# Patient Record
Sex: MALE | Race: WHITE | NOT HISPANIC OR LATINO
[De-identification: names, ages, dates, MRNs, and addresses within clinical notes are randomized per-mention and may not be internally consistent; named-entity substitution may affect disease eponyms.]

---

## 2017-05-04 ENCOUNTER — TRANSCRIPTION ENCOUNTER (OUTPATIENT)
Age: 47
End: 2017-05-04

## 2017-11-23 ENCOUNTER — TRANSCRIPTION ENCOUNTER (OUTPATIENT)
Age: 47
End: 2017-11-23

## 2018-08-14 ENCOUNTER — TRANSCRIPTION ENCOUNTER (OUTPATIENT)
Age: 48
End: 2018-08-14

## 2018-11-03 ENCOUNTER — INPATIENT (INPATIENT)
Facility: HOSPITAL | Age: 48
LOS: 1 days | Discharge: HOME | DRG: 208 | End: 2018-11-05
Attending: INTERNAL MEDICINE | Admitting: INTERNAL MEDICINE
Payer: COMMERCIAL

## 2018-11-03 VITALS
OXYGEN SATURATION: 99 % | HEART RATE: 114 BPM | SYSTOLIC BLOOD PRESSURE: 202 MMHG | RESPIRATION RATE: 18 BRPM | DIASTOLIC BLOOD PRESSURE: 114 MMHG

## 2018-11-03 DIAGNOSIS — Z98.52 VASECTOMY STATUS: Chronic | ICD-10-CM

## 2018-11-03 DIAGNOSIS — Z98.89 OTHER SPECIFIED POSTPROCEDURAL STATES: Chronic | ICD-10-CM

## 2018-11-04 DIAGNOSIS — R41.82 ALTERED MENTAL STATUS, UNSPECIFIED: ICD-10-CM

## 2018-11-04 DIAGNOSIS — R25.1 TREMOR, UNSPECIFIED: ICD-10-CM

## 2018-11-04 DIAGNOSIS — G92 TOXIC ENCEPHALOPATHY: ICD-10-CM

## 2018-11-04 DIAGNOSIS — I10 ESSENTIAL (PRIMARY) HYPERTENSION: ICD-10-CM

## 2018-11-04 DIAGNOSIS — F10.10 ALCOHOL ABUSE, UNCOMPLICATED: ICD-10-CM

## 2018-11-04 DIAGNOSIS — Z98.890 OTHER SPECIFIED POSTPROCEDURAL STATES: Chronic | ICD-10-CM

## 2018-11-04 DIAGNOSIS — J96.01 ACUTE RESPIRATORY FAILURE WITH HYPOXIA: ICD-10-CM

## 2018-11-04 DIAGNOSIS — E78.00 PURE HYPERCHOLESTEROLEMIA, UNSPECIFIED: ICD-10-CM

## 2018-11-04 DIAGNOSIS — F14.10 COCAINE ABUSE, UNCOMPLICATED: ICD-10-CM

## 2018-11-04 DIAGNOSIS — Z29.9 ENCOUNTER FOR PROPHYLACTIC MEASURES, UNSPECIFIED: ICD-10-CM

## 2018-11-04 LAB
ALBUMIN SERPL ELPH-MCNC: 3.2 G/DL — LOW (ref 3.3–5)
ALBUMIN SERPL ELPH-MCNC: 4.1 G/DL — SIGNIFICANT CHANGE UP (ref 3.3–5)
ALP SERPL-CCNC: 55 U/L — SIGNIFICANT CHANGE UP (ref 40–120)
ALP SERPL-CCNC: 74 U/L — SIGNIFICANT CHANGE UP (ref 40–120)
ALT FLD-CCNC: 34 U/L — SIGNIFICANT CHANGE UP (ref 12–78)
ALT FLD-CCNC: 42 U/L — SIGNIFICANT CHANGE UP (ref 12–78)
AMMONIA BLD-MCNC: 40 UMOL/L — HIGH (ref 11–32)
ANION GAP SERPL CALC-SCNC: 11 MMOL/L — SIGNIFICANT CHANGE UP (ref 5–17)
ANION GAP SERPL CALC-SCNC: 6 MMOL/L — SIGNIFICANT CHANGE UP (ref 5–17)
APAP SERPL-MCNC: <2 UG/ML — SIGNIFICANT CHANGE UP (ref 10–30)
APPEARANCE UR: CLEAR — SIGNIFICANT CHANGE UP
APTT BLD: 31.3 SEC — SIGNIFICANT CHANGE UP (ref 28.5–37)
AST SERPL-CCNC: 21 U/L — SIGNIFICANT CHANGE UP (ref 15–37)
AST SERPL-CCNC: 27 U/L — SIGNIFICANT CHANGE UP (ref 15–37)
BASE EXCESS BLDA CALC-SCNC: -2.8 MMOL/L — LOW (ref -2–2)
BASOPHILS # BLD AUTO: 0.03 K/UL — SIGNIFICANT CHANGE UP (ref 0–0.2)
BASOPHILS # BLD AUTO: 0.06 K/UL — SIGNIFICANT CHANGE UP (ref 0–0.2)
BASOPHILS NFR BLD AUTO: 0.3 % — SIGNIFICANT CHANGE UP (ref 0–2)
BASOPHILS NFR BLD AUTO: 0.8 % — SIGNIFICANT CHANGE UP (ref 0–2)
BILIRUB DIRECT SERPL-MCNC: <.1 MG/DL — SIGNIFICANT CHANGE UP (ref 0.05–0.2)
BILIRUB INDIRECT FLD-MCNC: >0.2 MG/DL — SIGNIFICANT CHANGE UP (ref 0.2–1)
BILIRUB SERPL-MCNC: 0.3 MG/DL — SIGNIFICANT CHANGE UP (ref 0.2–1.2)
BILIRUB SERPL-MCNC: 0.3 MG/DL — SIGNIFICANT CHANGE UP (ref 0.2–1.2)
BILIRUB UR-MCNC: NEGATIVE — SIGNIFICANT CHANGE UP
BLOOD GAS COMMENTS ARTERIAL: SIGNIFICANT CHANGE UP
BUN SERPL-MCNC: 9 MG/DL — SIGNIFICANT CHANGE UP (ref 7–23)
BUN SERPL-MCNC: 9 MG/DL — SIGNIFICANT CHANGE UP (ref 7–23)
CALCIUM SERPL-MCNC: 7.5 MG/DL — LOW (ref 8.5–10.1)
CALCIUM SERPL-MCNC: 8.4 MG/DL — LOW (ref 8.5–10.1)
CHLORIDE SERPL-SCNC: 108 MMOL/L — SIGNIFICANT CHANGE UP (ref 96–108)
CHLORIDE SERPL-SCNC: 110 MMOL/L — HIGH (ref 96–108)
CK MB BLD-MCNC: 1 % — SIGNIFICANT CHANGE UP (ref 0–3.5)
CK MB BLD-MCNC: <0.6 % — SIGNIFICANT CHANGE UP (ref 0–3.5)
CK MB CFR SERPL CALC: 1.2 NG/ML — SIGNIFICANT CHANGE UP (ref 0–3.6)
CK MB CFR SERPL CALC: <1 NG/ML — SIGNIFICANT CHANGE UP (ref 0–3.6)
CK SERPL-CCNC: 124 U/L — SIGNIFICANT CHANGE UP (ref 26–308)
CK SERPL-CCNC: 161 U/L — SIGNIFICANT CHANGE UP (ref 26–308)
CO2 SERPL-SCNC: 23 MMOL/L — SIGNIFICANT CHANGE UP (ref 22–31)
CO2 SERPL-SCNC: 28 MMOL/L — SIGNIFICANT CHANGE UP (ref 22–31)
COLOR SPEC: YELLOW — SIGNIFICANT CHANGE UP
CREAT SERPL-MCNC: 0.96 MG/DL — SIGNIFICANT CHANGE UP (ref 0.5–1.3)
CREAT SERPL-MCNC: 1 MG/DL — SIGNIFICANT CHANGE UP (ref 0.5–1.3)
DIFF PNL FLD: NEGATIVE — SIGNIFICANT CHANGE UP
EOSINOPHIL # BLD AUTO: 0.09 K/UL — SIGNIFICANT CHANGE UP (ref 0–0.5)
EOSINOPHIL # BLD AUTO: 0.13 K/UL — SIGNIFICANT CHANGE UP (ref 0–0.5)
EOSINOPHIL NFR BLD AUTO: 1 % — SIGNIFICANT CHANGE UP (ref 0–6)
EOSINOPHIL NFR BLD AUTO: 1.7 % — SIGNIFICANT CHANGE UP (ref 0–6)
ETHANOL SERPL-MCNC: 256 MG/DL — HIGH (ref 0–10)
GLUCOSE SERPL-MCNC: 88 MG/DL — SIGNIFICANT CHANGE UP (ref 70–99)
GLUCOSE SERPL-MCNC: 96 MG/DL — SIGNIFICANT CHANGE UP (ref 70–99)
GLUCOSE UR QL: NEGATIVE — SIGNIFICANT CHANGE UP
GRAM STN FLD: SIGNIFICANT CHANGE UP
HAV IGM SER-ACNC: SIGNIFICANT CHANGE UP
HBV CORE IGM SER-ACNC: SIGNIFICANT CHANGE UP
HBV SURFACE AG SER-ACNC: SIGNIFICANT CHANGE UP
HCO3 BLDA-SCNC: 21 MMOL/L — LOW (ref 23–27)
HCT VFR BLD CALC: 49.1 % — SIGNIFICANT CHANGE UP (ref 39–50)
HCT VFR BLD CALC: 55.4 % — HIGH (ref 39–50)
HCV AB S/CO SERPL IA: 0.06 S/CO — SIGNIFICANT CHANGE UP
HCV AB SERPL-IMP: SIGNIFICANT CHANGE UP
HGB BLD-MCNC: 16.2 G/DL — SIGNIFICANT CHANGE UP (ref 13–17)
HGB BLD-MCNC: 18.9 G/DL — HIGH (ref 13–17)
HIV 1+2 AB+HIV1 P24 AG SERPL QL IA: SIGNIFICANT CHANGE UP
HOROWITZ INDEX BLDA+IHG-RTO: 60 — SIGNIFICANT CHANGE UP
IMM GRANULOCYTES NFR BLD AUTO: 0.3 % — SIGNIFICANT CHANGE UP (ref 0–1.5)
IMM GRANULOCYTES NFR BLD AUTO: 0.4 % — SIGNIFICANT CHANGE UP (ref 0–1.5)
INR BLD: 0.96 RATIO — SIGNIFICANT CHANGE UP (ref 0.88–1.16)
KETONES UR-MCNC: ABNORMAL
LACTATE SERPL-SCNC: 2.1 MMOL/L — HIGH (ref 0.7–2)
LACTATE SERPL-SCNC: 3 MMOL/L — HIGH (ref 0.7–2)
LEGIONELLA AG UR QL: NEGATIVE — SIGNIFICANT CHANGE UP
LEUKOCYTE ESTERASE UR-ACNC: NEGATIVE — SIGNIFICANT CHANGE UP
LYMPHOCYTES # BLD AUTO: 1.37 K/UL — SIGNIFICANT CHANGE UP (ref 1–3.3)
LYMPHOCYTES # BLD AUTO: 15.3 % — SIGNIFICANT CHANGE UP (ref 13–44)
LYMPHOCYTES # BLD AUTO: 3.67 K/UL — HIGH (ref 1–3.3)
LYMPHOCYTES # BLD AUTO: 47.3 % — HIGH (ref 13–44)
MAGNESIUM SERPL-MCNC: 2 MG/DL — SIGNIFICANT CHANGE UP (ref 1.6–2.6)
MAGNESIUM SERPL-MCNC: 2.2 MG/DL — SIGNIFICANT CHANGE UP (ref 1.6–2.6)
MCHC RBC-ENTMCNC: 29.7 PG — SIGNIFICANT CHANGE UP (ref 27–34)
MCHC RBC-ENTMCNC: 30 PG — SIGNIFICANT CHANGE UP (ref 27–34)
MCHC RBC-ENTMCNC: 33 GM/DL — SIGNIFICANT CHANGE UP (ref 32–36)
MCHC RBC-ENTMCNC: 34.1 GM/DL — SIGNIFICANT CHANGE UP (ref 32–36)
MCV RBC AUTO: 88.1 FL — SIGNIFICANT CHANGE UP (ref 80–100)
MCV RBC AUTO: 90.1 FL — SIGNIFICANT CHANGE UP (ref 80–100)
MONOCYTES # BLD AUTO: 0.82 K/UL — SIGNIFICANT CHANGE UP (ref 0–0.9)
MONOCYTES # BLD AUTO: 1.01 K/UL — HIGH (ref 0–0.9)
MONOCYTES NFR BLD AUTO: 10.6 % — SIGNIFICANT CHANGE UP (ref 2–14)
MONOCYTES NFR BLD AUTO: 11.3 % — SIGNIFICANT CHANGE UP (ref 2–14)
NEUTROPHILS # BLD AUTO: 3.05 K/UL — SIGNIFICANT CHANGE UP (ref 1.8–7.4)
NEUTROPHILS # BLD AUTO: 6.43 K/UL — SIGNIFICANT CHANGE UP (ref 1.8–7.4)
NEUTROPHILS NFR BLD AUTO: 39.2 % — LOW (ref 43–77)
NEUTROPHILS NFR BLD AUTO: 71.8 % — SIGNIFICANT CHANGE UP (ref 43–77)
NITRITE UR-MCNC: NEGATIVE — SIGNIFICANT CHANGE UP
NRBC # BLD: 0 /100 WBCS — SIGNIFICANT CHANGE UP (ref 0–0)
PCO2 BLDA: 47 MMHG — HIGH (ref 32–46)
PH BLDA: 7.3 — LOW (ref 7.35–7.45)
PH UR: 5 — SIGNIFICANT CHANGE UP (ref 5–8)
PHOSPHATE SERPL-MCNC: 1.6 MG/DL — LOW (ref 2.5–4.5)
PLATELET # BLD AUTO: 270 K/UL — SIGNIFICANT CHANGE UP (ref 150–400)
PLATELET # BLD AUTO: 331 K/UL — SIGNIFICANT CHANGE UP (ref 150–400)
PO2 BLDA: 133 MMHG — HIGH (ref 74–108)
POTASSIUM SERPL-MCNC: 4 MMOL/L — SIGNIFICANT CHANGE UP (ref 3.5–5.3)
POTASSIUM SERPL-MCNC: 4.1 MMOL/L — SIGNIFICANT CHANGE UP (ref 3.5–5.3)
POTASSIUM SERPL-SCNC: 4 MMOL/L — SIGNIFICANT CHANGE UP (ref 3.5–5.3)
POTASSIUM SERPL-SCNC: 4.1 MMOL/L — SIGNIFICANT CHANGE UP (ref 3.5–5.3)
PROCALCITONIN SERPL-MCNC: <0.05 — SIGNIFICANT CHANGE UP (ref 0–0.04)
PROT SERPL-MCNC: 6.6 G/DL — SIGNIFICANT CHANGE UP (ref 6–8.3)
PROT SERPL-MCNC: 8.5 G/DL — HIGH (ref 6–8.3)
PROT UR-MCNC: 25 MG/DL
PROTHROM AB SERPL-ACNC: 10.9 SEC — SIGNIFICANT CHANGE UP (ref 10–12.9)
RBC # BLD: 5.45 M/UL — SIGNIFICANT CHANGE UP (ref 4.2–5.8)
RBC # BLD: 6.29 M/UL — HIGH (ref 4.2–5.8)
RBC # FLD: 12.6 % — SIGNIFICANT CHANGE UP (ref 10.3–14.5)
RBC # FLD: 12.6 % — SIGNIFICANT CHANGE UP (ref 10.3–14.5)
SALICYLATES SERPL-MCNC: <1.7 MG/DL — LOW (ref 2.8–20)
SAO2 % BLDA: 98 % — HIGH (ref 92–96)
SODIUM SERPL-SCNC: 142 MMOL/L — SIGNIFICANT CHANGE UP (ref 135–145)
SODIUM SERPL-SCNC: 144 MMOL/L — SIGNIFICANT CHANGE UP (ref 135–145)
SP GR SPEC: 1.01 — SIGNIFICANT CHANGE UP (ref 1.01–1.02)
SPECIMEN SOURCE: SIGNIFICANT CHANGE UP
T4 AB SER-ACNC: 6.6 UG/DL — SIGNIFICANT CHANGE UP (ref 4.6–12)
TROPONIN I SERPL-MCNC: <.015 NG/ML — SIGNIFICANT CHANGE UP (ref 0.01–0.04)
TROPONIN I SERPL-MCNC: <.015 NG/ML — SIGNIFICANT CHANGE UP (ref 0.01–0.04)
TSH SERPL-MCNC: 1.41 UIU/ML — SIGNIFICANT CHANGE UP (ref 0.36–3.74)
UROBILINOGEN FLD QL: NEGATIVE — SIGNIFICANT CHANGE UP
WBC # BLD: 7.76 K/UL — SIGNIFICANT CHANGE UP (ref 3.8–10.5)
WBC # BLD: 8.96 K/UL — SIGNIFICANT CHANGE UP (ref 3.8–10.5)
WBC # FLD AUTO: 7.76 K/UL — SIGNIFICANT CHANGE UP (ref 3.8–10.5)
WBC # FLD AUTO: 8.96 K/UL — SIGNIFICANT CHANGE UP (ref 3.8–10.5)

## 2018-11-04 PROCEDURE — 99291 CRITICAL CARE FIRST HOUR: CPT

## 2018-11-04 PROCEDURE — 71275 CT ANGIOGRAPHY CHEST: CPT | Mod: 26

## 2018-11-04 PROCEDURE — 99222 1ST HOSP IP/OBS MODERATE 55: CPT | Mod: AI,GC

## 2018-11-04 PROCEDURE — 71045 X-RAY EXAM CHEST 1 VIEW: CPT | Mod: 26

## 2018-11-04 PROCEDURE — 70450 CT HEAD/BRAIN W/O DYE: CPT | Mod: 26

## 2018-11-04 PROCEDURE — 74177 CT ABD & PELVIS W/CONTRAST: CPT | Mod: 26

## 2018-11-04 PROCEDURE — 93010 ELECTROCARDIOGRAM REPORT: CPT

## 2018-11-04 RX ORDER — SODIUM CHLORIDE 9 MG/ML
1000 INJECTION, SOLUTION INTRAVENOUS
Qty: 0 | Refills: 0 | Status: DISCONTINUED | OUTPATIENT
Start: 2018-11-04 | End: 2018-11-04

## 2018-11-04 RX ORDER — ETOMIDATE 2 MG/ML
20 INJECTION INTRAVENOUS ONCE
Qty: 0 | Refills: 0 | Status: COMPLETED | OUTPATIENT
Start: 2018-11-04 | End: 2018-11-04

## 2018-11-04 RX ORDER — FOLIC ACID 0.8 MG
1 TABLET ORAL DAILY
Qty: 0 | Refills: 0 | Status: DISCONTINUED | OUTPATIENT
Start: 2018-11-04 | End: 2018-11-05

## 2018-11-04 RX ORDER — AZITHROMYCIN 500 MG/1
500 TABLET, FILM COATED ORAL EVERY 24 HOURS
Qty: 0 | Refills: 0 | Status: DISCONTINUED | OUTPATIENT
Start: 2018-11-05 | End: 2018-11-04

## 2018-11-04 RX ORDER — PIPERACILLIN AND TAZOBACTAM 4; .5 G/20ML; G/20ML
3.38 INJECTION, POWDER, LYOPHILIZED, FOR SOLUTION INTRAVENOUS ONCE
Qty: 0 | Refills: 0 | Status: COMPLETED | OUTPATIENT
Start: 2018-11-04 | End: 2018-11-04

## 2018-11-04 RX ORDER — HEPARIN SODIUM 5000 [USP'U]/ML
5000 INJECTION INTRAVENOUS; SUBCUTANEOUS EVERY 8 HOURS
Qty: 0 | Refills: 0 | Status: DISCONTINUED | OUTPATIENT
Start: 2018-11-04 | End: 2018-11-05

## 2018-11-04 RX ORDER — ATORVASTATIN CALCIUM 80 MG/1
20 TABLET, FILM COATED ORAL AT BEDTIME
Qty: 0 | Refills: 0 | Status: DISCONTINUED | OUTPATIENT
Start: 2018-11-04 | End: 2018-11-05

## 2018-11-04 RX ORDER — MIDAZOLAM HYDROCHLORIDE 1 MG/ML
2 INJECTION, SOLUTION INTRAMUSCULAR; INTRAVENOUS
Qty: 0 | Refills: 0 | Status: DISCONTINUED | OUTPATIENT
Start: 2018-11-04 | End: 2018-11-05

## 2018-11-04 RX ORDER — SODIUM CHLORIDE 9 MG/ML
1000 INJECTION INTRAMUSCULAR; INTRAVENOUS; SUBCUTANEOUS ONCE
Qty: 0 | Refills: 0 | Status: COMPLETED | OUTPATIENT
Start: 2018-11-04 | End: 2018-11-04

## 2018-11-04 RX ORDER — SODIUM CHLORIDE 9 MG/ML
1000 INJECTION, SOLUTION INTRAVENOUS ONCE
Qty: 0 | Refills: 0 | Status: COMPLETED | OUTPATIENT
Start: 2018-11-04 | End: 2018-11-04

## 2018-11-04 RX ORDER — THIAMINE MONONITRATE (VIT B1) 100 MG
100 TABLET ORAL DAILY
Qty: 0 | Refills: 0 | Status: DISCONTINUED | OUTPATIENT
Start: 2018-11-04 | End: 2018-11-05

## 2018-11-04 RX ORDER — PIPERACILLIN AND TAZOBACTAM 4; .5 G/20ML; G/20ML
3.38 INJECTION, POWDER, LYOPHILIZED, FOR SOLUTION INTRAVENOUS EVERY 8 HOURS
Qty: 0 | Refills: 0 | Status: DISCONTINUED | OUTPATIENT
Start: 2018-11-04 | End: 2018-11-04

## 2018-11-04 RX ORDER — CIPROFLOXACIN AND DEXAMETHASONE 3; 1 MG/ML; MG/ML
4 SUSPENSION/ DROPS AURICULAR (OTIC)
Qty: 0 | Refills: 0 | COMMUNITY

## 2018-11-04 RX ORDER — AZITHROMYCIN 500 MG/1
500 TABLET, FILM COATED ORAL ONCE
Qty: 0 | Refills: 0 | Status: COMPLETED | OUTPATIENT
Start: 2018-11-04 | End: 2018-11-04

## 2018-11-04 RX ORDER — LANOLIN ALCOHOL/MO/W.PET/CERES
3 CREAM (GRAM) TOPICAL AT BEDTIME
Qty: 0 | Refills: 0 | Status: DISCONTINUED | OUTPATIENT
Start: 2018-11-04 | End: 2018-11-05

## 2018-11-04 RX ORDER — CYCLOBENZAPRINE HYDROCHLORIDE 10 MG/1
10 TABLET, FILM COATED ORAL
Qty: 0 | Refills: 0 | COMMUNITY

## 2018-11-04 RX ORDER — PANTOPRAZOLE SODIUM 20 MG/1
40 TABLET, DELAYED RELEASE ORAL DAILY
Qty: 0 | Refills: 0 | Status: DISCONTINUED | OUTPATIENT
Start: 2018-11-04 | End: 2018-11-04

## 2018-11-04 RX ORDER — LISINOPRIL 2.5 MG/1
10 TABLET ORAL DAILY
Qty: 0 | Refills: 0 | Status: DISCONTINUED | OUTPATIENT
Start: 2018-11-04 | End: 2018-11-05

## 2018-11-04 RX ORDER — ROCURONIUM BROMIDE 10 MG/ML
100 VIAL (ML) INTRAVENOUS ONCE
Qty: 0 | Refills: 0 | Status: COMPLETED | OUTPATIENT
Start: 2018-11-04 | End: 2018-11-04

## 2018-11-04 RX ORDER — MIDAZOLAM HYDROCHLORIDE 1 MG/ML
2 INJECTION, SOLUTION INTRAMUSCULAR; INTRAVENOUS ONCE
Qty: 0 | Refills: 0 | Status: DISCONTINUED | OUTPATIENT
Start: 2018-11-04 | End: 2018-11-04

## 2018-11-04 RX ORDER — PROPOFOL 10 MG/ML
5 INJECTION, EMULSION INTRAVENOUS
Qty: 1000 | Refills: 0 | Status: DISCONTINUED | OUTPATIENT
Start: 2018-11-04 | End: 2018-11-04

## 2018-11-04 RX ORDER — POTASSIUM CHLORIDE 20 MEQ
1 PACKET (EA) ORAL
Qty: 0 | Refills: 0 | COMMUNITY

## 2018-11-04 RX ADMIN — MIDAZOLAM HYDROCHLORIDE 2 MILLIGRAM(S): 1 INJECTION, SOLUTION INTRAMUSCULAR; INTRAVENOUS at 06:30

## 2018-11-04 RX ADMIN — HEPARIN SODIUM 5000 UNIT(S): 5000 INJECTION INTRAVENOUS; SUBCUTANEOUS at 13:31

## 2018-11-04 RX ADMIN — LISINOPRIL 10 MILLIGRAM(S): 2.5 TABLET ORAL at 10:21

## 2018-11-04 RX ADMIN — PIPERACILLIN AND TAZOBACTAM 25 GRAM(S): 4; .5 INJECTION, POWDER, LYOPHILIZED, FOR SOLUTION INTRAVENOUS at 08:32

## 2018-11-04 RX ADMIN — PROPOFOL 5 MICROGRAM(S)/KG/MIN: 10 INJECTION, EMULSION INTRAVENOUS at 01:23

## 2018-11-04 RX ADMIN — SODIUM CHLORIDE 2000 MILLILITER(S): 9 INJECTION INTRAMUSCULAR; INTRAVENOUS; SUBCUTANEOUS at 00:29

## 2018-11-04 RX ADMIN — SODIUM CHLORIDE 125 MILLILITER(S): 9 INJECTION, SOLUTION INTRAVENOUS at 03:50

## 2018-11-04 RX ADMIN — HEPARIN SODIUM 5000 UNIT(S): 5000 INJECTION INTRAVENOUS; SUBCUTANEOUS at 22:03

## 2018-11-04 RX ADMIN — HEPARIN SODIUM 5000 UNIT(S): 5000 INJECTION INTRAVENOUS; SUBCUTANEOUS at 05:19

## 2018-11-04 RX ADMIN — ETOMIDATE 20 MILLIGRAM(S): 2 INJECTION INTRAVENOUS at 01:53

## 2018-11-04 RX ADMIN — SODIUM CHLORIDE 1000 MILLILITER(S): 9 INJECTION INTRAMUSCULAR; INTRAVENOUS; SUBCUTANEOUS at 01:23

## 2018-11-04 RX ADMIN — PIPERACILLIN AND TAZOBACTAM 200 GRAM(S): 4; .5 INJECTION, POWDER, LYOPHILIZED, FOR SOLUTION INTRAVENOUS at 01:10

## 2018-11-04 RX ADMIN — Medication 85 MILLIMOLE(S): at 10:52

## 2018-11-04 RX ADMIN — Medication 100 MILLIGRAM(S): at 10:16

## 2018-11-04 RX ADMIN — Medication 1 TABLET(S): at 10:16

## 2018-11-04 RX ADMIN — AZITHROMYCIN 255 MILLIGRAM(S): 500 TABLET, FILM COATED ORAL at 04:23

## 2018-11-04 RX ADMIN — PROPOFOL 3.39 MICROGRAM(S)/KG/MIN: 10 INJECTION, EMULSION INTRAVENOUS at 00:29

## 2018-11-04 RX ADMIN — ATORVASTATIN CALCIUM 20 MILLIGRAM(S): 80 TABLET, FILM COATED ORAL at 22:02

## 2018-11-04 RX ADMIN — Medication 1 MILLIGRAM(S): at 10:16

## 2018-11-04 RX ADMIN — PROPOFOL 3.39 MICROGRAM(S)/KG/MIN: 10 INJECTION, EMULSION INTRAVENOUS at 05:23

## 2018-11-04 RX ADMIN — Medication 20 MILLIGRAM(S): at 13:31

## 2018-11-04 RX ADMIN — PIPERACILLIN AND TAZOBACTAM 3.38 GRAM(S): 4; .5 INJECTION, POWDER, LYOPHILIZED, FOR SOLUTION INTRAVENOUS at 01:40

## 2018-11-04 RX ADMIN — MIDAZOLAM HYDROCHLORIDE 2 MILLIGRAM(S): 1 INJECTION, SOLUTION INTRAMUSCULAR; INTRAVENOUS at 01:53

## 2018-11-04 RX ADMIN — SODIUM CHLORIDE 2000 MILLILITER(S): 9 INJECTION INTRAMUSCULAR; INTRAVENOUS; SUBCUTANEOUS at 00:30

## 2018-11-04 RX ADMIN — Medication 100 MILLIGRAM(S): at 01:53

## 2018-11-04 RX ADMIN — SODIUM CHLORIDE 2000 MILLILITER(S): 9 INJECTION, SOLUTION INTRAVENOUS at 16:38

## 2018-11-04 NOTE — PROVIDER CONTACT NOTE (EICU) - BACKGROUND
48 year old male with PMH of HTN and hyperlipidemia with episodes of body shaking and loosing consciousness secondary to alcohol and cocaine intoxication.

## 2018-11-04 NOTE — H&P ADULT - ATTENDING COMMENTS
47 yo male w/ hx of alcohol abuse and recent drinking binge (unknown quantity), p/w episodes of shaking w/ waxing and waning consciousness, found to have blood alcohol level 256, ammonia 40, lactate 3, cocaine + on drug screen,  admitted with alcoholic intoxication a/w coma/unconsciousness; intubated in ER    Plan  Outlines:    - Admit to ICU  - Details of management plan as per ICU team   - I have personally seen and examined the patient.  - Please see detailed History & Physical document above for additional / complete details regarding history, examination, assessment, plan - these were discussed and formulated together with the resident and I am in agreement.  - Prognosis: guarded  - Plan of care / treatment  and benefits / risks / alternatives  were explained in detail to the  FAMILY; understanding verbalized and they are in agreement. All questions were answered to their satisfaction.

## 2018-11-04 NOTE — PROCEDURE NOTE - ADDITIONAL PROCEDURE DETAILS
48M with AMS, tremors, intubated for airway protection/apneic episodes. NGT placed for medications/TF's

## 2018-11-04 NOTE — ED ADULT NURSE NOTE - NSIMPLEMENTINTERV_GEN_ALL_ED
Implemented All Fall Risk Interventions:  [place] to call system. Call bell, personal items and telephone within reach. Instruct patient to call for assistance. Room bathroom lighting operational. Non-slip footwear when patient is off stretcher. Physically safe environment: no spills, clutter or unnecessary equipment. Stretcher in lowest position, wheels locked, appropriate side rails in place. Provide visual cue, wrist band, yellow gown, etc. Monitor gait and stability. Monitor for mental status changes and reorient to person, place, and time. Review medications for side effects contributing to fall risk. Reinforce activity limits and safety measures with patient and family.

## 2018-11-04 NOTE — DIETITIAN INITIAL EVALUATION ADULT. - NUTRITION INTERVENTION
Nutrition Education/Meals and Snack/Enteral Nutrition Vitamin/Meals and Snack/Enteral Nutrition/Nutrition Education

## 2018-11-04 NOTE — ED PROVIDER NOTE - OBJECTIVE STATEMENT
Pt is a 47 yo male hx etoh abuse, occasional marijuana.  pt has been traveling lately and came home today . pt had been drinking and then went out with friends and drank more. per father, pt began with right arm shaking, then had both arms shaking but awake. pt then began to be a bit confused and they got him in car and on way to er became unconsious. on arrival lethargic and with seizure like activity but awake and answeriing questions, but confused.

## 2018-11-04 NOTE — ED ADULT NURSE NOTE - OBJECTIVE STATEMENT
As per pt father, he began with right arm shaking, then had both arms shaking but awake with confusion. Father states they got him in a  car and drove him here and on the way to the  ER Pt became unconscious. On arrival pt was unresponsive, and a few minutes after pt responded but lethargic with seizure like activity but awake. Iv placed, lab sent. Pt was intubated at 00:03 am, lip 24cm. normal sinus on the monitor. Vss, afebrile. will continue to monitor

## 2018-11-04 NOTE — PROGRESS NOTE ADULT - SUBJECTIVE AND OBJECTIVE BOX
HISTORY  48y Male    24 HOUR EVENTS:    SUBJECTIVE/ROS:  [ ] A ten-point review of systems was otherwise negative except as noted.  [ ] Due to altered mental status/intubation, subjective information were not able to be obtained from the patient. History was obtained, to the extent possible, from review of the chart and collateral sources of information.      NEURO  RASS:     GCS:     CAM ICU:  Exam:   Meds: midazolam Injectable 2 milliGRAM(s) IV Push every 2 hours PRN agitation  propofol Infusion 5 MICROgram(s)/kG/Min IV Continuous <Continuous>    [x] Adequacy of sedation and pain control has been assessed and adjusted      RESPIRATORY  RR: 11 (11-04-18 @ 08:00) (11 - 24)  SpO2: 96% (11-04-18 @ 08:24) (94% - 100%)  Wt(kg): --  Exam: unlabored, clear to auscultation bilaterally  Mechanical Ventilation: Mode: Spontaneous/ CPAP (Continuous Positive Airway Pressure), RR (patient): 17, FiO2: 40, PEEP: 5, PS: 10, MAP: 9, PIP: 16  ABG - ( 04 Nov 2018 02:39 )  pH: x     /  pCO2: x     /  pO2: x     / HCO3: x     / Base Excess: x     /  SaO2: x       Lactate: 2.1              [ ] Extubation Readiness Assessed  Meds:       CARDIOVASCULAR  HR: 87 (11-04-18 @ 08:24) (80 - 114)  BP: 106/64 (11-04-18 @ 08:00) (104/58 - 202/114)  BP(mean): 78 (11-04-18 @ 08:00) (75 - 106)  ABP: --  ABP(mean): --  Wt(kg): --  CVP(cm H2O): --    Lactate, Blood: 2.1 mmol/L (11-04 @ 02:39)    Exam:  Cardiac Rhythm:  Perfusion     [ ]Adequate   [ ]Inadequate  Mentation   [ ]Normal       [ ]Reduced  Extremities  [ ]Warm         [ ]Cool  Volume Status [ ]Hypervolemic [ ]Euvolemic [ ]Hypovolemic  Meds:       GI/NUTRITION  Exam:  Diet:  Meds: pantoprazole   Suspension 40 milliGRAM(s) Oral daily      GENITOURINARY  I&O's Detail    11-03 @ 08:01  -  11-04 @ 07:00  --------------------------------------------------------  IN:    IV PiggyBack: 250 mL    lactated ringers.: 500 mL    propofol Infusion: 131.6 mL  Total IN: 881.6 mL    OUT:    Indwelling Catheter - Urethral: 1075 mL  Total OUT: 1075 mL    Total NET: -193.4 mL      11-04 @ 07:01  -  11-04 @ 09:39  --------------------------------------------------------  IN:  Total IN: 0 mL    OUT:    Indwelling Catheter - Urethral: 75 mL  Total OUT: 75 mL    Total NET: -75 mL        Weight (kg): 116.7 (11-04 @ 03:23)  11-04    144  |  110<H>  |  9   ----------------------------<  88  4.0   |  28  |  0.96    Ca    7.5<L>      04 Nov 2018 08:59  Phos  1.6     11-04  Mg     2.0     11-04    TPro  6.6  /  Alb  3.2<L>  /  TBili  0.3  /  DBili  <.10  /  AST  21  /  ALT  34  /  AlkPhos  55  11-04    [ ] Mota catheter, indication: N/A  Meds: folic acid 1 milliGRAM(s) Oral daily  multivitamin 1 Tablet(s) Oral daily  sodium phosphate IVPB 30 milliMole(s) IV Intermittent once  thiamine 100 milliGRAM(s) Oral daily        HEMATOLOGIC  Meds: heparin  Injectable 5000 Unit(s) SubCutaneous every 8 hours    [x] VTE Prophylaxis                        16.2   8.96  )-----------( 270      ( 04 Nov 2018 08:59 )             49.1     PT/INR - ( 04 Nov 2018 00:00 )   PT: 10.9 sec;   INR: 0.96 ratio         PTT - ( 04 Nov 2018 00:00 )  PTT:31.3 sec  Transfusion     [ ] PRBC   [ ] Platelets   [ ] FFP   [ ] Cryoprecipitate      INFECTIOUS DISEASES  T(C): 36.3 (11-04-18 @ 08:11), Max: 36.7 (11-04-18 @ 02:18)  Wt(kg): --  WBC Count: 8.96 K/uL (11-04 @ 08:59)  WBC Count: 7.76 K/uL (11-04 @ 00:00)    Recent Cultures:    Meds: azithromycin  IVPB 500 milliGRAM(s) IV Intermittent every 24 hours  piperacillin/tazobactam IVPB. 3.375 Gram(s) IV Intermittent every 8 hours        ENDOCRINE  Capillary Blood Glucose    Meds:       ACCESS DEVICES:  [ ] Peripheral IV  [ ] Central Venous Line	[ ] R	[ ] L	[ ] IJ	[ ] Fem	[ ] SC	Placed:   [ ] Arterial Line		[ ] R	[ ] L	[ ] Fem	[ ] Rad	[ ] Ax	Placed:   [ ] PICC:					[ ] Mediport  [ ] Urinary Catheter, Date Placed:   [ ] Necessity of urinary, arterial, and venous catheters discussed    OTHER MEDICATIONS:      CODE STATUS:     IMAGING: HISTORY  HPI:  Patient intubated and sedated, however wife at bedside and provided history.     49 yo male w/ hx of alcohol abuse, p/w episodes of shaking w/ waxing and waning consciousness.     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 On his return, he met friends and continued to drink with them, mostly beer and vodka, though his wife is unsure of how much/if he ingested other drugs. Per wife, after returning home on the day of admission, patient watched TV alone (wife was upstairs sleeping), and noticed that both his arms started to shake. He remained conscious during this time. He then went upstairs to sleep, and his wife noticed continued shaking movements in the upper extremities. He then quickly became intermittently unresponsive and the decision was made to present to the ED.     In the ED, patient also exhibited these same symptoms of upper extremity shaking and intermittent consciousness throughout the episode. Last drink was 10 PM 11/3. Vitals , /102, RR 24, Sp02 100% (intubated, , Fi02 60%, PEEP 5, RR 14). Labs notable for Hgb 18.9, Hct 55.4, ammonia 40, lactate 3, ABG 7.3, pC02 47, p02 133 (while intubated), blood alcohol level 256. CT abdomen pelvis, CT chest and CT head negative for acute processes.     Of note, patient's wife knows he sees cardiologist (Dr. Blackburn prescribed patient amlodipine so possibly him), though doesn't know his past medical conditions. Also of note, patient's wife noted he recently saw a doctor for swelling in his neck and legs, which she attributes to his recent and frequent travel, and was prescribed a "water pill". (04 Nov 2018 01:32)    24 HOUR EVENTS:  Patient transitioned to PSV this AM. Sedation held. He is able to make his needs known. Wife at the bedside and corroborates hx as described above. He is awaiting extubation at this time     PAST MEDICAL & SURGICAL HISTORY:  High cholesterol  H/O hernia repair    Home Medications:  amLODIPine 5 mg oral tablet: 1 tab(s) orally once a day (04 Nov 2018 02:12)  cefadroxil: 500 milligram(s) orally once a day (04 Nov 2018 02:12)  Cialis 20 mg oral tablet: 1 tab(s) orally once a day (04 Nov 2018 02:12)  Ciprodex 0.3%-0.1% otic suspension: 4 drop(s) to each affected ear 2 times a day (04 Nov 2018 02:12)  cyclobenzaprine: 10 milligram(s) orally once a day (04 Nov 2018 02:12)  furosemide 20 mg oral tablet: 1 tab(s) orally once a day (04 Nov 2018 02:12)  Klor-Con 10 oral tablet, extended release: 1 tab(s) orally once a day (04 Nov 2018 02:12)  PARoxetine: 20 milligram(s) orally once a day (04 Nov 2018 02:12)  rosuvastatin 10 mg oral tablet: 1 tab(s) orally once a day (at bedtime) (04 Nov 2018 02:12)  testosterone: 200 milligram(s) intramuscularly every 2 weeks (04 Nov 2018 02:12)    Allergies    No Known Allergies    Intolerances      SUBJECTIVE/ROS:  [ ] A ten-point review of systems was otherwise negative except as noted.  [x ] Due to altered mental status/intubation, subjective information were not able to be obtained from the patient. History was obtained, to the extent possible, from review of the chart and collateral sources of information.      NEURO  RASS:  0   GCS:11T     CAM ICU: negative  Exam: Alert and following commands. no focal deficits  Meds: midazolam Injectable 2 milliGRAM(s) IV Push every 2 hours PRN agitation  propofol Infusion 5 MICROgram(s)/kG/Min IV Continuous <Continuous>    [x] Adequacy of sedation and pain control has been assessed and adjusted      RESPIRATORY  RR: 11 (11-04-18 @ 08:00) (11 - 24)  SpO2: 96% (11-04-18 @ 08:24) (94% - 100%)  Wt(kg): --  Exam: unlabored, clear to auscultation bilaterally  Mechanical Ventilation: Mode: Spontaneous/ CPAP (Continuous Positive Airway Pressure), RR (patient): 17, FiO2: 40, PEEP: 5, PS: 10, MAP: 9, PIP: 16  ABG - ( 04 Nov 2018 02:39 )  pH: x     /  pCO2: x     /  pO2: x     / HCO3: x     / Base Excess: x     /  SaO2: x       Lactate: 2.1      [NA ] Extubation Readiness Assessed  Meds:     CARDIOVASCULAR  HR: 87 (11-04-18 @ 08:24) (80 - 114)  BP: 106/64 (11-04-18 @ 08:00) (104/58 - 202/114)  BP(mean): 78 (11-04-18 @ 08:00) (75 - 106)  ABP: --  ABP(mean): --  Wt(kg): --  CVP(cm H2O): --    Lactate, Blood: 2.1 mmol/L (11-04 @ 02:39)    Exam: S1S2  Cardiac Rhythm: NSR  Perfusion     [x ]Adequate   [ ]Inadequate  Mentation   [ x]Normal       [ ]Reduced  Extremities  [ x]Warm         [ ]Cool  Volume Status [ ]Hypervolemic [ x]Euvolemic [ ]Hypovolemic  Meds:       GI/NUTRITION  Exam: soft, nt nd, no guarding or rebound  Diet: NPO  Meds: pantoprazole   Suspension 40 milliGRAM(s) Oral daily      GENITOURINARY  I&O's Detail    11-03 @ 08:01  -  11-04 @ 07:00  --------------------------------------------------------  IN:    IV PiggyBack: 250 mL    lactated ringers.: 500 mL    propofol Infusion: 131.6 mL  Total IN: 881.6 mL    OUT:    Indwelling Catheter - Urethral: 1075 mL  Total OUT: 1075 mL    Total NET: -193.4 mL      11-04 @ 07:01  -  11-04 @ 09:39  --------------------------------------------------------  IN:  Total IN: 0 mL    OUT:    Indwelling Catheter - Urethral: 75 mL  Total OUT: 75 mL    Total NET: -75 mL        Weight (kg): 116.7 (11-04 @ 03:23)  11-04    144  |  110<H>  |  9   ----------------------------<  88  4.0   |  28  |  0.96    Ca    7.5<L>      04 Nov 2018 08:59  Phos  1.6     11-04  Mg     2.0     11-04    TPro  6.6  /  Alb  3.2<L>  /  TBili  0.3  /  DBili  <.10  /  AST  21  /  ALT  34  /  AlkPhos  55  11-04    [ ] Mota catheter, indication: N/A  Meds: folic acid 1 milliGRAM(s) Oral daily  multivitamin 1 Tablet(s) Oral daily  sodium phosphate IVPB 30 milliMole(s) IV Intermittent once  thiamine 100 milliGRAM(s) Oral daily        HEMATOLOGIC  Meds: heparin  Injectable 5000 Unit(s) SubCutaneous every 8 hours    [x] VTE Prophylaxis                        16.2   8.96  )-----------( 270      ( 04 Nov 2018 08:59 )             49.1     PT/INR - ( 04 Nov 2018 00:00 )   PT: 10.9 sec;   INR: 0.96 ratio         PTT - ( 04 Nov 2018 00:00 )  PTT:31.3 sec  Transfusion     [ ] PRBC   [ ] Platelets   [ ] FFP   [ ] Cryoprecipitate      INFECTIOUS DISEASES  T(C): 36.3 (11-04-18 @ 08:11), Max: 36.7 (11-04-18 @ 02:18)  Wt(kg): --  WBC Count: 8.96 K/uL (11-04 @ 08:59)  WBC Count: 7.76 K/uL (11-04 @ 00:00)    Recent Cultures:    Meds: azithromycin  IVPB 500 milliGRAM(s) IV Intermittent every 24 hours  piperacillin/tazobactam IVPB. 3.375 Gram(s) IV Intermittent every 8 hours        ENDOCRINE  Capillary Blood Glucose    Meds:       ACCESS DEVICES:  [ ] Peripheral IV  [ ] Central Venous Line	[ ] R	[ ] L	[ ] IJ	[ ] Fem	[ ] SC	Placed:   [ ] Arterial Line		[ ] R	[ ] L	[ ] Fem	[ ] Rad	[ ] Ax	Placed:   [ ] PICC:					[ ] Mediport  [ ] Urinary Catheter, Date Placed:   [ ] Necessity of urinary, arterial, and venous catheters discussed    OTHER MEDICATIONS:      CODE STATUS:     IMAGING: HISTORY  HPI:  Patient intubated and sedated, however wife at bedside and provided history.     49 yo male w/ hx of alcohol abuse, p/w episodes of shaking w/ waxing and waning consciousness.     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 On his return, he met friends and continued to drink with them, mostly beer and vodka, though his wife is unsure of how much/if he ingested other drugs. Per wife, after returning home on the day of admission, patient watched TV alone (wife was upstairs sleeping), and noticed that both his arms started to shake. He remained conscious during this time. He then went upstairs to sleep, and his wife noticed continued shaking movements in the upper extremities. He then quickly became intermittently unresponsive and the decision was made to present to the ED.     In the ED, patient also exhibited these same symptoms of upper extremity shaking and intermittent consciousness throughout the episode. Last drink was 10 PM 11/3. Vitals , /102, RR 24, Sp02 100% (intubated, , Fi02 60%, PEEP 5, RR 14). Labs notable for Hgb 18.9, Hct 55.4, ammonia 40, lactate 3, ABG 7.3, pC02 47, p02 133 (while intubated), blood alcohol level 256. CT abdomen pelvis, CT chest and CT head negative for acute processes.     Of note, patient's wife knows he sees cardiologist (Dr. Blackburn prescribed patient amlodipine so possibly him), though doesn't know his past medical conditions. Also of note, patient's wife noted he recently saw a doctor for swelling in his neck and legs, which she attributes to his recent and frequent travel, and was prescribed a "water pill". (04 Nov 2018 01:32)    24 HOUR EVENTS:  Patient transitioned to PSV this AM. Sedation held. He is able to make his needs known. Wife at the bedside and corroborates hx as described above. He is awaiting extubation at this time     PAST MEDICAL & SURGICAL HISTORY:  High cholesterol  H/O hernia repair    Home Medications:  amLODIPine 5 mg oral tablet: 1 tab(s) orally once a day (04 Nov 2018 02:12)  cefadroxil: 500 milligram(s) orally once a day (04 Nov 2018 02:12)  Cialis 20 mg oral tablet: 1 tab(s) orally once a day (04 Nov 2018 02:12)  Ciprodex 0.3%-0.1% otic suspension: 4 drop(s) to each affected ear 2 times a day (04 Nov 2018 02:12)  cyclobenzaprine: 10 milligram(s) orally once a day (04 Nov 2018 02:12)  furosemide 20 mg oral tablet: 1 tab(s) orally once a day (04 Nov 2018 02:12)  Klor-Con 10 oral tablet, extended release: 1 tab(s) orally once a day (04 Nov 2018 02:12)  PARoxetine: 20 milligram(s) orally once a day (04 Nov 2018 02:12)  rosuvastatin 10 mg oral tablet: 1 tab(s) orally once a day (at bedtime) (04 Nov 2018 02:12)  testosterone: 200 milligram(s) intramuscularly every 2 weeks (04 Nov 2018 02:12)    Allergies    No Known Allergies    Intolerances      SUBJECTIVE/ROS:  [ ] A ten-point review of systems was otherwise negative except as noted.  [x ] Due to altered mental status/intubation, subjective information were not able to be obtained from the patient. History was obtained, to the extent possible, from review of the chart and collateral sources of information.      NEURO  RASS:  0   GCS:11T     CAM ICU: negative  Exam: Alert and following commands. no focal deficits  Meds: midazolam Injectable 2 milliGRAM(s) IV Push every 2 hours PRN agitation  propofol Infusion 5 MICROgram(s)/kG/Min IV Continuous <Continuous>    [x] Adequacy of sedation and pain control has been assessed and adjusted      RESPIRATORY  RR: 11 (11-04-18 @ 08:00) (11 - 24)  SpO2: 96% (11-04-18 @ 08:24) (94% - 100%)  Wt(kg): --  Exam: unlabored, clear to auscultation bilaterally  Mechanical Ventilation: Mode: Spontaneous/ CPAP (Continuous Positive Airway Pressure), RR (patient): 17, FiO2: 40, PEEP: 5, PS: 10, MAP: 9, PIP: 16  ABG - ( 04 Nov 2018 02:39 )  pH: x     /  pCO2: x     /  pO2: x     / HCO3: x     / Base Excess: x     /  SaO2: x       Lactate: 2.1      [NA ] Extubation Readiness Assessed  Meds:     CARDIOVASCULAR  HR: 87 (11-04-18 @ 08:24) (80 - 114)  BP: 106/64 (11-04-18 @ 08:00) (104/58 - 202/114)  BP(mean): 78 (11-04-18 @ 08:00) (75 - 106)  ABP: --  ABP(mean): --  Wt(kg): --  CVP(cm H2O): --    Lactate, Blood: 2.1 mmol/L (11-04 @ 02:39)    Exam: S1S2  Cardiac Rhythm: NSR  Perfusion     [x ]Adequate   [ ]Inadequate  Mentation   [ x]Normal       [ ]Reduced  Extremities  [ x]Warm         [ ]Cool  Volume Status [ ]Hypervolemic [ x]Euvolemic [ ]Hypovolemic  Meds:       GI/NUTRITION  Exam: soft, nt nd, no guarding or rebound  Diet: NPO  Meds: pantoprazole   Suspension 40 milliGRAM(s) Oral daily      GENITOURINARY  I&O's Detail    11-03 @ 08:01  -  11-04 @ 07:00  --------------------------------------------------------  IN:    IV PiggyBack: 250 mL    lactated ringers.: 500 mL    propofol Infusion: 131.6 mL  Total IN: 881.6 mL    OUT:    Indwelling Catheter - Urethral: 1075 mL  Total OUT: 1075 mL    Total NET: -193.4 mL      11-04 @ 07:01  -  11-04 @ 09:39  --------------------------------------------------------  IN:  Total IN: 0 mL    OUT:    Indwelling Catheter - Urethral: 75 mL  Total OUT: 75 mL    Total NET: -75 mL        Weight (kg): 116.7 (11-04 @ 03:23)  11-04    144  |  110<H>  |  9   ----------------------------<  88  4.0   |  28  |  0.96    Ca    7.5<L>      04 Nov 2018 08:59  Phos  1.6     11-04  Mg     2.0     11-04    TPro  6.6  /  Alb  3.2<L>  /  TBili  0.3  /  DBili  <.10  /  AST  21  /  ALT  34  /  AlkPhos  55  11-04    [x ] Mota catheter, indication: critical illness  Meds: folic acid 1 milliGRAM(s) Oral daily  multivitamin 1 Tablet(s) Oral daily  sodium phosphate IVPB 30 milliMole(s) IV Intermittent once  thiamine 100 milliGRAM(s) Oral daily        HEMATOLOGIC  Meds: heparin  Injectable 5000 Unit(s) SubCutaneous every 8 hours    [x] VTE Prophylaxis                        16.2   8.96  )-----------( 270      ( 04 Nov 2018 08:59 )             49.1     PT/INR - ( 04 Nov 2018 00:00 )   PT: 10.9 sec;   INR: 0.96 ratio         PTT - ( 04 Nov 2018 00:00 )  PTT:31.3 sec  Transfusion     [ ] PRBC   [ ] Platelets   [ ] FFP   [ ] Cryoprecipitate      INFECTIOUS DISEASES  T(C): 36.3 (11-04-18 @ 08:11), Max: 36.7 (11-04-18 @ 02:18)  Wt(kg): --  WBC Count: 8.96 K/uL (11-04 @ 08:59)  WBC Count: 7.76 K/uL (11-04 @ 00:00)    Recent Cultures:    Meds: azithromycin  IVPB 500 milliGRAM(s) IV Intermittent every 24 hours  piperacillin/tazobactam IVPB. 3.375 Gram(s) IV Intermittent every 8 hours        ENDOCRINE  Capillary Blood Glucose    Meds:       ACCESS DEVICES:  [x ] Peripheral IV  [ ] Central Venous Line	[ ] R	[ ] L	[ ] IJ	[ ] Fem	[ ] SC	Placed:   [ ] Arterial Line		[ ] R	[ ] L	[ ] Fem	[ ] Rad	[ ] Ax	Placed:   [ ] PICC:					[ ] Mediport  [ x] Urinary Catheter, Date Placed:   [ x] Necessity of urinary, arterial, and venous catheters discussed    OTHER MEDICATIONS:      CODE STATUS: Full    IMAGING:

## 2018-11-04 NOTE — H&P ADULT - PROBLEM SELECTOR PLAN 3
- patient on amlodipine 5 mg, no hx of HTN given by wife but presumed dx based on medication  - plan per ICU

## 2018-11-04 NOTE — H&P ADULT - NSHPPHYSICALEXAM_GEN_ALL_CORE
T(C): --  HR: 102 (11-04-18 @ 01:35) (102 - 114)  BP: 149/102 (11-04-18 @ 01:35) (149/81 - 202/114)  RR: 24 (11-04-18 @ 01:35) (18 - 24)  SpO2: 100% (11-04-18 @ 01:35) (98% - 100%)    GENERAL: sedated eyes closed, though would intermittently open during exam  EYES: sclera clear  ENMT: moist mucous membranes, ET tube in place  NECK: supple, soft, no thyromegaly noted  LUNGS: good air entry bilaterally, clear to auscultation, vent settings: , Fi02 60%, PEEP 5, RR 14  HEART: regular rate and rhythm, no murmurs appreciated   GASTROINTESTINAL: abdomen soft, nontender, + bowel sounds   MUSCULOSKELETAL: no clubbing or cyanosis  NEUROLOGIC: intubated and sedated   HEME/LYMPH: no palpable supraclavicular nodules

## 2018-11-04 NOTE — ED PROVIDER NOTE - MEDICAL DECISION MAKING DETAILS
pt with aob, ams with ? complex partial sz activity, ? other drug ingestion, intermittent unresponsive with apnea, intubated for airway protection and further tests, ct brain, check labs, trop, pain culture, check tox, icu consult

## 2018-11-04 NOTE — PROGRESS NOTE ADULT - SUBJECTIVE AND OBJECTIVE BOX
Interval events: admitted overnight for altered mental status and tremors at home, found to have acute alcohol intoxication and urine tox positive for cocaine. Intubated for airway protection but more awake today. Hemodynamically stable. Being treated for pneumonia    Review of Systems:  Constitutional: no fever, chills, fatigue  Neuro: no headache, numbness, weakness  Resp: no cough, wheezing, shortness of breath  CVS: no chest pain, palpitations, leg swelling  GI: no abdominal pain, nausea, vomiting, diarrhea   : no dysuria, frequency, incontinence  Skin: no itching, burning, rashes, or lesions   Msk: no joint pain or swelling  Psych: no depression, anxiety    T(F): 97.4 (18 @ 08:11), Max: 98 (18 @ 02:18)  HR: 87 (18 @ 08:24) (80 - 114)  BP: 106/64 (18 @ 08:00) (104/58 - 202/114)  RR: 11 (18 @ 08:00) (11 - 24)  SpO2: 96% (18 @ 08:24) (94% - 100%)  Wt(kg): --    Mode: Spontaneous/ CPAP (Continuous Positive Airway Pressure), FiO2: 40, PEEP: 5, PS: 10    CAPILLARY BLOOD GLUCOSE        I&O's Summary    2018 08:01  -  2018 07:00  --------------------------------------------------------  IN: 881.6 mL / OUT: 1075 mL / NET: -193.4 mL    2018 07:01  -  2018 09:37  --------------------------------------------------------  IN: 0 mL / OUT: 75 mL / NET: -75 mL        Physical Exam:     Gen:  Neuro:  HEENT:  CV:  Pulm:  GI:  Ext:  Skin:    Meds:  heparin  Injectable 5000 Unit(s) SubCutaneous every 8 hours    azithromycin  IVPB 500 milliGRAM(s) IV Intermittent every 24 hours  piperacillin/tazobactam IVPB. 3.375 Gram(s) IV Intermittent every 8 hours          midazolam Injectable 2 milliGRAM(s) IV Push every 2 hours PRN  propofol Infusion 5 MICROgram(s)/kG/Min IV Continuous <Continuous>    pantoprazole   Suspension 40 milliGRAM(s) Oral daily        folic acid 1 milliGRAM(s) Oral daily  multivitamin 1 Tablet(s) Oral daily  thiamine 100 milliGRAM(s) Oral daily                                      16.2   8.96  )-----------( 270      ( 2018 08:59 )             49.1       11-04    144  |  110<H>  |  9   ----------------------------<  88  4.0   |  28  |  0.96    Ca    7.5<L>      2018 08:59  Phos  1.6     11-04  Mg     2.0     11-04    TPro  6.6  /  Alb  3.2<L>  /  TBili  0.3  /  DBili  <.10  /  AST  21  /  ALT  34  /  AlkPhos  55  11-04    Lactate 2.1           11-04 @ 02:39    Lactate 3.0           11-04 @ 00:00      CARDIAC MARKERS ( 2018 08:59 )  <.015 ng/mL / x     / 124 U/L / x     / 1.2 ng/mL  CARDIAC MARKERS ( 2018 01:14 )  <.015 ng/mL / x     / 161 U/L / x     / <1.0 ng/mL      PT/INR - ( 2018 00:00 )   PT: 10.9 sec;   INR: 0.96 ratio         PTT - ( 2018 00:00 )  PTT:31.3 sec  Urinalysis Basic - ( 2018 01:12 )    Color: Yellow / Appearance: Clear / S.010 / pH: x  Gluc: x / Ketone: Trace  / Bili: Negative / Urobili: Negative   Blood: x / Protein: 25 mg/dL / Nitrite: Negative   Leuk Esterase: Negative / RBC: x / WBC x   Sq Epi: x / Non Sq Epi: x / Bacteria: x            ABG - ( 2018 01:10 )  pH, Arterial: 7.30  pH, Blood: x     /  pCO2: 47    /  pO2: 133   / HCO3: 21    / Base Excess: -2.8  /  SaO2: 98                  Radiology: ***    Bedside Lung U/S: ***    Bedside Cardiac U/S: ***    CENTRAL LINE: Y/N   DATE INSERTED:   REMOVE: Y/N    BEATTY: Y/N      DATE INSERTED:        REMOVE: Y/N    A-LINE: Y/N     DATE INSERTED:              REMOVE: Y/N    GLOBAL ISSUE/BEST PRACTICE:  Analgesia:  Sedation:  CAM-ICU:  HOB elevation: yes  Stress ulcer prophylaxis:  VTE prophylaxis:  Glycemic control:  Nutrition:    CODE STATUS: *** Interval events: admitted overnight for altered mental status and tremors at home, found to have acute alcohol intoxication and urine tox positive for cocaine. Intubated for airway protection but more awake today. Hemodynamically stable. Being treated for pneumonia    Review of Systems:  Constitutional: no fever, chills, fatigue  Neuro: no headache, numbness, weakness  Resp: no cough, wheezing, shortness of breath  CVS: no chest pain, palpitations, leg swelling  GI: no abdominal pain, nausea, vomiting, diarrhea   : no dysuria, frequency, incontinence  Skin: no itching, burning, rashes, or lesions   Msk: no joint pain or swelling  Psych: no depression, anxiety    T(F): 97.4 (18 @ 08:11), Max: 98 (18 @ 02:18)  HR: 87 (18 @ 08:24) (80 - 114)  BP: 106/64 (18 @ 08:00) (104/58 - 202/114)  RR: 11 (18 @ 08:00) (11 - 24)  SpO2: 96% (18 @ 08:24) (94% - 100%)    Mode: Spontaneous/ CPAP (Continuous Positive Airway Pressure), FiO2: 40, PEEP: 5, PS: 10    I&O's Summary    2018 08:01  -  2018 07:00  --------------------------------------------------------  IN: 881.6 mL / OUT: 1075 mL / NET: -193.4 mL    Physical Exam:     Gen: NAD; AAOx3  Neuro: CN II-XII grossly intact; moving all extremities   HEENT: NC/AT; EOMI; MMM  CV: normal S1 & S2; regular rate and rhythm   Pulm: faint rales at the bases improved with deep inhalation  GI: soft; NT/ND  Ext: trace edema; pulses intact  Skin: warm, well perfused    Meds:  heparin  Injectable 5000 Unit(s) SubCutaneous every 8 hours  azithromycin  IVPB 500 milliGRAM(s) IV Intermittent every 24 hours  piperacillin/tazobactam IVPB. 3.375 Gram(s) IV Intermittent every 8 hours  midazolam Injectable 2 milliGRAM(s) IV Push every 2 hours PRN  propofol Infusion 5 MICROgram(s)/kG/Min IV Continuous <Continuous>  pantoprazole   Suspension 40 milliGRAM(s) Oral daily  folic acid 1 milliGRAM(s) Oral daily  multivitamin 1 Tablet(s) Oral daily  thiamine 100 milliGRAM(s) Oral daily                          16.2   8.96  )-----------( 270      ( 2018 08:59 )             49.1       144  |  110  |  9   ---------------------<  88  4.0   |  28  |  0.96    Ca    7.5<L>      2018 08:59  Phos  1.6     11-04  Mg     2.0     11-04    TPro  6.6  /  Alb  3.2<L>  /  TBili  0.3  /  DBili  <.10  /  AST  21  /  ALT  34  /  AlkPhos  55  11-04    Lactate 2.1           11-04 @ 02:39    Lactate 3.0           11-04 @ 00:00      CARDIAC MARKERS ( 2018 08:59 )  <.015 ng/mL / x     / 124 U/L / x     / 1.2 ng/mL  CARDIAC MARKERS ( 2018 01:14 )  <.015 ng/mL / x     / 161 U/L / x     / <1.0 ng/mL      PT/INR - ( 2018 00:00 )   PT: 10.9 sec;   INR: 0.96 ratio         PTT - ( 2018 00:00 )  PTT:31.3 sec  Urinalysis Basic - ( 2018 01:12 )    Color: Yellow / Appearance: Clear / S.010 / pH: x  Gluc: x / Ketone: Trace  / Bili: Negative / Urobili: Negative   Blood: x / Protein: 25 mg/dL / Nitrite: Negative   Leuk Esterase: Negative / RBC: x / WBC x   Sq Epi: x / Non Sq Epi: x / Bacteria: x            ABG - ( 2018 01:10 )  pH, Arterial: 7.30  pH, Blood: x     /  pCO2: 47    /  pO2: 133   / HCO3: 21    / Base Excess: -2.8  /  SaO2: 98        Radiology: ***    Bedside Lung U/S: ***    Bedside Cardiac U/S: ***    CENTRAL LINE: N    BEATTY: Y (remove)    A-LINE: N    GLOBAL ISSUE/BEST PRACTICE:  Analgesia: n/a  Sedation: n/a  CAM-ICU: N  HOB elevation: yes  Stress ulcer prophylaxis: n/a  VTE prophylaxis: hsq  Glycemic control: yes  Nutrition: post-extubation    CODE STATUS: full Interval events: admitted overnight for altered mental status and tremors at home, found to have acute alcohol intoxication and urine tox positive for cocaine. Intubated for airway protection but more awake today. Hemodynamically stable. Being treated for pneumonia    Review of Systems:  Constitutional: no fever, chills, fatigue  Neuro: no headache, numbness, weakness  Resp: no cough, wheezing, shortness of breath  CVS: no chest pain, palpitations, leg swelling  GI: no abdominal pain, nausea, vomiting, diarrhea   : no dysuria, frequency, incontinence  Skin: no itching, burning, rashes, or lesions   Msk: no joint pain or swelling  Psych: no depression, anxiety    T(F): 97.4 (18 @ 08:11), Max: 98 (18 @ 02:18)  HR: 87 (18 @ 08:24) (80 - 114)  BP: 106/64 (18 @ 08:00) (104/58 - 202/114)  RR: 11 (18 @ 08:00) (11 - 24)  SpO2: 96% (18 @ 08:24) (94% - 100%)    Mode: Spontaneous/ CPAP (Continuous Positive Airway Pressure), FiO2: 40, PEEP: 5, PS: 10    I&O's Summary    2018 08:01  -  2018 07:00  --------------------------------------------------------  IN: 881.6 mL / OUT: 1075 mL / NET: -193.4 mL    Physical Exam:     Gen: NAD; AAOx3  Neuro: CN II-XII grossly intact; moving all extremities   HEENT: NC/AT; EOMI; MMM  CV: normal S1 & S2; regular rate and rhythm   Pulm: faint rales at the bases improved with deep inhalation  GI: soft; NT/ND  Ext: trace edema; pulses intact  Skin: warm, well perfused    Meds:  heparin  Injectable 5000 Unit(s) SubCutaneous every 8 hours  azithromycin  IVPB 500 milliGRAM(s) IV Intermittent every 24 hours  piperacillin/tazobactam IVPB. 3.375 Gram(s) IV Intermittent every 8 hours  midazolam Injectable 2 milliGRAM(s) IV Push every 2 hours PRN  propofol Infusion 5 MICROgram(s)/kG/Min IV Continuous <Continuous>  pantoprazole   Suspension 40 milliGRAM(s) Oral daily  folic acid 1 milliGRAM(s) Oral daily  multivitamin 1 Tablet(s) Oral daily  thiamine 100 milliGRAM(s) Oral daily                          16.2   8.96  )-----------( 270      ( 2018 08:59 )             49.1       144  |  110  |  9   ---------------------<  88  4.0   |  28  |  0.96    Ca    7.5<L>      2018 08:59  Phos  1.6     11-04  Mg     2.0     11-04    TPro  6.6  /  Alb  3.2<L>  /  TBili  0.3  /  DBili  <.10  /  AST  21  /  ALT  34  /  AlkPhos  55  11-04    Lactate 2.1           11-04 @ 02:39    Lactate 3.0           11-04 @ 00:00      CARDIAC MARKERS ( 2018 08:59 )  <.015 ng/mL / x     / 124 U/L / x     / 1.2 ng/mL  CARDIAC MARKERS ( 2018 01:14 )  <.015 ng/mL / x     / 161 U/L / x     / <1.0 ng/mL      PT/INR - ( 2018 00:00 )   PT: 10.9 sec;   INR: 0.96 ratio         PTT - ( 2018 00:00 )  PTT:31.3 sec  Urinalysis Basic - ( 2018 01:12 )    Color: Yellow / Appearance: Clear / S.010 / pH: x  Gluc: x / Ketone: Trace  / Bili: Negative / Urobili: Negative   Blood: x / Protein: 25 mg/dL / Nitrite: Negative   Leuk Esterase: Negative / RBC: x / WBC x   Sq Epi: x / Non Sq Epi: x / Bacteria: x            ABG - ( 2018 01:10 )  pH, Arterial: 7.30  pH, Blood: x     /  pCO2: 47    /  pO2: 133   / HCO3: 21    / Base Excess: -2.8  /  SaO2: 98        Head CT: No acute intracranial hemorrhage, mass effect, or CT evidence of an acute vascular territorial infarct. No evidence of global anoxic injury; gray-white matter differentiation is preserved.    CT C/A/P: bibasilar atelectasis. No pulmonary emboli. No lymphadenopathy. Enlarged heart. Diffuse hepatic steatosis    CENTRAL LINE: N    BEATTY: Y (remove)    A-LINE: N    GLOBAL ISSUE/BEST PRACTICE:  Analgesia: n/a  Sedation: n/a  CAM-ICU: N  HOB elevation: yes  Stress ulcer prophylaxis: n/a  VTE prophylaxis: hsq  Glycemic control: yes  Nutrition: post-extubation    CODE STATUS: full

## 2018-11-04 NOTE — DIETITIAN INITIAL EVALUATION ADULT. - NS AS NUTRI INTERV ED CONTENT
Unable to provide nutrition education at this time as pt is intubated, RD to follow up with education prn. RD to remain available.

## 2018-11-04 NOTE — H&P ADULT - PROBLEM SELECTOR PLAN 1
- p/w shaking movements and periodic unresponsiveness likely due to alcohol ingestion/overdose, patient intubated and sedated now for airway protection   - blood alcohol 256 and ammonia 40  - monitor mental status, wean from vent as tolerated

## 2018-11-04 NOTE — CONSULT NOTE ADULT - SUBJECTIVE AND OBJECTIVE BOX
48M with PMHx of hld, hernia repair, polysubstance abuse (marijuana/cocaine), ETOH Abuse, who was brought into the ER by his father and wife due to acute AMS, intermittent LOC, shaking/tremors of bilateral UE's/LE's. Per wife, Pt frequently travels due to his line of work, had just returned home from week long business trip today.     24 hour events: ***  PAST MEDICAL & SURGICAL HISTORY:  High cholesterol  H/O hernia repair      Review of Systems:  Constitutional: No fever, chills, fatigue  Neuro: No headache, numbness, weakness  Resp: No cough, wheezing, shortness of breath  CVS: No chest pain, palpitations, leg swelling  GI: No abdominal pain, nausea, vomiting, diarrhea   : No dysuria, frequency, incontinence  Skin: No itching, burning, rashes, or lesions   Msk: No joint pain or swelling  Psych: No depression, anxiety, mood swings    T(F): 97.9 (18 @ 03:00), Max: 98 (18 @ 02:18)  HR: 80 (18 @ 04:00) (80 - 114)  BP: 116/70 (18 @ 04:00) (106/70 - 202/114)  RR: 14 (18 @ 04:00) (14 - 24)  SpO2: 98% (18 @ 04:00) (96% - 100%)  Wt(kg): --    Mode: AC/ CMV (Assist Control/ Continuous Mandatory Ventilation), RR (machine): 14, TV (machine): 600, FiO2: 60, PEEP: 5    CAPILLARY BLOOD GLUCOSE          I&O's Summary    2018 08:01  -  2018 05:04  --------------------------------------------------------  IN: 558.8 mL / OUT: 875 mL / NET: -316.2 mL        Physical Exam:     Gen:   Neuro: A&Ox3, non-focal  HEENT: NC/AT  Resp: CTA b/l  CVS: nl S1/S2, RRR  Abd: soft, nt, nd, +bs  Ext: no edema, +pulses  Skin: well perfused, warm    Meds:          midazolam Injectable IV Push PRN  propofol Infusion IV Continuous      heparin  Injectable SubCutaneous    pantoprazole   Suspension Oral      lactated ringers. IV Continuous                                  18.9   7.76  )-----------( 331      ( 2018 00:00 )             55.4       11-    142  |  108  |  9   ----------------------------<  96  4.1   |  23  |  1.00    Ca    8.4<L>      2018 00:00  Mg     2.2         TPro  8.5<H>  /  Alb  4.1  /  TBili  0.3  /  DBili  x   /  AST  27  /  ALT  42  /  AlkPhos  74      Lactate 2.1            @ 02:39    Lactate 3.0           11-04 @ 00:00      CARDIAC MARKERS ( 2018 01:14 )  <.015 ng/mL / x     / 161 U/L / x     / <1.0 ng/mL      PT/INR - ( 2018 00:00 )   PT: 10.9 sec;   INR: 0.96 ratio         PTT - ( 2018 00:00 )  PTT:31.3 sec  Urinalysis Basic - ( 2018 01:12 )    Color: Yellow / Appearance: Clear / S.010 / pH: x  Gluc: x / Ketone: Trace  / Bili: Negative / Urobili: Negative   Blood: x / Protein: 25 mg/dL / Nitrite: Negative   Leuk Esterase: Negative / RBC: x / WBC x   Sq Epi: x / Non Sq Epi: x / Bacteria: x              Radiology: ***    Bedside lung ultrasound: ***    Bedside ECHO: ***    CENTRAL LINE: Y/N          DATE INSERTED:              REMOVE: Y/N    BEATTY: Y/N                        DATE INSERTED:              REMOVE: Y/N    A-LINE: Y/N                       DATE INSERTED:              REMOVE: Y/N    GLOBAL ISSUE/BEST PRACTICE:  Analgesia:  Sedation:  HOB elevation: yes  Stress ulcer prophylaxis:  VTE prophylaxis:  Glycemic control:  Nutrition:      CODE STATUS: ***  GOC discussion: Y  Critical care time spent (mins): ***  (Reviewing data, imaging, discussing with multidisciplinary team, non inclusive of procedures, discussing goals of care with patient/family) 48M with PMHx of hld, hernia repair, polysubstance abuse (marijuana/cocaine), ETOH Abuse, who was brought into the ER by his father and wife due to acute AMS, intermittent LOC, shaking/tremors of bilateral UE's/LE's. Per wife, Pt frequently travels due to his line of work, had just returned home from week long business trip today. Per wife, Pt drinks ~10 beers a day on the weekends as well as vodka (did not specify amount). Pt also uses "occasional" marijuana/cocaine. Wife states,  may have gone drinking with friends after he returned home from business trip but does not know how much he may have consumed. Wife states she saw bottle of vodka on the table tonight. Pt's daughter first saw him with some shaking in his hands while watching a movie. Later, Pt went to sleep and wife noticed he was shaking and became altered and decided to bring him to the ER.    In ER, Pt with diffuse shaking (not rhythmic), but awake and answering questions at times alternating with periods of apnea and what appears to be LOC. Pt never lost his pulse. Decision made to intubate Pt for airway protection. Pan scan obtained, did not show any acute processes. Labs significant for hemocenctrated H/H, lactic acidosis, metabolic acidosis on abg. UA negative. Urine Tox + cocaine, etoh 256. Pt given 2 liters crystalloids, empiric abx. Pt admitted to ICU for further management.      PAST MEDICAL & SURGICAL HISTORY:  High cholesterol  H/O hernia repair      Review of Systems:  unable to obtain    T(F): 97.9   HR: 80   BP: 116/70   RR: 14   SpO2: 98%   Wt(kg): --    Mode: AC/ CMV (Assist Control/ Continuous Mandatory Ventilation), RR (machine): 14, TV (machine): 600, FiO2: 60, PEEP: 5        I&O's Summary    2018 08:01  -  2018 05:04  --------------------------------------------------------  IN: 558.8 mL / OUT: 875 mL / NET: -316.2 mL        Physical Exam:     Gen: Distress  Neuro: intermittently awake, episodes of apnea with tremors  HEENT: NC/AT  Resp: CTA b/l  CVS: nl S1/S2, RRR  Abd: obese, soft, nt, nd, +bs  Ext: no edema, +pulses  Skin: well perfused, warm      Meds:    midazolam Injectable IV Push PRN  propofol Infusion IV Continuous  heparin  Injectable SubCutaneous  pantoprazole   Suspension Oral  lactated ringers. IV Continuous                              18.9   7.76  )-----------( 331      ( 2018 00:00 )             55.4       11-    142  |  108  |  9   ----------------------------<  96  4.1   |  23  |  1.00    Ca    8.4<L>      2018 00:00  Mg     2.2         TPro  8.5<H>  /  Alb  4.1  /  TBili  0.3  /  DBili  x   /  AST  27  /  ALT  42  /  AlkPhos  74      Lactate 2.1            @ 02:39    Lactate 3.0           11-04 @ 00:00      CARDIAC MARKERS ( 2018 01:14 )  <.015 ng/mL / x     / 161 U/L / x     / <1.0 ng/mL      PT/INR - ( 2018 00:00 )   PT: 10.9 sec;   INR: 0.96 ratio         PTT - ( 2018 00:00 )  PTT:31.3 sec  Urinalysis Basic - ( 2018 01:12 )    Color: Yellow / Appearance: Clear / S.010 / pH: x  Gluc: x / Ketone: Trace  / Bili: Negative / Urobili: Negative   Blood: x / Protein: 25 mg/dL / Nitrite: Negative   Leuk Esterase: Negative / RBC: x / WBC x   Sq Epi: x / Non Sq Epi: x / Bacteria: x        CTH:    FINDINGS:     There is no acute intra-axial or extra-axial hemorrhage. There is no mass   effect or shift of the midline. The basal cisterns are not effaced. There is   mild cerebral volume loss with prominence of the ventricles and sulci.   Gray-white matter differentiation is preserved. There is no CT evidence of   an acute vascular territorial infarct.     There is an endotracheal tube within the oral cavity and a nasogastric tube   entering the left nasal passage. There are secretions layering in the nasal   passages and nasopharynx. The osseous structures are unremarkable. The   visualized paranasal sinuses and tympanic/mastoid cavities are clear apart   from mild bilateral ethmoid and maxillary sinus mucosal thickening.       IMPRESSION:     No acute intracranial hemorrhage, mass effect, or CT evidence of an acute   vascular territorial infarct. No evidence of global anoxic injury;   gray-white matter differentiation is preserved.         CTA Chest/Abd/P:  FINDINGS:     There is adequate pulmonary arterial opacification. Evaluation is mildly   degraded by motion. There is no pulmonary embolism within the confines of   this examination.     There is bilateral dependent atelectasis/consolidation. There is no pleural   effusion. Endotracheal tube tip terminates above the dusty.     The heart is enlarged. There is no pericardial effusion. The thoracic aorta   and main pulmonary artery are normal in caliber.     There is no significant supraclavicular, axillary, mediastinal, or hilar   lymphadenopathy.     The visualized thyroid gland is unremarkable in appearance.     Nasogastric tube is seen within the esophagus extending into the stomach.     There is diffuse hepatic steatosis. A few tiny subcentimeter hypodense   lesions in the liver too small to characterize. The gallbladder is normally   distended. There are no radiodense gallstones. There is no biliary ductal   dilatation. The pancreas, spleen, adrenal glands, and kidneys are   unremarkable. The urinary bladder is distended with smooth and not thickened   walls. The prostate gland and seminal vesicles are unremarkable.     There is no bowel obstruction, overt bowel wall thickening, or mesenteric   inflammatory change. The appendix is normal. There is no pneumoperitoneum,   ascites, or loculated collection.     There is no significant abdominal or pelvic lymphadenopathy. The abdominal   aorta is normal in caliber.     There is evidence of right inguinal hernia repair. There is a small left   sided fat-containing inguinal hernia.     The osseous structures are unremarkable apart from degenerative changes of   the spine.     IMPRESSION:     CTA CHEST:     No pulmonary embolism within the confines of this exam.     Bilateral dependent atelectasis/consolidation which may be due to recent   intubation. Pneumonia could be present and should be excluded on clinical   grounds.     CT ABDOMEN/PELVIS:     No acute intra-abdominal or pelvic findings.     CXR:  Pending official report. ETT ~6 cm above dusty. NGT in stomach. ? bilateral pulmonary vascular congestion      CENTRAL LINE: no    BEATTY: yes    A-LINE: no    GLOBAL ISSUE/BEST PRACTICE:  Analgesia: no  Sedation: yes  HOB elevation: yes  Stress ulcer prophylaxis: yes  VTE prophylaxis: yes  Glycemic control: yes  Nutrition: npo      CODE STATUS: Full  GOC discussion: Y  Critical care time spent (mins): 97  (Reviewing data, imaging, discussing with multidisciplinary team, non inclusive of procedures, discussing goals of care with patient/family)

## 2018-11-04 NOTE — DIETITIAN INITIAL EVALUATION ADULT. - NS AS NUTRI INTERV ENTERAL NUTRITION
If TF is warranted recommend Jevity 1.2 starting rate at 30ml and increase by 10ml until goal rate of 80ml x 20hrs to provided a total of 1,600ml; provides a total of 1,920 kcal/day, 89 g protein/day (meets 28kcal/kg; 1.3 g protein/kg based on IBW of 67.1kg)

## 2018-11-04 NOTE — H&P ADULT - NSHPSOCIALHISTORY_GEN_ALL_CORE
As above, all hx obtained from wife at bedside as patient unresponsive    Alcohol: drinks beer and vodka, usually binges on weekends and work trips, which are frequent, but does not drink during the week  Smoking: Denies cigarette use  Denies use of illicit drugs     Works as salesman in hotel industry

## 2018-11-04 NOTE — CONSULT NOTE ADULT - ASSESSMENT
48M with PMHx as above, admitted with AMS, tremors, periods of apnea, intubated for respiratory failure, urine tox + for etoh/cocaine. Possibel etoh poisoning, possible cocaine intoxication/OD    -Admit to ICU  -Titrate propofol to light sedation for vent synchrony and symptom relief  -Neurology consult  -EEG   -Versed prn  -Will need CIWA Protocol given etoh abuse  -Monitor HD's. HTN urgency improved with airway management and sedation  -Continue Lung protective ventilation. Wean FiO2 as tolerated by O2 saturation > 92%. Unstable for SBT at this time  -NPO. PPI  -? Activated Charcoal  -Continue fluid resuscitation  -Monitor UOP/Lytes  -Empiric abx. Follow up cultures   -possible left basilar infiltrate on CTA chest, No PE seen  -DVT pps  -Supportive care

## 2018-11-04 NOTE — PROGRESS NOTE ADULT - ASSESSMENT
48M PMH HTN, HLD, obesity, JEFFREY on CPAP qhs, alcohol abuse, polysubstance abuse (marijuana, cocaine), and s/p hernia repair who presents with tremor and altered mental status requiring intubation for airway protection. Found to have bibasilar consolidation, diffuse hepatic steatosis, dehydration with hemoconcentration, lactic acidosis. Extubated to NC, doing well. 48M PMH HTN, HLD, obesity, JEFFREY on CPAP qhs, alcohol abuse, polysubstance abuse (marijuana, cocaine), and s/p hernia repair who presents with tremor and altered mental status requiring intubation for airway protection. Found to have bibasilar consolidation, diffuse hepatic steatosis, dehydration with hemoconcentration, lactic acidosis. Extubated to NC, doing well.    1. Neuro: stable, appears anxious at times. CIWA monitoring for withdrawal. Start thiamine, mvi, folic acid  2. CV: HD stable, hold amlodipine given leg edema. Start lisinopril and restart statin. Hold diuresis for now, resolved dehydration/hemoconcentration  3. Pulm: passed SBT, extubated to NC, doing well now on room air. CPAP qhs for JEFFREY  4. GI: start DASH/TLC diet. +Hepatic steatosis on CT consistent with alcoholic fatty liver disease. No evidence of cirrhosis or acute alcoholic hepatitis. Patient counseled regarding alcohol cessation and I recommended AA as outpatient for abstinence  5. Renal: stable kidney function and lytes, replete hypophosphatemia  6. ID: doubt pneumonia given no fevers, chills, cough, or leukocytosis. D/c abx. F/up cultures  7. Heme: HSQ for DVT ppx  8. Endo: no active issues. Receives testosterone shots  9. Dispo: full code, discussed with patient, wife, sister, and parents at bedside  CC time spent: 35 min

## 2018-11-04 NOTE — PROVIDER CONTACT NOTE (EICU) - ASSESSMENT
Acute encephalopathy (toxic)  Acute respiratory failure on mechanical ventilation  Alcohol intoxication  Cocaine usage

## 2018-11-04 NOTE — PROVIDER CONTACT NOTE (EICU) - RECOMMENDATIONS
- Agree with keeping patient intubated for now.   - Continue propofol  - Prn ativan for intermittent agitation  - PPI to continue  - Agree with zosyn for possible aspiration pneumonia  - D/c azithromycin  - I would d/c maintenance IVF  - Discussed with night time provider in ICU - Agree with keeping patient intubated for now.   - Continue propofol  - Prn ativan for intermittent agitation  - PPI to continue  - Agree with zosyn for possible aspiration pneumonia  - D/c azithromycin  - I would d/c maintenance IVF  - ET tube already adjusted  - Discussed with night time provider in ICU

## 2018-11-04 NOTE — H&P ADULT - NSHPOUTPATIENTPROVIDERS_GEN_ALL_CORE
Patient unresponsive, intubated though info of doctors who've prescribed him medications through [facility]:   Mathew Blackburn (cardiologist)  Italo Valenzuela and Mack Mak (ENT)  Jarad Burroughs and Carissa Chung and Ej Alvarenga([facility])  Singh Adhikari (Urologist)

## 2018-11-04 NOTE — DIETITIAN INITIAL EVALUATION ADULT. - OTHER INFO
Pt seen in ICU. As per chart pt is a 48 year old male with a PMH of HLD, HTN, alcohol abuse and recent drinking binge (unknown quantity), p/w episodes of shaking w/ waxing and waning consciousness,. Pt admitted with alcoholic intoxication a/w coma/unconsciousness. Pt's current weight per chart (11/4) 257.2lbs, (admission wt) 257.4lbs. Pt's wife unsure of pt's current or UBW, however reports that she believes the pt was intentionally trying to lose a few pounds over the past few weeks, but does not believe he lost any significant amount of weight. Pt with no BM since admission. Pt with + 1 left foot edema, no pressure injuries noted at this time.

## 2018-11-04 NOTE — H&P ADULT - ASSESSMENT
49 yo male w/ hx of alcohol abuse and recent drinking binge (unknown quantity), p/w episodes of shaking w/ waxing and waning consciousness, found to have blood alcohol level 256, ammonia 40, lactate 3, admitted with alcoholic intoxication a/w coma/unconsciousness.

## 2018-11-04 NOTE — H&P ADULT - HISTORY OF PRESENT ILLNESS
Patient intubated and sedated, however wife at bedside and provided history.     49 yo male w/ hx of alcohol abuse, p/w episodes of shaking w/ waxing and waning consciousness.     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 On his return, he met friends and continued to drink with them, mostly beer and vodka, though his wife is unsure of how much/if he ingested other drugs. Per wife, after returning home on the day of admission, patient watched TV alone (wife was upstairs sleeping), and noticed that both his arms started to shake. He remained conscious during this time. He then went upstairs to sleep, and his wife noticed continued shaking movements in the upper extremities. He then quickly became intermittently unresponsive and the decision was made to present to the ED.     In the ED, patient also exhibited these same symptoms of upper extremity shaking and intermittent consciousness throughout the episode. Last drink was 10 PM 11/3. Vitals , /102, RR 24, Sp02 100% (intubated, , Fi02 60%, PEEP 5, RR 14). Labs notable for Hgb 18.9, Hct 55.4, ammonia 40, lactate 3, ABG 7.3, pC02 47, p02 133 (while intubated), blood alcohol level 256. CT abdomen pelvis, CT chest and CT head negative for acute processes.     Of note, patient's wife knows he sees cardiologist (Dr. lBackburn prescribed patient amlodipine so possibly him), though doesn't know his past medical conditions. Also of note, patient's wife noted he recently saw a doctor for swelling in his neck and legs, which she attributes to his recent and frequent travel, and was prescribed a "water pill".

## 2018-11-05 ENCOUNTER — TRANSCRIPTION ENCOUNTER (OUTPATIENT)
Age: 48
End: 2018-11-05

## 2018-11-05 VITALS
OXYGEN SATURATION: 95 % | DIASTOLIC BLOOD PRESSURE: 75 MMHG | RESPIRATION RATE: 14 BRPM | HEART RATE: 68 BPM | SYSTOLIC BLOOD PRESSURE: 149 MMHG

## 2018-11-05 LAB
ALBUMIN SERPL ELPH-MCNC: 3.1 G/DL — LOW (ref 3.3–5)
ALP SERPL-CCNC: 57 U/L — SIGNIFICANT CHANGE UP (ref 40–120)
ALT FLD-CCNC: 27 U/L — SIGNIFICANT CHANGE UP (ref 12–78)
ANION GAP SERPL CALC-SCNC: 7 MMOL/L — SIGNIFICANT CHANGE UP (ref 5–17)
AST SERPL-CCNC: 14 U/L — LOW (ref 15–37)
BASOPHILS # BLD AUTO: 0.05 K/UL — SIGNIFICANT CHANGE UP (ref 0–0.2)
BASOPHILS NFR BLD AUTO: 0.6 % — SIGNIFICANT CHANGE UP (ref 0–2)
BILIRUB SERPL-MCNC: 0.9 MG/DL — SIGNIFICANT CHANGE UP (ref 0.2–1.2)
BUN SERPL-MCNC: 8 MG/DL — SIGNIFICANT CHANGE UP (ref 7–23)
CALCIUM SERPL-MCNC: 8 MG/DL — LOW (ref 8.5–10.1)
CHLORIDE SERPL-SCNC: 105 MMOL/L — SIGNIFICANT CHANGE UP (ref 96–108)
CK SERPL-CCNC: 117 U/L — SIGNIFICANT CHANGE UP (ref 26–308)
CO2 SERPL-SCNC: 29 MMOL/L — SIGNIFICANT CHANGE UP (ref 22–31)
CREAT SERPL-MCNC: 0.87 MG/DL — SIGNIFICANT CHANGE UP (ref 0.5–1.3)
CULTURE RESULTS: NO GROWTH — SIGNIFICANT CHANGE UP
EOSINOPHIL # BLD AUTO: 0.19 K/UL — SIGNIFICANT CHANGE UP (ref 0–0.5)
EOSINOPHIL NFR BLD AUTO: 2.3 % — SIGNIFICANT CHANGE UP (ref 0–6)
GLUCOSE SERPL-MCNC: 77 MG/DL — SIGNIFICANT CHANGE UP (ref 70–99)
HCT VFR BLD CALC: 48.1 % — SIGNIFICANT CHANGE UP (ref 39–50)
HGB BLD-MCNC: 16.1 G/DL — SIGNIFICANT CHANGE UP (ref 13–17)
IMM GRANULOCYTES NFR BLD AUTO: 0.4 % — SIGNIFICANT CHANGE UP (ref 0–1.5)
LACTATE SERPL-SCNC: 1.5 MMOL/L — SIGNIFICANT CHANGE UP (ref 0.7–2)
LYMPHOCYTES # BLD AUTO: 1.5 K/UL — SIGNIFICANT CHANGE UP (ref 1–3.3)
LYMPHOCYTES # BLD AUTO: 18.4 % — SIGNIFICANT CHANGE UP (ref 13–44)
MAGNESIUM SERPL-MCNC: 1.9 MG/DL — SIGNIFICANT CHANGE UP (ref 1.6–2.6)
MCHC RBC-ENTMCNC: 29.8 PG — SIGNIFICANT CHANGE UP (ref 27–34)
MCHC RBC-ENTMCNC: 33.5 GM/DL — SIGNIFICANT CHANGE UP (ref 32–36)
MCV RBC AUTO: 89.1 FL — SIGNIFICANT CHANGE UP (ref 80–100)
MONOCYTES # BLD AUTO: 0.93 K/UL — HIGH (ref 0–0.9)
MONOCYTES NFR BLD AUTO: 11.4 % — SIGNIFICANT CHANGE UP (ref 2–14)
NEUTROPHILS # BLD AUTO: 5.46 K/UL — SIGNIFICANT CHANGE UP (ref 1.8–7.4)
NEUTROPHILS NFR BLD AUTO: 66.9 % — SIGNIFICANT CHANGE UP (ref 43–77)
PHOSPHATE SERPL-MCNC: 2.3 MG/DL — LOW (ref 2.5–4.5)
PLATELET # BLD AUTO: 245 K/UL — SIGNIFICANT CHANGE UP (ref 150–400)
POTASSIUM SERPL-MCNC: 3.8 MMOL/L — SIGNIFICANT CHANGE UP (ref 3.5–5.3)
POTASSIUM SERPL-SCNC: 3.8 MMOL/L — SIGNIFICANT CHANGE UP (ref 3.5–5.3)
PROT SERPL-MCNC: 6.7 G/DL — SIGNIFICANT CHANGE UP (ref 6–8.3)
RBC # BLD: 5.4 M/UL — SIGNIFICANT CHANGE UP (ref 4.2–5.8)
RBC # FLD: 12.6 % — SIGNIFICANT CHANGE UP (ref 10.3–14.5)
SODIUM SERPL-SCNC: 141 MMOL/L — SIGNIFICANT CHANGE UP (ref 135–145)
SPECIMEN SOURCE: SIGNIFICANT CHANGE UP
WBC # BLD: 8.16 K/UL — SIGNIFICANT CHANGE UP (ref 3.8–10.5)
WBC # FLD AUTO: 8.16 K/UL — SIGNIFICANT CHANGE UP (ref 3.8–10.5)

## 2018-11-05 PROCEDURE — 99233 SBSQ HOSP IP/OBS HIGH 50: CPT | Mod: GC

## 2018-11-05 PROCEDURE — 85610 PROTHROMBIN TIME: CPT

## 2018-11-05 PROCEDURE — 80048 BASIC METABOLIC PNL TOTAL CA: CPT

## 2018-11-05 PROCEDURE — 82553 CREATINE MB FRACTION: CPT

## 2018-11-05 PROCEDURE — 93306 TTE W/DOPPLER COMPLETE: CPT

## 2018-11-05 PROCEDURE — 82140 ASSAY OF AMMONIA: CPT

## 2018-11-05 PROCEDURE — 87040 BLOOD CULTURE FOR BACTERIA: CPT

## 2018-11-05 PROCEDURE — 80053 COMPREHEN METABOLIC PANEL: CPT

## 2018-11-05 PROCEDURE — 71275 CT ANGIOGRAPHY CHEST: CPT

## 2018-11-05 PROCEDURE — 84484 ASSAY OF TROPONIN QUANT: CPT

## 2018-11-05 PROCEDURE — 99239 HOSP IP/OBS DSCHRG MGMT >30: CPT

## 2018-11-05 PROCEDURE — 80076 HEPATIC FUNCTION PANEL: CPT

## 2018-11-05 PROCEDURE — 85027 COMPLETE CBC AUTOMATED: CPT

## 2018-11-05 PROCEDURE — 99291 CRITICAL CARE FIRST HOUR: CPT | Mod: 25

## 2018-11-05 PROCEDURE — 83735 ASSAY OF MAGNESIUM: CPT

## 2018-11-05 PROCEDURE — 81001 URINALYSIS AUTO W/SCOPE: CPT

## 2018-11-05 PROCEDURE — 82803 BLOOD GASES ANY COMBINATION: CPT

## 2018-11-05 PROCEDURE — 87389 HIV-1 AG W/HIV-1&-2 AB AG IA: CPT

## 2018-11-05 PROCEDURE — 96374 THER/PROPH/DIAG INJ IV PUSH: CPT

## 2018-11-05 PROCEDURE — 80307 DRUG TEST PRSMV CHEM ANLYZR: CPT

## 2018-11-05 PROCEDURE — 87449 NOS EACH ORGANISM AG IA: CPT

## 2018-11-05 PROCEDURE — 74177 CT ABD & PELVIS W/CONTRAST: CPT

## 2018-11-05 PROCEDURE — 93005 ELECTROCARDIOGRAM TRACING: CPT

## 2018-11-05 PROCEDURE — 94002 VENT MGMT INPAT INIT DAY: CPT

## 2018-11-05 PROCEDURE — 85730 THROMBOPLASTIN TIME PARTIAL: CPT

## 2018-11-05 PROCEDURE — 83605 ASSAY OF LACTIC ACID: CPT

## 2018-11-05 PROCEDURE — 84443 ASSAY THYROID STIM HORMONE: CPT

## 2018-11-05 PROCEDURE — 80074 ACUTE HEPATITIS PANEL: CPT

## 2018-11-05 PROCEDURE — C8929: CPT

## 2018-11-05 PROCEDURE — 36415 COLL VENOUS BLD VENIPUNCTURE: CPT

## 2018-11-05 PROCEDURE — 82550 ASSAY OF CK (CPK): CPT

## 2018-11-05 PROCEDURE — 87086 URINE CULTURE/COLONY COUNT: CPT

## 2018-11-05 PROCEDURE — 71045 X-RAY EXAM CHEST 1 VIEW: CPT

## 2018-11-05 PROCEDURE — 84436 ASSAY OF TOTAL THYROXINE: CPT

## 2018-11-05 PROCEDURE — 87070 CULTURE OTHR SPECIMN AEROBIC: CPT

## 2018-11-05 PROCEDURE — 84145 PROCALCITONIN (PCT): CPT

## 2018-11-05 PROCEDURE — 84100 ASSAY OF PHOSPHORUS: CPT

## 2018-11-05 PROCEDURE — 70450 CT HEAD/BRAIN W/O DYE: CPT

## 2018-11-05 RX ORDER — CHLORHEXIDINE GLUCONATE 213 G/1000ML
1 SOLUTION TOPICAL DAILY
Qty: 0 | Refills: 0 | Status: DISCONTINUED | OUTPATIENT
Start: 2018-11-05 | End: 2018-11-05

## 2018-11-05 RX ORDER — SODIUM,POTASSIUM PHOSPHATES 278-250MG
1 POWDER IN PACKET (EA) ORAL
Qty: 0 | Refills: 0 | Status: DISCONTINUED | OUTPATIENT
Start: 2018-11-05 | End: 2018-11-05

## 2018-11-05 RX ADMIN — LISINOPRIL 10 MILLIGRAM(S): 2.5 TABLET ORAL at 05:10

## 2018-11-05 RX ADMIN — Medication 1 TABLET(S): at 12:20

## 2018-11-05 RX ADMIN — CHLORHEXIDINE GLUCONATE 1 APPLICATION(S): 213 SOLUTION TOPICAL at 12:20

## 2018-11-05 RX ADMIN — HEPARIN SODIUM 5000 UNIT(S): 5000 INJECTION INTRAVENOUS; SUBCUTANEOUS at 05:10

## 2018-11-05 RX ADMIN — Medication 20 MILLIGRAM(S): at 12:20

## 2018-11-05 RX ADMIN — Medication 100 MILLIGRAM(S): at 12:20

## 2018-11-05 RX ADMIN — Medication 1 MILLIGRAM(S): at 12:20

## 2018-11-05 NOTE — PROGRESS NOTE ADULT - ASSESSMENT
47 yo M with PMH of HTN, HLD, obesity, JEFFREY on CPAP, alcohol abuse, polysubstance abuse (marijuana, cocaine), and s/p hernia repair who presented to the ED with tremors and altered mental status requiring intubation for airway protection, found to have bibasilar consolidation, diffuse hepatic steatosis, dehydration with hemoconcentration, lactic acidosis, admitted to the ICU for Acute encephalopathy due to secondary to alcohol and cocaine intoxication. Extubated to NC on 11/4, doing well.    Neuro: stable, appears anxious at times. CIWA monitoring for withdrawal. Continue with Thiamine, Multivitamin, Folic acid, Midazolam PRN agitation.  Cardio: HD stable, hold Amlodipine given leg edema. Continue with ACE-I, statin.  Pulm: s/p extubated to NC on 11/4, saturating well on room air. Continue with CPAP for JEFFREY.  GI: Continue with DASH/TLC diet. Hepatic steatosis on CT consistent with Alcoholic Fatty Liver disease. No evidence of cirrhosis or acute alcoholic hepatitis. Patient counseled regarding alcohol cessation and AA as outpatient for abstinence  Renal: Kidney function remain stable, continue to monitor lytes and replete as needed.  ID: Doubt Pneumonia lack of fevers, clinical sign/symptoms, or leukocytosis. Legionella negative. Follow up blood cultures, sputum culture preliminarily negative. Monitor off antibiotics.  Heme: H/H, platelets stable. Continue HSQ for DVT ppx.  Endo: No active issues. Receives testosterone shots outpatient. 47 yo M with PMH of HTN, HLD, obesity, JEFFREY on CPAP, alcohol abuse, polysubstance abuse (marijuana, cocaine), and s/p hernia repair who presented to the ED with tremors and altered mental status requiring intubation for airway protection, found to have bibasilar consolidation, diffuse hepatic steatosis, dehydration with hemoconcentration, lactic acidosis, admitted to the ICU for Acute encephalopathy due to secondary to alcohol and cocaine intoxication. Extubated to NC on 11/4, doing well.    Neuro: Stable. CIWA monitoring for alcohol withdrawal, approximately 36 hrs since last alcohol use. Continue with Thiamine, Multivitamin, Folic acid, Midazolam PRN agitation.  Cardio: HD stable, hold Amlodipine given leg edema. Continue with ACE-I, Statin.  Pulm: s/p extubated to NC on 11/4, saturating well on room air. Continue with CPAP for JEFFREY.  GI: Continue with DASH/TLC diet. Hepatic steatosis on CT consistent with Alcoholic Fatty Liver disease. No evidence of cirrhosis or acute alcoholic hepatitis. Patient counseled regarding alcohol cessation and AA as outpatient for abstinence.  Renal: Kidney function remain stable, continue to monitor lytes and replete as needed.  ID: Doubt Pneumonia due to lack of fevers, clinical sign/symptoms, or leukocytosis. Legionella negative. Blood cultures, sputum culture preliminarily negative. Monitor off antibiotics.  Heme: H/H, platelets stable. Continue HSQ for DVT ppx.  Endo: No active issues. Receives testosterone injections outpatient.  Dispo: Transfer to [facility].

## 2018-11-05 NOTE — DISCHARGE NOTE ADULT - CARE PLAN
Principal Discharge DX:	Acute respiratory failure with hypoxia  Goal:	allevation of symptoms  Assessment and plan of treatment:	Please follow up with your primary care physician regarding your hospitalization. Please abstain from alcohol and ilicit drugs. May return to work. Return to ED if symptoms recur or if sudden onset of shortness of breath, chest pain or abdominal pain.  Secondary Diagnosis:	Alcohol abuse  Assessment and plan of treatment:	abstain from alcohol intake  Secondary Diagnosis:	Altered mental status, unspecified  Assessment and plan of treatment:	Resolved  Secondary Diagnosis:	HTN (hypertension)  Assessment and plan of treatment:	Resume home medications  Secondary Diagnosis:	High cholesterol  Assessment and plan of treatment:	resume home medications  Secondary Diagnosis:	Toxic encephalopathy

## 2018-11-05 NOTE — DISCHARGE NOTE ADULT - PATIENT PORTAL LINK FT
You can access the "[employer] Patient Portal, offered by [facility], by registering with the following website: [email or web address]

## 2018-11-05 NOTE — PROGRESS NOTE ADULT - PROBLEM SELECTOR PLAN 1
- p/w shaking movements and periodic unresponsiveness likely due to alcohol ingestion/overdose, patient intubated and sedated now for airway protection   - blood alcohol 256 and ammonia 40  extubated, tolerating well, wants to go home, a03  dc planning per ICU
Plan:  Neuro: Monitoring for recurrent tremors. Earlier this AM patient with rhythmic right upper extremity movement. On exam when given a different task (i..e wiggle toes) his movement stopped. He is following commands and I do not believe is having any seizure activity.     Pulm: Will extubate at preset time. Abx for presumed PNA are to be stopped as unlikely infectious    CV: stable. Will resume BP meds once extubated. Will start ACE rather than resume CCB due to lower extremity edema. TTE ordered to assess patient for pulmonary HTN.     GI/Nutrition: Diet as tolerated once extubated    /Renal: Remove jimenes. avoid nephrotoxic agents    ID: d/c abx therapy    Heme; Stable    Skin: intact    Dispo: Social work consult for polysubstance abuse. Place on CIWA protocol.   Will possibly be a floor candidate later this day if extubated well.

## 2018-11-05 NOTE — DISCHARGE NOTE ADULT - SECONDARY DIAGNOSIS.
Altered mental status, unspecified HTN (hypertension) High cholesterol Toxic encephalopathy Alcohol abuse

## 2018-11-05 NOTE — PROGRESS NOTE ADULT - SUBJECTIVE AND OBJECTIVE BOX
Patient is a 48y old  Male who presents with a chief complaint of Altered mental status (2018 09:38)    PAST MEDICAL & SURGICAL HISTORY:  High cholesterol  Pericardial cyst  Dyslipidemia: reports past history of dyslipidemia, denies current  H/O hernia repair  H/O vasectomy  H/O hernia repair  Status post hip surgery: R hip, repair of labrum,     LUIS GARAY   48y    Male    BRIEF HOSPITAL COURSE:    Admit  with  altered mental status and tremors, found to have acute alcohol intoxication and urine tox positive for cocaine. Intubated for airway protection extubated .         Review of Systems:  insomnia                      All other ROS are negative.    Allergies    No Known Allergies    Intolerances      Weight (kg): 116.7 (18 @ 03:23)  BMI (kg/m2): 40.3 (18 @ 03:23)    ICU Vital Signs Last 24 Hrs  T(C): 36.9 (2018 23:00), Max: 37.7 (2018 12:23)  T(F): 98.4 (2018 23:00), Max: 99.9 (2018 12:23)  HR: 79 (2018 00:00) (69 - 112)  BP: 122/67 (2018 00:00) (104/58 - 179/96)  BP(mean): 88 (2018 00:00) (75 - 128)  ABP: --  ABP(mean): --  RR: 17 (2018 00:00) (10 - 33)  SpO2: 92% (2018 00:00) (92% - 100%)    Physical Examination:    General:  NAD    HEENT: no JVD    PULM: bilateral BS    CVS:  s1 s2 reg    ABD: soft NT    EXT: no edema     SKIN: warm    Neuro: alert oriented    ABG - ( 2018 01:10 )  pH, Arterial: 7.30  pH, Blood: x     /  pCO2: 47    /  pO2: 133   / HCO3: 21    / Base Excess: -2.8  /  SaO2: 98                Mode: Spontaneous/ CPAP (Continuous Positive Airway Pressure)  FiO2: 40  PEEP: 5  PS: 10  MAP: 9  PIP: 16    Mode: Spontaneous/ CPAP (Continuous Positive Airway Pressure), FiO2: 40, PEEP: 5, PS: 10, MAP: 9, PIP: 16  LABS:                        16.2   8.96  )-----------( 270      ( 2018 08:59 )             49.1     11-    144  |  110<H>  |  9   ----------------------------<  88  4.0   |  28  |  0.96    Ca    7.5<L>      2018 08:59  Phos  1.6     11-  Mg     2.0         TPro  6.6  /  Alb  3.2<L>  /  TBili  0.3  /  DBili  <.10  /  AST  21  /  ALT  34  /  AlkPhos  55  1104      CARDIAC MARKERS ( 2018 08:59 )  <.015 ng/mL / x     / 124 U/L / x     / 1.2 ng/mL  CARDIAC MARKERS ( 2018 01:14 )  <.015 ng/mL / x     / 161 U/L / x     / <1.0 ng/mL      CAPILLARY BLOOD GLUCOSE        PT/INR - ( 2018 00:00 )   PT: 10.9 sec;   INR: 0.96 ratio         PTT - ( 2018 00:00 )  PTT:31.3 sec  Urinalysis Basic - ( 2018 01:12 )    Color: Yellow / Appearance: Clear / S.010 / pH: x  Gluc: x / Ketone: Trace  / Bili: Negative / Urobili: Negative   Blood: x / Protein: 25 mg/dL / Nitrite: Negative   Leuk Esterase: Negative / RBC: x / WBC x   Sq Epi: x / Non Sq Epi: x / Bacteria: x      CULTURES:      Medications:  MEDICATIONS  (STANDING):  atorvastatin 20 milliGRAM(s) Oral at bedtime  folic acid 1 milliGRAM(s) Oral daily  heparin  Injectable 5000 Unit(s) SubCutaneous every 8 hours  lisinopril 10 milliGRAM(s) Oral daily  melatonin 3 milliGRAM(s) Oral at bedtime  multivitamin 1 Tablet(s) Oral daily  PARoxetine 20 milliGRAM(s) Oral daily  thiamine 100 milliGRAM(s) Oral daily    MEDICATIONS  (PRN):  midazolam Injectable 2 milliGRAM(s) IV Push every 2 hours PRN agitation         @ 08:01  -   @ 07:00  --------------------------------------------------------  IN: 881.6 mL / OUT: 1075 mL / NET: -193.4 mL     @ 07:01  -   @ 00:15  --------------------------------------------------------  IN: 1930 mL / OUT: 950 mL / NET: 980 mL        RADIOLOGY/IMAGING/ECHO  < from: CT Angio Chest w/ IV Cont (18 @ 01:23) >  IMPRESSION:    CTA CHEST:     No pulmonary embolism within the confines of this exam.    Bilateral dependent atelectasis/consolidation which may be due to recent   intubation. Pneumonia could be present and should be excluded on clinical   grounds.    CT ABDOMEN/PELVIS:     No acute intra-abdominal or pelvic findings.    < end of copied text >        Assessment/Plan:      48m hx  HTN, HLD, obesity, JEFFREY on CPAP admit with lactic acidosis, AMS ? focal sz, ETOH level high + tox cocaine,  requiring intubation for airway protection.     Extubated .  No distress    [facility] protocol  Thiamine   Nocturnal CPAP    Transfer to pulse ox bed .

## 2018-11-05 NOTE — DISCHARGE NOTE ADULT - MEDICATION SUMMARY - MEDICATIONS TO TAKE
I will START or STAY ON the medications listed below when I get home from the hospital:    Cialis 20 mg oral tablet  -- 1 tab(s) by mouth once a day, As Needed  -- Indication: For erectile dysfunction    aspirin 81 mg oral delayed release tablet  -- 1 tab(s) by mouth once a day  -- Indication: For Home med    PARoxetine  -- 20 milligram(s) by mouth once a day  -- Indication: For depression    rosuvastatin 10 mg oral tablet  -- 1 tab(s) by mouth once a day (at bedtime)  -- Indication: For Hyperlipidemia    amLODIPine 5 mg oral tablet  -- 1 tab(s) by mouth once a day  -- Indication: For Hypertension    furosemide 20 mg oral tablet  -- 1 tab(s) by mouth once a day, As Needed  -- Indication: For Hypertension    testosterone  -- 200 milligram(s) intramuscularly every week  -- Indication: For low testosterone

## 2018-11-05 NOTE — PROGRESS NOTE ADULT - SUBJECTIVE AND OBJECTIVE BOX
Patient is a 48y old  Male who presents with a chief complaint of Altered mental status (2018 13:29)        HPI:  Patient intubated and sedated, however wife at bedside and provided history.     49 yo male w/ hx of alcohol abuse, p/w episodes of shaking w/ waxing and waning consciousness.     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 On his return, he met friends and continued to drink with them, mostly beer and vodka, though his wife is unsure of how much/if he ingested other drugs. Per wife, after returning home on the day of admission, patient watched TV alone (wife was upstairs sleeping), and noticed that both his arms started to shake. He remained conscious during this time. He then went upstairs to sleep, and his wife noticed continued shaking movements in the upper extremities. He then quickly became intermittently unresponsive and the decision was made to present to the ED.     In the ED, patient also exhibited these same symptoms of upper extremity shaking and intermittent consciousness throughout the episode. Last drink was 10 PM 11/3. Vitals , /102, RR 24, Sp02 100% (intubated, , Fi02 60%, PEEP 5, RR 14). Labs notable for Hgb 18.9, Hct 55.4, ammonia 40, lactate 3, ABG 7.3, pC02 47, p02 133 (while intubated), blood alcohol level 256. CT abdomen pelvis, CT chest and CT head negative for acute processes.     Of note, patient's wife knows he sees cardiologist (Dr. Blackburn prescribed patient amlodipine so possibly him), though doesn't know his past medical conditions. Also of note, patient's wife noted he recently saw a doctor for swelling in his neck and legs, which she attributes to his recent and frequent travel, and was prescribed a "water pill". (2018 01:32)      SUBJECTIVE & OBJECTIVE: Pt seen and examined at bedside, wants to go home     PHYSICAL EXAM:  T(C): 37.3 (18 @ 12:17), Max: 37.4 (18 @ 15:54)  HR: 68 (18 @ 14:00) (62 - 87)  BP: 149/75 (18 @ 14:00) (119/59 - 188/96)  RR: 14 (18 @ 14:00) (0 - 23)  SpO2: 95% (18 @ 14:00) (91% - 97%)  Wt(kg): --   GENERAL: NAD, well-groomed, well-developed  ENMT: Moist mucous membranes  NECK: Supple, No JVD  NERVOUS SYSTEM:  Alert & Oriented X3,  CHEST/LUNG: decrease air entry at the bases   HEART: Regular rate and rhythm; No murmurs, rubs, or gallops  ABDOMEN: Soft, Nontender, Nondistended; Bowel sounds present  EXTREMITIES:  2+ Peripheral Pulses, No clubbing, cyanosis, or edema        MEDICATIONS  (STANDING):  atorvastatin 20 milliGRAM(s) Oral at bedtime  chlorhexidine 2% Cloths 1 Application(s) Topical daily  folic acid 1 milliGRAM(s) Oral daily  heparin  Injectable 5000 Unit(s) SubCutaneous every 8 hours  lisinopril 10 milliGRAM(s) Oral daily  melatonin 3 milliGRAM(s) Oral at bedtime  multivitamin 1 Tablet(s) Oral daily  PARoxetine 20 milliGRAM(s) Oral daily  potassium acid phosphate/sodium acid phosphate tablet (K-PHOS No. 2) 1 Tablet(s) Oral four times a day with meals  thiamine 100 milliGRAM(s) Oral daily    MEDICATIONS  (PRN):  midazolam Injectable 2 milliGRAM(s) IV Push every 2 hours PRN agitation      LABS:                        16.1   8.16  )-----------( 245      ( 2018 04:53 )             48.1         141  |  105  |  8   ----------------------------<  77  3.8   |  29  |  0.87    Ca    8.0<L>      2018 04:53  Phos  2.3       Mg     1.9         TPro  6.7  /  Alb  3.1<L>  /  TBili  0.9  /  DBili  x   /  AST  14<L>  /  ALT  27  /  AlkPhos  57      PT/INR - ( 2018 00:00 )   PT: 10.9 sec;   INR: 0.96 ratio         PTT - ( 2018 00:00 )  PTT:31.3 sec  Urinalysis Basic - ( 2018 01:12 )    Color: Yellow / Appearance: Clear / S.010 / pH: x  Gluc: x / Ketone: Trace  / Bili: Negative / Urobili: Negative   Blood: x / Protein: 25 mg/dL / Nitrite: Negative   Leuk Esterase: Negative / RBC: x / WBC x   Sq Epi: x / Non Sq Epi: x / Bacteria: x      Magnesium, Serum: 1.9 mg/dL ( @ 04:53)    CAPILLARY BLOOD GLUCOSE          CAPILLARY BLOOD GLUCOSE        CAPILLARY BLOOD GLUCOSE        ABG - ( 2018 01:10 )  pH, Arterial: 7.30  pH, Blood: x     /  pCO2: 47    /  pO2: 133   / HCO3: 21    / Base Excess: -2.8  /  SaO2: 98                CARDIAC MARKERS ( 2018 04:53 )  x     / x     / 117 U/L / x     / x      CARDIAC MARKERS ( 2018 08:59 )  <.015 ng/mL / x     / 124 U/L / x     / 1.2 ng/mL  CARDIAC MARKERS ( 2018 01:14 )  <.015 ng/mL / x     / 161 U/L / x     / <1.0 ng/mL        RECENT CULTURES:      RADIOLOGY & ADDITIONAL TESTS:                        DVT/GI ppx  Discussed with pt @ bedside

## 2018-11-05 NOTE — PROGRESS NOTE ADULT - ATTENDING COMMENTS
49 yo male admitted with acute EtOH intoxication, + Cocaine, intubated for airway protection in ED, extubated yesterday and stable from CV and pulmonary standpoint.  No h/o EtOH withdrawal or Sz in past as per pt and wife.  No sign of acute infection, acute EtOH withdrawal.  Pt stable for d/c home

## 2018-11-05 NOTE — DISCHARGE NOTE ADULT - PLAN OF CARE
Resolved Resume home medications resume home medications allevation of symptoms Please follow up with your primary care physician regarding your hospitalization. Please abstain from alcohol and ilicit drugs. May return to work. Return to ED if symptoms recur or if sudden onset of shortness of breath, chest pain or abdominal pain. abstain from alcohol intake

## 2018-11-05 NOTE — PROGRESS NOTE ADULT - SUBJECTIVE AND OBJECTIVE BOX
Patient is a 48y old  Male who presents with a chief complaint of Altered mental status (2018 00:15)    Interval History: Patient extubated , doing well. No significant events overnight. Patient seen and examined at bedside.    REVIEW OF SYSTEMS  Constitutional: No fever, chills, fatigue  Neuro: No headache, numbness, weakness  Resp: No cough, wheezing, shortness of breath  CVS: No chest pain, palpitations, leg swelling  GI: No abdominal pain, nausea, vomiting, diarrhea   : No dysuria, frequency, incontinence  Skin: No itching, burning, rashes, or lesions   Msk: No joint pain or swelling  Psych: No depression, anxiety, mood swings  Heme: No bleeding    T(F): 98.3 (18 @ 08:09), Max: 99.9 (18 @ 12:23)  HR: 68 (18 @ 07:00) (62 - 105)  BP: 137/71 (18 @ 07:00) (119/59 - 179/96)  RR: 15 (18 @ 07:00) (0 - 33)  SpO2: 91% (18 @ 07:00) (91% - 100%)  Wt(kg): --    Mode: Spontaneous/ CPAP (Continuous Positive Airway Pressure), FiO2: 40, PEEP: 5, PS: 10    CAPILLARY BLOOD GLUCOSE  N/A  I&O's Summary     @ 07:01  -   @ 07:00  --------------------------------------------------------  IN: 2030 mL / OUT: 1350 mL / NET: 680 mL    PHYSICAL EXAM  General:   CNS:   HEENT:   Resp:   CVS:   Abd:   Ext:   Skin:     MEDICATIONS  lisinopril Oral  atorvastatin Oral  melatonin Oral  midazolam Injectable IV Push PRN  PARoxetine Oral  heparin  Injectable SubCutaneous  folic acid Oral  multivitamin Oral  thiamine Oral                        16.1   8.16  )-----------( 245      ( 2018 04:53 )             48.1         141  |  105  |  8   ----------------------------<  77  3.8   |  29  |  0.87    Ca    8.0<L>      2018 04:53  Phos  2.3       Mg     1.9         TPro  6.7  /  Alb  3.1<L>  /  TBili  0.9  /  DBili  x   /  AST  14<L>  /  ALT  27  /  AlkPhos  57      Lactate 1.5            @ 04:55    CARDIAC MARKERS ( 2018 04:53 )  x     / x     / 117 U/L / x     / x      CARDIAC MARKERS ( 2018 08:59 )  <.015 ng/mL / x     / 124 U/L / x     / 1.2 ng/mL  CARDIAC MARKERS ( 2018 01:14 )  <.015 ng/mL / x     / 161 U/L / x     / <1.0 ng/mL    PT/INR - ( 2018 00:00 )   PT: 10.9 sec;   INR: 0.96 ratio    PTT - ( 2018 00:00 )  PTT:31.3 sec    Acute Hepatitis Panel (18 @ 10:06)    Hepatitis C Virus Interpretation: Nonreact: Hepatitis C AB    Hepatitis C Virus S/CO Ratio: 0.06 S/CO    Hepatitis B Core IgM Antibody: Nonreact    Hepatitis B Surface Antigen: Nonreact    Hepatitis A IgM Antibody: Nonreact    HIV-1/2 Antigen/Antibody Screen by CMIA (18 @ 10:07)    HIV-1/2 Combo Result: Nonreact.    Legionella pneumophila Antigen, Urine (18 @ 12:44)    Legionella Antigen, Urine: Negative    Urinalysis Basic - ( 2018 01:12 )  Color: Yellow / Appearance: Clear / S.010 / pH: x  Gluc: x / Ketone: Trace  / Bili: Negative / Urobili: Negative   Blood: x / Protein: 25 mg/dL / Nitrite: Negative   Leuk Esterase: Negative / RBC: x / WBC x   Sq Epi: x / Non Sq Epi: x / Bacteria: x    .Sputum Endotracheal trap recived   Normal Respiratory Nathalia present   Few polymorphonuclear leukocytes per low power field  No Squamous epithelial cells per low power field  Moderate Gram positive cocci in pairs per oil power field  @ 12:38    Radiology: No new imaging studies  Bedside lung ultrasound: N/A  Bedside ECHO: N/A    CENTRAL LINE: N            BEATTY: N                     A-LINE: N                          GLOBAL ISSUE/BEST PRACTICE  Analgesia: N  Sedation: Y (Midazolam PRN)  HOB elevation: Yes  Stress ulcer prophylaxis: N  VTE prophylaxis: Y (HSQ)  Glycemic control: N   Nutrition: Y    CODE STATUS: Full Patient is a 48y old  Male who presents with a chief complaint of Altered mental status (2018 00:15)    Interval History: Patient extubated , doing well. No significant events overnight. Patient seen and examined at bedside. Complaining of some fatigue at this time.     REVIEW OF SYSTEMS  Constitutional: Reports fatigue, no fever, chills, fatigue  Neuro: No headache, numbness, weakness  Resp: No cough, wheezing, shortness of breath  CVS: No chest pain, palpitations, leg swelling  GI: No abdominal pain, nausea, vomiting, diarrhea   : No dysuria, frequency, incontinence  Skin: No itching, burning, rashes, or lesions   Heme: No bleeding    T(F): 98.3 (18 @ 08:09), Max: 99.9 (18 @ 12:23)  HR: 68 (18 @ 07:00) (62 - 105)  BP: 137/71 (18 @ 07:00) (119/59 - 179/96)  RR: 15 (18 @ 07:00) (0 - 33)  SpO2: 91% (18 @ :00) (91% - 100%)  Wt(kg): --    Mode: Spontaneous/ CPAP (Continuous Positive Airway Pressure), FiO2: 40, PEEP: 5, PS: 10    CAPILLARY BLOOD GLUCOSE  N/A  I&O's Summary     @ 07:01  -   @ 07:00  --------------------------------------------------------  IN: 2030 mL / OUT: 1350 mL / NET: 680 mL    Physical Exam:  General: Well developed, NAD  HEENT: NCAT, moist mucous membranes   Neurology: A&Ox3, nonfocal, sensation intact   Respiratory: CTA B/L, No W/R/R  CV: RRR, +S1/S2, no murmurs  Abdominal: Soft, NT, ND +BSx4  Extremities: trace LE edema bilaterally, + peripheral pulses    MEDICATIONS  lisinopril Oral  atorvastatin Oral  melatonin Oral  midazolam Injectable IV Push PRN  PARoxetine Oral  heparin  Injectable SubCutaneous  folic acid Oral  multivitamin Oral  thiamine Oral                        16.1   8.16  )-----------( 245      ( 2018 04:53 )             48.1         141  |  105  |  8   ----------------------------<  77  3.8   |  29  |  0.87    Ca    8.0<L>      2018 04:53  Phos  2.3       Mg     1.9         TPro  6.7  /  Alb  3.1<L>  /  TBili  0.9  /  DBili  x   /  AST  14<L>  /  ALT  27  /  AlkPhos  57      Lactate 1.5           1105 @ 04:55    CARDIAC MARKERS ( 2018 04:53 )  x     / x     / 117 U/L / x     / x      CARDIAC MARKERS ( 2018 08:59 )  <.015 ng/mL / x     / 124 U/L / x     / 1.2 ng/mL  CARDIAC MARKERS ( 2018 01:14 )  <.015 ng/mL / x     / 161 U/L / x     / <1.0 ng/mL    PT/INR - ( 2018 00:00 )   PT: 10.9 sec;   INR: 0.96 ratio    PTT - ( 2018 00:00 )  PTT:31.3 sec    Acute Hepatitis Panel (18 @ 10:06)    Hepatitis C Virus Interpretation: Nonreact: Hepatitis C AB    Hepatitis C Virus S/CO Ratio: 0.06 S/CO    Hepatitis B Core IgM Antibody: Nonreact    Hepatitis B Surface Antigen: Nonreact    Hepatitis A IgM Antibody: Nonreact    HIV-1/2 Antigen/Antibody Screen by CMIA (18 @ 10:07)    HIV-1/2 Combo Result: Nonreact.    Legionella pneumophila Antigen, Urine (18 @ 12:44)    Legionella Antigen, Urine: Negative    Urinalysis Basic - ( 2018 01:12 )  Color: Yellow / Appearance: Clear / S.010 / pH: x  Gluc: x / Ketone: Trace  / Bili: Negative / Urobili: Negative   Blood: x / Protein: 25 mg/dL / Nitrite: Negative   Leuk Esterase: Negative / RBC: x / WBC x   Sq Epi: x / Non Sq Epi: x / Bacteria: x    .Sputum Endotracheal trap recived   Normal Respiratory Nathalia present   Few polymorphonuclear leukocytes per low power field  No Squamous epithelial cells per low power field  Moderate Gram positive cocci in pairs per oil power field 11 @ 12:38    Radiology: No new imaging studies  Bedside lung ultrasound: N/A  Bedside ECHO: N/A    CENTRAL LINE: N            BEATTY: N                     A-LINE: N                          GLOBAL ISSUE/BEST PRACTICE  Analgesia: N  Sedation: Y (Midazolam PRN)  HOB elevation: Yes  Stress ulcer prophylaxis: N  VTE prophylaxis: Y (HSQ)  Glycemic control: N   Nutrition: Y    CODE STATUS: Full Patient is a 48y old  Male who presents with a chief complaint of Altered mental status (2018 00:15)    Interval History: Patient extubated , doing well. No significant events overnight. Patient seen and examined at bedside. Complaining of some fatigue and throat pain only at this time.     REVIEW OF SYSTEMS  Constitutional: Reports fatigue, no fever, chills, fatigue  Neuro: No headache, numbness, weakness  Resp: No cough, wheezing, shortness of breath  CVS: No chest pain, palpitations, leg swelling  GI: No abdominal pain, nausea, vomiting, diarrhea   : No dysuria, frequency, incontinence  Skin: No itching, burning, rashes, or lesions   Heme: No bleeding    T(F): 98.3 (18 @ 08:09), Max: 99.9 (18 @ 12:23)  HR: 68 (18 @ 07:00) (62 - 105)  BP: 137/71 (18 @ 07:00) (119/59 - 179/96)  RR: 15 (18 07:00) (0 - 33)  SpO2: 91% (18 @ :00) (91% - 100%)  Wt(kg): --    Mode: Spontaneous/ CPAP (Continuous Positive Airway Pressure), FiO2: 40, PEEP: 5, PS: 10    CAPILLARY BLOOD GLUCOSE  N/A  I&O's Summary     @ 07:01  -   @ 07:00  --------------------------------------------------------  IN: 2030 mL / OUT: 1350 mL / NET: 680 mL    Physical Exam:  General: Well developed, NAD  HEENT: NCAT, moist mucous membranes   Neurology: A&Ox3, nonfocal, sensation intact   Respiratory: CTA B/L, No W/R/R  CV: RRR, +S1/S2, no murmurs  Abdominal: Soft, NT, ND +BSx4  Extremities: trace LE edema bilaterally, + peripheral pulses    MEDICATIONS  lisinopril Oral  atorvastatin Oral  melatonin Oral  midazolam Injectable IV Push PRN  PARoxetine Oral  heparin  Injectable SubCutaneous  folic acid Oral  multivitamin Oral  thiamine Oral                        16.1   8.16  )-----------( 245      ( 2018 04:53 )             48.1         141  |  105  |  8   ----------------------------<  77  3.8   |  29  |  0.87    Ca    8.0<L>      2018 04:53  Phos  2.3       Mg     1.9         TPro  6.7  /  Alb  3.1<L>  /  TBili  0.9  /  DBili  x   /  AST  14<L>  /  ALT  27  /  AlkPhos  57      Lactate 1.5            @ 04:55    CARDIAC MARKERS ( 2018 04:53 )  x     / x     / 117 U/L / x     / x      CARDIAC MARKERS ( 2018 08:59 )  <.015 ng/mL / x     / 124 U/L / x     / 1.2 ng/mL  CARDIAC MARKERS ( 2018 01:14 )  <.015 ng/mL / x     / 161 U/L / x     / <1.0 ng/mL    PT/INR - ( 2018 00:00 )   PT: 10.9 sec;   INR: 0.96 ratio    PTT - ( 2018 00:00 )  PTT:31.3 sec    Acute Hepatitis Panel (18 @ 10:06)    Hepatitis C Virus Interpretation: Nonreact: Hepatitis C AB    Hepatitis C Virus S/CO Ratio: 0.06 S/CO    Hepatitis B Core IgM Antibody: Nonreact    Hepatitis B Surface Antigen: Nonreact    Hepatitis A IgM Antibody: Nonreact    HIV-1/2 Antigen/Antibody Screen by [facility] (18 @ 10:07)    HIV-1/2 Combo Result: Nonreact.    Legionella pneumophila Antigen, Urine (18 @ 12:44)    Legionella Antigen, Urine: Negative    Urinalysis Basic - ( 2018 01:12 )  Color: Yellow / Appearance: Clear / S.010 / pH: x  Gluc: x / Ketone: Trace  / Bili: Negative / Urobili: Negative   Blood: x / Protein: 25 mg/dL / Nitrite: Negative   Leuk Esterase: Negative / RBC: x / WBC x   Sq Epi: x / Non Sq Epi: x / Bacteria: x    .Sputum Endotracheal trap recived   Normal Respiratory Nathalia present   Few polymorphonuclear leukocytes per low power field  No Squamous epithelial cells per low power field  Moderate Gram positive cocci in pairs per oil power field 11- @ 12:38    Radiology: No new imaging studies  Bedside lung ultrasound: N/A  Bedside ECHO: N/A    CENTRAL LINE: N            BEATTY: N                     A-LINE: N                          GLOBAL ISSUE/BEST PRACTICE  Analgesia: N  Sedation: Y (Midazolam PRN)  HOB elevation: Yes  Stress ulcer prophylaxis: N  VTE prophylaxis: Y (HSQ)  Glycemic control: N   Nutrition: Y    CODE STATUS: Full Patient is a 48y old  Male who presents with a chief complaint of Altered mental status (2018 00:15)    Interval History: Patient extubated , doing well. No significant events overnight. Patient seen and examined at bedside. Complaining of some fatigue and throat pain only at this time.     REVIEW OF SYSTEMS  Constitutional: Reports fatigue, no fever, chills  Neuro: No headache, numbness, weakness  Resp: No cough, wheezing, shortness of breath  CVS: No chest pain, palpitations, leg swelling  GI: No abdominal pain, nausea, vomiting, diarrhea   : No dysuria, frequency, incontinence  Skin: No itching, burning, rashes, or lesions   Heme: No bleeding    T(F): 98.3 (18 @ 08:09), Max: 99.9 (18 @ 12:23)  HR: 68 (18 @ :) (62 - 105)  BP: 137/71 (18:00) (119/59 - 179/96)  RR: 15 (18:) (0 - 33)  SpO2: 91% (18:00) (91% - 100%)  Wt(kg): --    Mode: Spontaneous/ CPAP (Continuous Positive Airway Pressure), FiO2: 40, PEEP: 5, PS: 10    CAPILLARY BLOOD GLUCOSE  N/A    I&O's Summary     @ 07:01  -   @ 07:00  --------------------------------------------------------  IN: 2030 mL / OUT: 1350 mL / NET: 680 mL    Physical Exam:  General: Well developed, NAD  HEENT: NCAT, moist mucous membranes   Neurology: A&Ox3, nonfocal, sensation intact   Respiratory: CTA B/L, No W/R/R  CV: RRR, +S1/S2, no murmurs  Abdominal: Soft, NT, ND +BSx4  Extremities: trace pitting LE edema bilaterally, + peripheral pulses    MEDICATIONS  lisinopril Oral  atorvastatin Oral  melatonin Oral  midazolam Injectable IV Push PRN  PARoxetine Oral  heparin  Injectable SubCutaneous  folic acid Oral  multivitamin Oral  thiamine Oral                        16.1   8.16  )-----------( 245      ( 2018 04:53 )             48.1         141  |  105  |  8   ----------------------------<  77  3.8   |  29  |  0.87    Ca    8.0<L>      2018 04:53  Phos  2.3       Mg     1.9         TPro  6.7  /  Alb  3.1<L>  /  TBili  0.9  /  DBili  x   /  AST  14<L>  /  ALT  27  /  AlkPhos  57      Lactate 1.5            @ 04:55    CARDIAC MARKERS ( 2018 04:53 )  x     / x     / 117 U/L / x     / x      CARDIAC MARKERS ( 2018 08:59 )  <.015 ng/mL / x     / 124 U/L / x     / 1.2 ng/mL  CARDIAC MARKERS ( 2018 01:14 )  <.015 ng/mL / x     / 161 U/L / x     / <1.0 ng/mL    PT/INR - ( 2018 00:00 )   PT: 10.9 sec;   INR: 0.96 ratio    PTT - ( 2018 00:00 )  PTT:31.3 sec    Acute Hepatitis Panel (18 @ 10:06)    Hepatitis C Virus Interpretation: Nonreact: Hepatitis C AB    Hepatitis C Virus S/CO Ratio: 0.06 S/CO    Hepatitis B Core IgM Antibody: Nonreact    Hepatitis B Surface Antigen: Nonreact    Hepatitis A IgM Antibody: Nonreact    HIV-1/2 Antigen/Antibody Screen by CMIA (18 @ 10:07)    HIV-1/2 Combo Result: Nonreact.    Legionella pneumophila Antigen, Urine (18 @ 12:44)    Legionella Antigen, Urine: Negative    Urinalysis Basic - ( 2018 01:12 )  Color: Yellow / Appearance: Clear / S.010 / pH: x  Gluc: x / Ketone: Trace  / Bili: Negative / Urobili: Negative   Blood: x / Protein: 25 mg/dL / Nitrite: Negative   Leuk Esterase: Negative / RBC: x / WBC x   Sq Epi: x / Non Sq Epi: x / Bacteria: x    .Sputum Endotracheal trap received   Normal Respiratory Nathalia present   Few polymorphonuclear leukocytes per low power field  No Squamous epithelial cells per low power field  Moderate Gram positive cocci in pairs per oil power field 11-04 @ 12:38    Culture - Blood (18 @ 10:49)    Specimen Source: .Blood Blood-Venous    Culture Results:   No growth to date.    Culture - Blood (18 @ 10:49)    Specimen Source: .Blood Blood-Peripheral    Culture Results:   No growth to date.    Radiology: No new imaging studies  Bedside lung ultrasound: N/A  Bedside ECHO: N/A    CENTRAL LINE: N            BEATTY: N                     A-LINE: N                          GLOBAL ISSUE/BEST PRACTICE  Analgesia: N  Sedation: Y (Midazolam PRN)  HOB elevation: Yes  Stress ulcer prophylaxis: N  VTE prophylaxis: Y (HSQ)  Glycemic control: N   Nutrition: Y    CODE STATUS: Full

## 2018-11-05 NOTE — DISCHARGE NOTE ADULT - CARE PROVIDER_API CALL
Sully Tee), Internal Medicine  [address] [place] [address]  Phone: [phone]  Fax: [phone]

## 2018-11-05 NOTE — DISCHARGE NOTE ADULT - HOSPITAL COURSE
HPI:  Patient intubated and sedated, however wife at bedside and provided history.     49 yo male w/ hx of alcohol abuse, p/w episodes of shaking w/ waxing and waning consciousness.     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 On his return, he met friends and continued to drink with them, mostly beer and vodka, though his wife is unsure of how much/if he ingested other drugs. Per wife, after returning home on the day of admission, patient watched TV alone (wife was upstairs sleeping), and noticed that both his arms started to shake. He remained conscious during this time. He then went upstairs to sleep, and his wife noticed continued shaking movements in the upper extremities. He then quickly became intermittently unresponsive and the decision was made to present to the ED.     In the ED, patient also exhibited these same symptoms of upper extremity shaking and intermittent consciousness throughout the episode. Last drink was 10 PM 11/3. Vitals , /102, RR 24, Sp02 100% (intubated, , Fi02 60%, PEEP 5, RR 14). Labs notable for Hgb 18.9, Hct 55.4, ammonia 40, lactate 3, ABG 7.3, pC02 47, p02 133 (while intubated), blood alcohol level 256. CT abdomen pelvis, CT chest and CT head negative for acute processes.     Of note, patient's wife knows he sees cardiologist (Dr. Blackburn prescribed patient amlodipine so possibly him), though doesn't know his past medical conditions. Also of note, patient's wife noted he recently saw a doctor for swelling in his neck and legs, which she attributes to his recent and frequent travel, and was prescribed a "water pill". (04 Nov 2018 01:32)    Hospital Course:  Patient was admitted to the ICU and intubated for airway protection. His alcohol level and toxicology screen were positive. He was sedated on full ventilatory support and started on broad spectrum antibiotics for possible infectious etiology. Family reported some tremors pre hospital which were again seen in the ICU. This did not resemble seizure like activity and were able to be controlled with distraction. The following morning patient was extubated, jimenes and NG tube removed and a diet was started. CIWA precautions were put in place and social work consulted. No identifiable etiology for symptoms aside from polysubstance abuse appreciated. Patient at time of discharge is tolerating diet, free of pain and able to carry out activities of daily living.       TIME OF DISCHARGE  : 45 minutes

## 2018-11-06 LAB
CULTURE RESULTS: SIGNIFICANT CHANGE UP
SPECIMEN SOURCE: SIGNIFICANT CHANGE UP

## 2018-11-09 LAB
CULTURE RESULTS: SIGNIFICANT CHANGE UP
CULTURE RESULTS: SIGNIFICANT CHANGE UP
SPECIMEN SOURCE: SIGNIFICANT CHANGE UP
SPECIMEN SOURCE: SIGNIFICANT CHANGE UP

## 2018-11-14 DIAGNOSIS — F10.129 ALCOHOL ABUSE WITH INTOXICATION, UNSPECIFIED: ICD-10-CM

## 2018-11-14 DIAGNOSIS — K70.0 ALCOHOLIC FATTY LIVER: ICD-10-CM

## 2018-11-14 DIAGNOSIS — E86.0 DEHYDRATION: ICD-10-CM

## 2018-11-14 DIAGNOSIS — I10 ESSENTIAL (PRIMARY) HYPERTENSION: ICD-10-CM

## 2018-11-14 DIAGNOSIS — F19.19 OTHER PSYCHOACTIVE SUBSTANCE ABUSE WITH UNSPECIFIED PSYCHOACTIVE SUBSTANCE-INDUCED DISORDER: ICD-10-CM

## 2018-11-14 DIAGNOSIS — Z28.21 IMMUNIZATION NOT CARRIED OUT BECAUSE OF PATIENT REFUSAL: ICD-10-CM

## 2018-11-14 DIAGNOSIS — G92 TOXIC ENCEPHALOPATHY: ICD-10-CM

## 2018-11-14 DIAGNOSIS — E83.39 OTHER DISORDERS OF PHOSPHORUS METABOLISM: ICD-10-CM

## 2018-11-14 DIAGNOSIS — J96.01 ACUTE RESPIRATORY FAILURE WITH HYPOXIA: ICD-10-CM

## 2018-11-14 DIAGNOSIS — E87.2 ACIDOSIS: ICD-10-CM

## 2018-11-14 DIAGNOSIS — I16.0 HYPERTENSIVE URGENCY: ICD-10-CM
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
## 2018-11-14 DIAGNOSIS — E78.00 PURE HYPERCHOLESTEROLEMIA, UNSPECIFIED: ICD-10-CM
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
## 2019-04-26 NOTE — ED PROVIDER NOTE - NS_EDPROVIDERDISPOUSERTYPE_ED_A_ED
Attending Attestation (For Attendings USE Only)...

## 2020-02-03 ENCOUNTER — TRANSCRIPTION ENCOUNTER (OUTPATIENT)
Age: 50
End: 2020-02-03

## 2021-04-12 ENCOUNTER — APPOINTMENT (OUTPATIENT)
Dept: SURGERY | Facility: CLINIC | Age: 51
End: 2021-04-12
Payer: COMMERCIAL

## 2021-04-12 VITALS — BODY MASS INDEX: 41.78 KG/M2 | WEIGHT: 260 LBS | HEIGHT: 66 IN

## 2021-04-12 DIAGNOSIS — Z82.49 FAMILY HISTORY OF ISCHEMIC HEART DISEASE AND OTHER DISEASES OF THE CIRCULATORY SYSTEM: ICD-10-CM

## 2021-04-12 DIAGNOSIS — Z78.9 OTHER SPECIFIED HEALTH STATUS: ICD-10-CM

## 2021-04-12 DIAGNOSIS — M54.9 DORSALGIA, UNSPECIFIED: ICD-10-CM

## 2021-04-12 DIAGNOSIS — Z01.818 ENCOUNTER FOR OTHER PREPROCEDURAL EXAMINATION: ICD-10-CM

## 2021-04-12 DIAGNOSIS — K21.9 GASTRO-ESOPHAGEAL REFLUX DISEASE W/OUT ESOPHAGITIS: ICD-10-CM

## 2021-04-12 DIAGNOSIS — M19.90 UNSPECIFIED OSTEOARTHRITIS, UNSPECIFIED SITE: ICD-10-CM

## 2021-04-12 DIAGNOSIS — E78.00 PURE HYPERCHOLESTEROLEMIA, UNSPECIFIED: ICD-10-CM

## 2021-04-12 PROCEDURE — 99204 OFFICE O/P NEW MOD 45 MIN: CPT | Mod: 95

## 2021-04-12 RX ORDER — ROSUVASTATIN CALCIUM 5 MG/1
TABLET, FILM COATED ORAL
Refills: 0 | Status: ACTIVE | COMMUNITY

## 2021-04-12 RX ORDER — LOSARTAN POTASSIUM 100 MG/1
TABLET, FILM COATED ORAL
Refills: 0 | Status: ACTIVE | COMMUNITY

## 2021-04-12 NOTE — REASON FOR VISIT
[Home] : at home, [unfilled] , at the time of the visit. [Medical Office: ([facility])___] : at the medical office located in  [Verbal consent obtained from patient] : the patient, [unfilled]

## 2021-04-12 NOTE — ASSESSMENT
[FreeTextEntry1] : 50-year-old male with longstanding history of morbid obesity (height 5 feet 6 inches, weight 260 pounds, BMI 42) who is interested in bariatric surgery.  He has comorbidities of hypertension and hypercholesterolemia.\par \par The patient has failed multiple prior attempts at weight loss and is now dealing with the side effects, risk factors, and poor quality of life associated with morbid obesity.  They would benefit from surgical weight loss as outlined in the NIH and  ASMBS consensus statements on bariatric surgery.  Surgical intervention is medically indicated.\par \par My impression is that the patient is a reasonable candidate for laparoscopic sleeve gastrectomy.\par \par \par

## 2021-04-12 NOTE — PLAN
[FreeTextEntry1] : I have discussed my impression and treatment plan with the patient.  All surgical options were discussed including non-surgical treatments.  The patient would like to proceed with laparoscopic sleeve gastrectomy.  \par \par Benefits and risks of the planned surgery were discussed in depth, particularly leak, bleeding, sepsis, fistula, GERD, blood clots, dysphagia, malnutrition, weight regain, prolonged hospital stay and the remote possibility of death.   Also discussed was the importance of adhering to the recommended nutritional guidelines and supplements and attending regular follow-up.  I reviewed the critical importance of behavioral modification and follow-up in order to optimize outcomes and avoid complications. The patient was given the opportunity to ask pertinent questions and expressed good understanding of the aforementioned issues.\par \par Plan will be for laparoscopic sleeve gastrectomy after completion of:\par - medical weight management program\par - upper endoscopy\par - nutritional evaluation\par - medical, pulmonary and cardiac clearance\par - psychological evaluation\par - I have discussed with him the recommendation that he abstain from drinking alcohol immediately before and after surgery for some time.  I have discussed with him the risks of gastritis and peptic ulcer disease from drinking.  He expressed good understanding and agrees to comply.

## 2021-04-12 NOTE — HISTORY OF PRESENT ILLNESS
[de-identified] : Mr. Barraza is a 50-year-old gentleman who presents today for evaluation for bariatric surgery.\par \par The patient has been struggling with obesity for many years.  He has attempted several diet and exercise programs without success.  When he was younger, he could easily lose weight and exercise frequently, however has been less able to do so as he has gotten older.   The excess weight is starting to significantly affect his health and lifestyle.\par \par Medical history is significant for hypertension and high cholesterol.\par Surgical history is significant for bilateral inguinal hernia repair and umbilical hernia repair with mesh, vasectomy.\par The patient denies prior history of blood clot or abnormal bleeding.\par The patient denies prior history of dysphagia or GERD.\par He is a non-smoker.  He states he drinks socially.\par

## 2021-04-12 NOTE — HISTORY OF PRESENT ILLNESS
[de-identified] : Mr. Barraza is a 50-year-old gentleman who presents today for evaluation for bariatric surgery.\par \par The patient has been struggling with obesity for many years.  He has attempted several diet and exercise programs without success.  When he was younger, he could easily lose weight and exercise frequently, however has been less able to do so as he has gotten older.   The excess weight is starting to significantly affect his health and lifestyle.\par \par Medical history is significant for hypertension and high cholesterol.\par Surgical history is significant for bilateral inguinal hernia repair and umbilical hernia repair with mesh, vasectomy.\par The patient denies prior history of blood clot or abnormal bleeding.\par The patient denies prior history of dysphagia or GERD.\par He is a non-smoker.  He states he drinks socially.\par

## 2021-04-12 NOTE — REASON FOR VISIT
[Home] : at home, [unfilled] , at the time of the visit. [Medical Office: ([facility])___] : at the medical office located in  [Verbal consent obtained from patient] : the patient, [unfilled]

## 2021-04-14 ENCOUNTER — RESULT REVIEW (OUTPATIENT)
Age: 51
End: 2021-04-14

## 2021-05-20 DIAGNOSIS — Z01.818 ENCOUNTER FOR OTHER PREPROCEDURAL EXAMINATION: ICD-10-CM

## 2021-05-21 ENCOUNTER — APPOINTMENT (OUTPATIENT)
Dept: DISASTER EMERGENCY | Facility: CLINIC | Age: 51
End: 2021-05-21

## 2021-05-22 LAB — SARS-COV-2 N GENE NPH QL NAA+PROBE: NOT DETECTED

## 2021-05-23 ENCOUNTER — TRANSCRIPTION ENCOUNTER (OUTPATIENT)
Age: 51
End: 2021-05-23

## 2021-05-24 ENCOUNTER — RESULT REVIEW (OUTPATIENT)
Age: 51
End: 2021-05-24

## 2021-05-24 ENCOUNTER — OUTPATIENT (OUTPATIENT)
Dept: OUTPATIENT SERVICES | Facility: HOSPITAL | Age: 51
LOS: 1 days | End: 2021-05-24
Payer: COMMERCIAL

## 2021-05-24 DIAGNOSIS — Z98.89 OTHER SPECIFIED POSTPROCEDURAL STATES: Chronic | ICD-10-CM

## 2021-05-24 DIAGNOSIS — Z98.890 OTHER SPECIFIED POSTPROCEDURAL STATES: Chronic | ICD-10-CM

## 2021-05-24 DIAGNOSIS — R10.13 EPIGASTRIC PAIN: ICD-10-CM

## 2021-05-24 DIAGNOSIS — Z98.52 VASECTOMY STATUS: Chronic | ICD-10-CM

## 2021-05-24 PROCEDURE — 88312 SPECIAL STAINS GROUP 1: CPT

## 2021-05-24 PROCEDURE — 88305 TISSUE EXAM BY PATHOLOGIST: CPT

## 2021-05-24 PROCEDURE — 43239 EGD BIOPSY SINGLE/MULTIPLE: CPT

## 2021-05-24 PROCEDURE — 88312 SPECIAL STAINS GROUP 1: CPT | Mod: 26

## 2021-05-24 PROCEDURE — 88305 TISSUE EXAM BY PATHOLOGIST: CPT | Mod: 26

## 2021-05-25 LAB — SURGICAL PATHOLOGY STUDY: SIGNIFICANT CHANGE UP

## 2021-06-12 ENCOUNTER — TRANSCRIPTION ENCOUNTER (OUTPATIENT)
Age: 51
End: 2021-06-12

## 2021-06-15 ENCOUNTER — APPOINTMENT (OUTPATIENT)
Dept: ULTRASOUND IMAGING | Facility: HOSPITAL | Age: 51
End: 2021-06-15
Payer: COMMERCIAL

## 2021-06-15 ENCOUNTER — OUTPATIENT (OUTPATIENT)
Dept: OUTPATIENT SERVICES | Facility: HOSPITAL | Age: 51
LOS: 1 days | End: 2021-06-15
Payer: COMMERCIAL

## 2021-06-15 ENCOUNTER — APPOINTMENT (OUTPATIENT)
Dept: RADIOLOGY | Facility: HOSPITAL | Age: 51
End: 2021-06-15
Payer: COMMERCIAL

## 2021-06-15 DIAGNOSIS — Z98.890 OTHER SPECIFIED POSTPROCEDURAL STATES: Chronic | ICD-10-CM

## 2021-06-15 DIAGNOSIS — Z98.89 OTHER SPECIFIED POSTPROCEDURAL STATES: Chronic | ICD-10-CM

## 2021-06-15 DIAGNOSIS — E66.01 MORBID (SEVERE) OBESITY DUE TO EXCESS CALORIES: ICD-10-CM

## 2021-06-15 DIAGNOSIS — Z98.52 VASECTOMY STATUS: Chronic | ICD-10-CM

## 2021-06-15 DIAGNOSIS — Z00.00 ENCOUNTER FOR GENERAL ADULT MEDICAL EXAMINATION WITHOUT ABNORMAL FINDINGS: ICD-10-CM

## 2021-06-15 PROCEDURE — 76700 US EXAM ABDOM COMPLETE: CPT | Mod: 26

## 2021-06-15 PROCEDURE — 74246 X-RAY XM UPR GI TRC 2CNTRST: CPT

## 2021-06-15 PROCEDURE — 76700 US EXAM ABDOM COMPLETE: CPT

## 2021-06-15 PROCEDURE — 74240 X-RAY XM UPR GI TRC 1CNTRST: CPT

## 2021-06-15 PROCEDURE — 74246 X-RAY XM UPR GI TRC 2CNTRST: CPT | Mod: 26

## 2021-07-18 ENCOUNTER — TRANSCRIPTION ENCOUNTER (OUTPATIENT)
Age: 51
End: 2021-07-18

## 2021-07-19 ENCOUNTER — RESULT REVIEW (OUTPATIENT)
Age: 51
End: 2021-07-19

## 2021-07-19 ENCOUNTER — OUTPATIENT (OUTPATIENT)
Dept: OUTPATIENT SERVICES | Facility: HOSPITAL | Age: 51
LOS: 1 days | End: 2021-07-19
Payer: COMMERCIAL

## 2021-07-19 DIAGNOSIS — Z98.890 OTHER SPECIFIED POSTPROCEDURAL STATES: Chronic | ICD-10-CM

## 2021-07-19 DIAGNOSIS — K25.3 ACUTE GASTRIC ULCER WITHOUT HEMORRHAGE OR PERFORATION: ICD-10-CM

## 2021-07-19 DIAGNOSIS — Z98.89 OTHER SPECIFIED POSTPROCEDURAL STATES: Chronic | ICD-10-CM

## 2021-07-19 DIAGNOSIS — Z98.52 VASECTOMY STATUS: Chronic | ICD-10-CM

## 2021-07-19 PROCEDURE — 88313 SPECIAL STAINS GROUP 2: CPT

## 2021-07-19 PROCEDURE — 43239 EGD BIOPSY SINGLE/MULTIPLE: CPT

## 2021-07-19 PROCEDURE — 88312 SPECIAL STAINS GROUP 1: CPT | Mod: 26

## 2021-07-19 PROCEDURE — 88305 TISSUE EXAM BY PATHOLOGIST: CPT | Mod: 26

## 2021-07-19 PROCEDURE — 88313 SPECIAL STAINS GROUP 2: CPT | Mod: 26

## 2021-07-19 PROCEDURE — 88305 TISSUE EXAM BY PATHOLOGIST: CPT

## 2021-07-19 PROCEDURE — 88312 SPECIAL STAINS GROUP 1: CPT

## 2021-07-20 LAB — SURGICAL PATHOLOGY STUDY: SIGNIFICANT CHANGE UP

## 2021-10-18 ENCOUNTER — APPOINTMENT (OUTPATIENT)
Dept: SURGERY | Facility: CLINIC | Age: 51
End: 2021-10-18
Payer: COMMERCIAL

## 2021-10-18 VITALS
SYSTOLIC BLOOD PRESSURE: 163 MMHG | BODY MASS INDEX: 42.06 KG/M2 | WEIGHT: 268 LBS | DIASTOLIC BLOOD PRESSURE: 92 MMHG | RESPIRATION RATE: 17 BRPM | HEART RATE: 89 BPM | HEIGHT: 67 IN | OXYGEN SATURATION: 97 % | TEMPERATURE: 97.2 F

## 2021-10-18 PROCEDURE — 99212 OFFICE O/P EST SF 10 MIN: CPT

## 2021-10-21 LAB
25(OH)D3 SERPL-MCNC: 16.5 NG/ML
ALBUMIN SERPL ELPH-MCNC: 4.2 G/DL
ALP BLD-CCNC: 91 U/L
ALT SERPL-CCNC: 17 U/L
ANION GAP SERPL CALC-SCNC: 16 MMOL/L
APTT BLD: 30.5 SEC
AST SERPL-CCNC: 17 U/L
BASOPHILS # BLD AUTO: 0.07 K/UL
BASOPHILS NFR BLD AUTO: 1.1 %
BILIRUB SERPL-MCNC: 0.3 MG/DL
BUN SERPL-MCNC: 17 MG/DL
CALCIUM SERPL-MCNC: 9.1 MG/DL
CHLORIDE SERPL-SCNC: 104 MMOL/L
CO2 SERPL-SCNC: 21 MMOL/L
CREAT SERPL-MCNC: 0.95 MG/DL
EOSINOPHIL # BLD AUTO: 0.14 K/UL
EOSINOPHIL NFR BLD AUTO: 2.3 %
ESTIMATED AVERAGE GLUCOSE: 117 MG/DL
FOLATE SERPL-MCNC: 6.7 NG/ML
GLUCOSE SERPL-MCNC: 96 MG/DL
HBA1C MFR BLD HPLC: 5.7 %
HCT VFR BLD CALC: 45.6 %
HGB BLD-MCNC: 15.4 G/DL
IMM GRANULOCYTES NFR BLD AUTO: 0.6 %
INR PPP: 1 RATIO
IRON SERPL-MCNC: 58 UG/DL
LYMPHOCYTES # BLD AUTO: 2.53 K/UL
LYMPHOCYTES NFR BLD AUTO: 41 %
MAN DIFF?: NORMAL
MCHC RBC-ENTMCNC: 30.5 PG
MCHC RBC-ENTMCNC: 33.8 GM/DL
MCV RBC AUTO: 90.3 FL
MONOCYTES # BLD AUTO: 0.71 K/UL
MONOCYTES NFR BLD AUTO: 11.5 %
NEUTROPHILS # BLD AUTO: 2.68 K/UL
NEUTROPHILS NFR BLD AUTO: 43.5 %
PLATELET # BLD AUTO: 204 K/UL
POTASSIUM SERPL-SCNC: 4.4 MMOL/L
PROT SERPL-MCNC: 6.7 G/DL
PSA SERPL-MCNC: 0.96 NG/ML
PT BLD: 11.8 SEC
RBC # BLD: 5.05 M/UL
RBC # FLD: 13.2 %
SODIUM SERPL-SCNC: 141 MMOL/L
TSH SERPL-ACNC: 2.1 UIU/ML
VIT B12 SERPL-MCNC: 433 PG/ML
WBC # FLD AUTO: 6.17 K/UL

## 2021-10-25 LAB — VIT B1 SERPL-MCNC: 149 NMOL/L

## 2021-11-04 ENCOUNTER — OUTPATIENT (OUTPATIENT)
Dept: OUTPATIENT SERVICES | Facility: HOSPITAL | Age: 51
LOS: 1 days | End: 2021-11-04
Payer: COMMERCIAL

## 2021-11-04 VITALS
TEMPERATURE: 98 F | WEIGHT: 268.08 LBS | SYSTOLIC BLOOD PRESSURE: 142 MMHG | RESPIRATION RATE: 14 BRPM | HEIGHT: 66.5 IN | OXYGEN SATURATION: 95 % | HEART RATE: 78 BPM | DIASTOLIC BLOOD PRESSURE: 88 MMHG

## 2021-11-04 DIAGNOSIS — E66.01 MORBID (SEVERE) OBESITY DUE TO EXCESS CALORIES: ICD-10-CM

## 2021-11-04 DIAGNOSIS — Z98.89 OTHER SPECIFIED POSTPROCEDURAL STATES: Chronic | ICD-10-CM

## 2021-11-04 DIAGNOSIS — Z98.890 OTHER SPECIFIED POSTPROCEDURAL STATES: Chronic | ICD-10-CM

## 2021-11-04 DIAGNOSIS — Z29.9 ENCOUNTER FOR PROPHYLACTIC MEASURES, UNSPECIFIED: ICD-10-CM

## 2021-11-04 DIAGNOSIS — Z98.52 VASECTOMY STATUS: Chronic | ICD-10-CM

## 2021-11-04 LAB
ALBUMIN SERPL ELPH-MCNC: 4.2 G/DL — SIGNIFICANT CHANGE UP (ref 3.3–5)
ALP SERPL-CCNC: 85 U/L — SIGNIFICANT CHANGE UP (ref 40–120)
ALT FLD-CCNC: 22 U/L — SIGNIFICANT CHANGE UP (ref 10–45)
ANION GAP SERPL CALC-SCNC: 15 MMOL/L — SIGNIFICANT CHANGE UP (ref 5–17)
AST SERPL-CCNC: 24 U/L — SIGNIFICANT CHANGE UP (ref 10–40)
BILIRUB SERPL-MCNC: 0.4 MG/DL — SIGNIFICANT CHANGE UP (ref 0.2–1.2)
BLD GP AB SCN SERPL QL: NEGATIVE — SIGNIFICANT CHANGE UP
BUN SERPL-MCNC: 14 MG/DL — SIGNIFICANT CHANGE UP (ref 7–23)
CALCIUM SERPL-MCNC: 9.3 MG/DL — SIGNIFICANT CHANGE UP (ref 8.4–10.5)
CHLORIDE SERPL-SCNC: 102 MMOL/L — SIGNIFICANT CHANGE UP (ref 96–108)
CO2 SERPL-SCNC: 20 MMOL/L — LOW (ref 22–31)
CREAT SERPL-MCNC: 0.82 MG/DL — SIGNIFICANT CHANGE UP (ref 0.5–1.3)
GLUCOSE SERPL-MCNC: 78 MG/DL — SIGNIFICANT CHANGE UP (ref 70–99)
HCT VFR BLD CALC: 47.6 % — SIGNIFICANT CHANGE UP (ref 39–50)
HGB BLD-MCNC: 16 G/DL — SIGNIFICANT CHANGE UP (ref 13–17)
MCHC RBC-ENTMCNC: 29.6 PG — SIGNIFICANT CHANGE UP (ref 27–34)
MCHC RBC-ENTMCNC: 33.6 GM/DL — SIGNIFICANT CHANGE UP (ref 32–36)
MCV RBC AUTO: 88.1 FL — SIGNIFICANT CHANGE UP (ref 80–100)
NRBC # BLD: 0 /100 WBCS — SIGNIFICANT CHANGE UP (ref 0–0)
PLATELET # BLD AUTO: 242 K/UL — SIGNIFICANT CHANGE UP (ref 150–400)
POTASSIUM SERPL-MCNC: 4.2 MMOL/L — SIGNIFICANT CHANGE UP (ref 3.5–5.3)
POTASSIUM SERPL-SCNC: 4.2 MMOL/L — SIGNIFICANT CHANGE UP (ref 3.5–5.3)
PROT SERPL-MCNC: 7.2 G/DL — SIGNIFICANT CHANGE UP (ref 6–8.3)
RBC # BLD: 5.4 M/UL — SIGNIFICANT CHANGE UP (ref 4.2–5.8)
RBC # FLD: 12.8 % — SIGNIFICANT CHANGE UP (ref 10.3–14.5)
RH IG SCN BLD-IMP: POSITIVE — SIGNIFICANT CHANGE UP
SODIUM SERPL-SCNC: 137 MMOL/L — SIGNIFICANT CHANGE UP (ref 135–145)
WBC # BLD: 6.95 K/UL — SIGNIFICANT CHANGE UP (ref 3.8–10.5)
WBC # FLD AUTO: 6.95 K/UL — SIGNIFICANT CHANGE UP (ref 3.8–10.5)

## 2021-11-04 PROCEDURE — 86901 BLOOD TYPING SEROLOGIC RH(D): CPT

## 2021-11-04 PROCEDURE — 86900 BLOOD TYPING SEROLOGIC ABO: CPT

## 2021-11-04 PROCEDURE — 86850 RBC ANTIBODY SCREEN: CPT

## 2021-11-04 PROCEDURE — 80053 COMPREHEN METABOLIC PANEL: CPT

## 2021-11-04 PROCEDURE — G0463: CPT

## 2021-11-04 PROCEDURE — 85027 COMPLETE CBC AUTOMATED: CPT

## 2021-11-04 RX ORDER — CEFAZOLIN SODIUM 1 G
2000 VIAL (EA) INJECTION ONCE
Refills: 0 | Status: COMPLETED | OUTPATIENT
Start: 2021-11-17 | End: 2021-11-17

## 2021-11-04 RX ORDER — FUROSEMIDE 40 MG
1 TABLET ORAL
Qty: 0 | Refills: 0 | DISCHARGE

## 2021-11-04 RX ORDER — AMLODIPINE BESYLATE 2.5 MG/1
1 TABLET ORAL
Qty: 0 | Refills: 0 | DISCHARGE

## 2021-11-04 NOTE — H&P PST ADULT - NSICDXPASTSURGICALHX_GEN_ALL_CORE_FT
PAST SURGICAL HISTORY:  H/O hernia repair 2010 x2    H/O vasectomy 2007    S/P colonoscopy 2020    Status post hip surgery R hip, repair of labrum, 2013

## 2021-11-04 NOTE — H&P PST ADULT - HISTORY OF PRESENT ILLNESS
52 y/o with PMHx of HTN, HLD, obesity, sleep hypoxia (autopap machine), Hx of seizure 11/2018 (hospitalized, intubated, followed up with neurologist- negative work up), presents for PST evaluation for Laparoscopic Sleeve Gastrectomy on 11/17/21. Hx of weight variation but the past 3 years gained more weight.     Hx of Covid 12/2020 (asymptomatic, no hospitalization, no need for O2).   [employer] 7/2021  Denies Flu vaccine  Covid swab 11/14/21

## 2021-11-04 NOTE — H&P PST ADULT - ASSESSMENT
CORII VTE 2.0 SCORE [CLOT updated 2019]    AGE RELATED RISK FACTORS                                                       MOBILITY RELATED FACTORS  [X ] Age 41-60 years                                            (1 Point)                    [ ] Bed rest                                                        (1 Point)  [ ] Age: 61-74 years                                           (2 Points)                  [ ] Plaster cast                                                   (2 Points)  [ ] Age= 75 years                                              (3 Points)                    [ ] Bed bound for more than 72 hours                 (2 Points)    DISEASE RELATED RISK FACTORS                                               GENDER SPECIFIC FACTORS  [ ] Edema in the lower extremities                       (1 Point)              [ ] Pregnancy                                                     (1 Point)  [ ] Varicose veins                                               (1 Point)                     [ ] Post-partum < 6 weeks                                   (1 Point)             [ X] BMI > 25 Kg/m2                                            (1 Point)                     [ ] Hormonal therapy  or oral contraception          (1 Point)                 [ ] Sepsis (in the previous month)                        (1 Point)               [ ] History of pregnancy complications                 (1 point)  [ ] Pneumonia or serious lung disease                                               [ ] Unexplained or recurrent                     (1 Point)           (in the previous month)                               (1 Point)  [ ] Abnormal pulmonary function test                     (1 Point)                 SURGERY RELATED RISK FACTORS  [ ] Acute myocardial infarction                              (1 Point)               [ ]  Section                                             (1 Point)  [ ] Congestive heart failure (in the previous month)  (1 Point)      [ ] Minor surgery                                                  (1 Point)   [ ] Inflammatory bowel disease                             (1 Point)               [ ] Arthroscopic surgery                                        (2 Points)  [ ] Central venous access                                      (2 Points)                [X ] General surgery lasting more than 45 minutes (2 points)  [ ] Malignancy- Present or previous                   (2 Points)                [ ] Elective arthroplasty                                         (5 points)    [ ] Stroke (in the previous month)                          (5 Points)                                                                                                                                                           HEMATOLOGY RELATED FACTORS                                                 TRAUMA RELATED RISK FACTORS  [ ] Prior episodes of VTE                                     (3 Points)                [ ] Fracture of the hip, pelvis, or leg                       (5 Points)  [ ] Positive family history for VTE                         (3 Points)             [ ] Acute spinal cord injury (in the previous month)  (5 Points)  [ ] Prothrombin 23158 A                                     (3 Points)               [ ] Paralysis  (less than 1 month)                             (5 Points)  [ ] Factor V Leiden                                             (3 Points)                  [ ] Multiple Trauma within 1 month                        (5 Points)  [ ] Lupus anticoagulants                                     (3 Points)                                                           [ ] Anticardiolipin antibodies                               (3 Points)                                                       [ ] High homocysteine in the blood                      (3 Points)                                             [ ] Other congenital or acquired thrombophilia      (3 Points)                                                [ ] Heparin induced thrombocytopenia                  (3 Points)                                     Total Score [      4    ]

## 2021-11-04 NOTE — H&P PST ADULT - NSICDXPASTMEDICALHX_GEN_ALL_CORE_FT
PAST MEDICAL HISTORY:  Dyslipidemia     High cholesterol     HTN (hypertension)     Morbid obesity due to excess calories     Pericardial cyst 1993, resolved

## 2021-11-11 ENCOUNTER — TRANSCRIPTION ENCOUNTER (OUTPATIENT)
Age: 51
End: 2021-11-11

## 2021-11-11 PROBLEM — I10 ESSENTIAL (PRIMARY) HYPERTENSION: Chronic | Status: ACTIVE | Noted: 2021-11-04

## 2021-11-14 ENCOUNTER — OUTPATIENT (OUTPATIENT)
Dept: OUTPATIENT SERVICES | Facility: HOSPITAL | Age: 51
LOS: 1 days | End: 2021-11-14
Payer: COMMERCIAL

## 2021-11-14 DIAGNOSIS — Z98.890 OTHER SPECIFIED POSTPROCEDURAL STATES: Chronic | ICD-10-CM

## 2021-11-14 DIAGNOSIS — Z11.52 ENCOUNTER FOR SCREENING FOR COVID-19: ICD-10-CM

## 2021-11-14 DIAGNOSIS — Z98.89 OTHER SPECIFIED POSTPROCEDURAL STATES: Chronic | ICD-10-CM

## 2021-11-14 DIAGNOSIS — Z98.52 VASECTOMY STATUS: Chronic | ICD-10-CM

## 2021-11-14 LAB — SARS-COV-2 RNA SPEC QL NAA+PROBE: SIGNIFICANT CHANGE UP

## 2021-11-14 PROCEDURE — U0003: CPT

## 2021-11-14 PROCEDURE — C9803: CPT

## 2021-11-14 PROCEDURE — U0005: CPT

## 2021-11-16 ENCOUNTER — TRANSCRIPTION ENCOUNTER (OUTPATIENT)
Age: 51
End: 2021-11-16

## 2021-11-17 ENCOUNTER — INPATIENT (INPATIENT)
Facility: HOSPITAL | Age: 51
LOS: 2 days | Discharge: HOME | DRG: 621 | End: 2021-11-20
Attending: SURGERY | Admitting: SURGERY
Payer: COMMERCIAL

## 2021-11-17 ENCOUNTER — APPOINTMENT (OUTPATIENT)
Dept: SURGERY | Facility: HOSPITAL | Age: 51
End: 2021-11-17
Payer: COMMERCIAL

## 2021-11-17 ENCOUNTER — RESULT REVIEW (OUTPATIENT)
Age: 51
End: 2021-11-17

## 2021-11-17 VITALS
HEART RATE: 77 BPM | HEIGHT: 66.5 IN | DIASTOLIC BLOOD PRESSURE: 75 MMHG | OXYGEN SATURATION: 98 % | SYSTOLIC BLOOD PRESSURE: 125 MMHG | TEMPERATURE: 98 F | WEIGHT: 268.08 LBS | RESPIRATION RATE: 16 BRPM

## 2021-11-17 DIAGNOSIS — Z98.89 OTHER SPECIFIED POSTPROCEDURAL STATES: Chronic | ICD-10-CM

## 2021-11-17 DIAGNOSIS — Z98.52 VASECTOMY STATUS: Chronic | ICD-10-CM

## 2021-11-17 DIAGNOSIS — E66.01 MORBID (SEVERE) OBESITY DUE TO EXCESS CALORIES: ICD-10-CM

## 2021-11-17 DIAGNOSIS — Z98.890 OTHER SPECIFIED POSTPROCEDURAL STATES: Chronic | ICD-10-CM

## 2021-11-17 LAB
ANION GAP SERPL CALC-SCNC: 11 MMOL/L — SIGNIFICANT CHANGE UP (ref 5–17)
BASOPHILS # BLD AUTO: 0.04 K/UL — SIGNIFICANT CHANGE UP (ref 0–0.2)
BASOPHILS NFR BLD AUTO: 0.6 % — SIGNIFICANT CHANGE UP (ref 0–2)
BUN SERPL-MCNC: 8 MG/DL — SIGNIFICANT CHANGE UP (ref 7–23)
CALCIUM SERPL-MCNC: 8.1 MG/DL — LOW (ref 8.4–10.5)
CHLORIDE SERPL-SCNC: 104 MMOL/L — SIGNIFICANT CHANGE UP (ref 96–108)
CO2 SERPL-SCNC: 22 MMOL/L — SIGNIFICANT CHANGE UP (ref 22–31)
CREAT SERPL-MCNC: 0.89 MG/DL — SIGNIFICANT CHANGE UP (ref 0.5–1.3)
EOSINOPHIL # BLD AUTO: 0.08 K/UL — SIGNIFICANT CHANGE UP (ref 0–0.5)
EOSINOPHIL NFR BLD AUTO: 1.2 % — SIGNIFICANT CHANGE UP (ref 0–6)
GAS PNL BLDA: SIGNIFICANT CHANGE UP
GLUCOSE BLDC GLUCOMTR-MCNC: 83 MG/DL — SIGNIFICANT CHANGE UP (ref 70–99)
GLUCOSE SERPL-MCNC: 106 MG/DL — HIGH (ref 70–99)
HCT VFR BLD CALC: 42.4 % — SIGNIFICANT CHANGE UP (ref 39–50)
HGB BLD-MCNC: 14 G/DL — SIGNIFICANT CHANGE UP (ref 13–17)
IMM GRANULOCYTES NFR BLD AUTO: 0.3 % — SIGNIFICANT CHANGE UP (ref 0–1.5)
LYMPHOCYTES # BLD AUTO: 1.23 K/UL — SIGNIFICANT CHANGE UP (ref 1–3.3)
LYMPHOCYTES # BLD AUTO: 18.9 % — SIGNIFICANT CHANGE UP (ref 13–44)
MAGNESIUM SERPL-MCNC: 2.1 MG/DL — SIGNIFICANT CHANGE UP (ref 1.6–2.6)
MCHC RBC-ENTMCNC: 30 PG — SIGNIFICANT CHANGE UP (ref 27–34)
MCHC RBC-ENTMCNC: 33 GM/DL — SIGNIFICANT CHANGE UP (ref 32–36)
MCV RBC AUTO: 91 FL — SIGNIFICANT CHANGE UP (ref 80–100)
MONOCYTES # BLD AUTO: 0.31 K/UL — SIGNIFICANT CHANGE UP (ref 0–0.9)
MONOCYTES NFR BLD AUTO: 4.8 % — SIGNIFICANT CHANGE UP (ref 2–14)
NEUTROPHILS # BLD AUTO: 4.82 K/UL — SIGNIFICANT CHANGE UP (ref 1.8–7.4)
NEUTROPHILS NFR BLD AUTO: 74.2 % — SIGNIFICANT CHANGE UP (ref 43–77)
NRBC # BLD: 0 /100 WBCS — SIGNIFICANT CHANGE UP (ref 0–0)
PHOSPHATE SERPL-MCNC: 2.6 MG/DL — SIGNIFICANT CHANGE UP (ref 2.5–4.5)
PLATELET # BLD AUTO: 200 K/UL — SIGNIFICANT CHANGE UP (ref 150–400)
POTASSIUM SERPL-MCNC: 4.4 MMOL/L — SIGNIFICANT CHANGE UP (ref 3.5–5.3)
POTASSIUM SERPL-SCNC: 4.4 MMOL/L — SIGNIFICANT CHANGE UP (ref 3.5–5.3)
RBC # BLD: 4.66 M/UL — SIGNIFICANT CHANGE UP (ref 4.2–5.8)
RBC # FLD: 12.4 % — SIGNIFICANT CHANGE UP (ref 10.3–14.5)
SODIUM SERPL-SCNC: 137 MMOL/L — SIGNIFICANT CHANGE UP (ref 135–145)
WBC # BLD: 6.5 K/UL — SIGNIFICANT CHANGE UP (ref 3.8–10.5)
WBC # FLD AUTO: 6.5 K/UL — SIGNIFICANT CHANGE UP (ref 3.8–10.5)

## 2021-11-17 PROCEDURE — 43775 LAP SLEEVE GASTRECTOMY: CPT

## 2021-11-17 PROCEDURE — 71045 X-RAY EXAM CHEST 1 VIEW: CPT | Mod: 26

## 2021-11-17 PROCEDURE — 88305 TISSUE EXAM BY PATHOLOGIST: CPT | Mod: 26

## 2021-11-17 RX ORDER — ACETAMINOPHEN 500 MG
1000 TABLET ORAL ONCE
Refills: 0 | Status: COMPLETED | OUTPATIENT
Start: 2021-11-18 | End: 2021-11-18

## 2021-11-17 RX ORDER — HYDROMORPHONE HYDROCHLORIDE 2 MG/ML
0.25 INJECTION INTRAMUSCULAR; INTRAVENOUS; SUBCUTANEOUS ONCE
Refills: 0 | Status: DISCONTINUED | OUTPATIENT
Start: 2021-11-17 | End: 2021-11-17

## 2021-11-17 RX ORDER — ONDANSETRON 8 MG/1
4 TABLET, FILM COATED ORAL EVERY 6 HOURS
Refills: 0 | Status: DISCONTINUED | OUTPATIENT
Start: 2021-11-17 | End: 2021-11-20

## 2021-11-17 RX ORDER — ACETAMINOPHEN 500 MG
1000 TABLET ORAL ONCE
Refills: 0 | Status: COMPLETED | OUTPATIENT
Start: 2021-11-17 | End: 2021-11-17

## 2021-11-17 RX ORDER — FENTANYL CITRATE 50 UG/ML
25 INJECTION INTRAVENOUS
Refills: 0 | Status: DISCONTINUED | OUTPATIENT
Start: 2021-11-17 | End: 2021-11-20

## 2021-11-17 RX ORDER — DIPHENHYDRAMINE HCL 50 MG
12.5 CAPSULE ORAL ONCE
Refills: 0 | Status: DISCONTINUED | OUTPATIENT
Start: 2021-11-17 | End: 2021-11-20

## 2021-11-17 RX ORDER — THIAMINE MONONITRATE (VIT B1) 100 MG
100 TABLET ORAL ONCE
Refills: 0 | Status: COMPLETED | OUTPATIENT
Start: 2021-11-17 | End: 2021-11-17

## 2021-11-17 RX ORDER — HEPARIN SODIUM 5000 [USP'U]/ML
5000 INJECTION INTRAVENOUS; SUBCUTANEOUS ONCE
Refills: 0 | Status: COMPLETED | OUTPATIENT
Start: 2021-11-17 | End: 2021-11-17

## 2021-11-17 RX ORDER — HEPARIN SODIUM 5000 [USP'U]/ML
5000 INJECTION INTRAVENOUS; SUBCUTANEOUS EVERY 8 HOURS
Refills: 0 | Status: DISCONTINUED | OUTPATIENT
Start: 2021-11-17 | End: 2021-11-20

## 2021-11-17 RX ORDER — FENTANYL CITRATE 50 UG/ML
50 INJECTION INTRAVENOUS
Refills: 0 | Status: DISCONTINUED | OUTPATIENT
Start: 2021-11-17 | End: 2021-11-18

## 2021-11-17 RX ORDER — HYDROMORPHONE HYDROCHLORIDE 2 MG/ML
0.5 INJECTION INTRAMUSCULAR; INTRAVENOUS; SUBCUTANEOUS ONCE
Refills: 0 | Status: DISCONTINUED | OUTPATIENT
Start: 2021-11-17 | End: 2021-11-17

## 2021-11-17 RX ORDER — HALOPERIDOL DECANOATE 100 MG/ML
0.5 INJECTION INTRAMUSCULAR EVERY 4 HOURS
Refills: 0 | Status: DISCONTINUED | OUTPATIENT
Start: 2021-11-17 | End: 2021-11-20

## 2021-11-17 RX ORDER — FAMOTIDINE 10 MG/ML
20 INJECTION INTRAVENOUS ONCE
Refills: 0 | Status: COMPLETED | OUTPATIENT
Start: 2021-11-17 | End: 2021-11-17

## 2021-11-17 RX ORDER — CHLORHEXIDINE GLUCONATE 213 G/1000ML
1 SOLUTION TOPICAL ONCE
Refills: 0 | Status: DISCONTINUED | OUTPATIENT
Start: 2021-11-17 | End: 2021-11-17

## 2021-11-17 RX ORDER — SODIUM CHLORIDE 9 MG/ML
1000 INJECTION, SOLUTION INTRAVENOUS
Refills: 0 | Status: DISCONTINUED | OUTPATIENT
Start: 2021-11-17 | End: 2021-11-19

## 2021-11-17 RX ORDER — LIDOCAINE HCL 20 MG/ML
0.2 VIAL (ML) INJECTION ONCE
Refills: 0 | Status: DISCONTINUED | OUTPATIENT
Start: 2021-11-17 | End: 2021-11-17

## 2021-11-17 RX ORDER — SODIUM CHLORIDE 9 MG/ML
1000 INJECTION, SOLUTION INTRAVENOUS
Refills: 0 | Status: DISCONTINUED | OUTPATIENT
Start: 2021-11-17 | End: 2021-11-20

## 2021-11-17 RX ORDER — HALOPERIDOL DECANOATE 100 MG/ML
0.5 INJECTION INTRAMUSCULAR ONCE
Refills: 0 | Status: DISCONTINUED | OUTPATIENT
Start: 2021-11-17 | End: 2021-11-20

## 2021-11-17 RX ORDER — CEFAZOLIN SODIUM 1 G
2000 VIAL (EA) INJECTION EVERY 8 HOURS
Refills: 0 | Status: COMPLETED | OUTPATIENT
Start: 2021-11-17 | End: 2021-11-18

## 2021-11-17 RX ORDER — SODIUM CHLORIDE 9 MG/ML
3 INJECTION INTRAMUSCULAR; INTRAVENOUS; SUBCUTANEOUS EVERY 8 HOURS
Refills: 0 | Status: DISCONTINUED | OUTPATIENT
Start: 2021-11-17 | End: 2021-11-17

## 2021-11-17 RX ORDER — HYDROMORPHONE HYDROCHLORIDE 2 MG/ML
0.5 INJECTION INTRAMUSCULAR; INTRAVENOUS; SUBCUTANEOUS
Refills: 0 | Status: DISCONTINUED | OUTPATIENT
Start: 2021-11-17 | End: 2021-11-18

## 2021-11-17 RX ORDER — KETOROLAC TROMETHAMINE 30 MG/ML
30 SYRINGE (ML) INJECTION EVERY 6 HOURS
Refills: 0 | Status: DISCONTINUED | OUTPATIENT
Start: 2021-11-17 | End: 2021-11-18

## 2021-11-17 RX ORDER — HYOSCYAMINE SULFATE 0.13 MG
0.12 TABLET ORAL EVERY 6 HOURS
Refills: 0 | Status: DISCONTINUED | OUTPATIENT
Start: 2021-11-17 | End: 2021-11-20

## 2021-11-17 RX ORDER — PANTOPRAZOLE SODIUM 20 MG/1
40 TABLET, DELAYED RELEASE ORAL EVERY 24 HOURS
Refills: 0 | Status: DISCONTINUED | OUTPATIENT
Start: 2021-11-17 | End: 2021-11-20

## 2021-11-17 RX ORDER — FOLIC ACID 0.8 MG
1 TABLET ORAL ONCE
Refills: 0 | Status: DISCONTINUED | OUTPATIENT
Start: 2021-11-17 | End: 2021-11-20

## 2021-11-17 RX ORDER — DIAZEPAM 5 MG
5 TABLET ORAL ONCE
Refills: 0 | Status: DISCONTINUED | OUTPATIENT
Start: 2021-11-17 | End: 2021-11-17

## 2021-11-17 RX ORDER — HYDROMORPHONE HYDROCHLORIDE 2 MG/ML
1 INJECTION INTRAMUSCULAR; INTRAVENOUS; SUBCUTANEOUS ONCE
Refills: 0 | Status: DISCONTINUED | OUTPATIENT
Start: 2021-11-17 | End: 2021-11-17

## 2021-11-17 RX ADMIN — Medication 5 MILLIGRAM(S): at 16:35

## 2021-11-17 RX ADMIN — FENTANYL CITRATE 50 MICROGRAM(S): 50 INJECTION INTRAVENOUS at 13:54

## 2021-11-17 RX ADMIN — PANTOPRAZOLE SODIUM 40 MILLIGRAM(S): 20 TABLET, DELAYED RELEASE ORAL at 14:32

## 2021-11-17 RX ADMIN — Medication 30 MILLIGRAM(S): at 19:00

## 2021-11-17 RX ADMIN — Medication 100 MILLIGRAM(S): at 19:19

## 2021-11-17 RX ADMIN — Medication 1000 MILLIGRAM(S): at 23:00

## 2021-11-17 RX ADMIN — HYDROMORPHONE HYDROCHLORIDE 0.5 MILLIGRAM(S): 2 INJECTION INTRAMUSCULAR; INTRAVENOUS; SUBCUTANEOUS at 15:20

## 2021-11-17 RX ADMIN — Medication 1000 MILLIGRAM(S): at 17:00

## 2021-11-17 RX ADMIN — ONDANSETRON 4 MILLIGRAM(S): 8 TABLET, FILM COATED ORAL at 23:49

## 2021-11-17 RX ADMIN — Medication 30 MILLIGRAM(S): at 18:29

## 2021-11-17 RX ADMIN — HYDROMORPHONE HYDROCHLORIDE 1 MILLIGRAM(S): 2 INJECTION INTRAMUSCULAR; INTRAVENOUS; SUBCUTANEOUS at 22:56

## 2021-11-17 RX ADMIN — FENTANYL CITRATE 25 MICROGRAM(S): 50 INJECTION INTRAVENOUS at 14:35

## 2021-11-17 RX ADMIN — Medication 400 MILLIGRAM(S): at 22:47

## 2021-11-17 RX ADMIN — HYDROMORPHONE HYDROCHLORIDE 0.25 MILLIGRAM(S): 2 INJECTION INTRAMUSCULAR; INTRAVENOUS; SUBCUTANEOUS at 14:50

## 2021-11-17 RX ADMIN — HYDROMORPHONE HYDROCHLORIDE 0.5 MILLIGRAM(S): 2 INJECTION INTRAMUSCULAR; INTRAVENOUS; SUBCUTANEOUS at 21:15

## 2021-11-17 RX ADMIN — Medication 0.12 MILLIGRAM(S): at 19:22

## 2021-11-17 RX ADMIN — HYDROMORPHONE HYDROCHLORIDE 0.5 MILLIGRAM(S): 2 INJECTION INTRAMUSCULAR; INTRAVENOUS; SUBCUTANEOUS at 15:45

## 2021-11-17 RX ADMIN — Medication 100 MILLIGRAM(S): at 18:28

## 2021-11-17 RX ADMIN — HYDROMORPHONE HYDROCHLORIDE 0.25 MILLIGRAM(S): 2 INJECTION INTRAMUSCULAR; INTRAVENOUS; SUBCUTANEOUS at 15:24

## 2021-11-17 RX ADMIN — HYDROMORPHONE HYDROCHLORIDE 0.5 MILLIGRAM(S): 2 INJECTION INTRAMUSCULAR; INTRAVENOUS; SUBCUTANEOUS at 20:57

## 2021-11-17 RX ADMIN — HYDROMORPHONE HYDROCHLORIDE 1 MILLIGRAM(S): 2 INJECTION INTRAMUSCULAR; INTRAVENOUS; SUBCUTANEOUS at 22:41

## 2021-11-17 RX ADMIN — Medication 30 MILLIGRAM(S): at 23:49

## 2021-11-17 RX ADMIN — HEPARIN SODIUM 5000 UNIT(S): 5000 INJECTION INTRAVENOUS; SUBCUTANEOUS at 10:19

## 2021-11-17 RX ADMIN — Medication 0.12 MILLIGRAM(S): at 14:52

## 2021-11-17 RX ADMIN — FENTANYL CITRATE 50 MICROGRAM(S): 50 INJECTION INTRAVENOUS at 14:15

## 2021-11-17 RX ADMIN — SODIUM CHLORIDE 150 MILLILITER(S): 9 INJECTION, SOLUTION INTRAVENOUS at 14:06

## 2021-11-17 RX ADMIN — FENTANYL CITRATE 25 MICROGRAM(S): 50 INJECTION INTRAVENOUS at 13:40

## 2021-11-17 RX ADMIN — FENTANYL CITRATE 25 MICROGRAM(S): 50 INJECTION INTRAVENOUS at 14:20

## 2021-11-17 RX ADMIN — Medication 400 MILLIGRAM(S): at 16:44

## 2021-11-17 RX ADMIN — FENTANYL CITRATE 25 MICROGRAM(S): 50 INJECTION INTRAVENOUS at 13:50

## 2021-11-17 RX ADMIN — SODIUM CHLORIDE 250 MILLILITER(S): 9 INJECTION, SOLUTION INTRAVENOUS at 14:26

## 2021-11-17 RX ADMIN — HEPARIN SODIUM 5000 UNIT(S): 5000 INJECTION INTRAVENOUS; SUBCUTANEOUS at 18:29

## 2021-11-17 RX ADMIN — FAMOTIDINE 20 MILLIGRAM(S): 10 INJECTION INTRAVENOUS at 14:50

## 2021-11-17 RX ADMIN — ONDANSETRON 4 MILLIGRAM(S): 8 TABLET, FILM COATED ORAL at 18:29

## 2021-11-17 NOTE — PATIENT PROFILE ADULT - BILL PAYMENT
CERTIFICATE OF SCHOOL    October 4, 2017      Re: Isidoro Michelle  [address] [facility] [address]      This is to certify that Isidoro Michelle has been under my care. Please excuse him from school today, 10/4/17 due to illness.                       SIGNATURE:___________________________________________,   10/4/2017  Dr. Elaine Pimentel          Pediatrics  [address]   [place],  [place] [id number] [phone]          
no

## 2021-11-17 NOTE — CHART NOTE - NSCHARTNOTEFT_GEN_A_CORE
POST-OPERATIVE NOTE    Subjective:  Patient is s/p laparoscopic sleeve gastrectomy. Complaining for severe colicky abdominal pain. Dr. Posada is made aware and he personally assessed the patient. The patient reports that Dilaudid and Valium have slightly helped with his pain. Otherwise, denies n/v, chest pain or palpitations. Vitals are stable.     Vital Signs Last 24 Hrs  T(C): 36.6 (17 Nov 2021 16:45), Max: 36.7 (17 Nov 2021 08:15)  T(F): 97.9 (17 Nov 2021 16:45), Max: 98.1 (17 Nov 2021 08:15)  HR: 62 (17 Nov 2021 17:00) (53 - 80)  BP: 122/73 (17 Nov 2021 17:00) (111/68 - 136/76)  BP(mean): 93 (17 Nov 2021 17:00) (84 - 101)  RR: 16 (17 Nov 2021 17:00) (14 - 20)  SpO2: 100% (17 Nov 2021 17:00) (95% - 100%)  I&O's Detail    17 Nov 2021 07:01  -  17 Nov 2021 17:46  --------------------------------------------------------  IN:    IV PiggyBack: 100 mL    Lactated Ringers: 150 mL    sodium chloride 0.9% w/ Additives: 1000 mL  Total IN: 1250 mL    OUT:    Voided (mL): 450 mL  Total OUT: 450 mL    Total NET: 800 mL        ceFAZolin   IVPB 2000  ceFAZolin   IVPB 2000  ceFAZolin   IVPB 2000  ceFAZolin   IVPB 2000  heparin   Injectable 5000    PAST MEDICAL & SURGICAL HISTORY:  Dyslipidemia    Pericardial cyst  1993, resolved    High cholesterol    HTN (hypertension)    Morbid obesity due to excess calories    Status post hip surgery  R hip, repair of labrum, 2013    H/O hernia repair  2010 x2    H/O vasectomy  2007    S/P colonoscopy  2020          Physical Exam:  General: NAD, resting comfortably in bed  Pulmonary: Nonlabored breathing, no respiratory distress  Cardiovascular: NSR  Abdominal: soft, NT/ND  Extremities: WWP      LABS:                        14.0   6.50  )-----------( 200      ( 17 Nov 2021 13:15 )             42.4     11-17    137  |  104  |  8   ----------------------------<  106<H>  4.4   |  22  |  0.89    Ca    8.1<L>      17 Nov 2021 13:15  Phos  2.6     11-17  Mg     2.1     11-17        CAPILLARY BLOOD GLUCOSE      POCT Blood Glucose.: 83 mg/dL (17 Nov 2021 08:23)      Radiology and Additional Studies:    Assessment:  The patient is a 51y Male who is now several hours post-op from a laparoscopic sleeve gastrectomy:     Plan:  - Pain control as needed  - DVT ppx  - OOB and ambulating as tolerated  - F/u AM labs    GREEN [phone]

## 2021-11-17 NOTE — PRE-OP CHECKLIST - LATEX ALLERGY
no Ashley:  I have independently evaluated the patient and have documented in the appropriate sections above.  I agree with the exam and plan as noted above.

## 2021-11-18 ENCOUNTER — TRANSCRIPTION ENCOUNTER (OUTPATIENT)
Age: 51
End: 2021-11-18

## 2021-11-18 LAB
ANION GAP SERPL CALC-SCNC: 16 MMOL/L — SIGNIFICANT CHANGE UP (ref 5–17)
BASOPHILS # BLD AUTO: 0.01 K/UL — SIGNIFICANT CHANGE UP (ref 0–0.2)
BASOPHILS NFR BLD AUTO: 0.1 % — SIGNIFICANT CHANGE UP (ref 0–2)
BUN SERPL-MCNC: 10 MG/DL — SIGNIFICANT CHANGE UP (ref 7–23)
CALCIUM SERPL-MCNC: 8.3 MG/DL — LOW (ref 8.4–10.5)
CHLORIDE SERPL-SCNC: 103 MMOL/L — SIGNIFICANT CHANGE UP (ref 96–108)
CO2 SERPL-SCNC: 19 MMOL/L — LOW (ref 22–31)
COVID-19 NUCLEOCAPSID GAM AB INTERP: POSITIVE
COVID-19 NUCLEOCAPSID TOTAL GAM ANTIBODY RESULT: 46.8 INDEX — HIGH
COVID-19 SPIKE DOMAIN AB INTERP: POSITIVE
COVID-19 SPIKE DOMAIN ANTIBODY RESULT: >250 U/ML — HIGH
CREAT SERPL-MCNC: 0.84 MG/DL — SIGNIFICANT CHANGE UP (ref 0.5–1.3)
EOSINOPHIL # BLD AUTO: 0 K/UL — SIGNIFICANT CHANGE UP (ref 0–0.5)
EOSINOPHIL NFR BLD AUTO: 0 % — SIGNIFICANT CHANGE UP (ref 0–6)
GLUCOSE SERPL-MCNC: 95 MG/DL — SIGNIFICANT CHANGE UP (ref 70–99)
HCT VFR BLD CALC: 38.1 % — LOW (ref 39–50)
HGB BLD-MCNC: 12.9 G/DL — LOW (ref 13–17)
IMM GRANULOCYTES NFR BLD AUTO: 0.4 % — SIGNIFICANT CHANGE UP (ref 0–1.5)
LYMPHOCYTES # BLD AUTO: 1.5 K/UL — SIGNIFICANT CHANGE UP (ref 1–3.3)
LYMPHOCYTES # BLD AUTO: 19.7 % — SIGNIFICANT CHANGE UP (ref 13–44)
MCHC RBC-ENTMCNC: 30 PG — SIGNIFICANT CHANGE UP (ref 27–34)
MCHC RBC-ENTMCNC: 33.9 GM/DL — SIGNIFICANT CHANGE UP (ref 32–36)
MCV RBC AUTO: 88.6 FL — SIGNIFICANT CHANGE UP (ref 80–100)
MONOCYTES # BLD AUTO: 0.8 K/UL — SIGNIFICANT CHANGE UP (ref 0–0.9)
MONOCYTES NFR BLD AUTO: 10.5 % — SIGNIFICANT CHANGE UP (ref 2–14)
NEUTROPHILS # BLD AUTO: 5.26 K/UL — SIGNIFICANT CHANGE UP (ref 1.8–7.4)
NEUTROPHILS NFR BLD AUTO: 69.3 % — SIGNIFICANT CHANGE UP (ref 43–77)
NRBC # BLD: 0 /100 WBCS — SIGNIFICANT CHANGE UP (ref 0–0)
PLATELET # BLD AUTO: 230 K/UL — SIGNIFICANT CHANGE UP (ref 150–400)
POTASSIUM SERPL-MCNC: 4.1 MMOL/L — SIGNIFICANT CHANGE UP (ref 3.5–5.3)
POTASSIUM SERPL-SCNC: 4.1 MMOL/L — SIGNIFICANT CHANGE UP (ref 3.5–5.3)
RBC # BLD: 4.3 M/UL — SIGNIFICANT CHANGE UP (ref 4.2–5.8)
RBC # FLD: 12.2 % — SIGNIFICANT CHANGE UP (ref 10.3–14.5)
SARS-COV-2 IGG+IGM SERPL QL IA: 46.8 INDEX — HIGH
SARS-COV-2 IGG+IGM SERPL QL IA: >250 U/ML — HIGH
SARS-COV-2 IGG+IGM SERPL QL IA: POSITIVE
SARS-COV-2 IGG+IGM SERPL QL IA: POSITIVE
SODIUM SERPL-SCNC: 138 MMOL/L — SIGNIFICANT CHANGE UP (ref 135–145)
WBC # BLD: 7.6 K/UL — SIGNIFICANT CHANGE UP (ref 3.8–10.5)
WBC # FLD AUTO: 7.6 K/UL — SIGNIFICANT CHANGE UP (ref 3.8–10.5)

## 2021-11-18 RX ORDER — HYOSCYAMINE SULFATE 0.13 MG
2 TABLET ORAL
Qty: 56 | Refills: 0
Start: 2021-11-18 | End: 2021-11-24

## 2021-11-18 RX ORDER — HYOSCYAMINE SULFATE 0.13 MG
1 TABLET ORAL
Qty: 28 | Refills: 0
Start: 2021-11-18 | End: 2021-11-24

## 2021-11-18 RX ORDER — HYDROMORPHONE HYDROCHLORIDE 2 MG/ML
0.5 INJECTION INTRAMUSCULAR; INTRAVENOUS; SUBCUTANEOUS EVERY 4 HOURS
Refills: 0 | Status: DISCONTINUED | OUTPATIENT
Start: 2021-11-18 | End: 2021-11-20

## 2021-11-18 RX ORDER — OMEPRAZOLE 10 MG/1
1 CAPSULE, DELAYED RELEASE ORAL
Qty: 0 | Refills: 0 | DISCHARGE

## 2021-11-18 RX ORDER — ONDANSETRON 8 MG/1
1 TABLET, FILM COATED ORAL
Qty: 28 | Refills: 0
Start: 2021-11-18 | End: 2021-11-24

## 2021-11-18 RX ORDER — OMEPRAZOLE 10 MG/1
1 CAPSULE, DELAYED RELEASE ORAL
Qty: 30 | Refills: 0
Start: 2021-11-18 | End: 2021-12-17

## 2021-11-18 RX ORDER — OXYCODONE HYDROCHLORIDE 5 MG/1
5 TABLET ORAL
Qty: 120 | Refills: 0
Start: 2021-11-18 | End: 2021-11-21

## 2021-11-18 RX ADMIN — HEPARIN SODIUM 5000 UNIT(S): 5000 INJECTION INTRAVENOUS; SUBCUTANEOUS at 17:42

## 2021-11-18 RX ADMIN — HYDROMORPHONE HYDROCHLORIDE 0.5 MILLIGRAM(S): 2 INJECTION INTRAMUSCULAR; INTRAVENOUS; SUBCUTANEOUS at 17:42

## 2021-11-18 RX ADMIN — ONDANSETRON 4 MILLIGRAM(S): 8 TABLET, FILM COATED ORAL at 11:23

## 2021-11-18 RX ADMIN — Medication 1000 MILLIGRAM(S): at 05:27

## 2021-11-18 RX ADMIN — Medication 100 MILLIGRAM(S): at 02:13

## 2021-11-18 RX ADMIN — Medication 30 MILLIGRAM(S): at 05:27

## 2021-11-18 RX ADMIN — HYDROMORPHONE HYDROCHLORIDE 0.5 MILLIGRAM(S): 2 INJECTION INTRAMUSCULAR; INTRAVENOUS; SUBCUTANEOUS at 01:00

## 2021-11-18 RX ADMIN — Medication 1000 MILLIGRAM(S): at 11:52

## 2021-11-18 RX ADMIN — Medication 30 MILLIGRAM(S): at 05:12

## 2021-11-18 RX ADMIN — Medication 0.12 MILLIGRAM(S): at 08:14

## 2021-11-18 RX ADMIN — ONDANSETRON 4 MILLIGRAM(S): 8 TABLET, FILM COATED ORAL at 17:42

## 2021-11-18 RX ADMIN — ONDANSETRON 4 MILLIGRAM(S): 8 TABLET, FILM COATED ORAL at 23:34

## 2021-11-18 RX ADMIN — HEPARIN SODIUM 5000 UNIT(S): 5000 INJECTION INTRAVENOUS; SUBCUTANEOUS at 11:22

## 2021-11-18 RX ADMIN — Medication 0.12 MILLIGRAM(S): at 02:12

## 2021-11-18 RX ADMIN — PANTOPRAZOLE SODIUM 40 MILLIGRAM(S): 20 TABLET, DELAYED RELEASE ORAL at 13:30

## 2021-11-18 RX ADMIN — FENTANYL CITRATE 50 MICROGRAM(S): 50 INJECTION INTRAVENOUS at 21:05

## 2021-11-18 RX ADMIN — HYDROMORPHONE HYDROCHLORIDE 0.5 MILLIGRAM(S): 2 INJECTION INTRAMUSCULAR; INTRAVENOUS; SUBCUTANEOUS at 17:57

## 2021-11-18 RX ADMIN — HYDROMORPHONE HYDROCHLORIDE 0.5 MILLIGRAM(S): 2 INJECTION INTRAMUSCULAR; INTRAVENOUS; SUBCUTANEOUS at 00:44

## 2021-11-18 RX ADMIN — HYDROMORPHONE HYDROCHLORIDE 0.5 MILLIGRAM(S): 2 INJECTION INTRAMUSCULAR; INTRAVENOUS; SUBCUTANEOUS at 03:11

## 2021-11-18 RX ADMIN — HYDROMORPHONE HYDROCHLORIDE 0.5 MILLIGRAM(S): 2 INJECTION INTRAMUSCULAR; INTRAVENOUS; SUBCUTANEOUS at 08:16

## 2021-11-18 RX ADMIN — Medication 0.12 MILLIGRAM(S): at 20:36

## 2021-11-18 RX ADMIN — FENTANYL CITRATE 50 MICROGRAM(S): 50 INJECTION INTRAVENOUS at 20:36

## 2021-11-18 RX ADMIN — Medication 0.12 MILLIGRAM(S): at 13:30

## 2021-11-18 RX ADMIN — Medication 400 MILLIGRAM(S): at 11:22

## 2021-11-18 RX ADMIN — HYDROMORPHONE HYDROCHLORIDE 0.5 MILLIGRAM(S): 2 INJECTION INTRAMUSCULAR; INTRAVENOUS; SUBCUTANEOUS at 13:02

## 2021-11-18 RX ADMIN — HYDROMORPHONE HYDROCHLORIDE 0.5 MILLIGRAM(S): 2 INJECTION INTRAMUSCULAR; INTRAVENOUS; SUBCUTANEOUS at 08:31

## 2021-11-18 RX ADMIN — Medication 30 MILLIGRAM(S): at 00:05

## 2021-11-18 RX ADMIN — ONDANSETRON 4 MILLIGRAM(S): 8 TABLET, FILM COATED ORAL at 05:12

## 2021-11-18 RX ADMIN — Medication 400 MILLIGRAM(S): at 05:12

## 2021-11-18 RX ADMIN — HYDROMORPHONE HYDROCHLORIDE 0.5 MILLIGRAM(S): 2 INJECTION INTRAMUSCULAR; INTRAVENOUS; SUBCUTANEOUS at 12:47

## 2021-11-18 RX ADMIN — HEPARIN SODIUM 5000 UNIT(S): 5000 INJECTION INTRAVENOUS; SUBCUTANEOUS at 02:13

## 2021-11-18 RX ADMIN — HYDROMORPHONE HYDROCHLORIDE 0.5 MILLIGRAM(S): 2 INJECTION INTRAMUSCULAR; INTRAVENOUS; SUBCUTANEOUS at 02:56

## 2021-11-18 RX ADMIN — SODIUM CHLORIDE 150 MILLILITER(S): 9 INJECTION, SOLUTION INTRAVENOUS at 17:41

## 2021-11-18 NOTE — DISCHARGE NOTE PROVIDER - CARE PROVIDER_API CALL
Chintan Rodriguez  General Surgery  [address] [place] [address], [place] [address]  Phone: [phone]  Fax: [phone]  Follow Up Time:

## 2021-11-18 NOTE — DIETITIAN INITIAL EVALUATION ADULT. - CHIEF COMPLAINT
This is a " 51M with PMHx of HTN, HLD, obesity, sleep hypoxia, seizure, now POD1 s/p lap sleeve gastrectomy"

## 2021-11-18 NOTE — DIETITIAN INITIAL EVALUATION ADULT. - OTHER INFO
Pt with multiple previous wt loss attempts per chart and was unable to lose and maintain significant wt loss. Per chart, pt met with outpatient RD and weighed  260pounds (5/18/21). Pre-surgical wt (H&P) noted as 268 pounds (11/4/21).  Pt now S/P laparoscopic vertical sleeve gastrectomy. Pt denies N+V, does endorse some stomach spasms, sipping on bariatric clear liquids during RD visit. Pt with knowledge of bariatric full liquid diet and receptive to in depth review/reinforcement. Pt reports home stock of protein shakes with plans to purchase more. Pt reports plan to purchase the necessary vitamins/minerals in chewable/liquid/crushable form including a multivitamin with added elemental iron, calcium citrate with vitamin D, and sublingual B12. Pt was advised to take the multivitamin with elemental iron at least 2 hours apart from the calcium citrate with vitamin D for optimal absorption. Pt plans to schedule a follow up appointment with outpatient RD. Pt able to teach back all points discussed during interview.     Diet Education  1. Laparoscopic  sleeve gastrectomy recommendations reviewed/reinforced (discharge instruction handout references). Bariatric full liquid diet reviewed- sugar free, caffeine free, no carbonated beverages, low fat, no straws, no chewing gum, 6 small meals/day gradually increasing volume, and sipping beverages slowly, no water during meals- drink water 1 hour before or after meals, meeting hydration and protein needs. Discussed vitamin/mineral supplement compliance as discussed above. 2. RD to remain available to reinforce nutrition education as requested by patient/family/caregiver.

## 2021-11-18 NOTE — DISCHARGE NOTE PROVIDER - HOSPITAL COURSE
52 y/o with PMHx of HTN, HLD, obesity, sleep hypoxia (autopap machine), Hx of seizure 11/2018 (hospitalized, intubated, followed up with neurologist- negative work up).    On 11/18/2021 Mr Barraza underwent Laparoscopic Sleeve Gastrectomy. Bariatric ERAS protocol followed to include preoperative and perioperative use of DVT and SSI prophylaxis as well as multi-modal non-opioid analgesics. Patient tolerated the procedure well extubated in the operating room then transferred to the PACU in stable condition. Upon arrival in the PACU patient had brief episode of unresponsiveness, etiology unknown. Abdominal pain and spasm for which he received antispasmodic and analgesias with relief. Once hemodynamically stable and effective pain control he ambulated with the  assistance. He was also able to void within 4 hours following the procedure without difficulty. The patient was later transferred to a bariatric unit and placed on bedside continuous pulse oximetry. Pain management included IV opioid and non-opioid analgesia. Patient remained NPO overnight.     On POD #1 he remained stable with no cardio-pulmonary adverse events overnight, has effective  pain control. Denies nausea and vomiting. He walked the unit independently. The rest of the hospital course was uneventful and there were no post-operative complications identified. Patient met 8-Point Bariatric Surgery D/C criteria and subsequently cleared for discharge home on POD#1. No post d/c extended VTE prophylaxis ([facility] VTE Risk Stratification low; <1% ). The patient will follow a protocol-derived staged meal progression supervised by the dietitian in the outpatient setting. Patient will follow-up with Dr. Rodriguez in 7-10 days, medical doctor and dietitian in 30 days. All appropriate [facility] from [facility] prior to discharge. Written discharge instruction explained and given to include VTE prevention. 50 y/o with PMHx of HTN, HLD, obesity, sleep hypoxia (autopap machine), Hx of seizure 11/2018 (hospitalized, intubated, followed up with neurologist- negative work up).    On 11/18/2021 Mr Barraza underwent Laparoscopic Sleeve Gastrectomy. Bariatric ERAS protocol followed to include preoperative and perioperative use of DVT and SSI prophylaxis as well as multi-modal non-opioid analgesics. Patient tolerated the procedure well extubated in the operating room then transferred to the PACU in stable condition. Upon arrival in the PACU patient had brief episode of unresponsiveness, etiology unknown. Abdominal pain and spasm for which he received antispasmodic and analgesias with relief. Once hemodynamically stable and effective pain control he ambulated with the  assistance. He was also able to void within 4 hours following the procedure without difficulty. The patient was later transferred to a bariatric unit and placed on bedside continuous pulse oximetry. Pain management included IV opioid and non-opioid analgesia. Patient remained NPO overnight.     On POD #1 he remained stable with no cardio-pulmonary adverse events overnight, has ineffective  pain control. Denies nausea and vomiting. He walked the unit independently. On POD # 2 continues to c/o crampy and spasm-like abdominal pain and so CT Abdomen order which was negative for leak, bleed, and obstruction.  He resume bariatric diet and antispasmodic given.  The rest of the hospital course was uneventful and there were no post-operative complications identified. Patient met 8-Point Bariatric Surgery D/C criteria and subsequently cleared for discharge home on POD#3. No post d/c extended VTE prophylaxis ([facility] VTE Risk Stratification low; <1% ). The patient will follow a protocol-derived staged meal progression supervised by the dietitian in the outpatient setting. Patient will follow-up with Dr. Rodriguez in 7-10 days, medical doctor and dietitian in 30 days. All appropriate [facility] obtained from [facility] prior to discharge. Written discharge instruction explained and given to include VTE prevention.

## 2021-11-18 NOTE — DISCHARGE NOTE PROVIDER - NSDCCPTREATMENT_GEN_ALL_CORE_FT
PRINCIPAL PROCEDURE  Procedure: Gastrectomy, sleeve  Findings and Treatment: analgesia for pain control, bariatric didt, increase ambulation, dietary supplement, cpap for alexx. Follow up care

## 2021-11-18 NOTE — DISCHARGE NOTE PROVIDER - NSDCFUADDINST_GEN_ALL_CORE_FT
Avoid carbonated beverage, no smoking, no alcohol and avoid Motrin, aleve, naprosyn. Additional instructions as explained and outlined on the gastric sleeve instructions

## 2021-11-18 NOTE — DIETITIAN INITIAL EVALUATION ADULT. - ORAL INTAKE PTA/DIET HISTORY
Pt reports following a full liquid diet for 2 weeks PTA as instructed by outpatient RD. Pt reports having Premier Protein shakes, blended soup, broth. Confirms NKFA. Pt denies chewing/swallowing difficulty, nausea, vomiting, diarrhea, constipation. Was taking multivitamin and Balance of Nature supplement.

## 2021-11-18 NOTE — PROGRESS NOTE ADULT - SUBJECTIVE AND OBJECTIVE BOX
Green Team Surgery Daily Progress Note    Subjective:   Patient seen at bedside this AM. Reports feeling well, without complaints. Denies chest pain, SOB. Tolerating diet without N/V.     24h Events:   - Overnight, no acute events    Objective:  Vital Signs  T(C): 37.2 (11-17 @ 23:30), Max: 37.2 (11-17 @ 23:30)  HR: 85 (11-17 @ 23:30) (53 - 91)  BP: 103/65 (11-17 @ 23:30) (103/65 - 154/94)  RR: 18 (11-17 @ 23:30) (14 - 20)  SpO2: 93% (11-17 @ 23:30) (93% - 100%)  11-17-21 @ 07:01  -  11-18-21 @ 02:12  --------------------------------------------------------  IN:  Total IN: 0 mL    OUT:    Voided (mL): 1700 mL  Total OUT: 1700 mL    Total NET: -1700 mL          Physical Exam:  GEN: resting in bed comfortably in NAD  RESP: no increased WOB  ABD: soft, non-distended, non-tender without rebound tenderness or guarding, port incision site dressings c/d/i  EXTR: warm, well-perfused without gross deformities; spontaneous movement in b/l U/L extrem  NEURO: AAOx4    Labs:                        14.0   6.50  )-----------( 200      ( 17 Nov 2021 13:15 )             42.4   11-17    137  |  104  |  8   ----------------------------<  106<H>  4.4   |  22  |  0.89    Ca    8.1<L>      17 Nov 2021 13:15  Phos  2.6     11-17  Mg     2.1     11-17      CAPILLARY BLOOD GLUCOSE      POCT Blood Glucose.: 83 mg/dL (17 Nov 2021 08:23)      Medications:   MEDICATIONS  (STANDING):  acetaminophen   IVPB .. 1000 milliGRAM(s) IV Intermittent once  acetaminophen   IVPB .. 1000 milliGRAM(s) IV Intermittent once  ceFAZolin   IVPB 2000 milliGRAM(s) IV Intermittent every 8 hours  ceFAZolin   IVPB 2000 milliGRAM(s) IV Intermittent once  diphenhydrAMINE Injectable 12.5 milliGRAM(s) IV Push once  folic acid Injectable 1 milliGRAM(s) IV Push once  haloperidol    Injectable 0.5 milliGRAM(s) IV Push once  heparin   Injectable 5000 Unit(s) SubCutaneous every 8 hours  hyoscyamine SL 0.125 milliGRAM(s) SubLingual every 6 hours  ketorolac   Injectable 30 milliGRAM(s) IV Push every 6 hours  lactated ringers. 1000 milliLiter(s) (150 mL/Hr) IV Continuous <Continuous>  LORazepam   Injectable 0.5 milliGRAM(s) IV Push once  ondansetron Injectable 4 milliGRAM(s) IV Push every 6 hours  pantoprazole  Injectable 40 milliGRAM(s) IV Push every 24 hours  sodium chloride 0.9% 1000 milliLiter(s) (250 mL/Hr) IV Continuous <Continuous>    MEDICATIONS  (PRN):  fentaNYL    Injectable 25 MICROGram(s) IV Push every 5 minutes PRN Moderate Pain (4 - 6)  fentaNYL    Injectable 50 MICROGram(s) IV Push every 5 minutes PRN Severe Pain (7 - 10)  haloperidol IVPB 0.5 milliGRAM(s) IV Intermittent every 4 hours PRN nausea and/or vomiting  HYDROmorphone  Injectable 0.5 milliGRAM(s) IV Push every 2 hours PRN Severe Pain (7 - 10)      Imaging:

## 2021-11-18 NOTE — DISCHARGE NOTE PROVIDER - NSDCMRMEDTOKEN_GEN_ALL_CORE_FT
Cialis 20 mg oral tablet: 1 tab(s) orally once a day, As Needed  hyoscyamine 0.125 mg sublingual tablet: 1 tab(s) sublingual every 6 hours, As Needed MDD:4   losartan 25 mg oral tablet: 1 tab(s) orally once a day  omeprazole 40 mg oral delayed release capsule: 1 cap(s) orally once a day MDD:1  ondansetron 4 mg oral tablet, disintegratin tab(s) orally every 6 hours, As Needed   oxyCODONE 5 mg/5 mL oral solution: 5 milliliter(s) orally every 4 hours MDD:30  rosuvastatin 10 mg oral tablet: 1 tab(s) orally once a day (at bedtime)  Vitamin D3 25 mcg (1000 intl units) oral tablet: 1 tab(s) orally once a day   Cialis 20 mg oral tablet: 1 tab(s) orally once a day, As Needed  hyoscyamine 0.125 mg sublingual tablet: 2 tab(s) sublingual every 6 hours, As Needed MDD:4   losartan 25 mg oral tablet: 1 tab(s) orally once a day  omeprazole 40 mg oral delayed release capsule: 1 cap(s) orally once a day MDD:1  ondansetron 4 mg oral tablet, disintegratin tab(s) orally every 6 hours, As Needed   oxyCODONE 5 mg/5 mL oral solution: 5 milliliter(s) orally every 4 hours MDD:30  rosuvastatin 10 mg oral tablet: 1 tab(s) orally once a day (at bedtime)  Vitamin D3 25 mcg (1000 intl units) oral tablet: 1 tab(s) orally once a day

## 2021-11-18 NOTE — PROGRESS NOTE ADULT - SUBJECTIVE AND OBJECTIVE BOX
Post Op Day#: 1    Subjective: " Feeling better, spasm still on and off but not as bad"    Objective: No cardio-pulmonary adverse events overnight, improved abdominal pain, Walked the unit, denies dizziness, lightheadedness, no N/V. Tolerating bariatric clear liquid. Continuous pulse oximetry at bedside functioning, v/s stable, afebrile. AM Labs pending.  Ambulated independently.  Voiding as expected.                                               Vital Signs Last 24 Hrs  T(C): 36.9 (18 Nov 2021 06:04), Max: 37.2 (17 Nov 2021 23:30)  T(F): 98.5 (18 Nov 2021 06:04), Max: 98.9 (17 Nov 2021 23:30)  HR: 70 (18 Nov 2021 06:04) (53 - 91)  BP: 111/63 (18 Nov 2021 06:04) (103/65 - 154/94)  BP(mean): 96 (17 Nov 2021 20:15) (84 - 109)  RR: 18 (18 Nov 2021 06:04) (14 - 20)  SpO2: 97% (18 Nov 2021 06:04) (93% - 100%)                                               I&O's Summary    17 Nov 2021 07:01  -  18 Nov 2021 07:00  --------------------------------------------------------  IN: 3350 mL / OUT: 1700 mL / NET: 1650 mL                                                                          14.0   6.50  )-----------( 200      ( 17 Nov 2021 13:15 )             42.4                                                 11-17    137  |  104  |  8   ----------------------------<  106<H>  4.4   |  22  |  0.89    Ca    8.1<L>      17 Nov 2021 13:15  Phos  2.6     11-17  Mg     2.1     11-17      acetaminophen   IVPB .. 1000 milliGRAM(s) IV Intermittent once  ceFAZolin   IVPB 2000 milliGRAM(s) IV Intermittent once  diphenhydrAMINE Injectable 12.5 milliGRAM(s) IV Push once  fentaNYL    Injectable 25 MICROGram(s) IV Push every 5 minutes PRN  fentaNYL    Injectable 50 MICROGram(s) IV Push every 5 minutes PRN  folic acid Injectable 1 milliGRAM(s) IV Push once  haloperidol    Injectable 0.5 milliGRAM(s) IV Push once  haloperidol IVPB 0.5 milliGRAM(s) IV Intermittent every 4 hours PRN  heparin   Injectable 5000 Unit(s) SubCutaneous every 8 hours  HYDROmorphone  Injectable 0.5 milliGRAM(s) IV Push every 2 hours PRN  hyoscyamine SL 0.125 milliGRAM(s) SubLingual every 6 hours  lactated ringers. 1000 milliLiter(s) IV Continuous <Continuous>  LORazepam   Injectable 0.5 milliGRAM(s) IV Push once  ondansetron Injectable 4 milliGRAM(s) IV Push every 6 hours  pantoprazole  Injectable 40 milliGRAM(s) IV Push every 24 hours  sodium chloride 0.9% 1000 milliLiter(s) IV Continuous <Continuous>      Physical Exam:         Lungs:  clear breath sounds b/l       Heart:  Regular rate & rhythm       Abdomen:  Soft, non-distended.  Scopes sites clean, dry and intact. + bs, - flatus, no rebound or guarding       Skin:  intact, pannus w/o rash       Extremities: + pulses, no edema, no calf tenderness, negative arnie's     VTE Risk Assessment Scores               [facility]  VTE Predicted RISK Low:   ( <1% ), No post D/C extended  VTE prophylaxis      Assessment and Plan: Lap Sleeve Gastrectomy POD #1 Stable    - Analgesia  - Bariatric Clear diet today then protocol derived staged meal progression supervised by RD in outpatient setting  - DVT/GI prophylaxis, Incentive spirometry  - Ambulate as tolerated  -  Procedure specific education including postoperative complications, diet, vitamins and VTE prevention. Written materials given  - Medication reviewed and reconciled, Bariatric meds obtained from [facility] prior to d/c  -  D/C home this afternoon once Bariatric 8-Point d/c criteria met  -  Follow up with Dr Rodriguez  in 7-10 days, Dietitian and PMD in 30 days.      Irene Tomas, JET, ANP  [phone]

## 2021-11-18 NOTE — DISCHARGE NOTE PROVIDER - NSDCCPCAREPLAN_GEN_ALL_CORE_FT
PRINCIPAL DISCHARGE DIAGNOSIS  Diagnosis: Morbid obesity due to excess calories  Assessment and Plan of Treatment:

## 2021-11-18 NOTE — PROGRESS NOTE ADULT - ASSESSMENT
51M with PMHx of HTN, HLD, obesity, sleep hypoxia, seizure, now POD1 s/p lap sleeve gastrectomy     Plan:   bariatric ERP  -OOB/ambulation  -pain control  -dietician and pharmacy consult  -d/c once meets bariatric d/c criteria and passes bariatric clears tolerance test      Green Team Surgery   Pager [phone]

## 2021-11-18 NOTE — DISCHARGE NOTE PROVIDER - NSDCADMDATE_GEN_ALL_CORE_FT
Please call pt.  Needs appt- not evisit for this.  Last appt addressing mood/anxiety in 3/19, well over a year ago at her last physical.  Would prefer an office appt with physical/anxiety/mood f/u, but would be okay with video visit if she is resistaqnt to coming in.  Thanks- CW   17-Nov-2021 07:38

## 2021-11-18 NOTE — PHARMACOTHERAPY INTERVENTION NOTE - COMMENTS
S/p sleeve gastrectomy on 11/17/2021.  Patient medication reconciliation completed. Patient currently taking:     Home Medications:  Cialis 20 mg oral tablet: 1 tab(s) orally once a day, As Needed   losartan 25 mg oral tablet: 1 tab(s) orally once a day   rosuvastatin 10 mg oral tablet: 1 tab(s) orally once a day (at bedtime)   Vitamin D3 25 mcg (1000 intl units) oral tablet: 1 tab(s) orally once a day   Omeprazole 40mg DR cap; 1 tab orally once a day    Patient was instructed to use crushed, dissolvable, chewable, or liquid formulations of medications for 1 month. Patient was informed to take daily multivitamins post surgically. Patient reeducated on NSAID avoidance (ibuprofen, ASA, naproxen, aleve) as they increased risk of GI bleeding; may use APAP for mild pain otherwise contact prescriber for consult. Patient was informed on indications and directions for administration for hyoscyamine SL, acetaminophen liquid, ondansetron ODT, and omeprazole DR. Patient was instructed to take the medications as follows:     Stop cialis after surgery  Crush losartan and rosuvastatin  Continue vitamins/supplements in chewable/dissolvable forms (vit D, MVI)  Open omeprazole cap and mix with applesauce    No pre-op opioids  Post-op opioids:    PACU: 45 MME (fentanyl IV, hydromorphone IV)    2 Tk: 70 MME (hydromorphone IV) as of 11/18, 13:00  Home: pending     Recommendation:  Trial oxycodone PO oxycodone while inpatient to determine outpt dosing  Increase hyoscyamine 0.125mg to 0.25mg PRN as current dose is ineffective for spasms per pt    Jamison Sapp, AlyssaD, BCPS  [phone]  Available on [employer] Teams S/p sleeve gastrectomy on 11/17/2021.  Patient medication reconciliation completed. Patient currently taking:     Home Medications:  Cialis 20 mg oral tablet: 1 tab(s) orally once a day, As Needed   losartan 25 mg oral tablet: 1 tab(s) orally once a day   rosuvastatin 10 mg oral tablet: 1 tab(s) orally once a day (at bedtime)   Vitamin D3 25 mcg (1000 intl units) oral tablet: 1 tab(s) orally once a day   Omeprazole 40mg DR cap; 1 tab orally once a day    Patient was instructed to use crushed, dissolvable, chewable, or liquid formulations of medications for 1 month. Patient was informed to take daily multivitamins post surgically. Patient reeducated on NSAID avoidance (ibuprofen, ASA, naproxen, aleve) as they increased risk of GI bleeding; may use APAP for mild pain otherwise contact prescriber for consult. Patient was informed on indications and directions for administration for hyoscyamine SL, acetaminophen liquid, ondansetron ODT, and omeprazole DR. Patient was instructed to take the medications as follows:     Stop cialis after surgery  Crush losartan and rosuvastatin  Continue vitamins/supplements in chewable/dissolvable forms (vit D, MVI)  Open omeprazole cap and mix with applesauce    No pre-op opioids  Post-op opioids:    PACU: 45 MME (fentanyl IV, hydromorphone IV)    2 Tk: 70 MME (hydromorphone IV) as of 11/18, 13:00  Home: pending     Recommendation:  Trial PO oxycodone while inpatient to determine outpt dosing  Increase hyoscyamine 0.125mg to 0.25mg PRN as current dose is ineffective for spasms per pt    Jamison Sapp, PharmD, BCPS  [phone]  Available on [employer] Teams S/p sleeve gastrectomy on 11/17/2021.  Patient medication reconciliation completed. Patient currently taking:     Home Medications:  Cialis 20 mg oral tablet: 1 tab(s) orally once a day, As Needed   losartan 25 mg oral tablet: 1 tab(s) orally once a day   rosuvastatin 10 mg oral tablet: 1 tab(s) orally once a day (at bedtime)   Vitamin D3 25 mcg (1000 intl units) oral tablet: 1 tab(s) orally once a day   Omeprazole 40mg DR cap; 1 tab orally once a day    Patient was instructed to use crushed, dissolvable, chewable, or liquid formulations of medications for 1 month. Patient was informed to take daily multivitamins post surgically. Patient reeducated on NSAID avoidance (ibuprofen, ASA, naproxen, aleve) as they increased risk of GI bleeding; may use APAP for mild pain otherwise contact prescriber for consult. Patient was informed on indications and directions for administration for hyoscyamine SL, acetaminophen liquid, ondansetron ODT, and omeprazole DR. Patient was instructed to take the medications as follows:     Stop cialis after surgery  Crush losartan and rosuvastatin  Continue vitamins/supplements in chewable/dissolvable forms (vit D, MVI)  Open omeprazole cap and mix with applesauce    No pre-op opioids  Post-op opioids:    PACU: 45 MME (fentanyl IV, hydromorphone IV)    2 Tk: 110 MME (hydromorphone IV) as of 11/19, 11:00  Home: pending     Recommendation:  Trial PO oxycodone while inpatient to determine outpt dosing  Increase hyoscyamine 0.125mg to 0.25mg PRN as current dose is ineffective for spasms per pt    Jamison Sapp, PharmD, BCPS  [phone]  Available on [employer] Teams S/p sleeve gastrectomy on 11/17/2021.  Patient medication reconciliation completed. Patient currently taking:     Home Medications:  Cialis 20 mg oral tablet: 1 tab(s) orally once a day, As Needed   losartan 25 mg oral tablet: 1 tab(s) orally once a day   rosuvastatin 10 mg oral tablet: 1 tab(s) orally once a day (at bedtime)   Vitamin D3 25 mcg (1000 intl units) oral tablet: 1 tab(s) orally once a day   Omeprazole 40mg DR cap; 1 tab orally once a day    Patient was instructed to use crushed, dissolvable, chewable, or liquid formulations of medications for 1 month. Patient was informed to take daily multivitamins post surgically. Patient reeducated on NSAID avoidance (ibuprofen, ASA, naproxen, aleve) as they increased risk of GI bleeding; may use APAP for mild pain otherwise contact prescriber for consult. Patient was informed on indications and directions for administration for hyoscyamine SL, acetaminophen liquid, ondansetron ODT, and omeprazole DR. Patient was instructed to take the medications as follows:     Stop cialis after surgery  Crush losartan and rosuvastatin  Continue vitamins/supplements in chewable/dissolvable forms (vit D, MVI)  Open omeprazole cap and mix with applesauce    No pre-op opioids  Post-op opioids:    PACU: 45 MME (fentanyl IV, hydromorphone IV)    2 Tk: 110 MME (hydromorphone IV) as of 11/19, 11:00  Home: 180 MME (oxycodone 5mg/5mL PO Q4H PRN #120)    Recommendation:  Trial PO oxycodone while inpatient to determine outpt dosing  Increase hyoscyamine 0.125mg to 0.25mg PRN as current dose is ineffective for spasms per pt    Jamison Sapp, PharmD, BCPS  [phone]  Available on [employer] Teams

## 2021-11-18 NOTE — DIETITIAN INITIAL EVALUATION ADULT. - PHYSCIAL ASSESSMENT
skin: free of pressure injuries per nursing flow sheets, noted with surgical incision to abdomen obese

## 2021-11-19 LAB
ANION GAP SERPL CALC-SCNC: 10 MMOL/L — SIGNIFICANT CHANGE UP (ref 5–17)
BUN SERPL-MCNC: 11 MG/DL — SIGNIFICANT CHANGE UP (ref 7–23)
CALCIUM SERPL-MCNC: 8.6 MG/DL — SIGNIFICANT CHANGE UP (ref 8.4–10.5)
CHLORIDE SERPL-SCNC: 105 MMOL/L — SIGNIFICANT CHANGE UP (ref 96–108)
CO2 SERPL-SCNC: 25 MMOL/L — SIGNIFICANT CHANGE UP (ref 22–31)
CREAT SERPL-MCNC: 0.98 MG/DL — SIGNIFICANT CHANGE UP (ref 0.5–1.3)
GLUCOSE SERPL-MCNC: 69 MG/DL — LOW (ref 70–99)
HCT VFR BLD CALC: 38.5 % — LOW (ref 39–50)
HGB BLD-MCNC: 12.9 G/DL — LOW (ref 13–17)
MAGNESIUM SERPL-MCNC: 1.9 MG/DL — SIGNIFICANT CHANGE UP (ref 1.6–2.6)
MCHC RBC-ENTMCNC: 30.3 PG — SIGNIFICANT CHANGE UP (ref 27–34)
MCHC RBC-ENTMCNC: 33.5 GM/DL — SIGNIFICANT CHANGE UP (ref 32–36)
MCV RBC AUTO: 90.4 FL — SIGNIFICANT CHANGE UP (ref 80–100)
NRBC # BLD: 0 /100 WBCS — SIGNIFICANT CHANGE UP (ref 0–0)
PHOSPHATE SERPL-MCNC: 2.5 MG/DL — SIGNIFICANT CHANGE UP (ref 2.5–4.5)
PLATELET # BLD AUTO: 184 K/UL — SIGNIFICANT CHANGE UP (ref 150–400)
POTASSIUM SERPL-MCNC: 4.2 MMOL/L — SIGNIFICANT CHANGE UP (ref 3.5–5.3)
POTASSIUM SERPL-SCNC: 4.2 MMOL/L — SIGNIFICANT CHANGE UP (ref 3.5–5.3)
RBC # BLD: 4.26 M/UL — SIGNIFICANT CHANGE UP (ref 4.2–5.8)
RBC # FLD: 12.7 % — SIGNIFICANT CHANGE UP (ref 10.3–14.5)
SODIUM SERPL-SCNC: 140 MMOL/L — SIGNIFICANT CHANGE UP (ref 135–145)
WBC # BLD: 5.87 K/UL — SIGNIFICANT CHANGE UP (ref 3.8–10.5)
WBC # FLD AUTO: 5.87 K/UL — SIGNIFICANT CHANGE UP (ref 3.8–10.5)

## 2021-11-19 PROCEDURE — 74177 CT ABD & PELVIS W/CONTRAST: CPT | Mod: 26

## 2021-11-19 RX ORDER — KETOROLAC TROMETHAMINE 30 MG/ML
30 SYRINGE (ML) INJECTION ONCE
Refills: 0 | Status: DISCONTINUED | OUTPATIENT
Start: 2021-11-19 | End: 2021-11-19

## 2021-11-19 RX ORDER — CYCLOBENZAPRINE HYDROCHLORIDE 10 MG/1
10 TABLET, FILM COATED ORAL THREE TIMES A DAY
Refills: 0 | Status: DISCONTINUED | OUTPATIENT
Start: 2021-11-19 | End: 2021-11-20

## 2021-11-19 RX ADMIN — HEPARIN SODIUM 5000 UNIT(S): 5000 INJECTION INTRAVENOUS; SUBCUTANEOUS at 01:05

## 2021-11-19 RX ADMIN — HYDROMORPHONE HYDROCHLORIDE 0.5 MILLIGRAM(S): 2 INJECTION INTRAMUSCULAR; INTRAVENOUS; SUBCUTANEOUS at 06:30

## 2021-11-19 RX ADMIN — HYDROMORPHONE HYDROCHLORIDE 0.5 MILLIGRAM(S): 2 INJECTION INTRAMUSCULAR; INTRAVENOUS; SUBCUTANEOUS at 15:19

## 2021-11-19 RX ADMIN — Medication 0.12 MILLIGRAM(S): at 09:28

## 2021-11-19 RX ADMIN — HYDROMORPHONE HYDROCHLORIDE 0.5 MILLIGRAM(S): 2 INJECTION INTRAMUSCULAR; INTRAVENOUS; SUBCUTANEOUS at 20:44

## 2021-11-19 RX ADMIN — HYDROMORPHONE HYDROCHLORIDE 0.5 MILLIGRAM(S): 2 INJECTION INTRAMUSCULAR; INTRAVENOUS; SUBCUTANEOUS at 00:40

## 2021-11-19 RX ADMIN — HYDROMORPHONE HYDROCHLORIDE 0.5 MILLIGRAM(S): 2 INJECTION INTRAMUSCULAR; INTRAVENOUS; SUBCUTANEOUS at 05:59

## 2021-11-19 RX ADMIN — ONDANSETRON 4 MILLIGRAM(S): 8 TABLET, FILM COATED ORAL at 13:41

## 2021-11-19 RX ADMIN — HYDROMORPHONE HYDROCHLORIDE 0.5 MILLIGRAM(S): 2 INJECTION INTRAMUSCULAR; INTRAVENOUS; SUBCUTANEOUS at 15:50

## 2021-11-19 RX ADMIN — ONDANSETRON 4 MILLIGRAM(S): 8 TABLET, FILM COATED ORAL at 17:40

## 2021-11-19 RX ADMIN — HEPARIN SODIUM 5000 UNIT(S): 5000 INJECTION INTRAVENOUS; SUBCUTANEOUS at 17:40

## 2021-11-19 RX ADMIN — Medication 0.12 MILLIGRAM(S): at 20:44

## 2021-11-19 RX ADMIN — Medication 30 MILLIGRAM(S): at 17:40

## 2021-11-19 RX ADMIN — PANTOPRAZOLE SODIUM 40 MILLIGRAM(S): 20 TABLET, DELAYED RELEASE ORAL at 13:41

## 2021-11-19 RX ADMIN — HYDROMORPHONE HYDROCHLORIDE 0.5 MILLIGRAM(S): 2 INJECTION INTRAMUSCULAR; INTRAVENOUS; SUBCUTANEOUS at 00:10

## 2021-11-19 RX ADMIN — Medication 0.12 MILLIGRAM(S): at 13:41

## 2021-11-19 RX ADMIN — Medication 0.12 MILLIGRAM(S): at 01:05

## 2021-11-19 RX ADMIN — ONDANSETRON 4 MILLIGRAM(S): 8 TABLET, FILM COATED ORAL at 06:00

## 2021-11-19 RX ADMIN — HYDROMORPHONE HYDROCHLORIDE 0.5 MILLIGRAM(S): 2 INJECTION INTRAMUSCULAR; INTRAVENOUS; SUBCUTANEOUS at 10:48

## 2021-11-19 RX ADMIN — HEPARIN SODIUM 5000 UNIT(S): 5000 INJECTION INTRAVENOUS; SUBCUTANEOUS at 09:28

## 2021-11-19 RX ADMIN — HYDROMORPHONE HYDROCHLORIDE 0.5 MILLIGRAM(S): 2 INJECTION INTRAMUSCULAR; INTRAVENOUS; SUBCUTANEOUS at 21:15

## 2021-11-19 RX ADMIN — Medication 30 MILLIGRAM(S): at 18:29

## 2021-11-19 RX ADMIN — CYCLOBENZAPRINE HYDROCHLORIDE 10 MILLIGRAM(S): 10 TABLET, FILM COATED ORAL at 08:24

## 2021-11-19 RX ADMIN — HYDROMORPHONE HYDROCHLORIDE 0.5 MILLIGRAM(S): 2 INJECTION INTRAMUSCULAR; INTRAVENOUS; SUBCUTANEOUS at 11:20

## 2021-11-19 NOTE — PROGRESS NOTE ADULT - ASSESSMENT
51M with PMHx of HTN, HLD, obesity, sleep hypoxia, seizure, now POD2 s/p lap sleeve gastrectomy     Plan:   bariatric ERP  -OOB/ambulation  -pain control  -dietician and pharmacy consult  -d/c once meets bariatric d/c criteria and passes bariatric clears tolerance test      [facility]   Pager [phone]

## 2021-11-19 NOTE — PROGRESS NOTE ADULT - SUBJECTIVE AND OBJECTIVE BOX
[facility] Daily Progress Note    Subjective:   Patient seen at bedside this AM. Reports feeling well, without complaints. Denies chest pain, SOB. Tolerating diet without N/V.     24h Events:   - Overnight, no acute events    Objective:  Vital Signs  T(C): 36.8 (11-19 @ 00:43), Max: 37.2 (11-18 @ 20:57)  HR: 62 (11-19 @ 00:43) (53 - 81)  BP: 120/73 (11-19 @ 00:43) (111/63 - 122/69)  RR: 18 (11-19 @ 00:43) (18 - 18)  SpO2: 98% (11-19 @ 00:43) (96% - 99%)  11-17-21 @ 07:01  -  11-18-21 @ 07:00  --------------------------------------------------------  IN:  Total IN: 0 mL    OUT:    Voided (mL): 1700 mL  Total OUT: 1700 mL    Total NET: -1700 mL      11-18-21 @ 07:01  -  11-19-21 @ 01:10  --------------------------------------------------------  IN:  Total IN: 0 mL    OUT:    Voided (mL): 1750 mL  Total OUT: 1750 mL    Total NET: -1750 mL          Physical Exam:  GEN: resting in bed comfortably in NAD  RESP: no increased WOB  ABD: soft, non-distended, non-tender without rebound tenderness or guarding, port incision site dressing c/d/i  EXTR: warm, well-perfused without gross deformities; spontaneous movement in b/l U/L extrem  NEURO: AAOx4    Labs:                        12.9   7.60  )-----------( 230      ( 18 Nov 2021 07:27 )             38.1   11-18    138  |  103  |  10  ----------------------------<  95  4.1   |  19<L>  |  0.84    Ca    8.3<L>      18 Nov 2021 07:27  Phos  2.6     11-17  Mg     2.1     11-17      CAPILLARY BLOOD GLUCOSE          Medications:   MEDICATIONS  (STANDING):  diphenhydrAMINE Injectable 12.5 milliGRAM(s) IV Push once  folic acid Injectable 1 milliGRAM(s) IV Push once  haloperidol    Injectable 0.5 milliGRAM(s) IV Push once  heparin   Injectable 5000 Unit(s) SubCutaneous every 8 hours  hyoscyamine SL 0.125 milliGRAM(s) SubLingual every 6 hours  lactated ringers. 1000 milliLiter(s) (150 mL/Hr) IV Continuous <Continuous>  LORazepam   Injectable 0.5 milliGRAM(s) IV Push once  ondansetron Injectable 4 milliGRAM(s) IV Push every 6 hours  pantoprazole  Injectable 40 milliGRAM(s) IV Push every 24 hours  sodium chloride 0.9% 1000 milliLiter(s) (250 mL/Hr) IV Continuous <Continuous>    MEDICATIONS  (PRN):  fentaNYL    Injectable 25 MICROGram(s) IV Push every 5 minutes PRN Moderate Pain (4 - 6)  haloperidol IVPB 0.5 milliGRAM(s) IV Intermittent every 4 hours PRN nausea and/or vomiting  HYDROmorphone  Injectable 0.5 milliGRAM(s) IV Push every 4 hours PRN Severe Pain (7 - 10)      Imaging:       [facility] Daily Progress Note    Subjective:   Patient seen at bedside this AM. Reports incisional pain. Denies chest pain, SOB. Tolerating diet without N/V.     24h Events:   - Overnight, no acute events    Objective:  Vital Signs  T(C): 36.8 (11-19 @ 00:43), Max: 37.2 (11-18 @ 20:57)  HR: 62 (11-19 @ 00:43) (53 - 81)  BP: 120/73 (11-19 @ 00:43) (111/63 - 122/69)  RR: 18 (11-19 @ 00:43) (18 - 18)  SpO2: 98% (11-19 @ 00:43) (96% - 99%)  11-17-21 @ 07:01  -  11-18-21 @ 07:00  --------------------------------------------------------  IN:  Total IN: 0 mL    OUT:    Voided (mL): 1700 mL  Total OUT: 1700 mL    Total NET: -1700 mL      11-18-21 @ 07:01  -  11-19-21 @ 01:10  --------------------------------------------------------  IN:  Total IN: 0 mL    OUT:    Voided (mL): 1750 mL  Total OUT: 1750 mL    Total NET: -1750 mL          Physical Exam:  GEN: resting in bed comfortably in NAD  RESP: no increased WOB  ABD: soft, non-distended, non-tender without rebound tenderness or guarding, port incision site dressing c/d/i  EXTR: warm, well-perfused without gross deformities; spontaneous movement in b/l U/L extrem  NEURO: AAOx4    Labs:                        12.9   7.60  )-----------( 230      ( 18 Nov 2021 07:27 )             38.1   11-18    138  |  103  |  10  ----------------------------<  95  4.1   |  19<L>  |  0.84    Ca    8.3<L>      18 Nov 2021 07:27  Phos  2.6     11-17  Mg     2.1     11-17      CAPILLARY BLOOD GLUCOSE          Medications:   MEDICATIONS  (STANDING):  diphenhydrAMINE Injectable 12.5 milliGRAM(s) IV Push once  folic acid Injectable 1 milliGRAM(s) IV Push once  haloperidol    Injectable 0.5 milliGRAM(s) IV Push once  heparin   Injectable 5000 Unit(s) SubCutaneous every 8 hours  hyoscyamine SL 0.125 milliGRAM(s) SubLingual every 6 hours  lactated ringers. 1000 milliLiter(s) (150 mL/Hr) IV Continuous <Continuous>  LORazepam   Injectable 0.5 milliGRAM(s) IV Push once  ondansetron Injectable 4 milliGRAM(s) IV Push every 6 hours  pantoprazole  Injectable 40 milliGRAM(s) IV Push every 24 hours  sodium chloride 0.9% 1000 milliLiter(s) (250 mL/Hr) IV Continuous <Continuous>    MEDICATIONS  (PRN):  fentaNYL    Injectable 25 MICROGram(s) IV Push every 5 minutes PRN Moderate Pain (4 - 6)  haloperidol IVPB 0.5 milliGRAM(s) IV Intermittent every 4 hours PRN nausea and/or vomiting  HYDROmorphone  Injectable 0.5 milliGRAM(s) IV Push every 4 hours PRN Severe Pain (7 - 10)      Imaging:

## 2021-11-19 NOTE — PROGRESS NOTE ADULT - SUBJECTIVE AND OBJECTIVE BOX
Post Op Day#: 1    Subjective: " Pain in the stomach"    Objective: Continues to c/o intermittent spasm-like crampy abdominal pain.  Had abdominal CT which is  negative for PVT, no obstruction no  leak.  V/S stable, afebrile. Labs within acceptable range. Continuous pulse oximetry at bedside functioning, Ambulating independently.  Resume Bariatric clear liquid.  Voiding as expected.                                                Vital Signs Last 24 Hrs  T(C): 36.4 (19 Nov 2021 13:12), Max: 37.2 (18 Nov 2021 20:57)  T(F): 97.5 (19 Nov 2021 13:12), Max: 98.9 (18 Nov 2021 20:57)  HR: 80 (19 Nov 2021 13:12) (56 - 81)  BP: 130/79 (19 Nov 2021 13:12) (113/68 - 133/81)  BP(mean): --  RR: 19 (19 Nov 2021 13:12) (18 - 19)  SpO2: 94% (19 Nov 2021 13:12) (94% - 99%)                                               I&O's Summary    18 Nov 2021 07:01  -  19 Nov 2021 07:00  --------------------------------------------------------  IN: 2300 mL / OUT: 2550 mL / NET: -250 mL    19 Nov 2021 07:01  -  19 Nov 2021 15:32  --------------------------------------------------------  IN: 420 mL / OUT: 2000 mL / NET: -1580 mL                                                                          12.9   5.87  )-----------( 184      ( 19 Nov 2021 07:12 )             38.5                                                 11-19    140  |  105  |  11  ----------------------------<  69<L>  4.2   |  25  |  0.98    Ca    8.6      19 Nov 2021 07:15  Phos  2.5     11-19  Mg     1.9     11-19      bisacodyl Suppository 10 milliGRAM(s) Rectal daily PRN  cyclobenzaprine 10 milliGRAM(s) Oral three times a day PRN  diphenhydrAMINE Injectable 12.5 milliGRAM(s) IV Push once  fentaNYL    Injectable 25 MICROGram(s) IV Push every 5 minutes PRN  folic acid Injectable 1 milliGRAM(s) IV Push once  haloperidol    Injectable 0.5 milliGRAM(s) IV Push once  haloperidol IVPB 0.5 milliGRAM(s) IV Intermittent every 4 hours PRN  heparin   Injectable 5000 Unit(s) SubCutaneous every 8 hours  HYDROmorphone  Injectable 0.5 milliGRAM(s) IV Push every 4 hours PRN  hyoscyamine SL 0.125 milliGRAM(s) SubLingual every 6 hours  lactated ringers. 1000 milliLiter(s) IV Continuous <Continuous>  LORazepam   Injectable 0.5 milliGRAM(s) IV Push once  ondansetron Injectable 4 milliGRAM(s) IV Push every 6 hours  pantoprazole  Injectable 40 milliGRAM(s) IV Push every 24 hours      Physical Exam:         Lungs:  clear breath sounds b/l       Heart:  Regular rate & rhythm       Abdomen:  Soft, non-distended.  Scopes sites clean, dry and intact. + bs, - flatus, no rebound or guarding       Skin:  intact, pannus w/o rash       Extremities: + pulses, no edema, no calf tenderness, negative arnie's     VTE Risk Assessment Scores               [facility]  VTE Predicted RISK LOw:   (<1% ), No extended postoperative VTE prophylaxis      Assessment and Plan: Lap Sleeve Gastrectomy POD # 1    - Pain management, Flexeril for spasm  - Bariatric Clear diet today then protocol derived staged meal progression supervised by RD in outpatient setting  - DVT/GI prophylaxis, Incentive spirometry  - Ambulate as tolerated   -  Procedure specific education including postoperative complications, medications and side effect, diet, vitamins and VTE prevention. Written materials given  - Medication reviewed and reconciled, Bariatric meds obtained from [facility] prior to d/c  -  D/C home tomorrow  Bariatric 8-Point d/c criteria met  -  Follow up with Dr Rodriguez  in 7-10 days, Dietitian and PMD in 30 days.      Irene Tomas, JET, ANP  [phone]

## 2021-11-20 ENCOUNTER — TRANSCRIPTION ENCOUNTER (OUTPATIENT)
Age: 51
End: 2021-11-20

## 2021-11-20 VITALS — OXYGEN SATURATION: 97 % | HEART RATE: 75 BPM

## 2021-11-20 LAB
ANION GAP SERPL CALC-SCNC: 14 MMOL/L — SIGNIFICANT CHANGE UP (ref 5–17)
BUN SERPL-MCNC: 10 MG/DL — SIGNIFICANT CHANGE UP (ref 7–23)
CALCIUM SERPL-MCNC: 8.7 MG/DL — SIGNIFICANT CHANGE UP (ref 8.4–10.5)
CHLORIDE SERPL-SCNC: 102 MMOL/L — SIGNIFICANT CHANGE UP (ref 96–108)
CO2 SERPL-SCNC: 21 MMOL/L — LOW (ref 22–31)
CREAT SERPL-MCNC: 0.89 MG/DL — SIGNIFICANT CHANGE UP (ref 0.5–1.3)
GLUCOSE SERPL-MCNC: 78 MG/DL — SIGNIFICANT CHANGE UP (ref 70–99)
HCT VFR BLD CALC: 40 % — SIGNIFICANT CHANGE UP (ref 39–50)
HGB BLD-MCNC: 13.3 G/DL — SIGNIFICANT CHANGE UP (ref 13–17)
MAGNESIUM SERPL-MCNC: 1.8 MG/DL — SIGNIFICANT CHANGE UP (ref 1.6–2.6)
MCHC RBC-ENTMCNC: 29.8 PG — SIGNIFICANT CHANGE UP (ref 27–34)
MCHC RBC-ENTMCNC: 33.3 GM/DL — SIGNIFICANT CHANGE UP (ref 32–36)
MCV RBC AUTO: 89.7 FL — SIGNIFICANT CHANGE UP (ref 80–100)
NRBC # BLD: 0 /100 WBCS — SIGNIFICANT CHANGE UP (ref 0–0)
PHOSPHATE SERPL-MCNC: 2.8 MG/DL — SIGNIFICANT CHANGE UP (ref 2.5–4.5)
PLATELET # BLD AUTO: 183 K/UL — SIGNIFICANT CHANGE UP (ref 150–400)
POTASSIUM SERPL-MCNC: 3.8 MMOL/L — SIGNIFICANT CHANGE UP (ref 3.5–5.3)
POTASSIUM SERPL-SCNC: 3.8 MMOL/L — SIGNIFICANT CHANGE UP (ref 3.5–5.3)
RBC # BLD: 4.46 M/UL — SIGNIFICANT CHANGE UP (ref 4.2–5.8)
RBC # FLD: 12.2 % — SIGNIFICANT CHANGE UP (ref 10.3–14.5)
SODIUM SERPL-SCNC: 137 MMOL/L — SIGNIFICANT CHANGE UP (ref 135–145)
WBC # BLD: 5.62 K/UL — SIGNIFICANT CHANGE UP (ref 3.8–10.5)
WBC # FLD AUTO: 5.62 K/UL — SIGNIFICANT CHANGE UP (ref 3.8–10.5)

## 2021-11-20 PROCEDURE — 74177 CT ABD & PELVIS W/CONTRAST: CPT

## 2021-11-20 PROCEDURE — 82803 BLOOD GASES ANY COMBINATION: CPT

## 2021-11-20 PROCEDURE — 71045 X-RAY EXAM CHEST 1 VIEW: CPT

## 2021-11-20 PROCEDURE — 84295 ASSAY OF SERUM SODIUM: CPT

## 2021-11-20 PROCEDURE — 94660 CPAP INITIATION&MGMT: CPT

## 2021-11-20 PROCEDURE — 82565 ASSAY OF CREATININE: CPT

## 2021-11-20 PROCEDURE — 82330 ASSAY OF CALCIUM: CPT

## 2021-11-20 PROCEDURE — 82947 ASSAY GLUCOSE BLOOD QUANT: CPT

## 2021-11-20 PROCEDURE — C1889: CPT

## 2021-11-20 PROCEDURE — 80048 BASIC METABOLIC PNL TOTAL CA: CPT

## 2021-11-20 PROCEDURE — 84132 ASSAY OF SERUM POTASSIUM: CPT

## 2021-11-20 PROCEDURE — 85027 COMPLETE CBC AUTOMATED: CPT

## 2021-11-20 PROCEDURE — 83605 ASSAY OF LACTIC ACID: CPT

## 2021-11-20 PROCEDURE — 84100 ASSAY OF PHOSPHORUS: CPT

## 2021-11-20 PROCEDURE — 83735 ASSAY OF MAGNESIUM: CPT

## 2021-11-20 PROCEDURE — 85018 HEMOGLOBIN: CPT

## 2021-11-20 PROCEDURE — 85014 HEMATOCRIT: CPT

## 2021-11-20 PROCEDURE — C9399: CPT

## 2021-11-20 PROCEDURE — 85025 COMPLETE CBC W/AUTO DIFF WBC: CPT

## 2021-11-20 PROCEDURE — 36415 COLL VENOUS BLD VENIPUNCTURE: CPT

## 2021-11-20 PROCEDURE — 82962 GLUCOSE BLOOD TEST: CPT

## 2021-11-20 PROCEDURE — 88305 TISSUE EXAM BY PATHOLOGIST: CPT

## 2021-11-20 PROCEDURE — 86769 SARS-COV-2 COVID-19 ANTIBODY: CPT

## 2021-11-20 PROCEDURE — 82435 ASSAY OF BLOOD CHLORIDE: CPT

## 2021-11-20 RX ADMIN — Medication 0.12 MILLIGRAM(S): at 09:55

## 2021-11-20 RX ADMIN — Medication 0.12 MILLIGRAM(S): at 01:00

## 2021-11-20 RX ADMIN — ONDANSETRON 4 MILLIGRAM(S): 8 TABLET, FILM COATED ORAL at 01:00

## 2021-11-20 RX ADMIN — ONDANSETRON 4 MILLIGRAM(S): 8 TABLET, FILM COATED ORAL at 05:29

## 2021-11-20 RX ADMIN — HEPARIN SODIUM 5000 UNIT(S): 5000 INJECTION INTRAVENOUS; SUBCUTANEOUS at 01:01

## 2021-11-20 NOTE — DISCHARGE NOTE NURSING/CASE MANAGEMENT/SOCIAL WORK - PATIENT PORTAL LINK FT
You can access the FollowMyHealth Patient Portal offered by [facility] by registering at the following website: [email or web address]. By joining [facility]’s FollowMyHealth portal, you will also be able to view your health information using other applications (apps) compatible with our system.

## 2021-11-20 NOTE — PROGRESS NOTE ADULT - ATTENDING COMMENTS
POD1 s/p lap sleeve gastrectomy  Had significant post-operative pain in PACU with muscle spasm, improved with dilaudid and valium  Better today but still with crampy abdominal wall pain  No nausea    VSS  Abd soft, mildly distended, minimally tender at incisions  Incisions clean and dry, no erythema    Yohannes clears  Pain control  Encourage OOB  Continue to monitor    Chintan Rodriguez MD
I saw and examined the patient, and reviewed  the history with the patient and   Agree with note which was also reviewed and edited where appropriate.  D/W patient, RN, residents and Fellow
POD2 s/p sleeve gastrectomy  Incisional pain slightly improved  Tolerating bariatric clears  Passed flatus once    Vitals normal  Abdomen soft, mildly distended  Inc c/d/i      Cont bariatric protocol  Suppository  Encouraged ambulation  Discharge when criteria met    Chintan Rodriguez MD

## 2021-11-20 NOTE — PROGRESS NOTE ADULT - ASSESSMENT
Called patient  And his cardiologist put him on dofetilide and it interacted with the metformin. He reports that his BS are all good.he is wondering if there are any other options for him to replace the metformin         51M with PMHx of HTN, HLD, obesity, sleep hypoxia, seizure, now POD3 s/p lap sleeve gastrectomy     Plan:   bariatric ERP  -OOB/ambulation  -pain control  -d/c home today      [facility]   Pager [phone] 51M with PMHx of HTN, HLD, obesity, sleep hypoxia, seizure, now POD3 s/p lap sleeve gastrectomy     Plan:   - bariatric ERP  -OOB/ambulation  -pain control  -d/c home today      [facility]   Pager [phone]

## 2021-11-20 NOTE — PROGRESS NOTE ADULT - SUBJECTIVE AND OBJECTIVE BOX
Green Team Surgery Daily Progress Note    Subjective:   Patient seen at bedside this AM. Reports feeling well, without complaints. Denies chest pain, SOB. Tolerating diet without N/V.     24h Events:   - Overnight, no acute events    Objective:  Vital Signs  T(C): 36.7 (11-20 @ 01:05), Max: 37.7 (11-19 @ 16:43)  HR: 60 (11-20 @ 01:05) (60 - 82)  BP: 111/69 (11-20 @ 01:05) (111/69 - 133/81)  RR: 18 (11-20 @ 01:05) (18 - 19)  SpO2: 98% (11-20 @ 01:05) (91% - 98%)  11-18-21 @ 07:01  -  11-19-21 @ 07:00  --------------------------------------------------------  IN:  Total IN: 0 mL    OUT:    Voided (mL): 2550 mL  Total OUT: 2550 mL    Total NET: -2550 mL      11-19-21 @ 07:01  -  11-20-21 @ 03:07  --------------------------------------------------------  IN:  Total IN: 0 mL    OUT:    Voided (mL): 3400 mL  Total OUT: 3400 mL    Total NET: -3400 mL          Physical Exam:  GEN: resting in bed comfortably in NAD  RESP: no increased WOB  ABD: soft, non-distended, non-tender without rebound tenderness or guarding, port incision site dressings c/d/i  EXTR: warm, well-perfused without gross deformities; spontaneous movement in b/l U/L extrem  NEURO: AAOx4    Labs:                        12.9   5.87  )-----------( 184      ( 19 Nov 2021 07:12 )             38.5   11-19    140  |  105  |  11  ----------------------------<  69<L>  4.2   |  25  |  0.98    Ca    8.6      19 Nov 2021 07:15  Phos  2.5     11-19  Mg     1.9     11-19      CAPILLARY BLOOD GLUCOSE          Medications:   MEDICATIONS  (STANDING):  diphenhydrAMINE Injectable 12.5 milliGRAM(s) IV Push once  folic acid Injectable 1 milliGRAM(s) IV Push once  haloperidol    Injectable 0.5 milliGRAM(s) IV Push once  heparin   Injectable 5000 Unit(s) SubCutaneous every 8 hours  hyoscyamine SL 0.125 milliGRAM(s) SubLingual every 6 hours  lactated ringers. 1000 milliLiter(s) (150 mL/Hr) IV Continuous <Continuous>  LORazepam   Injectable 0.5 milliGRAM(s) IV Push once  ondansetron Injectable 4 milliGRAM(s) IV Push every 6 hours  pantoprazole  Injectable 40 milliGRAM(s) IV Push every 24 hours    MEDICATIONS  (PRN):  bisacodyl Suppository 10 milliGRAM(s) Rectal daily PRN Constipation  cyclobenzaprine 10 milliGRAM(s) Oral three times a day PRN Muscle Spasm  fentaNYL    Injectable 25 MICROGram(s) IV Push every 5 minutes PRN Moderate Pain (4 - 6)  haloperidol IVPB 0.5 milliGRAM(s) IV Intermittent every 4 hours PRN nausea and/or vomiting  HYDROmorphone  Injectable 0.5 milliGRAM(s) IV Push every 4 hours PRN Severe Pain (7 - 10)      Imaging:

## 2021-11-22 ENCOUNTER — INPATIENT (INPATIENT)
Facility: HOSPITAL | Age: 51
LOS: 3 days | Discharge: HOME | DRG: 176 | End: 2021-11-26
Attending: SURGERY | Admitting: SURGERY
Payer: COMMERCIAL

## 2021-11-22 VITALS
RESPIRATION RATE: 18 BRPM | DIASTOLIC BLOOD PRESSURE: 85 MMHG | HEART RATE: 82 BPM | SYSTOLIC BLOOD PRESSURE: 128 MMHG | TEMPERATURE: 98 F | OXYGEN SATURATION: 98 % | HEIGHT: 66.5 IN | WEIGHT: 259.93 LBS

## 2021-11-22 DIAGNOSIS — I26.99 OTHER PULMONARY EMBOLISM WITHOUT ACUTE COR PULMONALE: ICD-10-CM

## 2021-11-22 DIAGNOSIS — Z98.89 OTHER SPECIFIED POSTPROCEDURAL STATES: Chronic | ICD-10-CM

## 2021-11-22 DIAGNOSIS — Z98.52 VASECTOMY STATUS: Chronic | ICD-10-CM

## 2021-11-22 DIAGNOSIS — Z98.890 OTHER SPECIFIED POSTPROCEDURAL STATES: Chronic | ICD-10-CM

## 2021-11-22 DIAGNOSIS — Z90.3 ACQUIRED ABSENCE OF STOMACH [PART OF]: Chronic | ICD-10-CM

## 2021-11-22 LAB
ALBUMIN SERPL ELPH-MCNC: 3.8 G/DL — SIGNIFICANT CHANGE UP (ref 3.3–5)
ALP SERPL-CCNC: 85 U/L — SIGNIFICANT CHANGE UP (ref 40–120)
ALT FLD-CCNC: 26 U/L — SIGNIFICANT CHANGE UP (ref 10–45)
ANION GAP SERPL CALC-SCNC: 15 MMOL/L — SIGNIFICANT CHANGE UP (ref 5–17)
APPEARANCE UR: CLEAR — SIGNIFICANT CHANGE UP
APTT BLD: 28.5 SEC — SIGNIFICANT CHANGE UP (ref 27.5–35.5)
AST SERPL-CCNC: 17 U/L — SIGNIFICANT CHANGE UP (ref 10–40)
BACTERIA # UR AUTO: NEGATIVE — SIGNIFICANT CHANGE UP
BASE EXCESS BLDV CALC-SCNC: -1.8 MMOL/L — SIGNIFICANT CHANGE UP (ref -2–2)
BASOPHILS # BLD AUTO: 0.05 K/UL — SIGNIFICANT CHANGE UP (ref 0–0.2)
BASOPHILS NFR BLD AUTO: 0.5 % — SIGNIFICANT CHANGE UP (ref 0–2)
BILIRUB SERPL-MCNC: 0.5 MG/DL — SIGNIFICANT CHANGE UP (ref 0.2–1.2)
BILIRUB UR-MCNC: NEGATIVE — SIGNIFICANT CHANGE UP
BUN SERPL-MCNC: 11 MG/DL — SIGNIFICANT CHANGE UP (ref 7–23)
CA-I SERPL-SCNC: 1.19 MMOL/L — SIGNIFICANT CHANGE UP (ref 1.15–1.33)
CALCIUM SERPL-MCNC: 9.3 MG/DL — SIGNIFICANT CHANGE UP (ref 8.4–10.5)
CHLORIDE BLDV-SCNC: 103 MMOL/L — SIGNIFICANT CHANGE UP (ref 96–108)
CHLORIDE SERPL-SCNC: 103 MMOL/L — SIGNIFICANT CHANGE UP (ref 96–108)
CO2 BLDV-SCNC: 25 MMOL/L — SIGNIFICANT CHANGE UP (ref 22–26)
CO2 SERPL-SCNC: 19 MMOL/L — LOW (ref 22–31)
COLOR SPEC: SIGNIFICANT CHANGE UP
CREAT SERPL-MCNC: 0.84 MG/DL — SIGNIFICANT CHANGE UP (ref 0.5–1.3)
DIFF PNL FLD: NEGATIVE — SIGNIFICANT CHANGE UP
EOSINOPHIL # BLD AUTO: 0.24 K/UL — SIGNIFICANT CHANGE UP (ref 0–0.5)
EOSINOPHIL NFR BLD AUTO: 2.5 % — SIGNIFICANT CHANGE UP (ref 0–6)
GAS PNL BLDV: 135 MMOL/L — LOW (ref 136–145)
GAS PNL BLDV: SIGNIFICANT CHANGE UP
GAS PNL BLDV: SIGNIFICANT CHANGE UP
GLUCOSE BLDV-MCNC: 89 MG/DL — SIGNIFICANT CHANGE UP (ref 70–99)
GLUCOSE SERPL-MCNC: 90 MG/DL — SIGNIFICANT CHANGE UP (ref 70–99)
GLUCOSE UR QL: NEGATIVE — SIGNIFICANT CHANGE UP
HCO3 BLDV-SCNC: 24 MMOL/L — SIGNIFICANT CHANGE UP (ref 22–29)
HCT VFR BLD CALC: 44.9 % — SIGNIFICANT CHANGE UP (ref 39–50)
HCT VFR BLDA CALC: 45 % — SIGNIFICANT CHANGE UP (ref 39–51)
HGB BLD CALC-MCNC: 15.1 G/DL — SIGNIFICANT CHANGE UP (ref 12.6–17.4)
HGB BLD-MCNC: 14.6 G/DL — SIGNIFICANT CHANGE UP (ref 13–17)
IMM GRANULOCYTES NFR BLD AUTO: 0.3 % — SIGNIFICANT CHANGE UP (ref 0–1.5)
INR BLD: 1.35 RATIO — HIGH (ref 0.88–1.16)
KETONES UR-MCNC: ABNORMAL
LACTATE BLDV-MCNC: 1.2 MMOL/L — SIGNIFICANT CHANGE UP (ref 0.7–2)
LEUKOCYTE ESTERASE UR-ACNC: NEGATIVE — SIGNIFICANT CHANGE UP
LIDOCAIN IGE QN: 33 U/L — SIGNIFICANT CHANGE UP (ref 7–60)
LYMPHOCYTES # BLD AUTO: 1.65 K/UL — SIGNIFICANT CHANGE UP (ref 1–3.3)
LYMPHOCYTES # BLD AUTO: 17.2 % — SIGNIFICANT CHANGE UP (ref 13–44)
MCHC RBC-ENTMCNC: 29 PG — SIGNIFICANT CHANGE UP (ref 27–34)
MCHC RBC-ENTMCNC: 32.5 GM/DL — SIGNIFICANT CHANGE UP (ref 32–36)
MCV RBC AUTO: 89.3 FL — SIGNIFICANT CHANGE UP (ref 80–100)
MONOCYTES # BLD AUTO: 1.03 K/UL — HIGH (ref 0–0.9)
MONOCYTES NFR BLD AUTO: 10.7 % — SIGNIFICANT CHANGE UP (ref 2–14)
NEUTROPHILS # BLD AUTO: 6.59 K/UL — SIGNIFICANT CHANGE UP (ref 1.8–7.4)
NEUTROPHILS NFR BLD AUTO: 68.8 % — SIGNIFICANT CHANGE UP (ref 43–77)
NITRITE UR-MCNC: NEGATIVE — SIGNIFICANT CHANGE UP
NRBC # BLD: 0 /100 WBCS — SIGNIFICANT CHANGE UP (ref 0–0)
PCO2 BLDV: 42 MMHG — SIGNIFICANT CHANGE UP (ref 42–55)
PH BLDV: 7.36 — SIGNIFICANT CHANGE UP (ref 7.32–7.43)
PH UR: 6 — SIGNIFICANT CHANGE UP (ref 5–8)
PLATELET # BLD AUTO: 209 K/UL — SIGNIFICANT CHANGE UP (ref 150–400)
PO2 BLDV: 22 MMHG — LOW (ref 25–45)
POTASSIUM BLDV-SCNC: 3.8 MMOL/L — SIGNIFICANT CHANGE UP (ref 3.5–5.1)
POTASSIUM SERPL-MCNC: 4.1 MMOL/L — SIGNIFICANT CHANGE UP (ref 3.5–5.3)
POTASSIUM SERPL-SCNC: 4.1 MMOL/L — SIGNIFICANT CHANGE UP (ref 3.5–5.3)
PROT SERPL-MCNC: 7.1 G/DL — SIGNIFICANT CHANGE UP (ref 6–8.3)
PROT UR-MCNC: NEGATIVE — SIGNIFICANT CHANGE UP
PROTHROM AB SERPL-ACNC: 16 SEC — HIGH (ref 10.6–13.6)
RBC # BLD: 5.03 M/UL — SIGNIFICANT CHANGE UP (ref 4.2–5.8)
RBC # FLD: 12.3 % — SIGNIFICANT CHANGE UP (ref 10.3–14.5)
RBC CASTS # UR COMP ASSIST: 0 /HPF — SIGNIFICANT CHANGE UP (ref 0–4)
SAO2 % BLDV: 36.8 % — LOW (ref 67–88)
SODIUM SERPL-SCNC: 137 MMOL/L — SIGNIFICANT CHANGE UP (ref 135–145)
SP GR SPEC: 1.02 — SIGNIFICANT CHANGE UP (ref 1.01–1.02)
TROPONIN T, HIGH SENSITIVITY RESULT: 8 NG/L — SIGNIFICANT CHANGE UP (ref 0–51)
UROBILINOGEN FLD QL: NEGATIVE — SIGNIFICANT CHANGE UP
WBC # BLD: 9.59 K/UL — SIGNIFICANT CHANGE UP (ref 3.8–10.5)
WBC # FLD AUTO: 9.59 K/UL — SIGNIFICANT CHANGE UP (ref 3.8–10.5)
WBC UR QL: 1 /HPF — SIGNIFICANT CHANGE UP (ref 0–5)

## 2021-11-22 PROCEDURE — G1004: CPT

## 2021-11-22 PROCEDURE — 70450 CT HEAD/BRAIN W/O DYE: CPT | Mod: 26,ME

## 2021-11-22 PROCEDURE — 99285 EMERGENCY DEPT VISIT HI MDM: CPT

## 2021-11-22 PROCEDURE — 71275 CT ANGIOGRAPHY CHEST: CPT | Mod: 26,ME

## 2021-11-22 PROCEDURE — 74177 CT ABD & PELVIS W/CONTRAST: CPT | Mod: 26,ME

## 2021-11-22 PROCEDURE — 71045 X-RAY EXAM CHEST 1 VIEW: CPT | Mod: 26

## 2021-11-22 PROCEDURE — 93010 ELECTROCARDIOGRAM REPORT: CPT

## 2021-11-22 PROCEDURE — 99291 CRITICAL CARE FIRST HOUR: CPT | Mod: 24

## 2021-11-22 PROCEDURE — 93306 TTE W/DOPPLER COMPLETE: CPT | Mod: 26

## 2021-11-22 RX ORDER — ATORVASTATIN CALCIUM 80 MG/1
40 TABLET, FILM COATED ORAL AT BEDTIME
Refills: 0 | Status: DISCONTINUED | OUTPATIENT
Start: 2021-11-22 | End: 2021-11-26

## 2021-11-22 RX ORDER — SODIUM CHLORIDE 9 MG/ML
1000 INJECTION INTRAMUSCULAR; INTRAVENOUS; SUBCUTANEOUS ONCE
Refills: 0 | Status: DISCONTINUED | OUTPATIENT
Start: 2021-11-22 | End: 2021-11-22

## 2021-11-22 RX ORDER — SODIUM CHLORIDE 9 MG/ML
2000 INJECTION, SOLUTION INTRAVENOUS ONCE
Refills: 0 | Status: COMPLETED | OUTPATIENT
Start: 2021-11-22 | End: 2021-11-22

## 2021-11-22 RX ORDER — THIAMINE MONONITRATE (VIT B1) 100 MG
100 TABLET ORAL ONCE
Refills: 0 | Status: DISCONTINUED | OUTPATIENT
Start: 2021-11-22 | End: 2021-11-22

## 2021-11-22 RX ORDER — HYOSCYAMINE SULFATE 0.13 MG
0.12 TABLET ORAL EVERY 6 HOURS
Refills: 0 | Status: DISCONTINUED | OUTPATIENT
Start: 2021-11-22 | End: 2021-11-26

## 2021-11-22 RX ORDER — HEPARIN SODIUM 5000 [USP'U]/ML
1500 INJECTION INTRAVENOUS; SUBCUTANEOUS
Qty: 25000 | Refills: 0 | Status: DISCONTINUED | OUTPATIENT
Start: 2021-11-22 | End: 2021-11-22

## 2021-11-22 RX ORDER — THIAMINE MONONITRATE (VIT B1) 100 MG
100 TABLET ORAL ONCE
Refills: 0 | Status: COMPLETED | OUTPATIENT
Start: 2021-11-22 | End: 2021-11-22

## 2021-11-22 RX ORDER — PANTOPRAZOLE SODIUM 20 MG/1
40 TABLET, DELAYED RELEASE ORAL EVERY 24 HOURS
Refills: 0 | Status: DISCONTINUED | OUTPATIENT
Start: 2021-11-22 | End: 2021-11-23

## 2021-11-22 RX ORDER — HEPARIN SODIUM 5000 [USP'U]/ML
9500 INJECTION INTRAVENOUS; SUBCUTANEOUS EVERY 6 HOURS
Refills: 0 | Status: DISCONTINUED | OUTPATIENT
Start: 2021-11-22 | End: 2021-11-22

## 2021-11-22 RX ORDER — SODIUM CHLORIDE 9 MG/ML
1000 INJECTION, SOLUTION INTRAVENOUS
Refills: 0 | Status: DISCONTINUED | OUTPATIENT
Start: 2021-11-22 | End: 2021-11-23

## 2021-11-22 RX ORDER — PIPERACILLIN AND TAZOBACTAM 4; .5 G/20ML; G/20ML
3.38 INJECTION, POWDER, LYOPHILIZED, FOR SOLUTION INTRAVENOUS ONCE
Refills: 0 | Status: COMPLETED | OUTPATIENT
Start: 2021-11-22 | End: 2021-11-22

## 2021-11-22 RX ORDER — HEPARIN SODIUM 5000 [USP'U]/ML
9500 INJECTION INTRAVENOUS; SUBCUTANEOUS ONCE
Refills: 0 | Status: COMPLETED | OUTPATIENT
Start: 2021-11-22 | End: 2021-11-22

## 2021-11-22 RX ORDER — HEPARIN SODIUM 5000 [USP'U]/ML
4500 INJECTION INTRAVENOUS; SUBCUTANEOUS EVERY 6 HOURS
Refills: 0 | Status: DISCONTINUED | OUTPATIENT
Start: 2021-11-22 | End: 2021-11-22

## 2021-11-22 RX ORDER — VANCOMYCIN HCL 1 G
1000 VIAL (EA) INTRAVENOUS ONCE
Refills: 0 | Status: COMPLETED | OUTPATIENT
Start: 2021-11-22 | End: 2021-11-22

## 2021-11-22 RX ORDER — FOLIC ACID 0.8 MG
1 TABLET ORAL ONCE
Refills: 0 | Status: COMPLETED | OUTPATIENT
Start: 2021-11-22 | End: 2021-11-22

## 2021-11-22 RX ORDER — HEPARIN SODIUM 5000 [USP'U]/ML
INJECTION INTRAVENOUS; SUBCUTANEOUS
Qty: 25000 | Refills: 0 | Status: DISCONTINUED | OUTPATIENT
Start: 2021-11-22 | End: 2021-11-23

## 2021-11-22 RX ORDER — FOLIC ACID 0.8 MG
1 TABLET ORAL ONCE
Refills: 0 | Status: DISCONTINUED | OUTPATIENT
Start: 2021-11-22 | End: 2021-11-22

## 2021-11-22 RX ADMIN — HEPARIN SODIUM 2100 UNIT(S)/HR: 5000 INJECTION INTRAVENOUS; SUBCUTANEOUS at 19:55

## 2021-11-22 RX ADMIN — PANTOPRAZOLE SODIUM 40 MILLIGRAM(S): 20 TABLET, DELAYED RELEASE ORAL at 23:46

## 2021-11-22 RX ADMIN — Medication 2 MILLIGRAM(S): at 17:09

## 2021-11-22 RX ADMIN — Medication 1000 MILLIGRAM(S): at 18:03

## 2021-11-22 RX ADMIN — Medication 250 MILLIGRAM(S): at 14:34

## 2021-11-22 RX ADMIN — SODIUM CHLORIDE 2000 MILLILITER(S): 9 INJECTION, SOLUTION INTRAVENOUS at 18:03

## 2021-11-22 RX ADMIN — Medication 100 MILLIGRAM(S): at 18:24

## 2021-11-22 RX ADMIN — Medication 1 MILLIGRAM(S): at 16:18

## 2021-11-22 RX ADMIN — SODIUM CHLORIDE 2000 MILLILITER(S): 9 INJECTION, SOLUTION INTRAVENOUS at 14:34

## 2021-11-22 RX ADMIN — PIPERACILLIN AND TAZOBACTAM 3.38 GRAM(S): 4; .5 INJECTION, POWDER, LYOPHILIZED, FOR SOLUTION INTRAVENOUS at 18:04

## 2021-11-22 RX ADMIN — PIPERACILLIN AND TAZOBACTAM 200 GRAM(S): 4; .5 INJECTION, POWDER, LYOPHILIZED, FOR SOLUTION INTRAVENOUS at 17:09

## 2021-11-22 RX ADMIN — HEPARIN SODIUM 9500 UNIT(S): 5000 INJECTION INTRAVENOUS; SUBCUTANEOUS at 19:53

## 2021-11-22 NOTE — ED PROVIDER NOTE - OBJECTIVE STATEMENT
51M discharged yesterday after gastric sleeve done on Wednesday w/ Dr. Rodriguez, complaining of chest pains, shortness of breath, general malaise some confusion x 2 days with associated weakness. Pt endorses poor PO intake x2 days. Pt also endorses scrotal rash and pain which he says has been progressing over the next two weeks. Pt denies vomiting, denies any diarrhea, pt endorses diffuse abdominal pain but states he has had no issues with surgical wounds.

## 2021-11-22 NOTE — H&P ADULT - NSHPLABSRESULTS_GEN_ALL_CORE
----------  LABORATORY DATA:  ----------                        14.6   9.59  )-----------( 209      ( 2021 14:04 )             44.9                   137  |  103  |  11  ----------------------------<  90  4.1   |  19<L>  |  0.84    Ca    9.3      2021 14:04    TPro  7.1  /  Alb  3.8  /  TBili  0.5  /  DBili  x   /  AST  17  /  ALT  26  /  AlkPhos  85      LIVER FUNCTIONS - ( 2021 14:04 )  Alb: 3.8 g/dL / Pro: 7.1 g/dL / ALK PHOS: 85 U/L / ALT: 26 U/L / AST: 17 U/L / GGT: x           PT/INR - ( 2021 18:54 )   PT: 16.0 sec;   INR: 1.35 ratio         PTT - ( 2021 18:54 )  PTT:28.5 sec  Urinalysis Basic - ( 2021 21:00 )    Color: Light Yellow / Appearance: Clear / S.017 / pH: x  Gluc: x / Ketone: Large  / Bili: Negative / Urobili: Negative   Blood: x / Protein: Negative / Nitrite: Negative   Leuk Esterase: Negative / RBC: 0 /hpf / WBC 1 /HPF   Sq Epi: x / Non Sq Epi: x / Bacteria: Negative      < from: CT Angio Chest PE Protocol w/ IV Cont (21 @ 18:16) >    FINDINGS:  CHEST:  LUNGS AND LARGE AIRWAYS: Patent central airways. Evaluation of the lung parenchyma is limited due to respiratory motion.  PLEURA: No pleural effusion or pneumothorax.  VESSELS: Bilateral pulmonary emboli are present. On the right pulmonary embolus is seen in the right lower lobe lobar artery extending into several segmental branches. On the left there is a pulmonary embolus extending from the left main pulmonary artery into the lobar and segmental branches of the left lower lobe. Evaluation of subsegmental branches is limited due to respiratory motion.  HEART: Heart size is normal. No pericardial effusion. No evidence of right heart strain.  MEDIASTINUM AND EMMANUEL: No lymphadenopathy.  CHEST WALL AND LOWER NECK: Within normal limits.    ABDOMEN AND PELVIS:  LIVER: Within normal limits.  BILE DUCTS: Normal caliber.  GALLBLADDER: Within normal limits.  SPLEEN: Within normal limits.  PANCREAS: Within normal limits.  ADRENALS: Within normal limits.  KIDNEYS/URETERS: Within normal limits.    BLADDER: Within normal limits.  REPRODUCTIVE ORGANS: Prostatewithin normal limits.    BOWEL: Status post gastric sleeve. No collection or abscess is seen within the surgical bed. No bowel obstruction. Appendix is normal. Colonic diverticulosis without acute diverticulitis.  PERITONEUM: No ascites.  VESSELS: Within normal limits.  RETROPERITONEUM/LYMPH NODES: No lymphadenopathy.  ABDOMINAL WALL: Within normal limits.  BONES: Degenerative changes.    IMPRESSION:  Bilateral pulmonary emboli involving the right lower lobar and segmental branches in the left main, left lower lobe lobar, and subsegmental branches.    Status post gastric sleeve. No evidence of collection or abscess in the surgical bed.    < end of copied text >

## 2021-11-22 NOTE — H&P ADULT - NSHPPHYSICALEXAM_GEN_ALL_CORE
General: NAD  Pulmonary: normal resp effort, CTA-B  Cardiovascular: NSR, no murmurs  Abdominal: distended, nontender to palpation, incisions c/d/i

## 2021-11-22 NOTE — ED ADULT TRIAGE NOTE - CHIEF COMPLAINT QUOTE
C/o SOB x 1 hour with intermittent chest discomfort   Pt is 1 week s/p gastric sleeve at [facility] with Dr Rodriguez   ekg completed

## 2021-11-22 NOTE — ED PROVIDER NOTE - NSICDXFAMILYHX_GEN_ALL_CORE_FT
FAMILY HISTORY:  Father  Still living? Yes, Estimated age: Age Unknown  Family history of CABG, Age at diagnosis: Age Unknown  Family history of prostate cancer, Age at diagnosis: Age Unknown    Sibling  Still living? Yes, Estimated age: Age Unknown  Family history of stroke, Age at diagnosis: Age Unknown

## 2021-11-22 NOTE — CONSULT NOTE ADULT - ASSESSMENT
This is a 42yo Male with PMHx of HTN, HLD, s/p gastric sleeve surgery 5 days ago who presented to the ED today for chest pain and shortness of breath. EKG sinus, no ST changes. Troponin 8. CTA Chest: bilateral pulmonary emboli involving the right lower lobar and segmental branches in the left main, left lower lobe lobar, and subsegmental branches. HD stable. On RA with SpO2 high 90s. TTE with grossly normal LV systolic function. PESI risk index 53 points, Class I, Very Low Risk: 0-1.6% 30-day mortality in this group.    Plan:   - Continue Heparin gtt  - Check lower extremity venous dopplers   - Monitor on telemetry     Jim Reddy MD  Cardiology Fellow - PGY 4  Text or Call: [phone]  For all New Consults and Questions:  [email or web address]   Login: Inter[employer] This is a 42yo Male with PMHx of HTN, HLD, s/p gastric sleeve surgery 5 days ago who presented to the ED today for chest pain and shortness of breath. EKG sinus, no ST changes. Troponin 8. CTA Chest: bilateral pulmonary emboli involving the right lower lobar and segmental branches in the left main, left lower lobe lobar, and subsegmental branches. HD stable. On RA with SpO2 high 90s. TTE with grossly normal LV systolic function. PESI risk index 53 points, Class I, Very Low Risk: 0-1.6% 30-day mortality in this group. Provoked DVT in setting of recent surgery.     Plan:   - Continue Heparin gtt  - Check lower extremity venous dopplers   - Monitor on telemetry     Jim Reddy MD  Cardiology Fellow - PGY 4  Text or Call: [phone]  For all New Consults and Questions:  [email or web address]   Login: Viewpost

## 2021-11-22 NOTE — ED ADULT NURSE REASSESSMENT NOTE - NS ED NURSE REASSESS COMMENT FT1
per MD Torey Escalante, pt Is "hypoxic, confused, and About to fall out of bed." Upon immediate RN assessment, pt found to be sitting upright in stretcher, AOx4, answering questions without difficulty, speaking in clear and complete sentences, breathing even unlabored spontaneously, airway patent, following directions. pt moved to CM side of room, placed on pulse oximetry, 100% on room air. Pt repositioned to semi-marcos's position/ position of comfort. Pt denies complaints at this time.

## 2021-11-22 NOTE — ED PROVIDER NOTE - NSICDXPASTSURGICALHX_GEN_ALL_CORE_FT
PAST SURGICAL HISTORY:  H/O gastric sleeve     H/O hernia repair 2010 x2    H/O vasectomy 2007    S/P colonoscopy 2020    Status post hip surgery R hip, repair of labrum, 2013

## 2021-11-22 NOTE — ED ADULT NURSE NOTE - CHIEF COMPLAINT QUOTE
C/o SOB x 1 hour with intermittent chest discomfort   Pt is 1 week s/p gastric sleeve at [facility] with Dr Rodriguez   ekg completed

## 2021-11-22 NOTE — H&P ADULT - HISTORY OF PRESENT ILLNESS
52 YO M POD 5 s/p gastric sleeve presenting with EPIFANIO and shortness of breath for two days. Patient states that he has had trouble remembering what he needs to do, and has been feeling dizzy/ Reports feeling as though he were about to fall when entering the hospital today. States that he has had poor PO intake, both food and fluids since d/c and is not up to par with what the post operative instruction dictate that he should be having. Denies flatus and states that he had a very small BM this AM. States that he has been feeling slightly feverish but has not actually checked his temperature.

## 2021-11-22 NOTE — H&P ADULT - ASSESSMENT
55 YO M POD 5 s/p gastric sleeve presenting with SOB, dizziness, CT showing PE    Plan:  - Admit to Dr. Rodriguez  - ECHO without heart strain  - Cardiology consult, SCIU consult  - Hep gtt  - NPO, IVF  - am labs    Green Surgery  [id number]    Plan discussed with Dr. Dobbins on behalf of Dr. Rodriguez

## 2021-11-22 NOTE — ED ADULT NURSE REASSESSMENT NOTE - NS ED NURSE REASSESS COMMENT FT1
Report received from PAULINA Pagan. Pt A&Ox3, appears resting stretcher. Pt denies SOB, respirations non-labored, O2 sat 97% room air. Pt endorses mild sternal and RLQ discomfort but states "I can manage it." VSS, lung sounds clear b/l. Red, blanchable rash noted to scrotal area, no edema noted. Plan of care discussed with pt and verbalizes understanding. Safety and comfort measures maintained.

## 2021-11-22 NOTE — ED PROVIDER NOTE - PHYSICAL EXAMINATION
CONSTITUTIONAL: Uncomfortable appearing  SKIN: Chaperone MD Panosian - scrotal erythema, tenderness, no bullae  EYES: EOMI, PERRLA, no scleral icterus, conjunctiva pink  ENT: normal pharynx with no erythema or exudates  NECK: Supple; non tender. Full ROM.  CARD: RRR, no murmurs.  RESP: clear to ausculation b/l. No crackles or wheezing.  ABD: soft, non-tender, non-distended, no rebound or guarding.  MSK: Full ROM, no bony tenderness, no pedal edema, no calf tenderness  NEURO: normal motor. normal sensory. CN II-XII intact. Cerebellar testing normal. Normal gait.  PSYCH: Cooperative, appropriate.

## 2021-11-22 NOTE — ED ADULT NURSE NOTE - OBJECTIVE STATEMENT
51 y m came to the ed c/o rash in his groin and sob. patient states the rash started last week and the sob started today. patient is one week out of bariatric surgery. patient states rash is painful. no rash on his penis. patient is a/ox3. denies fevers, chills, chest pain, sob. abdomen is soft and nontender. skin is warm and dry. c/o decreased appetite since having surgery.

## 2021-11-22 NOTE — ED PROVIDER NOTE - CLINICAL SUMMARY MEDICAL DECISION MAKING FREE TEXT BOX
ap -51M hx of HTN, HLD, morbid obesity s/p grastric sleeve pod 5 here w/ SOB and abd pain w/ dec po intake w/ a worsening scortal rash and pain for the past 2 weeks. no fevers, no chills, but w/ general maliase. no dysuria, no penile discharge, no pain w/ stooling, no diarrhea. On exam, pt is awake and alert sating in the mid 90s on no oxygen, has clear lungs, soft abdomen w/ no active drainage from surgical wounds, abd mildly distended, no lower extremity edema,  area w/ erythema in the groin and scrotum, w/ no crepitus, no perineal tenderness, no erythema in the rectalarea. Pt w/ recent post op at risk for PE, intraabdominal infection vs urinary tract infection, plan for labs, imaging case discussed w/ surgical team. Pt reports "not feeling right" as a result, will obtain CT head imaging to assess for intracranial pathology as well.

## 2021-11-22 NOTE — ED ADULT NURSE REASSESSMENT NOTE - NS ED NURSE REASSESS COMMENT FT1
----- Message from Jesica Flahrety RN sent at 5/1/2018  3:25 PM CDT -----  Regarding: + Hpylori--interaction with patient's current medicaions  Patient had EGD 4/27/2018--pyloric channel ulcer and + H pylori.    Dr. Redd/Deepika Nunez NP want to treat with Prevpac.  There is an interaction that comes up between the Biaxin and the Citalopram (possible prolonged Q-T) and Lipitor.    Dr. Redd wants to know if patient can hold the Citalopram and Lipitor for 2 weeks? Or what would your recommendations be?    Thanks   Heparin nomogram protocol initiated with 2 RN's at bedside. 9,500 unit bolus administered with infusion 21ml/hr via pump. Plan of care discussed with pt and verbalizes understanding. No signs of active bleeding at this time. Safety and comfort measures maintained.

## 2021-11-23 LAB
ANION GAP SERPL CALC-SCNC: 13 MMOL/L — SIGNIFICANT CHANGE UP (ref 5–17)
ANION GAP SERPL CALC-SCNC: 15 MMOL/L — SIGNIFICANT CHANGE UP (ref 5–17)
ANION GAP SERPL CALC-SCNC: 16 MMOL/L — SIGNIFICANT CHANGE UP (ref 5–17)
APCR PPP: 3.18 RATIO — SIGNIFICANT CHANGE UP
APPEARANCE UR: CLEAR — SIGNIFICANT CHANGE UP
APTT BLD: 79.9 SEC — HIGH (ref 27.5–35.5)
APTT BLD: 89.8 SEC — HIGH (ref 27.5–35.5)
APTT BLD: 95.6 SEC — HIGH (ref 27.5–35.5)
APTT BLD: 97.6 SEC — HIGH (ref 27.5–35.5)
AT III ACT/NOR PPP CHRO: 71 % — LOW (ref 85–135)
BACTERIA # UR AUTO: NEGATIVE — SIGNIFICANT CHANGE UP
BASE EXCESS BLDV CALC-SCNC: -2 MMOL/L — SIGNIFICANT CHANGE UP (ref -2–2)
BILIRUB UR-MCNC: ABNORMAL
BLD GP AB SCN SERPL QL: NEGATIVE — SIGNIFICANT CHANGE UP
BUN SERPL-MCNC: 7 MG/DL — SIGNIFICANT CHANGE UP (ref 7–23)
BUN SERPL-MCNC: 8 MG/DL — SIGNIFICANT CHANGE UP (ref 7–23)
BUN SERPL-MCNC: 8 MG/DL — SIGNIFICANT CHANGE UP (ref 7–23)
CA-I SERPL-SCNC: 1.22 MMOL/L — SIGNIFICANT CHANGE UP (ref 1.15–1.33)
CALCIUM SERPL-MCNC: 8.6 MG/DL — SIGNIFICANT CHANGE UP (ref 8.4–10.5)
CALCIUM SERPL-MCNC: 8.8 MG/DL — SIGNIFICANT CHANGE UP (ref 8.4–10.5)
CALCIUM SERPL-MCNC: 9 MG/DL — SIGNIFICANT CHANGE UP (ref 8.4–10.5)
CHLORIDE BLDV-SCNC: 104 MMOL/L — SIGNIFICANT CHANGE UP (ref 96–108)
CHLORIDE SERPL-SCNC: 102 MMOL/L — SIGNIFICANT CHANGE UP (ref 96–108)
CHLORIDE SERPL-SCNC: 103 MMOL/L — SIGNIFICANT CHANGE UP (ref 96–108)
CHLORIDE SERPL-SCNC: 103 MMOL/L — SIGNIFICANT CHANGE UP (ref 96–108)
CK MB CFR SERPL CALC: <1 NG/ML — SIGNIFICANT CHANGE UP (ref 0–6.7)
CK SERPL-CCNC: 27 U/L — LOW (ref 30–200)
CO2 BLDV-SCNC: 24 MMOL/L — SIGNIFICANT CHANGE UP (ref 22–26)
CO2 SERPL-SCNC: 18 MMOL/L — LOW (ref 22–31)
CO2 SERPL-SCNC: 19 MMOL/L — LOW (ref 22–31)
CO2 SERPL-SCNC: 23 MMOL/L — SIGNIFICANT CHANGE UP (ref 22–31)
COLOR SPEC: COLORLESS — SIGNIFICANT CHANGE UP
CREAT SERPL-MCNC: 0.7 MG/DL — SIGNIFICANT CHANGE UP (ref 0.5–1.3)
CREAT SERPL-MCNC: 0.76 MG/DL — SIGNIFICANT CHANGE UP (ref 0.5–1.3)
CREAT SERPL-MCNC: 0.84 MG/DL — SIGNIFICANT CHANGE UP (ref 0.5–1.3)
DIFF PNL FLD: NEGATIVE — SIGNIFICANT CHANGE UP
DRVVT SCREEN TO CONFIRM RATIO: SIGNIFICANT CHANGE UP
EPI CELLS # UR: 0 — SIGNIFICANT CHANGE UP
GAS PNL BLDV: 134 MMOL/L — LOW (ref 136–145)
GAS PNL BLDV: SIGNIFICANT CHANGE UP
GAS PNL BLDV: SIGNIFICANT CHANGE UP
GLUCOSE BLDV-MCNC: 102 MG/DL — HIGH (ref 70–99)
GLUCOSE SERPL-MCNC: 85 MG/DL — SIGNIFICANT CHANGE UP (ref 70–99)
GLUCOSE SERPL-MCNC: 90 MG/DL — SIGNIFICANT CHANGE UP (ref 70–99)
GLUCOSE SERPL-MCNC: 93 MG/DL — SIGNIFICANT CHANGE UP (ref 70–99)
GLUCOSE UR QL: NEGATIVE — SIGNIFICANT CHANGE UP
HCO3 BLDV-SCNC: 23 MMOL/L — SIGNIFICANT CHANGE UP (ref 22–29)
HCT VFR BLD CALC: 40.7 % — SIGNIFICANT CHANGE UP (ref 39–50)
HCT VFR BLD CALC: 40.9 % — SIGNIFICANT CHANGE UP (ref 39–50)
HCT VFR BLD CALC: 41.9 % — SIGNIFICANT CHANGE UP (ref 39–50)
HCT VFR BLDA CALC: 44 % — SIGNIFICANT CHANGE UP (ref 39–51)
HCYS SERPL-MCNC: 9.2 UMOL/L — SIGNIFICANT CHANGE UP
HGB BLD CALC-MCNC: 14.7 G/DL — SIGNIFICANT CHANGE UP (ref 12.6–17.4)
HGB BLD-MCNC: 13.9 G/DL — SIGNIFICANT CHANGE UP (ref 13–17)
HGB BLD-MCNC: 13.9 G/DL — SIGNIFICANT CHANGE UP (ref 13–17)
HGB BLD-MCNC: 14.1 G/DL — SIGNIFICANT CHANGE UP (ref 13–17)
HOROWITZ INDEX BLDV+IHG-RTO: 21 — SIGNIFICANT CHANGE UP
HYALINE CASTS # UR AUTO: 0 /LPF — SIGNIFICANT CHANGE UP (ref 0–7)
INR BLD: 1.31 RATIO — HIGH (ref 0.88–1.16)
INR BLD: 1.33 RATIO — HIGH (ref 0.88–1.16)
INR BLD: 1.36 RATIO — HIGH (ref 0.88–1.16)
KETONES UR-MCNC: ABNORMAL
LA NT DPL PPP QL: 40.9 SEC — SIGNIFICANT CHANGE UP
LACTATE BLDV-MCNC: 1 MMOL/L — SIGNIFICANT CHANGE UP (ref 0.7–2)
LEUKOCYTE ESTERASE UR-ACNC: NEGATIVE — SIGNIFICANT CHANGE UP
MAGNESIUM SERPL-MCNC: 1.9 MG/DL — SIGNIFICANT CHANGE UP (ref 1.6–2.6)
MAGNESIUM SERPL-MCNC: 1.9 MG/DL — SIGNIFICANT CHANGE UP (ref 1.6–2.6)
MAGNESIUM SERPL-MCNC: 2.1 MG/DL — SIGNIFICANT CHANGE UP (ref 1.6–2.6)
MCHC RBC-ENTMCNC: 29.6 PG — SIGNIFICANT CHANGE UP (ref 27–34)
MCHC RBC-ENTMCNC: 29.6 PG — SIGNIFICANT CHANGE UP (ref 27–34)
MCHC RBC-ENTMCNC: 29.8 PG — SIGNIFICANT CHANGE UP (ref 27–34)
MCHC RBC-ENTMCNC: 33.7 GM/DL — SIGNIFICANT CHANGE UP (ref 32–36)
MCHC RBC-ENTMCNC: 34 GM/DL — SIGNIFICANT CHANGE UP (ref 32–36)
MCHC RBC-ENTMCNC: 34.2 GM/DL — SIGNIFICANT CHANGE UP (ref 32–36)
MCV RBC AUTO: 86.8 FL — SIGNIFICANT CHANGE UP (ref 80–100)
MCV RBC AUTO: 87.6 FL — SIGNIFICANT CHANGE UP (ref 80–100)
MCV RBC AUTO: 88 FL — SIGNIFICANT CHANGE UP (ref 80–100)
NITRITE UR-MCNC: NEGATIVE — SIGNIFICANT CHANGE UP
NRBC # BLD: 0 /100 WBCS — SIGNIFICANT CHANGE UP (ref 0–0)
NT-PROBNP SERPL-SCNC: 18 PG/ML — SIGNIFICANT CHANGE UP (ref 0–300)
PCO2 BLDV: 40 MMHG — LOW (ref 42–55)
PH BLDV: 7.37 — SIGNIFICANT CHANGE UP (ref 7.32–7.43)
PH UR: 6.5 — SIGNIFICANT CHANGE UP (ref 5–8)
PHOSPHATE SERPL-MCNC: 2.5 MG/DL — SIGNIFICANT CHANGE UP (ref 2.5–4.5)
PHOSPHATE SERPL-MCNC: 2.5 MG/DL — SIGNIFICANT CHANGE UP (ref 2.5–4.5)
PHOSPHATE SERPL-MCNC: 2.7 MG/DL — SIGNIFICANT CHANGE UP (ref 2.5–4.5)
PLATELET # BLD AUTO: 199 K/UL — SIGNIFICANT CHANGE UP (ref 150–400)
PLATELET # BLD AUTO: 200 K/UL — SIGNIFICANT CHANGE UP (ref 150–400)
PLATELET # BLD AUTO: 211 K/UL — SIGNIFICANT CHANGE UP (ref 150–400)
PO2 BLDV: 35 MMHG — SIGNIFICANT CHANGE UP (ref 25–45)
POTASSIUM BLDV-SCNC: 3.7 MMOL/L — SIGNIFICANT CHANGE UP (ref 3.5–5.1)
POTASSIUM SERPL-MCNC: 3.7 MMOL/L — SIGNIFICANT CHANGE UP (ref 3.5–5.3)
POTASSIUM SERPL-MCNC: 3.8 MMOL/L — SIGNIFICANT CHANGE UP (ref 3.5–5.3)
POTASSIUM SERPL-MCNC: 4.1 MMOL/L — SIGNIFICANT CHANGE UP (ref 3.5–5.3)
POTASSIUM SERPL-SCNC: 3.7 MMOL/L — SIGNIFICANT CHANGE UP (ref 3.5–5.3)
POTASSIUM SERPL-SCNC: 3.8 MMOL/L — SIGNIFICANT CHANGE UP (ref 3.5–5.3)
POTASSIUM SERPL-SCNC: 4.1 MMOL/L — SIGNIFICANT CHANGE UP (ref 3.5–5.3)
PROT C ACT/NOR PPP: 88 % — SIGNIFICANT CHANGE UP (ref 74–150)
PROT UR-MCNC: ABNORMAL
PROTHROM AB SERPL-ACNC: 15.5 SEC — HIGH (ref 10.6–13.6)
PROTHROM AB SERPL-ACNC: 15.7 SEC — HIGH (ref 10.6–13.6)
PROTHROM AB SERPL-ACNC: 16.1 SEC — HIGH (ref 10.6–13.6)
RBC # BLD: 4.67 M/UL — SIGNIFICANT CHANGE UP (ref 4.2–5.8)
RBC # BLD: 4.69 M/UL — SIGNIFICANT CHANGE UP (ref 4.2–5.8)
RBC # BLD: 4.76 M/UL — SIGNIFICANT CHANGE UP (ref 4.2–5.8)
RBC # FLD: 12.2 % — SIGNIFICANT CHANGE UP (ref 10.3–14.5)
RBC # FLD: 12.3 % — SIGNIFICANT CHANGE UP (ref 10.3–14.5)
RBC # FLD: 12.3 % — SIGNIFICANT CHANGE UP (ref 10.3–14.5)
RBC CASTS # UR COMP ASSIST: 1 /HPF — SIGNIFICANT CHANGE UP (ref 0–4)
RH IG SCN BLD-IMP: POSITIVE — SIGNIFICANT CHANGE UP
SAO2 % BLDV: 61.6 % — LOW (ref 67–88)
SARS-COV-2 RNA SPEC QL NAA+PROBE: SIGNIFICANT CHANGE UP
SODIUM SERPL-SCNC: 136 MMOL/L — SIGNIFICANT CHANGE UP (ref 135–145)
SODIUM SERPL-SCNC: 137 MMOL/L — SIGNIFICANT CHANGE UP (ref 135–145)
SODIUM SERPL-SCNC: 139 MMOL/L — SIGNIFICANT CHANGE UP (ref 135–145)
SP GR SPEC: 1.05 — SIGNIFICANT CHANGE UP (ref 1.01–1.02)
TROPONIN T, HIGH SENSITIVITY RESULT: 7 NG/L — SIGNIFICANT CHANGE UP (ref 0–51)
TROPONIN T, HIGH SENSITIVITY RESULT: 8 NG/L — SIGNIFICANT CHANGE UP (ref 0–51)
TROPONIN T, HIGH SENSITIVITY RESULT: 8 NG/L — SIGNIFICANT CHANGE UP (ref 0–51)
UROBILINOGEN FLD QL: NEGATIVE — SIGNIFICANT CHANGE UP
WBC # BLD: 6.76 K/UL — SIGNIFICANT CHANGE UP (ref 3.8–10.5)
WBC # BLD: 7.67 K/UL — SIGNIFICANT CHANGE UP (ref 3.8–10.5)
WBC # BLD: 8.93 K/UL — SIGNIFICANT CHANGE UP (ref 3.8–10.5)
WBC # FLD AUTO: 6.76 K/UL — SIGNIFICANT CHANGE UP (ref 3.8–10.5)
WBC # FLD AUTO: 7.67 K/UL — SIGNIFICANT CHANGE UP (ref 3.8–10.5)
WBC # FLD AUTO: 8.93 K/UL — SIGNIFICANT CHANGE UP (ref 3.8–10.5)
WBC UR QL: 1 /HPF — SIGNIFICANT CHANGE UP (ref 0–5)

## 2021-11-23 PROCEDURE — G0452: CPT | Mod: 26

## 2021-11-23 PROCEDURE — 93970 EXTREMITY STUDY: CPT | Mod: 26

## 2021-11-23 PROCEDURE — 99233 SBSQ HOSP IP/OBS HIGH 50: CPT

## 2021-11-23 PROCEDURE — 99223 1ST HOSP IP/OBS HIGH 75: CPT

## 2021-11-23 PROCEDURE — 93010 ELECTROCARDIOGRAM REPORT: CPT

## 2021-11-23 RX ORDER — HYDROMORPHONE HYDROCHLORIDE 2 MG/ML
0.5 INJECTION INTRAMUSCULAR; INTRAVENOUS; SUBCUTANEOUS ONCE
Refills: 0 | Status: DISCONTINUED | OUTPATIENT
Start: 2021-11-23 | End: 2021-11-23

## 2021-11-23 RX ORDER — POTASSIUM PHOSPHATE, MONOBASIC POTASSIUM PHOSPHATE, DIBASIC 236; 224 MG/ML; MG/ML
15 INJECTION, SOLUTION INTRAVENOUS ONCE
Refills: 0 | Status: COMPLETED | OUTPATIENT
Start: 2021-11-23 | End: 2021-11-23

## 2021-11-23 RX ORDER — MAGNESIUM SULFATE 500 MG/ML
2 VIAL (ML) INJECTION ONCE
Refills: 0 | Status: COMPLETED | OUTPATIENT
Start: 2021-11-23 | End: 2021-11-23

## 2021-11-23 RX ORDER — CHLORHEXIDINE GLUCONATE 213 G/1000ML
1 SOLUTION TOPICAL
Refills: 0 | Status: DISCONTINUED | OUTPATIENT
Start: 2021-11-23 | End: 2021-11-26

## 2021-11-23 RX ORDER — ACETAMINOPHEN 500 MG
975 TABLET ORAL EVERY 6 HOURS
Refills: 0 | Status: DISCONTINUED | OUTPATIENT
Start: 2021-11-23 | End: 2021-11-26

## 2021-11-23 RX ORDER — OXYCODONE HYDROCHLORIDE 5 MG/1
5 TABLET ORAL EVERY 6 HOURS
Refills: 0 | Status: DISCONTINUED | OUTPATIENT
Start: 2021-11-23 | End: 2021-11-26

## 2021-11-23 RX ORDER — NYSTATIN CREAM 100000 [USP'U]/G
1 CREAM TOPICAL
Refills: 0 | Status: DISCONTINUED | OUTPATIENT
Start: 2021-11-23 | End: 2021-11-26

## 2021-11-23 RX ORDER — MORPHINE SULFATE 50 MG/1
4 CAPSULE, EXTENDED RELEASE ORAL ONCE
Refills: 0 | Status: DISCONTINUED | OUTPATIENT
Start: 2021-11-23 | End: 2021-11-23

## 2021-11-23 RX ORDER — HEPARIN SODIUM 5000 [USP'U]/ML
2100 INJECTION INTRAVENOUS; SUBCUTANEOUS
Qty: 25000 | Refills: 0 | Status: DISCONTINUED | OUTPATIENT
Start: 2021-11-23 | End: 2021-11-25

## 2021-11-23 RX ORDER — KETOROLAC TROMETHAMINE 30 MG/ML
15 SYRINGE (ML) INJECTION ONCE
Refills: 0 | Status: DISCONTINUED | OUTPATIENT
Start: 2021-11-23 | End: 2021-11-24

## 2021-11-23 RX ORDER — HYDROCORTISONE 1 %
1 OINTMENT (GRAM) TOPICAL DAILY
Refills: 0 | Status: DISCONTINUED | OUTPATIENT
Start: 2021-11-23 | End: 2021-11-26

## 2021-11-23 RX ORDER — PANTOPRAZOLE SODIUM 20 MG/1
40 TABLET, DELAYED RELEASE ORAL DAILY
Refills: 0 | Status: DISCONTINUED | OUTPATIENT
Start: 2021-11-24 | End: 2021-11-26

## 2021-11-23 RX ORDER — ACETAMINOPHEN 500 MG
1000 TABLET ORAL EVERY 6 HOURS
Refills: 0 | Status: DISCONTINUED | OUTPATIENT
Start: 2021-11-23 | End: 2021-11-23

## 2021-11-23 RX ORDER — OXYCODONE HYDROCHLORIDE 5 MG/1
2.5 TABLET ORAL EVERY 6 HOURS
Refills: 0 | Status: DISCONTINUED | OUTPATIENT
Start: 2021-11-23 | End: 2021-11-26

## 2021-11-23 RX ADMIN — OXYCODONE HYDROCHLORIDE 5 MILLIGRAM(S): 5 TABLET ORAL at 02:57

## 2021-11-23 RX ADMIN — NYSTATIN CREAM 1 APPLICATION(S): 100000 CREAM TOPICAL at 17:49

## 2021-11-23 RX ADMIN — Medication 50 GRAM(S): at 13:07

## 2021-11-23 RX ADMIN — Medication 975 MILLIGRAM(S): at 01:40

## 2021-11-23 RX ADMIN — HEPARIN SODIUM 21 UNIT(S)/HR: 5000 INJECTION INTRAVENOUS; SUBCUTANEOUS at 01:49

## 2021-11-23 RX ADMIN — Medication 1000 MILLIGRAM(S): at 02:20

## 2021-11-23 RX ADMIN — MORPHINE SULFATE 4 MILLIGRAM(S): 50 CAPSULE, EXTENDED RELEASE ORAL at 11:10

## 2021-11-23 RX ADMIN — ATORVASTATIN CALCIUM 40 MILLIGRAM(S): 80 TABLET, FILM COATED ORAL at 21:49

## 2021-11-23 RX ADMIN — MORPHINE SULFATE 4 MILLIGRAM(S): 50 CAPSULE, EXTENDED RELEASE ORAL at 11:29

## 2021-11-23 RX ADMIN — Medication 400 MILLIGRAM(S): at 01:50

## 2021-11-23 RX ADMIN — OXYCODONE HYDROCHLORIDE 5 MILLIGRAM(S): 5 TABLET ORAL at 02:27

## 2021-11-23 RX ADMIN — NYSTATIN CREAM 1 APPLICATION(S): 100000 CREAM TOPICAL at 06:09

## 2021-11-23 RX ADMIN — HYDROMORPHONE HYDROCHLORIDE 0.5 MILLIGRAM(S): 2 INJECTION INTRAMUSCULAR; INTRAVENOUS; SUBCUTANEOUS at 20:50

## 2021-11-23 RX ADMIN — OXYCODONE HYDROCHLORIDE 5 MILLIGRAM(S): 5 TABLET ORAL at 19:22

## 2021-11-23 RX ADMIN — Medication 30 MILLILITER(S): at 20:27

## 2021-11-23 RX ADMIN — OXYCODONE HYDROCHLORIDE 5 MILLIGRAM(S): 5 TABLET ORAL at 20:00

## 2021-11-23 RX ADMIN — OXYCODONE HYDROCHLORIDE 5 MILLIGRAM(S): 5 TABLET ORAL at 01:40

## 2021-11-23 RX ADMIN — Medication 1 APPLICATION(S): at 02:32

## 2021-11-23 RX ADMIN — POTASSIUM PHOSPHATE, MONOBASIC POTASSIUM PHOSPHATE, DIBASIC 62.5 MILLIMOLE(S): 236; 224 INJECTION, SOLUTION INTRAVENOUS at 13:08

## 2021-11-23 RX ADMIN — Medication 975 MILLIGRAM(S): at 13:08

## 2021-11-23 RX ADMIN — CHLORHEXIDINE GLUCONATE 1 APPLICATION(S): 213 SOLUTION TOPICAL at 06:09

## 2021-11-23 RX ADMIN — OXYCODONE HYDROCHLORIDE 5 MILLIGRAM(S): 5 TABLET ORAL at 13:09

## 2021-11-23 RX ADMIN — HYDROMORPHONE HYDROCHLORIDE 0.5 MILLIGRAM(S): 2 INJECTION INTRAMUSCULAR; INTRAVENOUS; SUBCUTANEOUS at 20:28

## 2021-11-23 NOTE — CONSULT NOTE ADULT - ASSESSMENT
51y M PMH HTN, HLD, obesity, sleep hypoxia (autopap machine), Hx of seizure 11/2018 (neuro work-up negative), s/p sleeve gastrectomy 11/17 presented with EPIFANIO and shortness of breath for two days, found to have bilateral pulmonary emboli. Patient started on heparin gtt, evaluated by Vascular Cardiology. SICU consulted for hemodynamic and respiratory monitoring.     Neuro: AOx4, Post-operative pain control  - IV Tylenol and Oxycodone Solution PRN  - CTH negative for stroke  - Monitor for dizziness, mental status changes    Resp: bilateral pulmonary embolisms on CTA, likely 2/2 provoked DVT in setting recent surgery. Hx JEFFREY  - Continue heparin gtt  - Supplemental O2 as needed  - Nocturnal CPAP for JEFFREY    CV: hemodynamically stable  - EKG wnl  - 11/22 TTE: EF 60-65%, grossly normal LV and RV function   - f/u troponin/BNP  - Appreciate Vascular Cardiology recs     GI: s/p sleeve gastrectomy 11/17  - bariatric clear liquid diet  - Will follow-up with primary team regarding diet advancement  - Must crush pills or use liquid formulations of medications    /Renal:   - Trend Daily BMPs   - Monitor UOP    Heme: bilateral pulmonary embolism   - Continue hep gtt, titrate to goal aPTT 58-99  - Q6H PTT and CBC  - f/u bilateral lower extremity duplex (ordered)  - f/u hypercoagulable work-up    ID: Afebrile, WBC wnl  - s/p Vanc/Zosyn x1 in ED  - f/u BCx and UCx from ED  - Nystatin powder for suspected candida skin infection     Endo: A1c 5.7 (10/21/21)  - Trend glucose on BMPs    Dispo: SICU  Code Status: Full Code    Patient discussed with attending, Dr. Reyes.    Laly Ross MD  General Surgery, PGY2  [facility] SICU  Spectra [phone]

## 2021-11-23 NOTE — CONSULT NOTE ADULT - ATTENDING COMMENTS
Patient seen and examined  51 year old POD 5 from bariatric procedure (gastric sleeve), now presents with dizziness and SOB  CTA done in ED showing acute bilateral PE's    - Acute PE Patient seen and examined  51 year old POD 5 from bariatric procedure (gastric sleeve), now presents with dizziness and SOB  CTA done in ED showing acute bilateral PE's    - Acute provoked PE  - Hemodynamically stable, oxygenation ok, no signs of right heart failure on echo  - Will bring to SICU for close monitoring  - Start heparin infusion for now.  Likely transition to Lovenox or DOAC quickly

## 2021-11-23 NOTE — PROGRESS NOTE ADULT - SUBJECTIVE AND OBJECTIVE BOX
Vascular Cardiology  Progress note     SPECTRA [id number]              EMAIL [email or web address]   OFFICE [phone]    INTERVAL HISTORY:  -Had acute chest pain, sharp in nature, 10/10 intensity that lasted for about 15-20 mins and abated with morphine 2mg IV push.  -Non-radiating, never experienced before; EKG repeated x2 with no acute ST changes, NSR with incomplete RBBB.  -Troponin negative x2.      Allergies  No Known Allergies    MEDICATIONS:  heparin  Infusion 2100 Unit(s)/Hr IV Continuous <Continuous>  acetaminophen    Suspension .. 975 milliGRAM(s) Oral every 6 hours PRN  oxyCODONE    Solution 2.5 milliGRAM(s) Oral every 6 hours PRN  oxyCODONE    Solution 5 milliGRAM(s) Oral every 6 hours PRN  aluminum hydroxide/magnesium hydroxide/simethicone Suspension 30 milliLiter(s) Oral every 6 hours PRN  hyoscyamine SL 0.125 milliGRAM(s) SubLingual every 6 hours PRN  pantoprazole  Injectable 40 milliGRAM(s) IV Push every 24 hours  atorvastatin 40 milliGRAM(s) Oral at bedtime  chlorhexidine 2% Cloths 1 Application(s) Topical <User Schedule>  hydrocortisone 1% Cream 1 Application(s) Topical daily PRN  nystatin Powder 1 Application(s) Topical two times a day    PAST MEDICAL & SURGICAL HISTORY:  Dyslipidemia  Pericardial cyst  1993, resolved  High cholesterol  HTN (hypertension)  Morbid obesity due to excess calories  Status post hip surgery  R hip, repair of labrum, 2013  H/O hernia repair  2010 x2  H/O vasectomy  2007  S/P colonoscopy  2020  H/O gastric sleeve    FAMILY HISTORY:  Family history of CABG (Father)  Family history of prostate cancer (Father)  Family history of stroke (Sibling)    SOCIAL HISTORY:  unchanged    REVIEW OF SYSTEMS:  CONSTITUTIONAL: No fever, weight loss, or fatigue  EYES: No eye pain, visual disturbances, or discharge  ENMT:  No difficulty hearing, tinnitus, vertigo; No sinus or throat pain  NECK: No pain or stiffness  RESPIRATORY: No cough, wheezing, chills or hemoptysis; No Shortness of Breath  CARDIOVASCULAR: Chest pain  GASTROINTESTINAL: No abdominal or epigastric pain. No nausea, vomiting, or hematemesis; No diarrhea or constipation. No melena or hematochezia.  GENITOURINARY: No dysuria, frequency, hematuria, or incontinence  NEUROLOGICAL: No headaches, memory loss, loss of strength, numbness, or tremors  SKIN: No itching, burning, rashes, or lesions   LYMPH Nodes: No enlarged glands  ENDOCRINE: No heat or cold intolerance; No hair loss  MUSCULOSKELETAL: No joint pain or swelling; No muscle, back, or extremity pain  PSYCHIATRIC: No depression, anxiety, mood swings, or difficulty sleeping  HEME/LYMPH: No easy bruising, or bleeding gums  ALLERY AND IMMUNOLOGIC: No hives or eczema	    [ x] All others negative	  [ ] Unable to obtain    PHYSICAL EXAM:  T(C): 36.8 (11-23-21 @ 12:00), Max: 37.4 (11-22-21 @ 22:30)  HR: 66 (11-23-21 @ 15:07) (61 - 83)  BP: 123/74 (11-23-21 @ 14:30) (109/63 - 178/106)  RR: 18 (11-23-21 @ 14:30) (14 - 23)  SpO2: 98% (11-23-21 @ 15:07) (90% - 100%)  Wt(kg): --  I&O's Summary    22 Nov 2021 07:01  -  23 Nov 2021 07:00  --------------------------------------------------------  IN: 587 mL / OUT: 525 mL / NET: 62 mL    23 Nov 2021 07:01  -  23 Nov 2021 15:09  --------------------------------------------------------  IN: 300.5 mL / OUT: 700 mL / NET: -399.5 mL    Appearance: Morbidly obese man, in NAC  HEENT:   Normal oral mucosa, PERRL, EOMI	  Carotid:  Right: No bruit    Left:  No bruit  Lymphatic: No lymphadenopathy  Cardiovascular: Normal S1 S2, No JVD, No murmurs, No edema  Respiratory: Lungs clear to auscultation	  Psychiatry: A & O x 3, Mood & affect appropriate  Gastrointestinal:  Soft, Non-tender, + BS	  Skin: No rashes, No ecchymoses, No cyanosis	  Neurologic: Non-focal  Extremities: Normal range of motion, No clubbing, cyanosis.  Vascular:   Right DP:  Palpable             Left DP:  Palpable  Right PT:  Palpable              Left PT:  Palpable    LABS:	 	    CBC Full  -  ( 23 Nov 2021 11:10 )  WBC Count : 7.67 K/uL  Hemoglobin : 13.9 g/dL  Hematocrit : 40.7 %  Platelet Count - Automated : 200 K/uL  Mean Cell Volume : 86.8 fl  Mean Cell Hemoglobin : 29.6 pg  Mean Cell Hemoglobin Concentration : 34.2 gm/dL  Auto Neutrophil # : x  Auto Lymphocyte # : x  Auto Monocyte # : x  Auto Eosinophil # : x  Auto Basophil # : x  Auto Neutrophil % : x  Auto Lymphocyte % : x  Auto Monocyte % : x  Auto Eosinophil % : x  Auto Basophil % : x    11-23    136  |  103  |  8   ----------------------------<  93  3.8   |  18<L>  |  0.70  11-23    137  |  102  |  8   ----------------------------<  90  3.7   |  19<L>  |  0.84    Ca    8.8      23 Nov 2021 11:10  Ca    9.0      23 Nov 2021 01:12  Phos  2.5     11-23  Phos  2.5     11-23  Mg     1.9     11-23  Mg     1.9     11-23    TPro  7.1  /  Alb  3.8  /  TBili  0.5  /  DBili  x   /  AST  17  /  ALT  26  /  AlkPhos  85  11-22      Assessment:  1. Post-surgical state (gastric sleeve surgery 5 days prior to presentation), immobile, morbidly obese, male sex  2. CT chest PE protocol 11/22:Bilateral pulmonary emboli involving the right lower lobar and segmental branches in the left main, left lower lobe lobar, and subsegmental branches.   --HD stable, on RA with SpO2 high 90s  --TTE with grossly normal LV systolic function.   --PESI risk index 53 points, Class I, Very Low Risk: 0-1.6% 30-day mortality in this group.   3. VA Duplex 11/23 with R peroneal and soleal vein DVT    Plan:  1. Continue heparin gtt   2. Continue excellent SICU care  3. Continue telemetry monitoring     Thank you    Vascular Cardiology Service   [id number]  [id number]  Office [phone]  email:   [email or web address]     Vascular Cardiology  Progress note     SPECTRA [id number]              EMAIL [email or web address]   OFFICE [phone]    INTERVAL HISTORY:  -Had acute chest pain, sharp in nature, 10/10 intensity that lasted for about 15-20 mins and abated with morphine 2mg IV push.  -Non-radiating, never experienced before; EKG repeated x2 with no acute ST changes, NSR with incomplete RBBB.  -Troponin negative x2.      Allergies  No Known Allergies    MEDICATIONS:  heparin  Infusion 2100 Unit(s)/Hr IV Continuous <Continuous>  acetaminophen    Suspension .. 975 milliGRAM(s) Oral every 6 hours PRN  oxyCODONE    Solution 2.5 milliGRAM(s) Oral every 6 hours PRN  oxyCODONE    Solution 5 milliGRAM(s) Oral every 6 hours PRN  aluminum hydroxide/magnesium hydroxide/simethicone Suspension 30 milliLiter(s) Oral every 6 hours PRN  hyoscyamine SL 0.125 milliGRAM(s) SubLingual every 6 hours PRN  pantoprazole  Injectable 40 milliGRAM(s) IV Push every 24 hours  atorvastatin 40 milliGRAM(s) Oral at bedtime  chlorhexidine 2% Cloths 1 Application(s) Topical <User Schedule>  hydrocortisone 1% Cream 1 Application(s) Topical daily PRN  nystatin Powder 1 Application(s) Topical two times a day    PAST MEDICAL & SURGICAL HISTORY:  Dyslipidemia  Pericardial cyst  1993, resolved  High cholesterol  HTN (hypertension)  Morbid obesity due to excess calories  Status post hip surgery  R hip, repair of labrum, 2013  H/O hernia repair  2010 x2  H/O vasectomy  2007  S/P colonoscopy  2020  H/O gastric sleeve    FAMILY HISTORY:  Family history of CABG (Father)  Family history of prostate cancer (Father)  Family history of stroke (Sibling)    SOCIAL HISTORY:  unchanged    REVIEW OF SYSTEMS:  CONSTITUTIONAL: No fever, weight loss, or fatigue  EYES: No eye pain, visual disturbances, or discharge  ENMT:  No difficulty hearing, tinnitus, vertigo; No sinus or throat pain  NECK: No pain or stiffness  RESPIRATORY: No cough, wheezing, chills or hemoptysis; No Shortness of Breath  CARDIOVASCULAR: Chest pain  GASTROINTESTINAL: No abdominal or epigastric pain. No nausea, vomiting, or hematemesis; No diarrhea or constipation. No melena or hematochezia.  GENITOURINARY: No dysuria, frequency, hematuria, or incontinence  NEUROLOGICAL: No headaches, memory loss, loss of strength, numbness, or tremors  SKIN: No itching, burning, rashes, or lesions   LYMPH Nodes: No enlarged glands  ENDOCRINE: No heat or cold intolerance; No hair loss  MUSCULOSKELETAL: No joint pain or swelling; No muscle, back, or extremity pain  PSYCHIATRIC: No depression, anxiety, mood swings, or difficulty sleeping  HEME/LYMPH: No easy bruising, or bleeding gums  ALLERY AND IMMUNOLOGIC: No hives or eczema	    [ x] All others negative	  [ ] Unable to obtain    PHYSICAL EXAM:  T(C): 36.8 (11-23-21 @ 12:00), Max: 37.4 (11-22-21 @ 22:30)  HR: 66 (11-23-21 @ 15:07) (61 - 83)  BP: 123/74 (11-23-21 @ 14:30) (109/63 - 178/106)  RR: 18 (11-23-21 @ 14:30) (14 - 23)  SpO2: 98% (11-23-21 @ 15:07) (90% - 100%)  Wt(kg): --  I&O's Summary    22 Nov 2021 07:01  -  23 Nov 2021 07:00  --------------------------------------------------------  IN: 587 mL / OUT: 525 mL / NET: 62 mL    23 Nov 2021 07:01  -  23 Nov 2021 15:09  --------------------------------------------------------  IN: 300.5 mL / OUT: 700 mL / NET: -399.5 mL    Appearance: Morbidly obese man, in NAC  HEENT:   Normal oral mucosa, PERRL, EOMI	  Carotid:  Right: No bruit    Left:  No bruit  Lymphatic: No lymphadenopathy  Cardiovascular: Normal S1 S2, No JVD, No murmurs, No edema  Respiratory: Lungs clear to auscultation	  Psychiatry: A & O x 3, Mood & affect appropriate  Gastrointestinal:  Soft, Non-tender, + BS	  Skin: No rashes, No ecchymoses, No cyanosis	  Neurologic: Non-focal  Extremities: Normal range of motion, No clubbing, cyanosis.  Vascular:   Right DP:  Palpable             Left DP:  Palpable  Right PT:  Palpable              Left PT:  Palpable    LABS:	 	    CBC Full  -  ( 23 Nov 2021 11:10 )  WBC Count : 7.67 K/uL  Hemoglobin : 13.9 g/dL  Hematocrit : 40.7 %  Platelet Count - Automated : 200 K/uL  Mean Cell Volume : 86.8 fl  Mean Cell Hemoglobin : 29.6 pg  Mean Cell Hemoglobin Concentration : 34.2 gm/dL  Auto Neutrophil # : x  Auto Lymphocyte # : x  Auto Monocyte # : x  Auto Eosinophil # : x  Auto Basophil # : x  Auto Neutrophil % : x  Auto Lymphocyte % : x  Auto Monocyte % : x  Auto Eosinophil % : x  Auto Basophil % : x    11-23    136  |  103  |  8   ----------------------------<  93  3.8   |  18<L>  |  0.70  11-23    137  |  102  |  8   ----------------------------<  90  3.7   |  19<L>  |  0.84    Ca    8.8      23 Nov 2021 11:10  Ca    9.0      23 Nov 2021 01:12  Phos  2.5     11-23  Phos  2.5     11-23  Mg     1.9     11-23  Mg     1.9     11-23    TPro  7.1  /  Alb  3.8  /  TBili  0.5  /  DBili  x   /  AST  17  /  ALT  26  /  AlkPhos  85  11-22      Assessment:  1. Post-surgical state (gastric sleeve surgery 5 days prior to presentation), immobile, morbidly obese, male sex  2. CT chest PE protocol 11/22:Bilateral pulmonary emboli involving the right lower lobar and segmental branches in the left main, left lower lobe lobar, and subsegmental branches.   --HD stable, on RA with SpO2 high 90s  --TTE with grossly normal LV systolic function.   --PESI risk index 53 points, Class I, Very Low Risk: 0-1.6% 30-day mortality in this group.   3. VA Duplex 11/23 with R peroneal and soleal vein DVT    Plan:  1. Continue heparin gtt   2. Continue excellent SICU care  3. Continue telemetry monitoring  4. Will require hypercoagulable workup outpatient     Thank you    Vascular Cardiology Service   [id number] - [id number]  Office [phone]  email:   [email or web address]

## 2021-11-23 NOTE — PROGRESS NOTE ADULT - ASSESSMENT
55 YO M POD 5 s/p gastric sleeve presenting with SOB, dizziness, CT showing PE    Plan:  hep gtt f/u ptt levels  NPO/IVF  echo without heart strain  cardiology consult  f/u AM labs  c/w excellent SICU care      Green Team Surgery   Pager [phone] 53 YO M POD 5 s/p gastric sleeve presenting with SOB, dizziness, CT showing PE    Plan:  hep gtt f/u ptt levels  CLD bariatric   echo without heart strain  f/u cardiology consult  f/u AM labs  c/w excellent SICU care      Green Team Surgery   Pager [phone]

## 2021-11-24 LAB
ALBUMIN SERPL ELPH-MCNC: 3.7 G/DL — SIGNIFICANT CHANGE UP (ref 3.3–5)
ALP SERPL-CCNC: 80 U/L — SIGNIFICANT CHANGE UP (ref 40–120)
ALT FLD-CCNC: 20 U/L — SIGNIFICANT CHANGE UP (ref 10–45)
ANION GAP SERPL CALC-SCNC: 14 MMOL/L — SIGNIFICANT CHANGE UP (ref 5–17)
ANION GAP SERPL CALC-SCNC: 16 MMOL/L — SIGNIFICANT CHANGE UP (ref 5–17)
APTT BLD: 77.6 SEC — HIGH (ref 27.5–35.5)
APTT BLD: 85.3 SEC — HIGH (ref 27.5–35.5)
AST SERPL-CCNC: 16 U/L — SIGNIFICANT CHANGE UP (ref 10–40)
B2 GLYCOPROT1 AB SER QL: NEGATIVE — SIGNIFICANT CHANGE UP
BILIRUB SERPL-MCNC: 0.4 MG/DL — SIGNIFICANT CHANGE UP (ref 0.2–1.2)
BUN SERPL-MCNC: 7 MG/DL — SIGNIFICANT CHANGE UP (ref 7–23)
BUN SERPL-MCNC: 8 MG/DL — SIGNIFICANT CHANGE UP (ref 7–23)
CALCIUM SERPL-MCNC: 8.7 MG/DL — SIGNIFICANT CHANGE UP (ref 8.4–10.5)
CALCIUM SERPL-MCNC: 8.9 MG/DL — SIGNIFICANT CHANGE UP (ref 8.4–10.5)
CARDIOLIPIN AB SER-ACNC: NEGATIVE — SIGNIFICANT CHANGE UP
CHLORIDE SERPL-SCNC: 102 MMOL/L — SIGNIFICANT CHANGE UP (ref 96–108)
CHLORIDE SERPL-SCNC: 104 MMOL/L — SIGNIFICANT CHANGE UP (ref 96–108)
CK MB CFR SERPL CALC: 1 NG/ML — SIGNIFICANT CHANGE UP (ref 0–6.7)
CO2 SERPL-SCNC: 19 MMOL/L — LOW (ref 22–31)
CO2 SERPL-SCNC: 22 MMOL/L — SIGNIFICANT CHANGE UP (ref 22–31)
CREAT SERPL-MCNC: 0.74 MG/DL — SIGNIFICANT CHANGE UP (ref 0.5–1.3)
CREAT SERPL-MCNC: 0.85 MG/DL — SIGNIFICANT CHANGE UP (ref 0.5–1.3)
CULTURE RESULTS: NO GROWTH — SIGNIFICANT CHANGE UP
DNA PLOIDY SPEC FC-IMP: SIGNIFICANT CHANGE UP
GAS PNL BLDA: SIGNIFICANT CHANGE UP
GLUCOSE SERPL-MCNC: 83 MG/DL — SIGNIFICANT CHANGE UP (ref 70–99)
GLUCOSE SERPL-MCNC: 84 MG/DL — SIGNIFICANT CHANGE UP (ref 70–99)
HCT VFR BLD CALC: 40.8 % — SIGNIFICANT CHANGE UP (ref 39–50)
HCT VFR BLD CALC: 42.2 % — SIGNIFICANT CHANGE UP (ref 39–50)
HGB BLD-MCNC: 14 G/DL — SIGNIFICANT CHANGE UP (ref 13–17)
HGB BLD-MCNC: 14.1 G/DL — SIGNIFICANT CHANGE UP (ref 13–17)
INR BLD: 1.27 RATIO — HIGH (ref 0.88–1.16)
INR BLD: 1.28 RATIO — HIGH (ref 0.88–1.16)
MAGNESIUM SERPL-MCNC: 2 MG/DL — SIGNIFICANT CHANGE UP (ref 1.6–2.6)
MAGNESIUM SERPL-MCNC: 2.2 MG/DL — SIGNIFICANT CHANGE UP (ref 1.6–2.6)
MCHC RBC-ENTMCNC: 29.4 PG — SIGNIFICANT CHANGE UP (ref 27–34)
MCHC RBC-ENTMCNC: 29.4 PG — SIGNIFICANT CHANGE UP (ref 27–34)
MCHC RBC-ENTMCNC: 33.4 GM/DL — SIGNIFICANT CHANGE UP (ref 32–36)
MCHC RBC-ENTMCNC: 34.3 GM/DL — SIGNIFICANT CHANGE UP (ref 32–36)
MCV RBC AUTO: 85.5 FL — SIGNIFICANT CHANGE UP (ref 80–100)
MCV RBC AUTO: 87.9 FL — SIGNIFICANT CHANGE UP (ref 80–100)
NRBC # BLD: 0 /100 WBCS — SIGNIFICANT CHANGE UP (ref 0–0)
NRBC # BLD: 0 /100 WBCS — SIGNIFICANT CHANGE UP (ref 0–0)
NT-PROBNP SERPL-SCNC: 12 PG/ML — SIGNIFICANT CHANGE UP (ref 0–300)
PHOSPHATE SERPL-MCNC: 2.4 MG/DL — LOW (ref 2.5–4.5)
PHOSPHATE SERPL-MCNC: 2.6 MG/DL — SIGNIFICANT CHANGE UP (ref 2.5–4.5)
PLATELET # BLD AUTO: 226 K/UL — SIGNIFICANT CHANGE UP (ref 150–400)
PLATELET # BLD AUTO: 232 K/UL — SIGNIFICANT CHANGE UP (ref 150–400)
POTASSIUM SERPL-MCNC: 3.7 MMOL/L — SIGNIFICANT CHANGE UP (ref 3.5–5.3)
POTASSIUM SERPL-MCNC: 3.8 MMOL/L — SIGNIFICANT CHANGE UP (ref 3.5–5.3)
POTASSIUM SERPL-SCNC: 3.7 MMOL/L — SIGNIFICANT CHANGE UP (ref 3.5–5.3)
POTASSIUM SERPL-SCNC: 3.8 MMOL/L — SIGNIFICANT CHANGE UP (ref 3.5–5.3)
PROT SERPL-MCNC: 6.8 G/DL — SIGNIFICANT CHANGE UP (ref 6–8.3)
PROTHROM AB SERPL-ACNC: 15.1 SEC — HIGH (ref 10.6–13.6)
PROTHROM AB SERPL-ACNC: 15.2 SEC — HIGH (ref 10.6–13.6)
RBC # BLD: 4.77 M/UL — SIGNIFICANT CHANGE UP (ref 4.2–5.8)
RBC # BLD: 4.8 M/UL — SIGNIFICANT CHANGE UP (ref 4.2–5.8)
RBC # FLD: 12.1 % — SIGNIFICANT CHANGE UP (ref 10.3–14.5)
RBC # FLD: 12.3 % — SIGNIFICANT CHANGE UP (ref 10.3–14.5)
SODIUM SERPL-SCNC: 138 MMOL/L — SIGNIFICANT CHANGE UP (ref 135–145)
SODIUM SERPL-SCNC: 139 MMOL/L — SIGNIFICANT CHANGE UP (ref 135–145)
SPECIMEN SOURCE: SIGNIFICANT CHANGE UP
TROPONIN T, HIGH SENSITIVITY RESULT: 8 NG/L — SIGNIFICANT CHANGE UP (ref 0–51)
WBC # BLD: 6.79 K/UL — SIGNIFICANT CHANGE UP (ref 3.8–10.5)
WBC # BLD: 8.19 K/UL — SIGNIFICANT CHANGE UP (ref 3.8–10.5)
WBC # FLD AUTO: 6.79 K/UL — SIGNIFICANT CHANGE UP (ref 3.8–10.5)
WBC # FLD AUTO: 8.19 K/UL — SIGNIFICANT CHANGE UP (ref 3.8–10.5)

## 2021-11-24 PROCEDURE — 99233 SBSQ HOSP IP/OBS HIGH 50: CPT

## 2021-11-24 PROCEDURE — 71045 X-RAY EXAM CHEST 1 VIEW: CPT | Mod: 26

## 2021-11-24 PROCEDURE — 93010 ELECTROCARDIOGRAM REPORT: CPT

## 2021-11-24 RX ORDER — HYDROMORPHONE HYDROCHLORIDE 2 MG/ML
0.5 INJECTION INTRAMUSCULAR; INTRAVENOUS; SUBCUTANEOUS ONCE
Refills: 0 | Status: DISCONTINUED | OUTPATIENT
Start: 2021-11-24 | End: 2021-11-24

## 2021-11-24 RX ORDER — POTASSIUM CHLORIDE 20 MEQ
40 PACKET (EA) ORAL ONCE
Refills: 0 | Status: COMPLETED | OUTPATIENT
Start: 2021-11-24 | End: 2021-11-24

## 2021-11-24 RX ORDER — POTASSIUM PHOSPHATE, MONOBASIC POTASSIUM PHOSPHATE, DIBASIC 236; 224 MG/ML; MG/ML
15 INJECTION, SOLUTION INTRAVENOUS ONCE
Refills: 0 | Status: COMPLETED | OUTPATIENT
Start: 2021-11-24 | End: 2021-11-25

## 2021-11-24 RX ORDER — FLUCONAZOLE 150 MG/1
800 TABLET ORAL ONCE
Refills: 0 | Status: COMPLETED | OUTPATIENT
Start: 2021-11-24 | End: 2021-11-24

## 2021-11-24 RX ORDER — FLUCONAZOLE 150 MG/1
400 TABLET ORAL EVERY 24 HOURS
Refills: 0 | Status: DISCONTINUED | OUTPATIENT
Start: 2021-11-24 | End: 2021-11-26

## 2021-11-24 RX ADMIN — OXYCODONE HYDROCHLORIDE 5 MILLIGRAM(S): 5 TABLET ORAL at 11:39

## 2021-11-24 RX ADMIN — Medication 1 APPLICATION(S): at 06:18

## 2021-11-24 RX ADMIN — OXYCODONE HYDROCHLORIDE 5 MILLIGRAM(S): 5 TABLET ORAL at 23:00

## 2021-11-24 RX ADMIN — PANTOPRAZOLE SODIUM 40 MILLIGRAM(S): 20 TABLET, DELAYED RELEASE ORAL at 11:33

## 2021-11-24 RX ADMIN — HYDROMORPHONE HYDROCHLORIDE 0.5 MILLIGRAM(S): 2 INJECTION INTRAMUSCULAR; INTRAVENOUS; SUBCUTANEOUS at 00:50

## 2021-11-24 RX ADMIN — Medication 15 MILLIGRAM(S): at 00:25

## 2021-11-24 RX ADMIN — Medication 15 MILLIGRAM(S): at 00:01

## 2021-11-24 RX ADMIN — FLUCONAZOLE 100 MILLIGRAM(S): 150 TABLET ORAL at 20:24

## 2021-11-24 RX ADMIN — CHLORHEXIDINE GLUCONATE 1 APPLICATION(S): 213 SOLUTION TOPICAL at 06:03

## 2021-11-24 RX ADMIN — NYSTATIN CREAM 1 APPLICATION(S): 100000 CREAM TOPICAL at 17:35

## 2021-11-24 RX ADMIN — OXYCODONE HYDROCHLORIDE 5 MILLIGRAM(S): 5 TABLET ORAL at 12:02

## 2021-11-24 RX ADMIN — OXYCODONE HYDROCHLORIDE 5 MILLIGRAM(S): 5 TABLET ORAL at 22:22

## 2021-11-24 RX ADMIN — Medication 40 MILLIEQUIVALENT(S): at 06:03

## 2021-11-24 RX ADMIN — HYDROMORPHONE HYDROCHLORIDE 0.5 MILLIGRAM(S): 2 INJECTION INTRAMUSCULAR; INTRAVENOUS; SUBCUTANEOUS at 00:24

## 2021-11-24 RX ADMIN — Medication 250 MILLIMOLE(S): at 00:24

## 2021-11-24 RX ADMIN — Medication 30 MILLILITER(S): at 23:15

## 2021-11-24 RX ADMIN — ATORVASTATIN CALCIUM 40 MILLIGRAM(S): 80 TABLET, FILM COATED ORAL at 22:22

## 2021-11-24 RX ADMIN — NYSTATIN CREAM 1 APPLICATION(S): 100000 CREAM TOPICAL at 06:03

## 2021-11-24 RX ADMIN — Medication 975 MILLIGRAM(S): at 23:15

## 2021-11-24 RX ADMIN — Medication 975 MILLIGRAM(S): at 23:50

## 2021-11-24 NOTE — PROGRESS NOTE ADULT - SUBJECTIVE AND OBJECTIVE BOX
Green Team Surgery Daily Progress Note    Subjective:   Patient seen at bedside this AM. Reports feeling well, without complaints. Denies chest pain, SOB. Tolerating diet without N/V.     24h Events:   - Overnight, no acute events    Objective:  Vital Signs  T(C): 37.3 (11-23 @ 23:00), Max: 37.3 (11-23 @ 23:00)  HR: 92 (11-24 @ 00:00) (59 - 92)  BP: 150/98 (11-24 @ 00:00) (104/63 - 178/106)  RR: 21 (11-24 @ 00:00) (11 - 25)  SpO2: 99% (11-24 @ 00:00) (90% - 100%)  11-22-21 @ 07:01  -  11-23-21 @ 07:00  --------------------------------------------------------  IN:  Total IN: 0 mL    OUT:    Voided (mL): 525 mL  Total OUT: 525 mL    Total NET: -525 mL      11-23-21 @ 07:01  -  11-24-21 @ 01:16  --------------------------------------------------------  IN:  Total IN: 0 mL    OUT:    Voided (mL): 1100 mL  Total OUT: 1100 mL    Total NET: -1100 mL          Physical Exam:  GEN: resting in bed comfortably in NAD  RESP: no increased WOB  ABD: distended, nontender to palpation, incisions c/d/i  EXTR: warm, well-perfused without gross deformities; spontaneous movement in b/l U/L extrem  NEURO: AAOx4    Labs:                        14.1   8.19  )-----------( 232      ( 24 Nov 2021 00:24 )             42.2   11-24    138  |  102  |  7   ----------------------------<  83  3.7   |  22  |  0.85    Ca    8.9      24 Nov 2021 00:24  Phos  2.6     11-24  Mg     2.2     11-24    TPro  6.8  /  Alb  3.7  /  TBili  0.4  /  DBili  x   /  AST  16  /  ALT  20  /  AlkPhos  80  11-24    CAPILLARY BLOOD GLUCOSE          Medications:   MEDICATIONS  (STANDING):  atorvastatin 40 milliGRAM(s) Oral at bedtime  chlorhexidine 2% Cloths 1 Application(s) Topical <User Schedule>  heparin  Infusion 2100 Unit(s)/Hr (21 mL/Hr) IV Continuous <Continuous>  nystatin Powder 1 Application(s) Topical two times a day  pantoprazole   Suspension 40 milliGRAM(s) Oral daily  potassium chloride   Powder 40 milliEquivalent(s) Oral once    MEDICATIONS  (PRN):  acetaminophen    Suspension .. 975 milliGRAM(s) Oral every 6 hours PRN Mild Pain (1 - 3)  aluminum hydroxide/magnesium hydroxide/simethicone Suspension 30 milliLiter(s) Oral every 6 hours PRN Dyspepsia  hydrocortisone 1% Cream 1 Application(s) Topical daily PRN Rash and/or Itching  hyoscyamine SL 0.125 milliGRAM(s) SubLingual every 6 hours PRN nausea  oxyCODONE    Solution 2.5 milliGRAM(s) Oral every 6 hours PRN Moderate Pain (4 - 6)  oxyCODONE    Solution 5 milliGRAM(s) Oral every 6 hours PRN Severe Pain (7 - 10)      Imaging:       Green Team Surgery Daily Progress Note    Subjective:   Patient seen at bedside this AM. Reports feeling well, without complaints. Had chest pain over night with negative EKG, negative troponin. Denies chest pain , SOB now. Tolerating diet without N/V.     24h Events:   - Overnight, no acute events    Objective:  Vital Signs  T(C): 37.3 (11-23 @ 23:00), Max: 37.3 (11-23 @ 23:00)  HR: 92 (11-24 @ 00:00) (59 - 92)  BP: 150/98 (11-24 @ 00:00) (104/63 - 178/106)  RR: 21 (11-24 @ 00:00) (11 - 25)  SpO2: 99% (11-24 @ 00:00) (90% - 100%)  11-22-21 @ 07:01  -  11-23-21 @ 07:00  --------------------------------------------------------  IN:  Total IN: 0 mL    OUT:    Voided (mL): 525 mL  Total OUT: 525 mL    Total NET: -525 mL      11-23-21 @ 07:01  -  11-24-21 @ 01:16  --------------------------------------------------------  IN:  Total IN: 0 mL    OUT:    Voided (mL): 1100 mL  Total OUT: 1100 mL    Total NET: -1100 mL          Physical Exam:  GEN: resting in bed comfortably in NAD  RESP: no increased WOB  ABD: mildly  distended, nontender to palpation, incisions c/d/i  EXTR: warm, well-perfused without gross deformities; spontaneous movement in b/l U/L extrem  NEURO: AAOx4    Labs:                        14.1   8.19  )-----------( 232      ( 24 Nov 2021 00:24 )             42.2   11-24    138  |  102  |  7   ----------------------------<  83  3.7   |  22  |  0.85    Ca    8.9      24 Nov 2021 00:24  Phos  2.6     11-24  Mg     2.2     11-24    TPro  6.8  /  Alb  3.7  /  TBili  0.4  /  DBili  x   /  AST  16  /  ALT  20  /  AlkPhos  80  11-24    CAPILLARY BLOOD GLUCOSE          Medications:   MEDICATIONS  (STANDING):  atorvastatin 40 milliGRAM(s) Oral at bedtime  chlorhexidine 2% Cloths 1 Application(s) Topical <User Schedule>  heparin  Infusion 2100 Unit(s)/Hr (21 mL/Hr) IV Continuous <Continuous>  nystatin Powder 1 Application(s) Topical two times a day  pantoprazole   Suspension 40 milliGRAM(s) Oral daily  potassium chloride   Powder 40 milliEquivalent(s) Oral once    MEDICATIONS  (PRN):  acetaminophen    Suspension .. 975 milliGRAM(s) Oral every 6 hours PRN Mild Pain (1 - 3)  aluminum hydroxide/magnesium hydroxide/simethicone Suspension 30 milliLiter(s) Oral every 6 hours PRN Dyspepsia  hydrocortisone 1% Cream 1 Application(s) Topical daily PRN Rash and/or Itching  hyoscyamine SL 0.125 milliGRAM(s) SubLingual every 6 hours PRN nausea  oxyCODONE    Solution 2.5 milliGRAM(s) Oral every 6 hours PRN Moderate Pain (4 - 6)  oxyCODONE    Solution 5 milliGRAM(s) Oral every 6 hours PRN Severe Pain (7 - 10)      Imaging:

## 2021-11-24 NOTE — DIETITIAN INITIAL EVALUATION ADULT. - ORAL INTAKE PTA/DIET HISTORY
Pt sleeping during multiple attempts to visit, spoke with wife at bedside. Pt known to this RD from recent admission last week for gastric sleeve, admitted 11/17-11/20. Wife reports pt with poor PO intake and appetite since discharge due to chest pain and muscle spasms. Reports pt with drinking < 1 Ensure Max protein shake per day and not much else. States pt became dehydrated. Denies pt with nausea or emesis, no swallowing/chewing issues noted.  NKFA. Vitamin D supplement at home.

## 2021-11-24 NOTE — DIETITIAN INITIAL EVALUATION ADULT. - CHIEF COMPLAINT
This is a "52 YO M POD 5 s/p gastric sleeve presenting with EPIFANIO and shortness of breath for two days. Patient states that he has had trouble remembering what he needs to do, and has been feeling dizzy/ Reports feeling as though he were about to fall when entering the hospital today. States that he has had poor PO intake, both food and fluids since d/c and is not up to par with what the post operative instruction dictate that he should be having. " CT showing PE, currently on heparin

## 2021-11-24 NOTE — DIETITIAN INITIAL EVALUATION ADULT. - ADD RECOMMEND
1) Continue bariatric clear liquid diet as tolerated. Team to advance to bariatric full liquid diet as able. 2) Continue to provided Ensure Max BID on trays. 3) Diet education provided to wife at bedside, reinforce with patient as able. 4) Upon discharge, recommend vitamin/mineral supplements as discussed above. 5) Encourage outpatient RD follow-up upon discharge.

## 2021-11-24 NOTE — PROGRESS NOTE ADULT - SUBJECTIVE AND OBJECTIVE BOX
HISTORY:  52 YO M POD 5 s/p gastric sleeve presenting with EPIFANIO and shortness of breath for two days. Patient states that he has had trouble remembering what he needs to do, and has been feeling dizzy/ Reports feeling as though he were about to fall when entering the hospital today. States that he has had poor PO intake, both food and fluids since d/c and is not up to par with what the post operative instruction dictate that he should be having. Denies flatus and states that he had a very small BM this AM. States that he has been feeling slightly feverish but has not actually checked his temperature.     24 HOUR EVENTS:  -PT developed acute non radiating chest pain located below left nipple + epigastric region no association w/ n/v    -- Serial ECG and Trop no sig abnormalities     NEURO  Exam: AxOx3 moving all extremities spontaneously full sensation over LE 5/5 strength   Meds: acetaminophen    Suspension .. 975 milliGRAM(s) Oral every 6 hours PRN Mild Pain (1 - 3)  oxyCODONE    Solution 2.5 milliGRAM(s) Oral every 6 hours PRN Moderate Pain (4 - 6)  oxyCODONE    Solution 5 milliGRAM(s) Oral every 6 hours PRN Severe Pain (7 - 10)      RESPIRATORY  RR: 21 (11-24-21 @ 00:00) (11 - 25)  SpO2: 99% (11-24-21 @ 00:00) (90% - 100%)  Wt(kg): --  Exam: CTA B/L  ABG - ( 24 Nov 2021 00:20 )  pH: 7.43  /  pCO2: 35    /  pO2: 104   / HCO3: 23    / Base Excess: -0.6  /  SaO2: 98.8    Lactate: x          CARDIOVASCULAR  HR: 92 (11-24-21 @ 00:00) (59 - 92)  BP: 150/98 (11-24-21 @ 00:00) (104/63 - 178/106)  BP(mean): 117 (11-24-21 @ 00:00) (78 - 137)  ABP: --  ABP(mean): --  Wt(kg): --  CVP(cm H2O): --  VBG - ( 23 Nov 2021 01:01 )  pH: 7.37  /  pCO2: 40    /  pO2: 35    / HCO3: 23    / Base Excess: -2.0  /  SaO2: 61.6   Lactate: 1.0     Exam:  RRR  Cardiac Rhythm:   Perfusion     [x ]Adequate   [ ]Inadequate  Mentation   [x ]Normal       [ ]Reduced  Extremities  [x ]Warm         [ ]Cool  Volume Status [ ]Hypervolemic [x ]Euvolemic [ ]Hypovolemic  Meds:     GI/NUTRITION  Exam: Nontender abdomen, no rebound no guarding   Diet: Clear liquid diet  Meds: aluminum hydroxide/magnesium hydroxide/simethicone Suspension 30 milliLiter(s) Oral every 6 hours PRN Dyspepsia  hyoscyamine SL 0.125 milliGRAM(s) SubLingual every 6 hours PRN nausea  pantoprazole   Suspension 40 milliGRAM(s) Oral daily      GENITOURINARY  I&O's Detail    11-22 @ 07:01  -  11-23 @ 07:00  --------------------------------------------------------  IN:    Heparin: 126 mL    Heparin Infusion: 21 mL    IV PiggyBack: 100 mL    Oral Fluid: 340 mL  Total IN: 587 mL    OUT:    Voided (mL): 525 mL  Total OUT: 525 mL    Total NET: 62 mL      11-23 @ 07:01  -  11-24 @ 01:07  --------------------------------------------------------  IN:    Heparin: 252 mL    IV PiggyBack: 424.5 mL  Total IN: 676.5 mL    OUT:    Voided (mL): 1100 mL  Total OUT: 1100 mL    Total NET: -423.5 mL          11-24    138  |  102  |  7   ----------------------------<  83  3.7   |  22  |  0.85    Ca    8.9      24 Nov 2021 00:24  Phos  2.6     11-24  Mg     2.2     11-24    TPro  6.8  /  Alb  3.7  /  TBili  0.4  /  DBili  x   /  AST  16  /  ALT  20  /  AlkPhos  80  11-24    HEMATOLOGIC  Meds: heparin  Infusion 2100 Unit(s)/Hr IV Continuous <Continuous>                          14.1   8.19  )-----------( 232      ( 24 Nov 2021 00:24 )             42.2     PT/INR - ( 24 Nov 2021 00:24 )   PT: 15.1 sec;   INR: 1.27 ratio         PTT - ( 24 Nov 2021 00:24 )  PTT:85.3 sec    INFECTIOUS DISEASES  T(C): 37.3 (11-23-21 @ 23:00), Max: 37.3 (11-23-21 @ 23:00)  Wt(kg): --  WBC Count: 8.19 K/uL (11-24 @ 00:24)  WBC Count: 6.76 K/uL (11-23 @ 20:43)  WBC Count: 7.67 K/uL (11-23 @ 11:10)  WBC Count: 8.93 K/uL (11-23 @ 01:12)    Recent Cultures:  Specimen Source: Clean Catch Clean Catch (Midstream), 11-22 @ 23:12; Results   No growth; Gram Stain: --; Organism: --  Specimen Source: .Blood Blood-Peripheral, 11-22 @ 18:27; Results   No growth to date.; Gram Stain: --; Organism: --    Med: Nystatin for groin rash suspected candida     ENDOCRINE  Capillary Blood Glucose    Meds: atorvastatin 40 milliGRAM(s) Oral at bedtime      ACCESS DEVICES:  [x ] Peripheral IV  [ ] Central Venous Line		[ ] R	[ ] L	[ ] IJ	[ ] Fem	[ ] SC	Placed:   [ ] Arterial Line			[ ] R	[ ] L	[ ] Fem	[ ] Rad	[ ] Ax	Placed:   [ ] PICC:					[ ] Mediport  [ ] Urinary Catheter, Date Placed:   [ ] Necessity of urinary, arterial, and venous catheters discussed    OTHER MEDICATIONS:  chlorhexidine 2% Cloths 1 Application(s) Topical <User Schedule>  hydrocortisone 1% Cream 1 Application(s) Topical daily PRN  nystatin Powder 1 Application(s) Topical two times a day      IMAGING:

## 2021-11-24 NOTE — PROGRESS NOTE ADULT - ASSESSMENT
Assessment:  1. Bilateral PE without right heart strain  --HD stable, on RA with SpO2 high 90s  --Negative cardiac biomarkers  --TTE with grossly normal LV systolic function.   --PESI risk index 53 points, Class I, Very Low Risk: 0-1.6% 30-day mortality in this group.   2. R peroneal and soleal vein DVT  --No evidence of phlegmasia on exam, report of numbness over right inner thigh, intact motion  3. Post-surgical state (gastric sleeve surgery 5 days prior to presentation), immobile, morbidly obese, male sex    Plan:  1. Continue heparin gtt  2. Per hematology, no indication for hypercoagulable workup  3. Will need to decide on optimal anticoagulation (Lovenox vs Coumadin), DOACs are less ideal post gastric sleeve as they are absorbed in stomach and small intestine)  4. Continue telemetry monitoring     Thank you    Vascular Cardiology Service  [place] - [id number]  Office [phone]  email:   [email or web address] Assessment:  1. Bilateral PE without right heart strain  --HD stable, on RA with SpO2 high 90s  --Negative cardiac biomarkers  --TTE with grossly normal LV systolic function.   --PESI risk index 53 points, Class I, Very Low Risk: 0-1.6% 30-day mortality in this group.   2. R peroneal and soleal vein DVT  --No evidence of phlegmasia on exam, report of numbness over right inner thigh, intact motion  3. Post-surgical state (gastric sleeve surgery 5 days prior to presentation), immobile, morbidly obese, male sex    Plan:  1. Continue heparin gtt for now, if stable in next few days, will consider transitioning to Lovenox, DOACs are less ideal post gastric sleeve as they are absorbed in stomach and small intestine  2. Will plan for hypercoagulable workup as outpatient  3. Continue telemetry monitoring     Thank you    Vascular Cardiology Service  [facility] - [id number]  Office [phone]  email:   [email or web address] Assessment:  1. Bilateral PE without right heart strain  --HD stable, on RA with SpO2 high 90s  --Negative cardiac biomarkers  --TTE with grossly normal LV systolic function.   --PESI risk index 53 points, Class I, Very Low Risk: 0-1.6% 30-day mortality in this group.   2. R peroneal and soleal vein DVT  --No evidence of phlegmasia on exam, report of numbness over right inner thigh, intact motion  3. Post-surgical state (gastric sleeve surgery 5 days prior to presentation), immobile, morbidly obese, male sex    Plan:  1. Continue heparin gtt for now, if stable in next few days, will consider transitioning to Lovenox, DOACs are less ideal post gastric sleeve as they are absorbed in stomach and small intestine  2. Will plan for hypercoagulable workup as outpatient  3. Continue telemetry monitoring     Thank you    Vascular Cardiology Service  [phone]  Office [phone]  email:   [email or web address]

## 2021-11-24 NOTE — PROGRESS NOTE ADULT - ASSESSMENT
51y M PMH HTN, HLD, obesity, sleep hypoxia (autopap machine), Hx of seizure 11/2018 (neuro work-up negative), s/p sleeve gastrectomy 11/17 presented with EPIFANIO and shortness of breath for two days, found to have bilateral pulmonary emboli. Patient started on heparin gtt, evaluated by Vascular Cardiology. SICU consulted for hemodynamic and respiratory monitoring.     Neuro: AOx4, Post-operative pain control  - IV Tylenol and Oxycodone Solution, hydromorphone PRN  - CTH negative for stroke  - Monitor for dizziness, mental status changes    Resp: bilateral pulmonary embolisms on CTA, likely 2/2 provoked DVT in setting recent surgery. Hx JEFFREY  - Continue heparin gtt  - Supplemental O2 as needed  - Nocturnal CPAP for JEFFREY    CV: hemodynamically stable  - EKG wnl  - 11/22 TTE: EF 60-65%, grossly normal LV and RV function   - f/u troponin/BNP  - Appreciate Vascular Cardiology recs     GI: s/p sleeve gastrectomy 11/17  - bariatric clear liquid diet  - Will follow-up with primary team regarding diet advancement  - Must crush pills or use liquid formulations of medications    /Renal:   - Trend Daily BMPs   - Monitor UOP    Heme: bilateral pulmonary embolism   - Continue hep gtt, titrate to goal aPTT 58-99  - Q6H PTT and CBC  - f/u bilateral lower extremity duplex (ordered)  - f/u hypercoagulable work-up    ID: Afebrile, WBC wnl  - s/p Vanc/Zosyn x1 in ED  - f/u BCx and UCx from ED  - Nystatin powder for suspected candida skin infection     Endo: A1c 5.7 (10/21/21)  - Trend glucose on BMPs    Dispo: SICU  Code Status: Full Code    [facility] SICU  Spectra [phone]

## 2021-11-24 NOTE — DIETITIAN INITIAL EVALUATION ADULT. - OTHER INFO
Weight: Per previous RD note pt had met with outpatient RD and weighed 260pounds (5/18/21), pre-surgical wt (H&P) was  noted as 268 pounds (11/4/21). Current dosing weight is ~253lbs (11/23/21).     Pt currently on Bariatric Clear liquid diet, consuming little, still with chest pain. No BM noted so far. Pt's wife amendable to review of bariatric full liquid diet: sugar free, caffeine free, no carbonated beverages, low fat, no straws, no chewing gum, 6 small meals/day gradually increasing volume, and sipping beverages slowly, no water during meals- drink water 1 hour before or after meals, meeting hydration and protein needs. Discussed vitamin/mineral supplement compliance as well. Written information provided.

## 2021-11-24 NOTE — DIETITIAN INITIAL EVALUATION ADULT. - PERTINENT LABORATORY DATA
11-24 @ 00:24: Na 138, BUN 7, Cr 0.85, BG 83, K+ 3.7, Phos 2.6, Mg 2.2, Alk Phos 80, ALT/SGPT 20, AST/SGOT 16

## 2021-11-24 NOTE — PROGRESS NOTE ADULT - SUBJECTIVE AND OBJECTIVE BOX
Vascular Cardiology  Progress note  DIRECT SERVICE NUMBER: [phone]            EMAIL [email or web address]   OFFICE [phone]    CC:  Dyspnea    INTERVAL HISTORY:  Continues to complain of acute sharp chest pain, unchanged from yesterday, possibly worse with positioning. Denies shortness of breath, noting some numbness and tingling over right inner thigh, no change in strength or edema, no pain.       Allergies    No Known Allergies    Intolerances    MEDICATIONS:  heparin  Infusion 2100 Unit(s)/Hr IV Continuous <Continuous>  acetaminophen    Suspension .. 975 milliGRAM(s) Oral every 6 hours PRN  oxyCODONE    Solution 2.5 milliGRAM(s) Oral every 6 hours PRN  oxyCODONE    Solution 5 milliGRAM(s) Oral every 6 hours PRN  aluminum hydroxide/magnesium hydroxide/simethicone Suspension 30 milliLiter(s) Oral every 6 hours PRN  hyoscyamine SL 0.125 milliGRAM(s) SubLingual every 6 hours PRN  pantoprazole   Suspension 40 milliGRAM(s) Oral daily  atorvastatin 40 milliGRAM(s) Oral at bedtime  chlorhexidine 2% Cloths 1 Application(s) Topical <User Schedule>  hydrocortisone 1% Cream 1 Application(s) Topical daily PRN  nystatin Powder 1 Application(s) Topical two times a day      PAST MEDICAL & SURGICAL HISTORY:  Dyslipidemia    Pericardial cyst  1993, resolved    High cholesterol    HTN (hypertension)    Morbid obesity due to excess calories    Status post hip surgery  R hip, repair of labrum, 2013    H/O hernia repair  2010 x2    H/O vasectomy  2007    S/P colonoscopy  2020  H/O gastric sleeve      FAMILY HISTORY:  Family history of CABG (Father)    Family history of prostate cancer (Father)    Family history of stroke (Sibling)      SOCIAL HISTORY:  unchanged    REVIEW OF SYSTEMS:  CONSTITUTIONAL: No fever, weight loss, or fatigue  EYES: No eye pain, visual disturbances, or discharge  ENMT:  No difficulty hearing, tinnitus, vertigo; No sinus or throat pain  NECK: No pain or stiffness  RESPIRATORY: No cough, wheezing, chills or hemoptysis; No Shortness of Breath  CARDIOVASCULAR: No palpitations, passing out, dizziness, or leg swelling; +chest pain  GASTROINTESTINAL: No abdominal or epigastric pain. No nausea, vomiting, or hematemesis; No diarrhea or constipation. No melena or hematochezia.  GENITOURINARY: No dysuria, frequency, hematuria, or incontinence  NEUROLOGICAL: No headaches, memory loss, loss of strength, numbness, or tremors  SKIN: No itching, burning, rashes, or lesions   LYMPH Nodes: No enlarged glands  ENDOCRINE: No heat or cold intolerance; No hair loss  MUSCULOSKELETAL: No joint pain or swelling; No muscle, back, or extremity pain  PSYCHIATRIC: No depression, anxiety, mood swings, or difficulty sleeping  HEME/LYMPH: No easy bruising, or bleeding gums  ALLERY AND IMMUNOLOGIC: No hives or eczema	    [ x] All others negative    PHYSICAL EXAM:  T(C): 37 (11-24-21 @ 10:00), Max: 37.3 (11-23-21 @ 23:00)  HR: 72 (11-24-21 @ 11:00) (57 - 92)  BP: 115/67 (11-24-21 @ 11:00) (104/63 - 150/98)  RR: 19 (11-24-21 @ 11:00) (11 - 25)  SpO2: 92% (11-24-21 @ 11:00) (91% - 100%)  Wt(kg): --  I&O's Summary    23 Nov 2021 07:01  -  24 Nov 2021 07:00  --------------------------------------------------------  IN: 823.5 mL / OUT: 1100 mL / NET: -276.5 mL    24 Nov 2021 07:01  -  24 Nov 2021 11:15  --------------------------------------------------------  IN: 264 mL / OUT: 400 mL / NET: -136 mL    Appearance: Normal	  HEENT:   Normal oral mucosa, EOMI	  Cardiovascular: Normal S1 S2, No murmurs, No edema  Respiratory: Lungs clear to auscultation  Psychiatry: A & O x 3, Mood & affect appropriate  Gastrointestinal:  Soft  Skin: No rashes, No ecchymoses, No cyanosis	  Neurologic: Non-focal  Extremities: Normal range of motion, No clubbing, cyanosis. Reported decreased sensation over right inner thigh  Vascular:   Right DP:  Palpable             Left DP:  Palpable    LABS:	 	    CBC Full  -  ( 24 Nov 2021 00:24 )  WBC Count : 8.19 K/uL  Hemoglobin : 14.1 g/dL  Hematocrit : 42.2 %  Platelet Count - Automated : 232 K/uL  Mean Cell Volume : 87.9 fl  Mean Cell Hemoglobin : 29.4 pg  Mean Cell Hemoglobin Concentration : 33.4 gm/dL  Auto Neutrophil # : x  Auto Lymphocyte # : x  Auto Monocyte # : x  Auto Eosinophil # : x  Auto Basophil # : x  Auto Neutrophil % : x  Auto Lymphocyte % : x  Auto Monocyte % : x  Auto Eosinophil % : x  Auto Basophil % : x    11-24    138  |  102  |  7   ----------------------------<  83  3.7   |  22  |  0.85  11-23    139  |  103  |  7   ----------------------------<  85  4.1   |  23  |  0.76    Ca    8.9      24 Nov 2021 00:24  Ca    8.6      23 Nov 2021 20:43  Phos  2.6     11-24  Phos  2.7     11-23  Mg     2.2     11-24  Mg     2.1     11-23    TPro  6.8  /  Alb  3.7  /  TBili  0.4  /  DBili  x   /  AST  16  /  ALT  20  /  AlkPhos  80  11-24  TPro  7.1  /  Alb  3.8  /  TBili  0.5  /  DBili  x   /  AST  17  /  ALT  26  /  AlkPhos  85  11-22    < from: CT Angio Chest PE Protocol w/ IV Cont (11.22.21 @ 18:16) >  IMPRESSION:  Bilateral pulmonary emboli involving the right lower lobar and segmental branches in the left main, left lower lobe lobar, and subsegmental branches.    Status post gastric sleeve. No evidence of collection or abscess in the surgical bed.  < end of copied text >    < from: Transthoracic Echocardiogram (11.22.21 @ 19:14) >  Conclusions:  Technically difficult study.  1. Normal mitral valve. Minimal mitral regurgitation.  2. Aortic valve not well visualized; probably normal.  3. Normal left ventricular systolic function. No segmental  wall motion abnormalities.  4. The right ventricle is not well visualized; grossly  normal right ventricular systolic function. TAPSE 2.93 cm.  Limited endocardial definition limits assessment of strain  imaging.  < end of copied text >

## 2021-11-24 NOTE — CHART NOTE - NSCHARTNOTEFT_GEN_A_CORE
51 M with history of HTN, HLD, obesity, JEFFREY (on CPAP) s/p laparoscopic sleeve gastrectomy on 11/17 presenting POD#5 with SOB, found to have bilateral PE. Currently on heparin gtt. PE considered provoked in the setting of recent surgery and obesity, as such no hypercoagulable workup indicated at this time. Can discharge patient on a DOAC (as covered by insurance) for three months, as long as patient with adequate hemostasis/ no significant bleed.         ****************************************  Mundo Malhotra PGY4  Hematology/Oncology   [phone]  ****************************************

## 2021-11-24 NOTE — DIETITIAN INITIAL EVALUATION ADULT. - PHYSCIAL ASSESSMENT
skin: free of pressure injuries per nursing flow sheets, noted with surgical incision to abdomen, rash obese

## 2021-11-24 NOTE — PROGRESS NOTE ADULT - ASSESSMENT
53 YO M POD 5 s/p gastric sleeve presenting with SOB, dizziness, CT showing PE    Plan:  hep gtt f/u ptt levels  CLD bariatric   echo without heart strain  f/u cardiology consult  f/u AM labs  c/w excellent SICU care      Green Team Surgery   Pager [phone] 53 YO M POD 5 s/p gastric sleeve presenting with SOB, dizziness, CT showing PE    Plan:  hep gtt f/u ptt levels  CLD bariatric   echo without heart strain  f/u cardiology consult  f/u hematology consult for hypercoagulable  status and po AC choice  f/u AM labs  c/w excellent SICU care      [facility] Team Surgery   Pager [phone]

## 2021-11-25 LAB
APPEARANCE UR: CLEAR — SIGNIFICANT CHANGE UP
BACTERIA # UR AUTO: NEGATIVE — SIGNIFICANT CHANGE UP
BILIRUB UR-MCNC: NEGATIVE — SIGNIFICANT CHANGE UP
COLOR SPEC: YELLOW — SIGNIFICANT CHANGE UP
DIFF PNL FLD: NEGATIVE — SIGNIFICANT CHANGE UP
EPI CELLS # UR: 0 /HPF — SIGNIFICANT CHANGE UP
GLUCOSE UR QL: NEGATIVE — SIGNIFICANT CHANGE UP
HYALINE CASTS # UR AUTO: 0 /LPF — SIGNIFICANT CHANGE UP (ref 0–2)
KETONES UR-MCNC: ABNORMAL
LEUKOCYTE ESTERASE UR-ACNC: NEGATIVE — SIGNIFICANT CHANGE UP
NITRITE UR-MCNC: NEGATIVE — SIGNIFICANT CHANGE UP
PH UR: 6 — SIGNIFICANT CHANGE UP (ref 5–8)
PROT S FREE PPP-ACNC: 58 % — LOW (ref 63–140)
PROT UR-MCNC: SIGNIFICANT CHANGE UP
RBC CASTS # UR COMP ASSIST: 1 /HPF — SIGNIFICANT CHANGE UP (ref 0–4)
SP GR SPEC: 1.02 — SIGNIFICANT CHANGE UP (ref 1.01–1.02)
UROBILINOGEN FLD QL: NEGATIVE — SIGNIFICANT CHANGE UP
WBC UR QL: 1 /HPF — SIGNIFICANT CHANGE UP (ref 0–5)

## 2021-11-25 PROCEDURE — 99232 SBSQ HOSP IP/OBS MODERATE 35: CPT

## 2021-11-25 PROCEDURE — 93010 ELECTROCARDIOGRAM REPORT: CPT

## 2021-11-25 RX ORDER — ENOXAPARIN SODIUM 100 MG/ML
115 INJECTION SUBCUTANEOUS EVERY 12 HOURS
Refills: 0 | Status: DISCONTINUED | OUTPATIENT
Start: 2021-11-25 | End: 2021-11-26

## 2021-11-25 RX ORDER — HYDROMORPHONE HYDROCHLORIDE 2 MG/ML
0.5 INJECTION INTRAMUSCULAR; INTRAVENOUS; SUBCUTANEOUS ONCE
Refills: 0 | Status: DISCONTINUED | OUTPATIENT
Start: 2021-11-25 | End: 2021-11-25

## 2021-11-25 RX ORDER — SODIUM CHLORIDE 9 MG/ML
1000 INJECTION, SOLUTION INTRAVENOUS
Refills: 0 | Status: DISCONTINUED | OUTPATIENT
Start: 2021-11-25 | End: 2021-11-25

## 2021-11-25 RX ADMIN — Medication 975 MILLIGRAM(S): at 20:27

## 2021-11-25 RX ADMIN — OXYCODONE HYDROCHLORIDE 5 MILLIGRAM(S): 5 TABLET ORAL at 06:20

## 2021-11-25 RX ADMIN — SODIUM CHLORIDE 30 MILLILITER(S): 9 INJECTION, SOLUTION INTRAVENOUS at 06:22

## 2021-11-25 RX ADMIN — OXYCODONE HYDROCHLORIDE 5 MILLIGRAM(S): 5 TABLET ORAL at 05:48

## 2021-11-25 RX ADMIN — PANTOPRAZOLE SODIUM 40 MILLIGRAM(S): 20 TABLET, DELAYED RELEASE ORAL at 11:08

## 2021-11-25 RX ADMIN — POTASSIUM PHOSPHATE, MONOBASIC POTASSIUM PHOSPHATE, DIBASIC 62.5 MILLIMOLE(S): 236; 224 INJECTION, SOLUTION INTRAVENOUS at 01:24

## 2021-11-25 RX ADMIN — ATORVASTATIN CALCIUM 40 MILLIGRAM(S): 80 TABLET, FILM COATED ORAL at 21:25

## 2021-11-25 RX ADMIN — Medication 975 MILLIGRAM(S): at 19:57

## 2021-11-25 RX ADMIN — HYDROMORPHONE HYDROCHLORIDE 0.5 MILLIGRAM(S): 2 INJECTION INTRAMUSCULAR; INTRAVENOUS; SUBCUTANEOUS at 23:15

## 2021-11-25 RX ADMIN — CHLORHEXIDINE GLUCONATE 1 APPLICATION(S): 213 SOLUTION TOPICAL at 05:20

## 2021-11-25 RX ADMIN — ENOXAPARIN SODIUM 115 MILLIGRAM(S): 100 INJECTION SUBCUTANEOUS at 23:10

## 2021-11-25 RX ADMIN — NYSTATIN CREAM 1 APPLICATION(S): 100000 CREAM TOPICAL at 05:21

## 2021-11-25 RX ADMIN — FLUCONAZOLE 100 MILLIGRAM(S): 150 TABLET ORAL at 19:44

## 2021-11-25 RX ADMIN — OXYCODONE HYDROCHLORIDE 2.5 MILLIGRAM(S): 5 TABLET ORAL at 07:20

## 2021-11-25 RX ADMIN — Medication 30 MILLILITER(S): at 05:51

## 2021-11-25 RX ADMIN — NYSTATIN CREAM 1 APPLICATION(S): 100000 CREAM TOPICAL at 16:25

## 2021-11-25 RX ADMIN — HYDROMORPHONE HYDROCHLORIDE 0.5 MILLIGRAM(S): 2 INJECTION INTRAMUSCULAR; INTRAVENOUS; SUBCUTANEOUS at 22:46

## 2021-11-25 RX ADMIN — ENOXAPARIN SODIUM 115 MILLIGRAM(S): 100 INJECTION SUBCUTANEOUS at 11:07

## 2021-11-25 RX ADMIN — OXYCODONE HYDROCHLORIDE 2.5 MILLIGRAM(S): 5 TABLET ORAL at 07:50

## 2021-11-25 RX ADMIN — Medication 1 APPLICATION(S): at 06:57

## 2021-11-25 RX ADMIN — OXYCODONE HYDROCHLORIDE 5 MILLIGRAM(S): 5 TABLET ORAL at 18:26

## 2021-11-25 NOTE — PROGRESS NOTE ADULT - ASSESSMENT
53 YO M POD 6 s/p gastric sleeve presenting with SOB, dizziness, CT showing PE    Plan:  hep gtt f/u ptt levels  CLD bariatric   echo without heart strain  f/u cardiology consult  f/u hematology consult for hypercoagulable  status and po AC choice  f/u AM labs  c/w excellent SICU care      Green Team Surgery   Pager [phone]

## 2021-11-25 NOTE — PROGRESS NOTE ADULT - SUBJECTIVE AND OBJECTIVE BOX
HISTORY  51y Male POD 5 s/p gastric sleeve presenting with EPIFANIO and shortness of breath for two days. Patient states that he has had trouble remembering what he needs to do, and has been feeling dizzy/ Reports feeling as though he were about to fall when entering the hospital today. States that he has had poor PO intake, both food and fluids since d/c and is not up to par with what the post operative instruction dictate that he should be having. Denies flatus and states that he had a very small BM this AM. States that he has been feeling slightly feverish but has not actually checked his temperature.     24 HOUR EVENTS:  - Continued substernal Chest pain radiating to left shoulder, ekg wnl, negative card enzymes  - C/o worsening R thigh numbness, RLE Dopp neg    SUBJECTIVE/ROS:  [ ] A ten-point review of systems was otherwise negative except as noted.  [ ] Due to altered mental status/intubation, subjective information were not able to be obtained from the patient. History was obtained, to the extent possible, from review of the chart and collateral sources of information.      NEURO  Exam: awake, alert, oriented  Meds: acetaminophen    Suspension .. 975 milliGRAM(s) Oral every 6 hours PRN Mild Pain (1 - 3)  oxyCODONE    Solution 2.5 milliGRAM(s) Oral every 6 hours PRN Moderate Pain (4 - 6)  oxyCODONE    Solution 5 milliGRAM(s) Oral every 6 hours PRN Severe Pain (7 - 10)    [x] Adequacy of sedation and pain control has been assessed and adjusted      RESPIRATORY  RR: 15 (11-25-21 @ 00:00) (13 - 30)  SpO2: 92% (11-25-21 @ 00:00) (91% - 100%)  Exam: unlabored, clear to auscultation bilaterally  Mechanical Ventilation: none   ABG - ( 24 Nov 2021 00:20 )  pH: 7.43  /  pCO2: 35    /  pO2: 104   / HCO3: 23    / Base Excess: -0.6  /  SaO2: 98.8    Lactate: x      [N/A] Extubation Readiness Assessed  Meds: none       CARDIOVASCULAR  HR: 59 (11-25-21 @ 00:00) (57 - 83)  BP: 132/80 (11-24-21 @ 23:00) (108/53 - 160/82)  BP(mean): 100 (11-24-21 @ 23:00) (77 - 114)  VBG - ( 23 Nov 2021 01:01 )  pH: 7.37  /  pCO2: 40    /  pO2: 35    / HCO3: 23    / Base Excess: -2.0  /  SaO2: 61.6   Lactate: 1.0    Exam: regular rate and rhythm  Cardiac Rhythm: sinus  Perfusion     [x]Adequate   [ ]Inadequate  Mentation   [x]Normal       [ ]Reduced  Extremities  [x]Warm         [ ]Cool  Volume Status [ ]Hypervolemic [x]Euvolemic [ ]Hypovolemic  Meds: none       GI/NUTRITION  Exam: soft, nontender, nondistended  Diet: bariatric CLD   Meds: aluminum hydroxide/magnesium hydroxide/simethicone Suspension 30 milliLiter(s) Oral every 6 hours PRN Dyspepsia  hyoscyamine SL 0.125 milliGRAM(s) SubLingual every 6 hours PRN nausea  pantoprazole   Suspension 40 milliGRAM(s) Oral daily      GENITOURINARY  I&O's Detail    11-23 @ 07:01  -  11-24 @ 07:00  --------------------------------------------------------  IN:    Heparin: 399 mL    IV PiggyBack: 424.5 mL  Total IN: 823.5 mL    OUT:    Voided (mL): 1100 mL  Total OUT: 1100 mL    Total NET: -276.5 mL      11-24 @ 07:01  -  11-25 @ 00:50  --------------------------------------------------------  IN:    Heparin: 336 mL    IV PiggyBack: 400 mL    Oral Fluid: 340 mL  Total IN: 1076 mL    OUT:    Voided (mL): 1260 mL  Total OUT: 1260 mL    Total NET: -184 mL          11-24    139  |  104  |  8   ----------------------------<  84  3.8   |  19<L>  |  0.74    Ca    8.7      24 Nov 2021 22:30  Phos  2.4     11-24  Mg     2.0     11-24    TPro  6.8  /  Alb  3.7  /  TBili  0.4  /  DBili  x   /  AST  16  /  ALT  20  /  AlkPhos  80  11-24    [ ] Mota catheter, indication: N/A  Meds: potassium phosphate IVPB 15 milliMole(s) IV Intermittent once        HEMATOLOGIC  Meds: heparin  Infusion 2100 Unit(s)/Hr IV Continuous <Continuous>    [x] VTE Prophylaxis                        14.0   6.79  )-----------( 226      ( 24 Nov 2021 22:30 )             40.8     PT/INR - ( 24 Nov 2021 22:30 )   PT: 15.2 sec;   INR: 1.28 ratio         PTT - ( 24 Nov 2021 22:30 )  PTT:77.6 sec  Transfusion     [ ] PRBC   [ ] Platelets   [ ] FFP   [ ] Cryoprecipitate      INFECTIOUS DISEASES  WBC Count: 6.79 K/uL (11-24 @ 22:30)    RECENT CULTURES:  Specimen Source: Clean Catch Clean Catch (Midstream)  Date/Time: 11-22 @ 23:12  Culture Results:   No growth  Gram Stain: --  Organism: --  Specimen Source: .Blood Blood-Peripheral  Date/Time: 11-22 @ 18:27  Culture Results:   No growth to date.  Gram Stain: --  Organism: --    Meds: fluconAZOLE IVPB 400 milliGRAM(s) IV Intermittent every 24 hours        ENDOCRINE  CAPILLARY BLOOD GLUCOSE        Meds: atorvastatin 40 milliGRAM(s) Oral at bedtime        ACCESS DEVICES:  [x] Peripheral IV  [ ] Central Venous Line	[ ] R	[ ] L	[ ] IJ	[ ] Fem	[ ] SC	Placed:   [ ] Arterial Line		[ ] R	[ ] L	[ ] Fem	[ ] Rad	[ ] Ax	Placed:   [ ] PICC:					[ ] Mediport  [ ] Urinary Catheter, Date Placed:   [x] Necessity of urinary, arterial, and venous catheters discussed    OTHER MEDICATIONS:  chlorhexidine 2% Cloths 1 Application(s) Topical <User Schedule>  hydrocortisone 1% Cream 1 Application(s) Topical daily PRN  nystatin Powder 1 Application(s) Topical two times a day      CODE STATUS: full code       IMAGING: < from: CT Angio Chest PE Protocol w/ IV Cont (11.22.21 @ 18:16) >  IMPRESSION:  Bilateral pulmonary emboli involving the right lower lobar and segmental branches in the left main, left lower lobe lobar, and subsegmental branches.    Status post gastric sleeve. No evidence of collection or abscess in the surgical bed.    < end of copied text >

## 2021-11-25 NOTE — PROGRESS NOTE ADULT - SUBJECTIVE AND OBJECTIVE BOX
Green Team Surgery Daily Progress Note    Subjective:   Patient seen at bedside this AM. Denies acute onset abdominal pain, N/V/D, SOB, CP, lightheadedness. Tolerating CLD    24h Events:   - Overnight, no acute events    Objective:  Vital Signs  T(C): 36.9 (11-24 @ 23:00), Max: 37.5 (11-24 @ 19:00)  HR: 56 (11-25 @ 02:00) (56 - 83)  BP: 126/79 (11-25 @ 02:00) (108/53 - 160/82)  RR: 12 (11-25 @ 02:00) (12 - 30)  SpO2: 93% (11-25 @ 02:00) (91% - 100%)  11-23-21 @ 07:01  -  11-24-21 @ 07:00  --------------------------------------------------------  IN:  Total IN: 0 mL    OUT:    Voided (mL): 1100 mL  Total OUT: 1100 mL    Total NET: -1100 mL      11-24-21 @ 07:01  -  11-25-21 @ 02:25  --------------------------------------------------------  IN:  Total IN: 0 mL    OUT:    Voided (mL): 1260 mL  Total OUT: 1260 mL    Total NET: -1260 mL          Physical Exam:  GEN: resting in bed comfortably in NAD  RESP: no increased WOB  ABD: mildly  distended, nontender to palpation, incisions c/d/i  EXTR: warm, well-perfused without gross deformities; spontaneous movement in b/l U/L extrem  NEURO: AAOx4    Labs:                        14.0   6.79  )-----------( 226      ( 24 Nov 2021 22:30 )             40.8   11-24    139  |  104  |  8   ----------------------------<  84  3.8   |  19<L>  |  0.74    Ca    8.7      24 Nov 2021 22:30  Phos  2.4     11-24  Mg     2.0     11-24    TPro  6.8  /  Alb  3.7  /  TBili  0.4  /  DBili  x   /  AST  16  /  ALT  20  /  AlkPhos  80  11-24    CAPILLARY BLOOD GLUCOSE          Medications:   MEDICATIONS  (STANDING):  atorvastatin 40 milliGRAM(s) Oral at bedtime  chlorhexidine 2% Cloths 1 Application(s) Topical <User Schedule>  fluconAZOLE IVPB 400 milliGRAM(s) IV Intermittent every 24 hours  heparin  Infusion 2100 Unit(s)/Hr (21 mL/Hr) IV Continuous <Continuous>  nystatin Powder 1 Application(s) Topical two times a day  pantoprazole   Suspension 40 milliGRAM(s) Oral daily    MEDICATIONS  (PRN):  acetaminophen    Suspension .. 975 milliGRAM(s) Oral every 6 hours PRN Mild Pain (1 - 3)  aluminum hydroxide/magnesium hydroxide/simethicone Suspension 30 milliLiter(s) Oral every 6 hours PRN Dyspepsia  hydrocortisone 1% Cream 1 Application(s) Topical daily PRN Rash and/or Itching  hyoscyamine SL 0.125 milliGRAM(s) SubLingual every 6 hours PRN nausea  oxyCODONE    Solution 2.5 milliGRAM(s) Oral every 6 hours PRN Moderate Pain (4 - 6)  oxyCODONE    Solution 5 milliGRAM(s) Oral every 6 hours PRN Severe Pain (7 - 10)      Imaging:

## 2021-11-25 NOTE — PROGRESS NOTE ADULT - ASSESSMENT
51y M PMH HTN, HLD, obesity, sleep hypoxia (autopap machine), Hx of seizure 11/2018 (neuro work-up negative), s/p sleeve gastrectomy 11/17 presented with EPIFANIO and shortness of breath for two days, found to have bilateral pulmonary emboli. Patient started on heparin gtt, evaluated by Vascular Cardiology. SICU consulted for hemodynamic and respiratory monitoring.      Neuro: AOx4, Post-operative pain control  - IV Tylenol and Oxycodone Solution PRN  - CTH negative for stroke  - Monitor for dizziness, mental status changes    Resp: bilateral pulmonary embolisms on CTA, likely 2/2 provoked DVT in setting recent surgery. Hx JEFFREY  - Continue heparin gtt  - Supplemental O2 as needed  - Nocturnal CPAP for JEFFREY    CV: hemodynamically stable  - EKG wnl  - 11/22 TTE: EF 60-65%, grossly normal LV and RV function   - f/u troponin/BNP  - B/l dopplers with RIGHT soleal and peroneal DVTs  - Vascular Cards following - will need lovenox v coumadin going forward, continue with hep gtt for now    GI: s/p sleeve gastrectomy 11/17  - bariatric clear liquid diet  - Will follow-up with primary team regarding diet advancement  - Must crush pills or use liquid formulations of medications    /Renal:   - Trend Daily BMPs   - Monitor UOP    Heme: bilateral pulmonary embolism   - Continue hep gtt, titrate to goal aPTT 58-99  - Q6H PTT and CBC  - DVT R. peroneal + soleal below knee  - Coag labs WNL. No further hypercoagulable work-up indicated, per heme  -Will need to decide on optimal anticoagulation (Lovenox vs Coumadin), DOACs are less ideal post gastric sleeve as they are absorbed in stomach and small intestine)      ID: Afebrile, WBC wnl  - s/p Vanc/Zosyn x1 in ED  - f/u BCx and UCx from ED  - Nystatin powder for suspected candida skin infection     Endo: A1c 5.7 (10/21/21)  - Trend glucose on BMPs    Dispo: SICU  Code Status: Full Code

## 2021-11-25 NOTE — PROVIDER CONTACT NOTE (OTHER) - BACKGROUND
51y Male POD 5 s/p gastric sleeve presenting with EPIFANIO and shortness of breath for two days. CTA with bilateral pulmonary emboli, pt started on Heparin gtt.

## 2021-11-25 NOTE — PROVIDER CONTACT NOTE (OTHER) - ACTION/TREATMENT ORDERED:
As per LA Sexton, no need for straight cath at this time. Allow pt to continue to rest, start maintenance fluids @ 30cc/hr. Once pt is more awake, allow pt to void on his own. Continue to monitor.

## 2021-11-25 NOTE — PROVIDER CONTACT NOTE (OTHER) - ASSESSMENT
Pt on Heparin gtt, Vital signs stable, no SOB and denies pain.  Urine output 50 cc for shift, bladder scan revealed 275 in bladder. Pt has poor PO intake due to epigastric pain.

## 2021-11-25 NOTE — CHART NOTE - NSCHARTNOTEFT_GEN_A_CORE
SICU Transfer Note    Transfer from: SICU  Transfer to: Green Team Surgery [facility]  Accepting physician: Dr. Rodriguez      SICU COURSE: 51y Male  s/p gastric sleeve 11/17 presenting with EPIFANIO and shortness of breath for two days on 11/23. Patient states that he has had trouble remembering what he needs to do, and has been feeling dizzy/ Reports feeling as though he were about to fall when entering the hospital today. States that he has had poor PO intake, both food and fluids since d/c and is not up to par with what the post operative instruction dictate that he should be having. CTA showed bilateral PE involving Right lower lobar and segmental branches in the left main , left lower lobar and subsegmental branches. Patient was admitted to SICU hemodynamically stable for close monitoring and VTE treatment. Patient started on hep gtt, cardiac workup negative. On hospital SICU day #1 patient developed acute non radiating chest pain located below left nipple and epigastric region with serial EKG and trop levels showing no abnormalities. On hospital SICU day #2 patient continued substernal chest pain radiating to left shoulder, EKG wnl, negative card enzymes. New complaint of worsening right thigh numbness, RLE doppler negative. Patient deemed stable enough to be             ASSESSMENT & PLAN:                 Vital Signs Last 24 Hrs  T(C): 36.4 (25 Nov 2021 16:56), Max: 36.9 (24 Nov 2021 23:00)  T(F): 97.5 (25 Nov 2021 16:56), Max: 98.4 (24 Nov 2021 23:00)  HR: 67 (25 Nov 2021 16:56) (51 - 82)  BP: 123/81 (25 Nov 2021 16:56) (111/71 - 160/82)  BP(mean): 95 (25 Nov 2021 16:00) (74 - 116)  RR: 16 (25 Nov 2021 16:56) (11 - 30)  SpO2: 96% (25 Nov 2021 16:56) (91% - 97%)  I&O's Summary    24 Nov 2021 07:01  -  25 Nov 2021 07:00  --------------------------------------------------------  IN: 1584 mL / OUT: 1660 mL / NET: -76 mL    25 Nov 2021 07:01  -  25 Nov 2021 19:18  --------------------------------------------------------  IN: 202 mL / OUT: 950 mL / NET: -748 mL          MEDICATIONS  (STANDING):  atorvastatin 40 milliGRAM(s) Oral at bedtime  chlorhexidine 2% Cloths 1 Application(s) Topical <User Schedule>  enoxaparin Injectable 115 milliGRAM(s) SubCutaneous every 12 hours  fluconAZOLE IVPB 400 milliGRAM(s) IV Intermittent every 24 hours  nystatin Powder 1 Application(s) Topical two times a day  pantoprazole   Suspension 40 milliGRAM(s) Oral daily    MEDICATIONS  (PRN):  acetaminophen    Suspension .. 975 milliGRAM(s) Oral every 6 hours PRN Mild Pain (1 - 3)  aluminum hydroxide/magnesium hydroxide/simethicone Suspension 30 milliLiter(s) Oral every 6 hours PRN Dyspepsia  hydrocortisone 1% Cream 1 Application(s) Topical daily PRN Rash and/or Itching  hyoscyamine SL 0.125 milliGRAM(s) SubLingual every 6 hours PRN nausea  oxyCODONE    Solution 2.5 milliGRAM(s) Oral every 6 hours PRN Moderate Pain (4 - 6)  oxyCODONE    Solution 5 milliGRAM(s) Oral every 6 hours PRN Severe Pain (7 - 10)        LABS                                            14.0                  Neurophils% (auto):   x      (11-24 @ 22:30):    6.79 )-----------(226          Lymphocytes% (auto):  x                                             40.8                   Eosinphils% (auto):   x        Manual%: Neutrophils x    ; Lymphocytes x    ; Eosinophils x    ; Bands%: x    ; Blasts x                                    139    |  104    |  8                   Calcium: 8.7   / iCa: x      (11-24 @ 22:30)    ----------------------------<  84        Magnesium: 2.0                              3.8     |  19     |  0.74             Phosphorous: 2.4        ( 11-24 @ 22:30 )   PT: 15.2 sec;   INR: 1.28 ratio  aPTT: 77.6 sec SICU Transfer Note    Transfer from: SICU  Transfer to: [facility]  Accepting physician: Dr. Rodriguez      SICU COURSE: 51y Male  s/p gastric sleeve 11/17 presenting with EPIFANIO and shortness of breath for two days on 11/23. Patient states that he has had trouble remembering what he needs to do, and has been feeling dizzy/ Reports feeling as though he were about to fall when entering the hospital today. States that he has had poor PO intake, both food and fluids since d/c and is not up to par with what the post operative instruction dictate that he should be having. CTA showed bilateral PE involving Right lower lobar and segmental branches in the left main , left lower lobar and subsegmental branches. Patient was admitted to SICU hemodynamically stable for close monitoring and VTE treatment. Patient started on hep gtt, cardiac workup negative. On hospital SICU day #1 patient developed acute non radiating chest pain located below left nipple and epigastric region with serial EKG and trop levels showing no abnormalities. On hospital SICU day #2 patient continued substernal chest pain radiating to left shoulder, EKG wnl, negative card enzymes. New complaint of worsening right thigh numbness, RLE doppler negative. Patient deemed stable enough to be transferred to the floor on 11/25 in the PM. Patient complained of mildly edematous scrotum and penis; groins indurated and raw. Patient HDS            ASSESSMENT & PLAN:     Cont anticoagulation, will transition to lovenox  Appreciate heme consult  Would get urology consult in setting of  complaints  Cont nystatin/diflucan  Encourage ambulation  Yohannes clears              Vital Signs Last 24 Hrs  T(C): 36.4 (25 Nov 2021 16:56), Max: 36.9 (24 Nov 2021 23:00)  T(F): 97.5 (25 Nov 2021 16:56), Max: 98.4 (24 Nov 2021 23:00)  HR: 67 (25 Nov 2021 16:56) (51 - 82)  BP: 123/81 (25 Nov 2021 16:56) (111/71 - 160/82)  BP(mean): 95 (25 Nov 2021 16:00) (74 - 116)  RR: 16 (25 Nov 2021 16:56) (11 - 30)  SpO2: 96% (25 Nov 2021 16:56) (91% - 97%)  I&O's Summary    24 Nov 2021 07:01 - 25 Nov 2021 07:00  --------------------------------------------------------  IN: 1584 mL / OUT: 1660 mL / NET: -76 mL    25 Nov 2021 07:01  -  25 Nov 2021 19:18  --------------------------------------------------------  IN: 202 mL / OUT: 950 mL / NET: -748 mL          MEDICATIONS  (STANDING):  atorvastatin 40 milliGRAM(s) Oral at bedtime  chlorhexidine 2% Cloths 1 Application(s) Topical <User Schedule>  enoxaparin Injectable 115 milliGRAM(s) SubCutaneous every 12 hours  fluconAZOLE IVPB 400 milliGRAM(s) IV Intermittent every 24 hours  nystatin Powder 1 Application(s) Topical two times a day  pantoprazole   Suspension 40 milliGRAM(s) Oral daily    MEDICATIONS  (PRN):  acetaminophen    Suspension .. 975 milliGRAM(s) Oral every 6 hours PRN Mild Pain (1 - 3)  aluminum hydroxide/magnesium hydroxide/simethicone Suspension 30 milliLiter(s) Oral every 6 hours PRN Dyspepsia  hydrocortisone 1% Cream 1 Application(s) Topical daily PRN Rash and/or Itching  hyoscyamine SL 0.125 milliGRAM(s) SubLingual every 6 hours PRN nausea  oxyCODONE    Solution 2.5 milliGRAM(s) Oral every 6 hours PRN Moderate Pain (4 - 6)  oxyCODONE    Solution 5 milliGRAM(s) Oral every 6 hours PRN Severe Pain (7 - 10)        LABS                                            14.0                  Neurophils% (auto):   x      (11-24 @ 22:30):    6.79 )-----------(226          Lymphocytes% (auto):  x                                             40.8                   Eosinphils% (auto):   x        Manual%: Neutrophils x    ; Lymphocytes x    ; Eosinophils x    ; Bands%: x    ; Blasts x                                    139    |  104    |  8                   Calcium: 8.7   / iCa: x      (11-24 @ 22:30)    ----------------------------<  84        Magnesium: 2.0                              3.8     |  19     |  0.74             Phosphorous: 2.4        ( 11-24 @ 22:30 )   PT: 15.2 sec;   INR: 1.28 ratio  aPTT: 77.6 sec

## 2021-11-26 ENCOUNTER — TRANSCRIPTION ENCOUNTER (OUTPATIENT)
Age: 51
End: 2021-11-26

## 2021-11-26 VITALS
OXYGEN SATURATION: 94 % | HEART RATE: 71 BPM | RESPIRATION RATE: 18 BRPM | DIASTOLIC BLOOD PRESSURE: 69 MMHG | TEMPERATURE: 99 F | SYSTOLIC BLOOD PRESSURE: 125 MMHG

## 2021-11-26 LAB
ANION GAP SERPL CALC-SCNC: 15 MMOL/L — SIGNIFICANT CHANGE UP (ref 5–17)
BUN SERPL-MCNC: 7 MG/DL — SIGNIFICANT CHANGE UP (ref 7–23)
CALCIUM SERPL-MCNC: 9.2 MG/DL — SIGNIFICANT CHANGE UP (ref 8.4–10.5)
CHLORIDE SERPL-SCNC: 102 MMOL/L — SIGNIFICANT CHANGE UP (ref 96–108)
CO2 SERPL-SCNC: 21 MMOL/L — LOW (ref 22–31)
CREAT SERPL-MCNC: 0.78 MG/DL — SIGNIFICANT CHANGE UP (ref 0.5–1.3)
GLUCOSE SERPL-MCNC: 76 MG/DL — SIGNIFICANT CHANGE UP (ref 70–99)
HCT VFR BLD CALC: 43.9 % — SIGNIFICANT CHANGE UP (ref 39–50)
HGB BLD-MCNC: 14.2 G/DL — SIGNIFICANT CHANGE UP (ref 13–17)
INR BLD: 1.24 RATIO — HIGH (ref 0.88–1.16)
MAGNESIUM SERPL-MCNC: 2 MG/DL — SIGNIFICANT CHANGE UP (ref 1.6–2.6)
MCHC RBC-ENTMCNC: 29.2 PG — SIGNIFICANT CHANGE UP (ref 27–34)
MCHC RBC-ENTMCNC: 32.3 GM/DL — SIGNIFICANT CHANGE UP (ref 32–36)
MCV RBC AUTO: 90.1 FL — SIGNIFICANT CHANGE UP (ref 80–100)
NRBC # BLD: 0 /100 WBCS — SIGNIFICANT CHANGE UP (ref 0–0)
PHOSPHATE SERPL-MCNC: 2.8 MG/DL — SIGNIFICANT CHANGE UP (ref 2.5–4.5)
PLATELET # BLD AUTO: 165 K/UL — SIGNIFICANT CHANGE UP (ref 150–400)
POTASSIUM SERPL-MCNC: 4 MMOL/L — SIGNIFICANT CHANGE UP (ref 3.5–5.3)
POTASSIUM SERPL-SCNC: 4 MMOL/L — SIGNIFICANT CHANGE UP (ref 3.5–5.3)
PROTHROM AB SERPL-ACNC: 14.7 SEC — HIGH (ref 10.6–13.6)
RBC # BLD: 4.87 M/UL — SIGNIFICANT CHANGE UP (ref 4.2–5.8)
RBC # FLD: 12.3 % — SIGNIFICANT CHANGE UP (ref 10.3–14.5)
SODIUM SERPL-SCNC: 138 MMOL/L — SIGNIFICANT CHANGE UP (ref 135–145)
WBC # BLD: 4.48 K/UL — SIGNIFICANT CHANGE UP (ref 3.8–10.5)
WBC # FLD AUTO: 4.48 K/UL — SIGNIFICANT CHANGE UP (ref 3.8–10.5)

## 2021-11-26 PROCEDURE — 85014 HEMATOCRIT: CPT

## 2021-11-26 PROCEDURE — 83690 ASSAY OF LIPASE: CPT

## 2021-11-26 PROCEDURE — 85306 CLOT INHIBIT PROT S FREE: CPT

## 2021-11-26 PROCEDURE — 82553 CREATINE MB FRACTION: CPT

## 2021-11-26 PROCEDURE — 85301 ANTITHROMBIN III ANTIGEN: CPT

## 2021-11-26 PROCEDURE — U0003: CPT

## 2021-11-26 PROCEDURE — 83605 ASSAY OF LACTIC ACID: CPT

## 2021-11-26 PROCEDURE — 82803 BLOOD GASES ANY COMBINATION: CPT

## 2021-11-26 PROCEDURE — 36415 COLL VENOUS BLD VENIPUNCTURE: CPT

## 2021-11-26 PROCEDURE — G1004: CPT

## 2021-11-26 PROCEDURE — 96366 THER/PROPH/DIAG IV INF ADDON: CPT

## 2021-11-26 PROCEDURE — 83090 ASSAY OF HOMOCYSTEINE: CPT

## 2021-11-26 PROCEDURE — U0005: CPT

## 2021-11-26 PROCEDURE — 85730 THROMBOPLASTIN TIME PARTIAL: CPT

## 2021-11-26 PROCEDURE — 94660 CPAP INITIATION&MGMT: CPT

## 2021-11-26 PROCEDURE — 93306 TTE W/DOPPLER COMPLETE: CPT

## 2021-11-26 PROCEDURE — 96376 TX/PRO/DX INJ SAME DRUG ADON: CPT

## 2021-11-26 PROCEDURE — 74177 CT ABD & PELVIS W/CONTRAST: CPT | Mod: ME

## 2021-11-26 PROCEDURE — 87077 CULTURE AEROBIC IDENTIFY: CPT

## 2021-11-26 PROCEDURE — 86900 BLOOD TYPING SEROLOGIC ABO: CPT

## 2021-11-26 PROCEDURE — 71275 CT ANGIOGRAPHY CHEST: CPT | Mod: ME

## 2021-11-26 PROCEDURE — 86146 BETA-2 GLYCOPROTEIN ANTIBODY: CPT

## 2021-11-26 PROCEDURE — 81240 F2 GENE: CPT

## 2021-11-26 PROCEDURE — 85613 RUSSELL VIPER VENOM DILUTED: CPT

## 2021-11-26 PROCEDURE — 70450 CT HEAD/BRAIN W/O DYE: CPT | Mod: ME

## 2021-11-26 PROCEDURE — 82435 ASSAY OF BLOOD CHLORIDE: CPT

## 2021-11-26 PROCEDURE — 87086 URINE CULTURE/COLONY COUNT: CPT

## 2021-11-26 PROCEDURE — 86147 CARDIOLIPIN ANTIBODY EA IG: CPT

## 2021-11-26 PROCEDURE — 82330 ASSAY OF CALCIUM: CPT

## 2021-11-26 PROCEDURE — 93005 ELECTROCARDIOGRAM TRACING: CPT

## 2021-11-26 PROCEDURE — 87040 BLOOD CULTURE FOR BACTERIA: CPT

## 2021-11-26 PROCEDURE — 99285 EMERGENCY DEPT VISIT HI MDM: CPT | Mod: 25

## 2021-11-26 PROCEDURE — 84132 ASSAY OF SERUM POTASSIUM: CPT

## 2021-11-26 PROCEDURE — 82947 ASSAY GLUCOSE BLOOD QUANT: CPT

## 2021-11-26 PROCEDURE — 85018 HEMOGLOBIN: CPT

## 2021-11-26 PROCEDURE — 96375 TX/PRO/DX INJ NEW DRUG ADDON: CPT

## 2021-11-26 PROCEDURE — 99232 SBSQ HOSP IP/OBS MODERATE 35: CPT

## 2021-11-26 PROCEDURE — 86901 BLOOD TYPING SEROLOGIC RH(D): CPT

## 2021-11-26 PROCEDURE — 82550 ASSAY OF CK (CPK): CPT

## 2021-11-26 PROCEDURE — 85307 ASSAY ACTIVATED PROTEIN C: CPT

## 2021-11-26 PROCEDURE — 80048 BASIC METABOLIC PNL TOTAL CA: CPT

## 2021-11-26 PROCEDURE — 84100 ASSAY OF PHOSPHORUS: CPT

## 2021-11-26 PROCEDURE — 85027 COMPLETE CBC AUTOMATED: CPT

## 2021-11-26 PROCEDURE — 83880 ASSAY OF NATRIURETIC PEPTIDE: CPT

## 2021-11-26 PROCEDURE — 85300 ANTITHROMBIN III ACTIVITY: CPT

## 2021-11-26 PROCEDURE — 86850 RBC ANTIBODY SCREEN: CPT

## 2021-11-26 PROCEDURE — 84295 ASSAY OF SERUM SODIUM: CPT

## 2021-11-26 PROCEDURE — 85303 CLOT INHIBIT PROT C ACTIVITY: CPT

## 2021-11-26 PROCEDURE — 96365 THER/PROPH/DIAG IV INF INIT: CPT

## 2021-11-26 PROCEDURE — 83735 ASSAY OF MAGNESIUM: CPT

## 2021-11-26 PROCEDURE — 87102 FUNGUS ISOLATION CULTURE: CPT

## 2021-11-26 PROCEDURE — 84484 ASSAY OF TROPONIN QUANT: CPT

## 2021-11-26 PROCEDURE — 85610 PROTHROMBIN TIME: CPT

## 2021-11-26 PROCEDURE — 99254 IP/OBS CNSLTJ NEW/EST MOD 60: CPT

## 2021-11-26 PROCEDURE — 96367 TX/PROPH/DG ADDL SEQ IV INF: CPT

## 2021-11-26 PROCEDURE — 85025 COMPLETE CBC W/AUTO DIFF WBC: CPT

## 2021-11-26 PROCEDURE — 71045 X-RAY EXAM CHEST 1 VIEW: CPT

## 2021-11-26 PROCEDURE — 81001 URINALYSIS AUTO W/SCOPE: CPT

## 2021-11-26 PROCEDURE — 81241 F5 GENE: CPT

## 2021-11-26 PROCEDURE — 80053 COMPREHEN METABOLIC PANEL: CPT

## 2021-11-26 PROCEDURE — 93970 EXTREMITY STUDY: CPT

## 2021-11-26 RX ORDER — FLUCONAZOLE 150 MG/1
2 TABLET ORAL
Qty: 14 | Refills: 0
Start: 2021-11-26 | End: 2021-12-02

## 2021-11-26 RX ORDER — NYSTATIN CREAM 100000 [USP'U]/G
1 CREAM TOPICAL
Qty: 1 | Refills: 0
Start: 2021-11-26 | End: 2021-12-02

## 2021-11-26 RX ORDER — TADALAFIL 10 MG/1
1 TABLET, FILM COATED ORAL
Qty: 0 | Refills: 0 | DISCHARGE

## 2021-11-26 RX ORDER — ACETAMINOPHEN 500 MG
30.47 TABLET ORAL
Qty: 237 | Refills: 0
Start: 2021-11-26

## 2021-11-26 RX ORDER — ENOXAPARIN SODIUM 100 MG/ML
115 INJECTION SUBCUTANEOUS
Qty: 1 | Refills: 0
Start: 2021-11-26 | End: 2021-12-25

## 2021-11-26 RX ORDER — SODIUM,POTASSIUM PHOSPHATES 278-250MG
1 POWDER IN PACKET (EA) ORAL ONCE
Refills: 0 | Status: DISCONTINUED | OUTPATIENT
Start: 2021-11-26 | End: 2021-11-26

## 2021-11-26 RX ADMIN — NYSTATIN CREAM 1 APPLICATION(S): 100000 CREAM TOPICAL at 05:34

## 2021-11-26 RX ADMIN — PANTOPRAZOLE SODIUM 40 MILLIGRAM(S): 20 TABLET, DELAYED RELEASE ORAL at 13:43

## 2021-11-26 RX ADMIN — ENOXAPARIN SODIUM 115 MILLIGRAM(S): 100 INJECTION SUBCUTANEOUS at 13:34

## 2021-11-26 NOTE — PROGRESS NOTE ADULT - PROVIDER SPECIALTY LIST ADULT
Critical Care
SICU
Vascular Cardiology
Surgery
Vascular Cardiology
SICU
Surgery
Vascular Cardiology

## 2021-11-26 NOTE — DISCHARGE NOTE PROVIDER - CARE PROVIDERS DIRECT ADDRESSES
[email or web address]

## 2021-11-26 NOTE — CONSULT NOTE ADULT - SUBJECTIVE AND OBJECTIVE BOX
[facility] SICU CONSULT NOTE  51y M PMH HTN, HLD, obesity, sleep hypoxia (autopap machine), Hx of seizure 2018 (neuro work-up negative), s/p sleeve gastrectomy  presented with EPIFANIO and shortness of breath for two days. Patient states that he has had trouble remembering what he needs to do, and has been feeling dizzy/ Reports feeling as though he were about to fall when entering the hospital today. States that he has had poor PO intake, both food and fluids since d/c and is not up to par with what the post operative instruction dictate that he should be having. Denies flatus and states that he had a very small BM this AM. States that he has been feeling slightly feverish but has not actually checked his temperature.     In ED, patient hemodynamically stable, labs wnl. CTA with bilateral pulmonary emboli. Patient started on heparin gtt, evaluated by Vascular Cardiology. SICU consulted for hemodynamic and respiratory monitoring.     PAST MEDICAL & SURGICAL HISTORY:  Dyslipidemia    Pericardial cyst  , resolved    High cholesterol    HTN (hypertension)    Morbid obesity due to excess calories    Status post hip surgery  R hip, repair of labrum,     H/O hernia repair  2010 x2    H/O vasectomy      S/P colonoscopy      H/O gastric sleeve        MEDICATIONS  (STANDING):  atorvastatin 40 milliGRAM(s) Oral at bedtime  heparin  Infusion.  Unit(s)/Hr (21 mL/Hr) IV Continuous <Continuous>  nystatin Powder 1 Application(s) Topical two times a day  pantoprazole  Injectable 40 milliGRAM(s) IV Push every 24 hours    MEDICATIONS  (PRN):  acetaminophen   IVPB .. 1000 milliGRAM(s) IV Intermittent every 6 hours PRN Mild Pain (1 - 3)  hydrocortisone 1% Cream 1 Application(s) Topical daily PRN Rash and/or Itching  hyoscyamine SL 0.125 milliGRAM(s) SubLingual every 6 hours PRN nausea      Allergies    No Known Allergies    Intolerances        SOCIAL HISTORY:  with children. Never smoker. Consumed EtOH 3-4 drinks, 2-3x per week pre-op    FAMILY HISTORY:  Family history of CABG (Father)    Family history of prostate cancer (Father)    Family history of stroke (Sibling)        Physical Exam:  General: NAD, resting comfortably  Neuro: A/O x 3, CNs II-XII grossly intact, normal sensation, no focal deficits  HEENT: NC/AT, EOMI, normal hearing, no oral lesions, no LAD, neck supple  Pulmonary: slightly increased WOB on RA  Cardiovascular: RRR  Abdominal: Obese, soft, ND/NT, steristrips c/d/i, minimal ecchymosis around incision   : bilateral erythematous plaques with superficial skin desquamation from inguinal crease to periscrotal skin and thighs  Back: small, pruritic erythematous papules on upper back  Extremities: WWP, normal strength, no clubbing/cyanosis/edema  Vasc: 2+ palpable radial and DP pulses bilaterally    Vital Signs Last 24 Hrs  T(C): 36.7 (2021 00:38), Max: 37.4 (2021 22:30)  T(F): 98.1 (2021 00:38), Max: 99.4 (2021 22:30)  HR: 79 (:) (74 - 83)  BP: 138/84 (:00) (128/85 - 144/83)  BP(mean): 106 (:00) (103 - 106)  RR: 20 (2021 01:00) (17 - 20)  SpO2: 95% (:00) (95% - 98%)    I&O's Summary    2021 07:01  -  2021 01:40  --------------------------------------------------------  IN: 21 mL / OUT: 525 mL / NET: -504 mL        LABS:                        14.1   8.93  )-----------( 211      ( 2021 01:12 )             41.9     11-22    137  |  103  |  11  ----------------------------<  90  4.1   |  19<L>  |  0.84    Ca    9.3      2021 14:04    TPro  7.1  /  Alb  3.8  /  TBili  0.5  /  DBili  x   /  AST  17  /  ALT  26  /  AlkPhos  85  11-22    PT/INR - ( 2021 01:12 )   PT: 16.1 sec;   INR: 1.36 ratio         PTT - ( 2021 01:12 )  PTT:95.6 sec  Urinalysis Basic - ( 2021 21:00 )    Color: Light Yellow / Appearance: Clear / S.017 / pH: x  Gluc: x / Ketone: Large  / Bili: Negative / Urobili: Negative   Blood: x / Protein: Negative / Nitrite: Negative   Leuk Esterase: Negative / RBC: 0 /hpf / WBC 1 /HPF   Sq Epi: x / Non Sq Epi: x / Bacteria: Negative      CAPILLARY BLOOD GLUCOSE        LIVER FUNCTIONS - ( 2021 14:04 )  Alb: 3.8 g/dL / Pro: 7.1 g/dL / ALK PHOS: 85 U/L / ALT: 26 U/L / AST: 17 U/L / GGT: x             Cultures: Blood Cx and Urine Cx pending      RADIOLOGY & ADDITIONAL STUDIES:  < from: CT Angio Chest PE Protocol w/ IV Cont (21 @ 18:16) >    FINDINGS:  CHEST:  LUNGS AND LARGE AIRWAYS: Patent central airways. Evaluation of the lung parenchyma is limited due to respiratory motion.  PLEURA: No pleural effusion or pneumothorax.  VESSELS: Bilateral pulmonary emboli are present. On the right pulmonary embolus is seen in the right lower lobe lobar artery extending into several segmental branches. On the left there is a pulmonary embolus extending from the left main pulmonary artery into the lobar and segmental branches of the left lower lobe. Evaluation of subsegmental branches is limited due to respiratory motion.  HEART: Heart size is normal. No pericardial effusion. No evidence of right heart strain.  MEDIASTINUM AND EMMAUNEL: No lymphadenopathy.  CHEST WALL AND LOWER NECK: Within normal limits.    ABDOMEN AND PELVIS:  LIVER: Within normal limits.  BILE DUCTS: Normal caliber.  GALLBLADDER: Within normal limits.  SPLEEN: Within normal limits.  PANCREAS: Within normal limits.  ADRENALS: Within normal limits.  KIDNEYS/URETERS: Within normal limits.    BLADDER: Within normal limits.  REPRODUCTIVE ORGANS: Prostatewithin normal limits.    BOWEL: Status post gastric sleeve. No collection or abscess is seen within the surgical bed. No bowel obstruction. Appendix is normal. Colonic diverticulosis without acute diverticulitis.  PERITONEUM: No ascites.  VESSELS: Within normal limits.  RETROPERITONEUM/LYMPH NODES: No lymphadenopathy.  ABDOMINAL WALL: Within normal limits.  BONES: Degenerative changes.    IMPRESSION:  Bilateral pulmonary emboli involving the right lower lobar and segmental branches in the left main, left lower lobe lobar, and subsegmental branches.    Status post gastric sleeve. No evidence of collection or abscess in the surgical bed.    < end of copied text >    < from: CT Head No Cont (21 @ 18:09) >  FINDINGS:  There is no CT evidence of acute transcortical infarct.    There is mild cerebral volume loss. There is  mild  white matter hypoattenuation which is nonspecific in etiology but likely related to chronic microvascular ischemic disease.    There is no hydrocephalus, mass effect, or acute intracranial hemorrhage. No extra-axial collection. Basal cisterns are patent.    Retention cyst or polyp in the left maxillary sinus. Periapical lucency suggesting periodontal disease    The calvarium is intact.    IMPRESSION:  No evidence of acute transcortical infarct, acute intracranial hemorrhage, or mass effect.    < end of copied text >    < from: Transthoracic Echocardiogram (21 @ 19:14) >  Conclusions:  Technically difficult study.  1. Normal mitral valve. Minimal mitral regurgitation.  2. Aortic valve not well visualized; probably normal.  3. Normal left ventricular systolic function. No segmental  wall motion abnormalities.  4. The right ventricle is not well visualized; grossly  normal right ventricular systolic function.TAPSE 2.93 cm.  Limited endocardial definition limits assessment of strain  imaging.  Findings discussed with Dr Short from primary team.  *** No previous Echo exam.  -----------------------------------------------------    < end of copied text >  
  HPI:  Patient is a 51y Male s/p recent gastric sleeve, readmitted for PE, urology consulted for groin rash. Patient states he has started to develop the rash approximately 1 week ago, when he was still admitted post-operatively for his sleeve procedure. At the time, states he was using topical agent, which was not adequately helping. He endorses itching and pain to the groin area, mostly localized to the b/l inner thighs, and to the tip of the penis. Patient states he has had a history of jock itch in the past, but never this severely. Patient denies other urinary sx including dysuria, hematuria, and testicular pain. States the rash has since improved since being on IV fluconazole.       PAST MEDICAL & SURGICAL HISTORY:  Dyslipidemia    Pericardial cyst  , resolved    High cholesterol    HTN (hypertension)    Morbid obesity due to excess calories    Status post hip surgery  R hip, repair of labrum,     H/O hernia repair  2010 x2    H/O vasectomy      S/P colonoscopy      H/O gastric sleeve      FAMILY HISTORY:  Family history of CABG (Father)    Family history of prostate cancer (Father)    Family history of stroke (Sibling)      SOCIAL HISTORY:   Tobacco hx:  MEDICATIONS  (STANDING):  atorvastatin 40 milliGRAM(s) Oral at bedtime  chlorhexidine 2% Cloths 1 Application(s) Topical <User Schedule>  enoxaparin Injectable 115 milliGRAM(s) SubCutaneous every 12 hours  fluconAZOLE IVPB 400 milliGRAM(s) IV Intermittent every 24 hours  nystatin Powder 1 Application(s) Topical two times a day  pantoprazole   Suspension 40 milliGRAM(s) Oral daily    MEDICATIONS  (PRN):  acetaminophen    Suspension .. 975 milliGRAM(s) Oral every 6 hours PRN Mild Pain (1 - 3)  aluminum hydroxide/magnesium hydroxide/simethicone Suspension 30 milliLiter(s) Oral every 6 hours PRN Dyspepsia  hydrocortisone 1% Cream 1 Application(s) Topical daily PRN Rash and/or Itching  hyoscyamine SL 0.125 milliGRAM(s) SubLingual every 6 hours PRN nausea  oxyCODONE    Solution 2.5 milliGRAM(s) Oral every 6 hours PRN Moderate Pain (4 - 6)  oxyCODONE    Solution 5 milliGRAM(s) Oral every 6 hours PRN Severe Pain (7 - 10)    Allergies    No Known Allergies    Intolerances        REVIEW OF SYSTEMS: Pertinent positives and negatives as stated in HPI, otherwise negative    Vital signs  T(C): 36.8 (21 @ 05:01), Max: 36.8 (21 @ 21:00)  HR: 59 (21 @ 05:05)  BP: 120/79 (21 @ 05:01)  SpO2: 96% (21 @ 05:05)  Wt(kg): --    Output      Physical Exam  Gen: NAD  Pulm: No respiratory distress, no subcostal retractions  CV: RRR, no JVD  Abd: Soft, NT, ND  : Circumcised +slight extra skin noted, able to retract back without difficulty, no discharge or blood at urethral meatus.  Testes descended bilaterally.  Testes and epididymis nontender bilaterally. b/l thighs noted to have fungal appearing sloughing rash on an erythematous base, with minimal dryness and irritation noted to the glans, likely of the same etiology  MSK: No edema present    LABS:         @ 22:30    WBC 6.79  / Hct 40.8  / SCr 0.74         139  |  104  |  8   ----------------------------<  84  3.8   |  19<L>  |  0.74    Ca    8.7      2021 22:30  Phos  2.4       Mg     2.0           PT/INR - ( 2021 22:30 )   PT: 15.2 sec;   INR: 1.28 ratio         PTT - ( 2021 22:30 )  PTT:77.6 sec  Urinalysis Basic - ( 2021 11:21 )    Color: Yellow / Appearance: Clear / S.021 / pH: x  Gluc: x / Ketone: Small  / Bili: Negative / Urobili: Negative   Blood: x / Protein: Trace / Nitrite: Negative   Leuk Esterase: Negative / RBC: 1 /hpf / WBC 1 /HPF   Sq Epi: x / Non Sq Epi: 0 /hpf / Bacteria: Negative            
HPI:  52 YO M POD 5 s/p gastric sleeve presenting with EPIFANIO and shortness of breath for two days. Patient states that he has had trouble remembering what he needs to do, and has been feeling dizzy/ Reports feeling as though he were about to fall when entering the hospital today. States that he has had poor PO intake, both food and fluids since d/c and is not up to par with what the post operative instruction dictate that he should be having. Denies flatus and states that he had a very small BM this AM. States that he has been feeling slightly feverish but has not actually checked his temperature.     PAST MEDICAL & SURGICAL HISTORY:  Dyslipidemia    Pericardial cyst  1993, resolved    High cholesterol    HTN (hypertension)    Morbid obesity due to excess calories    Status post hip surgery  R hip, repair of labrum, 2013    H/O hernia repair  2010 x2    H/O vasectomy  2007    S/P colonoscopy  2020        MEDICATIONS  (STANDING):  piperacillin/tazobactam IVPB... 3.375 Gram(s) IV Intermittent once    MEDICATIONS  (PRN):      Allergies    No Known Allergies    Intolerances        SOCIAL HISTORY:    FAMILY HISTORY:  Family history of CABG (Father)    Family history of prostate cancer (Father)    Family history of stroke (Sibling)      Physical Exam:  General: NAD  Pulmonary: normal resp effort, CTA-B  Cardiovascular: NSR, no murmurs  Abdominal: distended, nontender to palpation, incisions c/d/i    Vital Signs Last 24 Hrs  T(C): 36.9 (22 Nov 2021 11:42), Max: 36.9 (22 Nov 2021 11:42)  T(F): 98.4 (22 Nov 2021 11:42), Max: 98.4 (22 Nov 2021 11:42)  HR: 82 (22 Nov 2021 11:42) (82 - 82)  BP: 128/85 (22 Nov 2021 11:42) (128/85 - 128/85)  BP(mean): --  RR: 18 (22 Nov 2021 11:42) (18 - 18)  SpO2: 98% (22 Nov 2021 11:42) (98% - 98%)    I&O's Summary          LABS:                        14.6   9.59  )-----------( 209      ( 22 Nov 2021 14:04 )             44.9     11-22    137  |  103  |  11  ----------------------------<  90  4.1   |  19<L>  |  0.84    Ca    9.3      22 Nov 2021 14:04    TPro  7.1  /  Alb  3.8  /  TBili  0.5  /  DBili  x   /  AST  17  /  ALT  26  /  AlkPhos  85  11-22        CAPILLARY BLOOD GLUCOSE        LIVER FUNCTIONS - ( 22 Nov 2021 14:04 )  Alb: 3.8 g/dL / Pro: 7.1 g/dL / ALK PHOS: 85 U/L / ALT: 26 U/L / AST: 17 U/L / GGT: x             Cultures:      RADIOLOGY & ADDITIONAL STUDIES:      Plan:  55 YO M POD 5 s/p gastric sleeve presenting with SOB, dizziness    - please obtain ABG  - please give 2 L bolus  - please start banana bag with thiamine and folate  - CTH  - CTA chest for r/o PE  - please keep patient under observation in ED for now    Surgery, [id number]          
Patient seen and evaluated at bedside    Chief Complaint: Chest pain, shortness of breath    HPI:  This is a 40yo Male with PMHx of HTN, HLD, s/p gastric sleeve surgery 5 days ago who presented to the ED today for chest pain and shortness of breath. The pain started shortly after his surgery and the dyspnea started a few days later. He also felt confused and dizzy today which further prompted him to come into the ED. Denies fevers, chills, orthopnea, PND sxs. No prior DVT/PE hx. No known malignancy. His mother does have frequent PEs. Prostate cancer in father and brother.    ED Course:  - Afebrile, HRs 80s (sinus), BP 130s/70s, SpO2 98% RA  - Troponin 8, CBC and CMP wnl, INR 1.35  - EKG normal sinus rhythm, rate 80, no ST-T wave changes  - CTA Chest: bilateral pulmonary emboli involving the right lower lobar and segmental branches in the left main, left lower lobe lobar, and subsegmental branches.   - Meds given: Heparin gtt, Ativan, Vanc/Zosyn, IVF 2L      PMHx:   Dyslipidemia  Pericardial cyst  High cholesterol  HTN (hypertension)  Morbid obesity due to excess calories      PSHx:   Status post hip surgery  H/O hernia repair  H/O vasectomy  H/O hernia repair  S/P colonoscopy  H/O gastric sleeve      Allergies:  No Known Allergies      Home Meds: see MAR    Current Medications:   atorvastatin 40 milliGRAM(s) Oral at bedtime  heparin  Infusion.  Unit(s)/Hr IV Continuous <Continuous>  hyoscyamine SL 0.125 milliGRAM(s) SubLingual every 6 hours PRN  lactated ringers. 1000 milliLiter(s) IV Continuous <Continuous>  pantoprazole  Injectable 40 milliGRAM(s) IV Push every 24 hours      FAMILY HISTORY:  Family history of CABG (Father)  Family history of prostate cancer (Father)  Family history of stroke (Sibling)      Social History:  Smoking History: denies  Alcohol Use: denies  Drug Use: denies    REVIEW OF SYSTEMS:  Constitutional:     [x ] negative [ ] fevers [ ] chills [ ] weight loss [ ] weight gain  HEENT:                  [x ] negative [ ] dry eyes [ ] eye irritation [ ] postnasal drip [ ] nasal congestion  CV:                         [ ] negative  [ x] chest pain [ ] orthopnea [ ] palpitations [ ] murmur  Resp:                     [ ] negative [ ] cough [x ] shortness of breath [ ] dyspnea [ ] wheezing [ ] sputum [ ]hemoptysis  GI:                          [ ] negative [ ] nausea [ ] vomiting [ ] diarrhea [ ] constipation [x ] abd pain [ ] dysphagia   :                        [ x] negative [ ] dysuria [ ] nocturia [ ] hematuria [ ] increased urinary frequency  Musculoskeletal: [x ] negative [ ] back pain [ ] myalgias [ ] arthralgias [ ] fracture  Skin:                       [ x] negative [ ] rash [ ] itch  Neurological:        [ x] negative [ ] headache [ ] dizziness [ ] syncope [ ] weakness [ ] numbness  Psychiatric:           [ x] negative [ ] anxiety [ ] depression  Endocrine:            [ x] negative [ ] diabetes [ ] thyroid problem  Heme/Lymph:      [ x] negative [ ] anemia [ ] bleeding problem  Allergic/Immune: [ x] negative [ ] itchy eyes [ ] nasal discharge [ ] hives [ ] angioedema    [ x] All other systems negative  [ ] Unable to assess ROS due to      Physical Exam:  T(F): 99.4 (11-22), Max: 99.4 (11-22)  HR: 83 (11-22) (77 - 83)  BP: 144/83 (11-22) (128/85 - 144/83)  RR: 20 (11-22)  SpO2: 98% (11-22)  GENERAL: No acute distress, well-developed  HEAD:  Atraumatic, Normocephalic  ENT: EOMI, conjunctiva and sclera clear, Neck supple, No JVD, moist mucosa  CHEST/LUNG: Clear to auscultation bilaterally; No wheeze, equal breath sounds bilaterally   HEART: Regular rate and rhythm; No murmurs, rubs, or gallops  ABDOMEN: Soft, Nontender, Nondistended; Bowel sounds present  EXTREMITIES:  No clubbing, cyanosis, or edema  PSYCH: Nl behavior, nl affect  NEUROLOGY: AAOx3, non-focal, cranial nerves intact    Cardiovascular Diagnostic Testing:    ECG: Personally reviewed:  Sinus, no ST-T wave changes     Echo: Personally reviewed:  Conclusions:  Technically difficult study.  1. Normal mitral valve. Minimal mitral regurgitation.  2. Aortic valve not well visualized; probably normal.  3. Normal left ventricular systolic function. No segmental  wall motion abnormalities.  4. The right ventricle is not well visualized; grossly  normal right ventricular systolic function.TAPSE 2.93 cm.  Limited endocardial definition limits assessment of strain  imaging.  Findings discussed with Dr Short from primary team.  *** No previous Echo exam.    Imaging:    CXR: Personally reviewed    Labs: Personally reviewed                        14.6   9.59  )-----------( 209      ( 22 Nov 2021 14:04 )             44.9     11-22    137  |  103  |  11  ----------------------------<  90  4.1   |  19<L>  |  0.84    Ca    9.3      22 Nov 2021 14:04    TPro  7.1  /  Alb  3.8  /  TBili  0.5  /  DBili  x   /  AST  17  /  ALT  26  /  AlkPhos  85  11-22    PT/INR - ( 22 Nov 2021 18:54 )   PT: 16.0 sec;   INR: 1.35 ratio         PTT - ( 22 Nov 2021 18:54 )  PTT:28.5 sec        
HPI:    52 yo M s/p sleeve gastrectomy 11/19/21 p/w SOB and admitted w/ b/l PEs. Dermatology consulted for new groin rash x 3 days that is painful and pruritic. Has been taking Fluconazole 400mg IV daily and applying nystatin BID x 3 days while hospitalized with little improvement.    PAST MEDICAL & SURGICAL HISTORY:  Dyslipidemia    Pericardial cyst  1993, resolved    High cholesterol    HTN (hypertension)    Morbid obesity due to excess calories    Status post hip surgery  R hip, repair of labrum, 2013    H/O hernia repair  2010 x2    H/O vasectomy  2007    S/P colonoscopy  2020    H/O gastric sleeve      Review of Systems:  Constitutional: denies fevers, chills  HEENT: odynophagia or dysphagia  Cardiovascular: denies palpitations  Respiratory: denies SOB, wheezing  Gastrointestinal: denies N/V/D, abdominal pain  : denies dysuria  MSK: denies weakness, joint pain  Skin: denies new rashes or masses unless otherwise specified in hpi    MEDICATIONS  (STANDING):  atorvastatin 40 milliGRAM(s) Oral at bedtime  chlorhexidine 2% Cloths 1 Application(s) Topical <User Schedule>  enoxaparin Injectable 115 milliGRAM(s) SubCutaneous every 12 hours  fluconAZOLE IVPB 400 milliGRAM(s) IV Intermittent every 24 hours  nystatin Powder 1 Application(s) Topical two times a day  pantoprazole   Suspension 40 milliGRAM(s) Oral daily  potassium phosphate / sodium phosphate Tablet (K-PHOS No. 2) 1 Tablet(s) Oral once    ALLERGIES: No Known Allergies    SOCIAL HISTORY: denies drug use  FAMILY HISTORY:  Family history of CABG (Father)    Family history of prostate cancer (Father)    Family history of stroke (Sibling)      VITAL SIGNS LAST 24 HOURS:  T(F): 97.9 (11-26 @ 09:03), Max: 98.2 (11-25 @ 21:00)  HR: 84 (11-26 @ 09:35) (54 - 84)  BP: 122/82 (11-26 @ 09:03) (109/58 - 123/81)  RR: 18 (11-26 @ 09:03) (12 - 19)    PHYSICAL EXAM:      In no apparent distress. Oropharynx showed no ulcerations. No visible lymphadenopathy. Conjunctiva were non-injected. No clubbing of extremities.  No hyperhidrosis or bromhidrosis.   Of note on skin exam:  macerated beefy red plaques w/ satellite pustules in b/l inguinal folds, scrotum, and penis    LABS:  WBC Count : 4.48 K/uL   Hemoglobin : 14.2 g/dL   Hematocrit : 43.9 %   Platelet Count - Automated : 165 K/uL    138  |  102  |  7   ----------------------------<  76  4.0   |  21<L>  |  0.78    RADIOLOGY & ADDITIONAL STUDIES: no relevant studies

## 2021-11-26 NOTE — DISCHARGE NOTE PROVIDER - NSFOLLOWUPCLINICS_GEN_ALL_ED_FT
[facility]  Hematology/Oncology  [address] [id number]  Phone: [phone]  Fax:   Follow Up Time: 2 weeks     [facility]  Hematology/Oncology  [address] [place] [address]  Phone: [phone]  Fax:   Follow Up Time: 2 weeks    [facility] D[facility]at[facility]  Dermatology  [address] [place] [address], [facility] [address]  [place] [address]  Phone: [phone]  Fax:   Follow Up Time: 1-3 days

## 2021-11-26 NOTE — PROGRESS NOTE ADULT - SUBJECTIVE AND OBJECTIVE BOX
Vascular Cardiology  Progress note  DIRECT SERVICE NUMBER: [phone]            EMAIL [email or web address]   OFFICE [phone]    CC:  Dyspnea    INTERVAL HISTORY:  Doing well.  Transitioned to 2 kristian  now on lovenox  no sob.     Allergies    No Known Allergies    Intolerances   MEDICATIONS  (STANDING):  atorvastatin 40 milliGRAM(s) Oral at bedtime  chlorhexidine 2% Cloths 1 Application(s) Topical <User Schedule>  enoxaparin Injectable 115 milliGRAM(s) SubCutaneous every 12 hours  fluconAZOLE IVPB 400 milliGRAM(s) IV Intermittent every 24 hours  nystatin Powder 1 Application(s) Topical two times a day  pantoprazole   Suspension 40 milliGRAM(s) Oral daily  potassium phosphate / sodium phosphate Tablet (K-PHOS No. 2) 1 Tablet(s) Oral once      PAST MEDICAL & SURGICAL HISTORY:  Dyslipidemia    Pericardial cyst  1993, resolved    High cholesterol    HTN (hypertension)    Morbid obesity due to excess calories    Status post hip surgery  R hip, repair of labrum, 2013    H/O hernia repair  2010 x2    H/O vasectomy  2007    S/P colonoscopy  2020  H/O gastric sleeve      FAMILY HISTORY:  Family history of CABG (Father)    Family history of prostate cancer (Father)    Family history of stroke (Sibling)      SOCIAL HISTORY:  unchanged    REVIEW OF SYSTEMS:  CONSTITUTIONAL: No fever, weight loss, or fatigue  EYES: No eye pain, visual disturbances, or discharge  ENMT:  No difficulty hearing, tinnitus, vertigo; No sinus or throat pain  NECK: No pain or stiffness  RESPIRATORY: No cough, wheezing, chills or hemoptysis; No Shortness of Breath  CARDIOVASCULAR: No palpitations, passing out, dizziness, or leg swelling; +chest pain  GASTROINTESTINAL: No abdominal or epigastric pain. No nausea, vomiting, or hematemesis; No diarrhea or constipation. No melena or hematochezia.  GENITOURINARY: No dysuria, frequency, hematuria, or incontinence  NEUROLOGICAL: No headaches, memory loss, loss of strength, numbness, or tremors  SKIN: No itching, burning, rashes, or lesions   LYMPH Nodes: No enlarged glands  ENDOCRINE: No heat or cold intolerance; No hair loss  MUSCULOSKELETAL: No joint pain or swelling; No muscle, back, or extremity pain  PSYCHIATRIC: No depression, anxiety, mood swings, or difficulty sleeping  HEME/LYMPH: No easy bruising, or bleeding gums  ALLERY AND IMMUNOLOGIC: No hives or eczema	    [ x] All others negative      ICU Vital Signs Last 24 Hrs  T(C): 36.6 (26 Nov 2021 09:03), Max: 36.8 (25 Nov 2021 21:00)  T(F): 97.9 (26 Nov 2021 09:03), Max: 98.2 (25 Nov 2021 21:00)  HR: 84 (26 Nov 2021 09:35) (54 - 84)  BP: 122/82 (26 Nov 2021 09:03) (109/58 - 131/83)  BP(mean): 95 (25 Nov 2021 16:00) (84 - 102)  ABP: --  ABP(mean): --  RR: 18 (26 Nov 2021 09:03) (12 - 25)  SpO2: 96% (26 Nov 2021 09:35) (92% - 97%)    Appearance: Normal	  HEENT:   Normal oral mucosa, EOMI	  Cardiovascular: Normal S1 S2, No murmurs, No edema  Respiratory: Lungs clear to auscultation  Psychiatry: A & O x 3, Mood & affect appropriate  Gastrointestinal:  Soft  Skin: No rashes, No ecchymoses, No cyanosis	  Neurologic: Non-focal  Extremities: Normal range of motion, No clubbing, cyanosis. Reported decreased sensation over right inner thigh  Vascular:   Right DP:  Palpable             Left DP:  Palpable                        14.2   4.48  )-----------( 165      ( 26 Nov 2021 07:14 )             43.9   11-26    138  |  102  |  7   ----------------------------<  76  4.0   |  21<L>  |  0.78    Ca    9.2      26 Nov 2021 07:17  Phos  2.8     11-26  Mg     2.0     11-26          < from: CT Angio Chest PE Protocol w/ IV Cont (11.22.21 @ 18:16) >  IMPRESSION:  Bilateral pulmonary emboli involving the right lower lobar and segmental branches in the left main, left lower lobe lobar, and subsegmental branches.    Status post gastric sleeve. No evidence of collection or abscess in the surgical bed.  < end of copied text >    < from: Transthoracic Echocardiogram (11.22.21 @ 19:14) >  Conclusions:  Technically difficult study.  1. Normal mitral valve. Minimal mitral regurgitation.  2. Aortic valve not well visualized; probably normal.  3. Normal left ventricular systolic function. No segmental  wall motion abnormalities.  4. The right ventricle is not well visualized; grossly  normal right ventricular systolic function. TAPSE 2.93 cm.  Limited endocardial definition limits assessment of strain  imaging.  < end of copied text >

## 2021-11-26 NOTE — DISCHARGE NOTE NURSING/CASE MANAGEMENT/SOCIAL WORK - NSDCPELOVENOX_GEN_ALL_CORE
Enoxaparin/Lovenox - Compliance/Enoxaparin/Lovenox - Dietary Advice/Enoxaparin/Lovenox - Follow up monitoring/Enoxaparin/Lovenox - Potential for adverse drug reactions and interactions (4) completely dependent

## 2021-11-26 NOTE — PROGRESS NOTE ADULT - ATTENDING COMMENTS
Hemodynamically remains stable  ECHO no RV strain  Saturating well  Dev CP, cardiac enzymes neg, EKG unchanged, likely pleuritic cp  Rt below knee peroneal and soleal VT.  Continue Heparin drip gtt, PTT therapeutic  Discussed with intervention cardiology
51y M PMH HTN, HLD, obesity, sleep apnea (autopap machine), Hx of seizure 11/2018 (neuro work-up negative), s/p sleeve gastrectomy 11/17 presented with EPIFANIO and shortness of breath for two days, found to have bilateral pulmonary emboli. Patient started on heparin gtt, PERT team consulted    Change Heparin drip gtt to T Lovenox   Hemodynamically stable saturating well on RA, on nCPAP 14 cm of water  Pleuritic CP, cardiac enzymes have been neg  C/o penile pain, off Mota, will check UA  Antifungal Diflucan for groin fungal infection
51y M PMH HTN, HLD, obesity, sleep apnea (autopap machine), Hx of seizure 11/2018 (neuro work-up negative), s/p sleeve gastrectomy 11/17 presented with EPIFANIO and shortness of breath for two days, found to have bilateral pulmonary emboli. Patient started on heparin gtt, PERT team consulted    Duplex of LE below knee VT  Heparin drip gtt, PTT therapeutic, no RV strain on ECHO  Possible Lovenox from AM  Hemodynamically stable saturating well on RA, on nCPAP 14 cm of water  Pleuritic CP, cardiac enzymes have been neg    Spoke to his wife
C/o chest pain overnight  Trops neg, seen by cardiology  Better this morning  Also with R hip numbness  Has not been OOB    Vitals normal  Abd soft, nontender  B/L LE strength and ROM intact    Plan  Cont hep gtt  Heme consult for transition to lovenox  Encourage bariatric clears PO  Encourage OOB/ambulation; discussed with patient the importance of getting out of bed    Chintan Rodriguez MD
Continue heparin gtt for now, if stable in next few days, will consider transitioning to Lovenox, DOACs are less ideal post gastric sleeve as they are absorbed in stomach and small intestine    R leg numbness per primary team    Dorothy [phone]
PE with negative cardiac biomarkers  Continue heparin gtt for now  WIll need to decide on lovenox vs. coumadin going forwards (the DOACs are absorbed in the stomach and small intestine)    Dorothy
S/p sleeve gastrectomy now with PE, DVT likely 2/2 stasis, not ambulating postop  No SOB  chest pain resolved with maalox, tolerating anival clears without problem  Chief complaint is groin and penile pain from what appears to be a fungal dermatitis    VSS, afebrile  Abdomen soft, nontender, nondistended  Scrotum and penis mildly edematous, glans is red  Groins are indurated and raw  Minimally tender to palpation, no crepitus or fluctuance  Buttocks and perianal areas appear normal and are nontender      Plan  Cont anticoagulation, will transition to lovenox  Appreciate heme consult  Would get urology consult in setting of  complaints  Cont nystatin/diflucan  Encourage ambulation  Anival clears    Chintan Rodriguez MD
s/p sleeve gastrectomy, readmitted last night for B/L PEs  Patient says he did minimal walking while at home and was quite sedentary  Feels better this am, no SOB, resting comfortably, no pain    SaO2 97  Afebrile  HR 60s  Abdomen soft, nontender, nondistended    Plan  heparin gtt  f/u cardiology recs    Discussed with patient    Chintan Rodriguez MD
Doing well  Tolerating anival clears   No SOB    Groin rash stable  Awaiting derm consult  Discharge planning on lovenox    Chintan Rodriguez MD

## 2021-11-26 NOTE — PROGRESS NOTE ADULT - ASSESSMENT
54M s/p sleeve gastrectomy on 11/19/21 now presenting with SOB dizziness, chest pain, CT showing PE. Patient admitted to SICU for cardiac and respiratory monitoring and VTE treatment. Patient has been stable enough to be transferred to the floor on 11/25/21    Plan:  Hep gtt d/c'd 11/25/21  continue Lovenox  f/u urology consult in setting of  complaints  continue nystatin and diflucan  OOB/ambulation  cw bariatric CLD    Green Team Pager [phone] 54M s/p sleeve gastrectomy on 11/19/21 now presenting with SOB dizziness, chest pain, CT showing PE. Patient admitted to SICU for cardiac and respiratory monitoring and VTE treatment. Patient has been stable enough to be transferred to the floor on 11/25/21    Plan:  ·	Hep gtt d/c'd 11/25/21  ·	continue Lovenox  ·	continue nystatin and diflucan  ·	OOB/ambulation  ·	cw bariatric CLD  ·	Urology: no acute urologic intervention warranted at this time. Glans pain likely related to dryness/infection contaminated by thighs. Dermatology consult recommended.   ·	f/u hematology re: DOAC     Green Team Pager [phone] 54M s/p sleeve gastrectomy on 11/19/21 now presenting with SOB dizziness, chest pain, CT showing PE. Patient admitted to SICU for cardiac and respiratory monitoring and VTE treatment. Patient has been stable enough to be transferred to the floor on 11/25/21    Plan:  ·	Hep gtt d/c'd 11/25/21  ·	continue Lovenox  ·	continue nystatin and diflucan  ·	OOB/ambulation  ·	cw bariatric CLD  ·	Urology: no acute urologic intervention warranted at this time. Patient with history of recurrent similar groin rash, likely exacerbation of tinea cruris . Dermatology consult recommended.   ·	f/u hematology re: DOAC     Green Team Pager [phone]

## 2021-11-26 NOTE — DISCHARGE NOTE NURSING/CASE MANAGEMENT/SOCIAL WORK - PATIENT PORTAL LINK FT
You can access the FollowMyHealth Patient Portal offered by [facility] by registering at the following website: [email or web address]. By joining [employer]’s FollowMyHealth portal, you will also be able to view your health information using other applications (apps) compatible with our system.

## 2021-11-26 NOTE — DISCHARGE NOTE PROVIDER - HOSPITAL COURSE
50 YO M POD 5 s/p gastric sleeve presenting with EPIFANIO and shortness of breath for two days. Patient states that he has had trouble remembering what he needs to do, and has been feeling dizzy/ Reports feeling as though he were about to fall when entering the hospital today. States that he has had poor PO intake, both food and fluids since d/c and is not up to par with what the post operative instruction dictate that he should be having. Denies flatus and states that he had a very small BM this AM. States that he has been feeling slightly feverish but has not actually checked his temperature.     Ct head No evidence of acute transcortical infarct, acute intracranial hemorrhage, or mass effect.    ct chest/abd/pelv Bilateral pulmonary emboli involving the right lower lobar and segmental branches in the left main, left lower lobe lobar, and subsegmental branches.  Status post gastric sleeve. No evidence of collection or abscess in the surgical bed.    patient admitted to SICU for close monitoring, started on heparin gtt.      52 YO M POD 5 s/p gastric sleeve presenting with EPIFANIO and shortness of breath for two days. Patient states that he has had trouble remembering what he needs to do, and has been feeling dizzy/ Reports feeling as though he were about to fall when entering the hospital today. States that he has had poor PO intake, both food and fluids since d/c and is not up to par with what the post operative instruction dictate that he should be having. Denies flatus and states that he had a very small BM this AM. States that he has been feeling slightly feverish but has not actually checked his temperature.     Ct head No evidence of acute transcortical infarct, acute intracranial hemorrhage, or mass effect.    ct chest/abd/pelv Bilateral pulmonary emboli involving the right lower lobar and segmental branches in the left main, left lower lobe lobar, and subsegmental branches.  Status post gastric sleeve. No evidence of collection or abscess in the surgical bed.    patient admitted to SICU for close monitoring, started on heparin gtt.     On hospital SICU day #1 patient developed acute non radiating chest pain located below left nipple and epigastric region with serial EKG and trop levels showing no abnormalities. On hospital SICU day #2 patient continued substernal chest pain radiating to left shoulder, EKG wnl, negative card enzymes. New complaint of worsening right thigh numbness, RLE doppler negative. Patient deemed stable enough to be transferred to the floor on 11/25 in the PM. Patient complained of mildly edematous scrotum and penis; groins indurated and raw- started on diflucan and nystatin.     Urology consutled for scrotal pain, recommended derm consult   Derm recommended     Determined patient should be swtiched from hep gtt to lovenox,, since DOACS absorbed by stomach      52 YO M POD 5 s/p gastric sleeve presenting with EPIFANIO and shortness of breath for two days. Patient states that he has had trouble remembering what he needs to do, and has been feeling dizzy/ Reports feeling as though he were about to fall when entering the hospital today. States that he has had poor PO intake, both food and fluids since d/c and is not up to par with what the post operative instruction dictate that he should be having. Denies flatus and states that he had a very small BM this AM. States that he has been feeling slightly feverish but has not actually checked his temperature.     Ct head No evidence of acute transcortical infarct, acute intracranial hemorrhage, or mass effect.    ct chest/abd/pelv Bilateral pulmonary emboli involving the right lower lobar and segmental branches in the left main, left lower lobe lobar, and subsegmental branches.  Status post gastric sleeve. No evidence of collection or abscess in the surgical bed.    patient admitted to SICU for close monitoring, started on heparin gtt.     On hospital SICU day #1 patient developed acute non radiating chest pain located below left nipple and epigastric region with serial EKG and trop levels showing no abnormalities. On hospital SICU day #2 patient continued substernal chest pain radiating to left shoulder, EKG wnl, negative card enzymes. New complaint of worsening right thigh numbness, RLE doppler negative. Patient deemed stable enough to be transferred to the floor on 11/25 in the PM. Patient complained of mildly edematous scrotum and penis; groins indurated and raw- started on diflucan and nystatin.     Urology consutled for scrotal pain, recommended derm consult   Derm recommended -fungal cx, cont diflucan/nystatin close follow up next week     Determined patient should be swtiched from hep gtt to lovenox, since DOACS absorbed by stomach. Patient underwent lovenox teaching prior to discharge.      52 YO M POD 5 s/p gastric sleeve presenting with EPIFANIO and shortness of breath for two days. Patient states that he has had trouble remembering what he needs to do, and has been feeling dizzy/ Reports feeling as though he were about to fall when entering the hospital today. States that he has had poor PO intake, both food and fluids since d/c and is not up to par with what the post operative instruction dictate that he should be having. Denies flatus and states that he had a very small BM this AM. States that he has been feeling slightly feverish but has not actually checked his temperature.     Ct head No evidence of acute transcortical infarct, acute intracranial hemorrhage, or mass effect.    ct chest/abd/pelv Bilateral pulmonary emboli involving the right lower lobar and segmental branches in the left main, left lower lobe lobar, and subsegmental branches.  Status post gastric sleeve. No evidence of collection or abscess in the surgical bed.    patient admitted to SICU for close monitoring, started on heparin gtt.     On hospital SICU day #1 patient developed acute non radiating chest pain located below left nipple and epigastric region with serial EKG and trop levels showing no abnormalities. On hospital SICU day #2 patient continued substernal chest pain radiating to left shoulder, EKG wnl, negative card enzymes. New complaint of worsening right thigh numbness, RLE doppler negative. Patient deemed stable enough to be transferred to the floor on 11/25 in the PM. Patient complained of mildly edematous scrotum and penis; groins indurated and raw- started on diflucan and nystatin.     Urology consutled for scrotal pain, recommended derm consult   Derm recommended -fungal cx, cont diflucan/nystatin close follow up next week     Determined patient should be swtiched from hep gtt to lovenox, since DOACS absorbed by stomach. Patient underwent lovenox teaching prior to discharge.   Patient to follow up with surgery, cardiology, derm and heme

## 2021-11-26 NOTE — DISCHARGE NOTE PROVIDER - PROVIDER TOKENS
PROVIDER:[TOKEN:[[id number]],FOLLOWUP:[1 week]],PROVIDER:[TOKEN:[[id number]],FOLLOWUP:[1 week]]

## 2021-11-26 NOTE — DISCHARGE NOTE PROVIDER - NSDCMRMEDTOKEN_GEN_ALL_CORE_FT
Cialis 20 mg oral tablet: 1 tab(s) orally once a day, As Needed  hyoscyamine 0.125 mg sublingual tablet: 2 tab(s) sublingual every 6 hours, As Needed MDD:4   losartan 25 mg oral tablet: 1 tab(s) orally once a day  Lovenox 120 mg/0.8 mL injectable solution: 115 milligram(s) subcutaneously 2 times a day   omeprazole 40 mg oral delayed release capsule: 1 cap(s) orally once a day MDD:1  ondansetron 4 mg oral tablet, disintegratin tab(s) orally every 6 hours, As Needed   oxyCODONE 5 mg/5 mL oral solution: 5 milliliter(s) orally every 4 hours MDD:30  rosuvastatin 10 mg oral tablet: 1 tab(s) orally once a day (at bedtime)  Vitamin D3 25 mcg (1000 intl units) oral tablet: 1 tab(s) orally once a day   acetaminophen 160 mg/5 mL oral suspension: 30.47 milliliter(s) orally every 6 hours, As needed, Mild Pain (1 - 3)  hyoscyamine 0.125 mg sublingual tablet: 2 tab(s) sublingual every 6 hours, As Needed MDD:4   losartan 25 mg oral tablet: 1 tab(s) orally once a day  Lovenox 120 mg/0.8 mL injectable solution: 115 milligram(s) subcutaneously 2 times a day   nystatin 100,000 units/g topical powder: 1 application topically 2 times a day  omeprazole 40 mg oral delayed release capsule: 1 cap(s) orally once a day MDD:1  ondansetron 4 mg oral tablet, disintegratin tab(s) orally every 6 hours, As Needed   rosuvastatin 10 mg oral tablet: 1 tab(s) orally once a day (at bedtime)  Vitamin D3 25 mcg (1000 intl units) oral tablet: 1 tab(s) orally once a day   acetaminophen 160 mg/5 mL oral suspension: 30.47 milliliter(s) orally every 6 hours, As needed, Mild Pain (1 - 3)  fluconazole 200 mg oral tablet: 2 tab(s) orally once a day (crush )  hyoscyamine 0.125 mg sublingual tablet: 2 tab(s) sublingual every 6 hours, As Needed MDD:4   losartan 25 mg oral tablet: 1 tab(s) orally once a day  Lovenox 120 mg/0.8 mL injectable solution: 115 milligram(s) subcutaneously 2 times a day   nystatin 100,000 units/g topical powder: 1 application topically 2 times a day  omeprazole 40 mg oral delayed release capsule: 1 cap(s) orally once a day MDD:1  ondansetron 4 mg oral tablet, disintegratin tab(s) orally every 6 hours, As Needed   rosuvastatin 10 mg oral tablet: 1 tab(s) orally once a day (at bedtime)  Vitamin D3 25 mcg (1000 intl units) oral tablet: 1 tab(s) orally once a day

## 2021-11-26 NOTE — CHART NOTE - NSCHARTNOTEFT_GEN_A_CORE
Dermatology Chart Note    History: 52 yo M s/p sleeve gastrectomy 11/19/21 p/w SOB and admitted w/ b/l PEs. Dermatology consulted for new groin rash x 3 days that is painful and pruritic. Has been taking Fluconazole 400mg IV daily and applying nystatin BID x 3 days while hospitalized with little improvement.    Physical Exam:  VS wnl  Per photos: macerated beefy red plaques w/ satellite pustules in b/l inguinal folds, scrotum, and penis    Differential favors cutaneous fungal infection.   At this time recommend:  - please obtain fungal swab culture  - c/w fluconazole and nystatin therapies  - as patient is to be discharged soon, he can follow up at our outpatient Dermatology office within 1 week(s)  [address], [address], [facility]  [phone]      The patient's chart was reviewed in addition to photos of the rash taken by the primary team with the permission of the patient.  Patient was discussed remotely with the dermatology attending Dr. Susie Pizano.  Recommendations were communicated with the primary team.  Please page [phone] for further related questions.    Tasha Sierra MD  Resident Physician, PGY3  [facility] Dermatology  Pager: [phone]  Office: [phone]

## 2021-11-26 NOTE — CHART NOTE - NSCHARTNOTEFT_GEN_A_CORE
Consult called to dermatology, Dr. Sierra - pictures sent via Teams, agreed with fungal rash - continue fluconazole/ nystatin. Get fungal culture to eval for resistant fungal infection. Email sent to [email or web address] for follow up appointment next week. Explained to patient who agrees with plan.     Asher [phone] [phone]

## 2021-11-26 NOTE — DISCHARGE NOTE PROVIDER - CARE PROVIDER_API CALL
Gustavo De La Cruz (DO)  Cardiovascular Disease; Internal Medicine; Nuclear Cardiology  [address] [place] [address]  Phone: [phone]  Fax: [phone]  Follow Up Time: 1 week    Chintan Rodriguez  General Surgery  [address] [place] [id number]  Phone: [phone]  Fax: [phone]  Follow Up Time: 1 week

## 2021-11-26 NOTE — CONSULT NOTE ADULT - ASSESSMENT
51y Male s/p recent gastric sleeve, readmitted for PE, urology consulted for groin rash. Labs/urine negative and non-contributory.  -no acute urologic intervention warranted at this time  -glans pain likely related to dryness/infection contaminated by thighs  -would recommend dermatology consult  -discussed with Dr. Espinoza, to be discussed with attending in AM  51y Male s/p recent gastric sleeve, readmitted for PE, urology consulted for groin rash. Labs/urine negative and non-contributory.  -no acute urologic intervention warranted at this time  -patient with history of recurrent similar groin rash, likely exacerbation of tinea cruris   -would recommend dermatology consult  -final recommendations pending discussion with Dr. Hilario      51y Male s/p recent gastric sleeve, readmitted for PE, urology consulted for groin rash. Labs/urine negative and non-contributory.  -no acute urologic intervention warranted at this time  -patient with history of recurrent similar groin rash, likely exacerbation of tinea cruris   -would recommend dermatology consult  -Discussed with Dr. Hilario

## 2021-11-26 NOTE — PROGRESS NOTE ADULT - SUBJECTIVE AND OBJECTIVE BOX
Green Team Surgery Daily Progress Note    Subjective:   Patient seen at bedside this AM. Denies acute onset abdominal pain, N/V/D, SOB, CP, lightheadedness. Tolerating bariatric CLD    24h Events:   - Overnight, no acute events    Objective:  Vital Signs  T(C): 36.7 (11-26 @ 00:53), Max: 36.8 (11-25 @ 21:00)  HR: 54 (11-26 @ 00:53) (51 - 74)  BP: 109/58 (11-26 @ 00:53) (109/58 - 151/91)  RR: 18 (11-26 @ 00:53) (11 - 30)  SpO2: 95% (11-26 @ 00:53) (91% - 97%)  11-24-21 @ 07:01  -  11-25-21 @ 07:00  --------------------------------------------------------  IN:  Total IN: 0 mL    OUT:    Voided (mL): 1660 mL  Total OUT: 1660 mL    Total NET: -1660 mL      11-25-21 @ 07:01  -  11-26-21 @ 01:41  --------------------------------------------------------  IN:  Total IN: 0 mL    OUT:    Voided (mL): 1400 mL  Total OUT: 1400 mL    Total NET: -1400 mL          Physical Exam:  GEN: resting in bed comfortably in NAD  RESP: no increased WOB  ABD: mildly  distended, nontender to palpation, incisions c/d/i  EXTR:Scrotum and penis mildly edematous, glans is red  Groins are indurated and raw  Minimally tender to palpation, no crepitus or fluctuance  Buttocks and perianal areas appear normal and are nontender  NEURO: AAOx4    Labs:                        14.0   6.79  )-----------( 226      ( 24 Nov 2021 22:30 )             40.8   11-24    139  |  104  |  8   ----------------------------<  84  3.8   |  19<L>  |  0.74    Ca    8.7      24 Nov 2021 22:30  Phos  2.4     11-24  Mg     2.0     11-24      CAPILLARY BLOOD GLUCOSE          Medications:   MEDICATIONS  (STANDING):  atorvastatin 40 milliGRAM(s) Oral at bedtime  chlorhexidine 2% Cloths 1 Application(s) Topical <User Schedule>  enoxaparin Injectable 115 milliGRAM(s) SubCutaneous every 12 hours  fluconAZOLE IVPB 400 milliGRAM(s) IV Intermittent every 24 hours  nystatin Powder 1 Application(s) Topical two times a day  pantoprazole   Suspension 40 milliGRAM(s) Oral daily    MEDICATIONS  (PRN):  acetaminophen    Suspension .. 975 milliGRAM(s) Oral every 6 hours PRN Mild Pain (1 - 3)  aluminum hydroxide/magnesium hydroxide/simethicone Suspension 30 milliLiter(s) Oral every 6 hours PRN Dyspepsia  hydrocortisone 1% Cream 1 Application(s) Topical daily PRN Rash and/or Itching  hyoscyamine SL 0.125 milliGRAM(s) SubLingual every 6 hours PRN nausea  oxyCODONE    Solution 2.5 milliGRAM(s) Oral every 6 hours PRN Moderate Pain (4 - 6)  oxyCODONE    Solution 5 milliGRAM(s) Oral every 6 hours PRN Severe Pain (7 - 10)      Imaging:       Green Team Surgery Daily Progress Note    Subjective:   Patient seen at bedside this AM. Denies chest pain,  abdominal pain, N/V/D, SOB, lightheadedness. Tolerating bariatric CLD. Groin rash is much better than yesterday.     24h Events:   - Overnight, no acute events    Objective:  Vital Signs  T(C): 36.7 (11-26 @ 00:53), Max: 36.8 (11-25 @ 21:00)  HR: 54 (11-26 @ 00:53) (51 - 74)  BP: 109/58 (11-26 @ 00:53) (109/58 - 151/91)  RR: 18 (11-26 @ 00:53) (11 - 30)  SpO2: 95% (11-26 @ 00:53) (91% - 97%)  11-24-21 @ 07:01  -  11-25-21 @ 07:00  --------------------------------------------------------  IN:  Total IN: 0 mL    OUT:    Voided (mL): 1660 mL  Total OUT: 1660 mL    Total NET: -1660 mL      11-25-21 @ 07:01  -  11-26-21 @ 01:41  --------------------------------------------------------  IN:  Total IN: 0 mL    OUT:    Voided (mL): 1400 mL  Total OUT: 1400 mL    Total NET: -1400 mL          Physical Exam:  GEN: resting in bed comfortably in NAD  RESP: no increased WOB  ABD: mildly  distended, nontender to palpation, incisions c/d/i  EXTR:Scrotum and penis mildly edematous, glans is red  Groins are indurated and raw, better than yesterday.  Minimally tender to palpation, no crepitus or fluctuance  Buttocks and perianal areas appear normal and are nontender  NEURO: AAOx4    Labs:                        14.0   6.79  )-----------( 226      ( 24 Nov 2021 22:30 )             40.8   11-24    139  |  104  |  8   ----------------------------<  84  3.8   |  19<L>  |  0.74    Ca    8.7      24 Nov 2021 22:30  Phos  2.4     11-24  Mg     2.0     11-24      CAPILLARY BLOOD GLUCOSE          Medications:   MEDICATIONS  (STANDING):  atorvastatin 40 milliGRAM(s) Oral at bedtime  chlorhexidine 2% Cloths 1 Application(s) Topical <User Schedule>  enoxaparin Injectable 115 milliGRAM(s) SubCutaneous every 12 hours  fluconAZOLE IVPB 400 milliGRAM(s) IV Intermittent every 24 hours  nystatin Powder 1 Application(s) Topical two times a day  pantoprazole   Suspension 40 milliGRAM(s) Oral daily    MEDICATIONS  (PRN):  acetaminophen    Suspension .. 975 milliGRAM(s) Oral every 6 hours PRN Mild Pain (1 - 3)  aluminum hydroxide/magnesium hydroxide/simethicone Suspension 30 milliLiter(s) Oral every 6 hours PRN Dyspepsia  hydrocortisone 1% Cream 1 Application(s) Topical daily PRN Rash and/or Itching  hyoscyamine SL 0.125 milliGRAM(s) SubLingual every 6 hours PRN nausea  oxyCODONE    Solution 2.5 milliGRAM(s) Oral every 6 hours PRN Moderate Pain (4 - 6)  oxyCODONE    Solution 5 milliGRAM(s) Oral every 6 hours PRN Severe Pain (7 - 10)      Imaging:       Green Team Surgery Daily Progress Note    Subjective:   Patient seen at bedside this AM. Denies chest pain,  abdominal pain, N/V/D, SOB, lightheadedness. Tolerating bariatric CLD. Groin rash is much better than yesterday. Patient reports that he overall feels much better today and progressing in that trajectory.     24h Events:   - Overnight, no acute events    Objective:  Vital Signs  T(C): 36.7 (11-26 @ 00:53), Max: 36.8 (11-25 @ 21:00)  HR: 54 (11-26 @ 00:53) (51 - 74)  BP: 109/58 (11-26 @ 00:53) (109/58 - 151/91)  RR: 18 (11-26 @ 00:53) (11 - 30)  SpO2: 95% (11-26 @ 00:53) (91% - 97%)  11-24-21 @ 07:01  -  11-25-21 @ 07:00  --------------------------------------------------------  IN:  Total IN: 0 mL    OUT:    Voided (mL): 1660 mL  Total OUT: 1660 mL    Total NET: -1660 mL      11-25-21 @ 07:01  -  11-26-21 @ 01:41  --------------------------------------------------------  IN:  Total IN: 0 mL    OUT:    Voided (mL): 1400 mL  Total OUT: 1400 mL    Total NET: -1400 mL          Physical Exam:  GEN: resting in bed comfortably in NAD  RESP: no increased WOB  ABD: mildly  distended, nontender to palpation, incisions c/d/i  EXTR:Scrotum and penis mildly edematous, glans is red  Groins are indurated and raw, better than yesterday.  Minimally tender to palpation, no crepitus or fluctuance  Buttocks and perianal areas appear normal and are nontender  NEURO: AAOx4    Labs:                        14.0   6.79  )-----------( 226      ( 24 Nov 2021 22:30 )             40.8   11-24    139  |  104  |  8   ----------------------------<  84  3.8   |  19<L>  |  0.74    Ca    8.7      24 Nov 2021 22:30  Phos  2.4     11-24  Mg     2.0     11-24      CAPILLARY BLOOD GLUCOSE          Medications:   MEDICATIONS  (STANDING):  atorvastatin 40 milliGRAM(s) Oral at bedtime  chlorhexidine 2% Cloths 1 Application(s) Topical <User Schedule>  enoxaparin Injectable 115 milliGRAM(s) SubCutaneous every 12 hours  fluconAZOLE IVPB 400 milliGRAM(s) IV Intermittent every 24 hours  nystatin Powder 1 Application(s) Topical two times a day  pantoprazole   Suspension 40 milliGRAM(s) Oral daily    MEDICATIONS  (PRN):  acetaminophen    Suspension .. 975 milliGRAM(s) Oral every 6 hours PRN Mild Pain (1 - 3)  aluminum hydroxide/magnesium hydroxide/simethicone Suspension 30 milliLiter(s) Oral every 6 hours PRN Dyspepsia  hydrocortisone 1% Cream 1 Application(s) Topical daily PRN Rash and/or Itching  hyoscyamine SL 0.125 milliGRAM(s) SubLingual every 6 hours PRN nausea  oxyCODONE    Solution 2.5 milliGRAM(s) Oral every 6 hours PRN Moderate Pain (4 - 6)  oxyCODONE    Solution 5 milliGRAM(s) Oral every 6 hours PRN Severe Pain (7 - 10)      Imaging:

## 2021-11-26 NOTE — PROGRESS NOTE ADULT - ASSESSMENT
Assessment:  1. Bilateral PE without right heart strain  --HD stable, on RA with SpO2 high 90s  --Negative cardiac biomarkers  --TTE with grossly normal LV systolic function.   --PESI risk index 53 points, Class I, Very Low Risk: 0-1.6% 30-day mortality in this group.   2. R peroneal and soleal vein DVT  --No evidence of phlegmasia on exam, report of numbness over right inner thigh, intact motion  3. Post-surgical state (gastric sleeve surgery 5 days prior to presentation), immobile, morbidly obese, male sex    Plan:  1 Doing well on lovenox  2.  Continue lovenox 1mg/kg BID  3.  Appreciate surgical care  4.  Outpatient followup in 2-3  weeks    Dorothy [phone]

## 2021-11-26 NOTE — DISCHARGE NOTE PROVIDER - NSDCCPCAREPLAN_GEN_ALL_CORE_FT
PRINCIPAL DISCHARGE DIAGNOSIS  Diagnosis: Pulmonary embolism  Assessment and Plan of Treatment: lovenox 115 mg subcutaneously twice daily   follow up with vascular cardiology Dr. De La Cruz in 1-2 weeks please call to schedule an appointment  please follow up with hematology at the [facility] please call [phone] to schedule an appointment      SECONDARY DISCHARGE DIAGNOSES  Diagnosis: Morbid obesity  Assessment and Plan of Treatment: bariatric liquid diet as outlined by dietician  refer to your sleeve gastrectomy dischage paperwork   follow up with Dr. Rodriguez in 1 week please call to schedule an appointment    Diagnosis: Rash  Assessment and Plan of Treatment:      PRINCIPAL DISCHARGE DIAGNOSIS  Diagnosis: Pulmonary embolism  Assessment and Plan of Treatment: lovenox 115 mg subcutaneously twice daily   follow up with vascular cardiology Dr. De La Cruz in 1-2 weeks please call to schedule an appointment  please follow up with hematology at the [facility] please call [phone] to schedule an appointment      SECONDARY DISCHARGE DIAGNOSES  Diagnosis: Morbid obesity  Assessment and Plan of Treatment: bariatric liquid diet as outlined by dietician  refer to your sleeve gastrectomy dischage paperwork   may shower let soap/water run over incisions   steristrips will fall off on own in about 1 week   follow up with Dr. Rodriguez in 1 week please call to schedule an appointment    Diagnosis: Rash  Assessment and Plan of Treatment:      PRINCIPAL DISCHARGE DIAGNOSIS  Diagnosis: Pulmonary embolism  Assessment and Plan of Treatment: lovenox 115 mg subcutaneously twice daily   follow up with vascular cardiology Dr. De La Cruz in 1-2 weeks please call to schedule an appointment  please follow up with hematology at the [facility] please call [phone] to schedule an appointment      SECONDARY DISCHARGE DIAGNOSES  Diagnosis: Morbid obesity  Assessment and Plan of Treatment: bariatric liquid diet as outlined by dietician  refer to your sleeve gastrectomy dischage paperwork   may shower let soap/water run over incisions   steristrips will fall off on own in about 1 week   follow up with Dr. Rodriguez in 1 week please call to schedule an appointment    Diagnosis: Rash  Assessment and Plan of Treatment: follow up w  diflucan and nystatin     PRINCIPAL DISCHARGE DIAGNOSIS  Diagnosis: Pulmonary embolism  Assessment and Plan of Treatment: lovenox 115 mg subcutaneously twice daily   follow up with vascular cardiology Dr. De La Cruz in 1-2 weeks please call to schedule an appointment  please follow up with hematology at the [facility] please call [phone] to schedule an appointment      SECONDARY DISCHARGE DIAGNOSES  Diagnosis: Morbid obesity  Assessment and Plan of Treatment: bariatric liquid diet as outlined by dietician  refer to your sleeve gastrectomy dischage paperwork   crush all medications   may shower let soap/water run over incisions   steristrips will fall off on own in about 1 week   follow up with Dr. Rodriguez in 1 week please call to schedule an appointment    Diagnosis: Rash  Assessment and Plan of Treatment: follow up with dermatology -they will call you to set up follow up appointment for next week   office info   [address]   [place] [address]  phone number [phone]   diflucan 400mg daily   nystatin powder twice daily   if you do not hear from dermatology on monday 11/29 please call [phone] to find out when appointment is

## 2021-11-26 NOTE — CONSULT NOTE ADULT - ASSESSMENT
#rash, groin  Differential favors cutaneous fungal infection, likely candidal  At this time recommend:  - cutaneous fungal swab culture  - c/w fluconazole and nystatin therapies  - as patient is to be discharged soon, he can follow up at our outpatient Dermatology office within 1 week(s)  [address], [address], [facility]  [phone]    The patient's chart was reviewed in addition to being discussed with the dermatology attending Dr. Susie Pizano.  Recommendations were communicated with the primary team.  Please page [phone] for further related questions.    Tasha Sierra MD  Resident Physician, PGY3  [facility]  Pager: [phone]  Office: [phone]

## 2021-11-27 LAB
CULTURE RESULTS: SIGNIFICANT CHANGE UP
CULTURE RESULTS: SIGNIFICANT CHANGE UP
PTR INTERPRETATION: SIGNIFICANT CHANGE UP
SPECIMEN SOURCE: SIGNIFICANT CHANGE UP
SPECIMEN SOURCE: SIGNIFICANT CHANGE UP

## 2021-11-29 ENCOUNTER — APPOINTMENT (OUTPATIENT)
Dept: SURGERY | Facility: CLINIC | Age: 51
End: 2021-11-29
Payer: COMMERCIAL

## 2021-11-29 VITALS
RESPIRATION RATE: 17 BRPM | HEIGHT: 67 IN | OXYGEN SATURATION: 98 % | DIASTOLIC BLOOD PRESSURE: 87 MMHG | HEART RATE: 73 BPM | SYSTOLIC BLOOD PRESSURE: 126 MMHG | BODY MASS INDEX: 38.14 KG/M2 | WEIGHT: 243 LBS | TEMPERATURE: 97.5 F

## 2021-11-29 PROCEDURE — 99024 POSTOP FOLLOW-UP VISIT: CPT

## 2021-11-29 NOTE — HISTORY OF PRESENT ILLNESS
[de-identified] : Ranjith is a 51 year old male here for a post operative visit. He is S/P Laparoscopic sleeve gastrectomy 11/17/21, complicated by DVT and bilateral PEs requiring readmission.\par During his hospitalization last week, he also developed a severe fungal infection of bilateral groins.\par All his symptoms have been improving since discharge.  He continues to take therapeutic Lovenox as prescribed.  He feels his breathing is unlabored and he is able to ambulate well.  He has no incisional pain.  He is tolerating bariatric liquids and protein shakes without dysphagia.  He is having normal bowel function.\par

## 2021-11-29 NOTE — ASSESSMENT
[FreeTextEntry1] : 51-year-old male recovering well status post laparoscopic sleeve gastrectomy on 11/17/2021, complicated by acute DVT and PE in setting of inactivity.  Also with suspected fungal rash in both groins.\par Recovering well, now on therapeutic Lovenox.

## 2021-11-29 NOTE — PHYSICAL EXAM
[Obese, well nourished, in no acute distress] : obese, well nourished, in no acute distress [Normal] : affect appropriate [de-identified] : Normal respirations [de-identified] : Soft, nontender, nondistended.  Incisions well-healed without erythema or drainage [de-identified] : Bilateral lower extremities no edema

## 2021-11-29 NOTE — PLAN
[FreeTextEntry1] : [] Advance to pureed foods as per bariatric diet instructions at 2 weeks\par [] emphasized importance of maintaining excellent hydration, monitoring urine output and color, and keeping a bottle of water available at all times\par [] 1-2 protein shakes per day \par [] walk as much as tolerated\par [] cont lovenox\par [] Patient will schedule follow-up with cardiologist and hematologist\par [] Patient is scheduled to see dermatology tomorrow\par \par F/u 2 weeks

## 2021-11-30 ENCOUNTER — APPOINTMENT (OUTPATIENT)
Dept: DERMATOLOGY | Facility: CLINIC | Age: 51
End: 2021-11-30
Payer: COMMERCIAL

## 2021-11-30 PROCEDURE — 99214 OFFICE O/P EST MOD 30 MIN: CPT

## 2021-11-30 RX ORDER — NYSTATIN 100000 [USP'U]/G
100000 CREAM TOPICAL
Qty: 1 | Refills: 3 | Status: ACTIVE | COMMUNITY
Start: 2021-11-30 | End: 1900-01-01

## 2021-11-30 RX ORDER — FLUCONAZOLE 200 MG/1
200 TABLET ORAL
Qty: 14 | Refills: 0 | Status: ACTIVE | COMMUNITY
Start: 2021-11-30 | End: 1900-01-01

## 2021-12-01 ENCOUNTER — OUTPATIENT (OUTPATIENT)
Dept: OUTPATIENT SERVICES | Facility: HOSPITAL | Age: 51
LOS: 1 days | Discharge: HOME | End: 2021-12-01

## 2021-12-01 DIAGNOSIS — Z98.890 OTHER SPECIFIED POSTPROCEDURAL STATES: Chronic | ICD-10-CM

## 2021-12-01 DIAGNOSIS — I26.90 SEPTIC PULMONARY EMBOLISM WITHOUT ACUTE COR PULMONALE: ICD-10-CM

## 2021-12-01 DIAGNOSIS — Z98.89 OTHER SPECIFIED POSTPROCEDURAL STATES: Chronic | ICD-10-CM

## 2021-12-01 DIAGNOSIS — Z98.52 VASECTOMY STATUS: Chronic | ICD-10-CM

## 2021-12-01 DIAGNOSIS — Z90.3 ACQUIRED ABSENCE OF STOMACH [PART OF]: Chronic | ICD-10-CM

## 2021-12-03 ENCOUNTER — APPOINTMENT (OUTPATIENT)
Dept: HEMATOLOGY ONCOLOGY | Facility: CLINIC | Age: 51
End: 2021-12-03

## 2021-12-03 LAB — SURGICAL PATHOLOGY STUDY: SIGNIFICANT CHANGE UP

## 2021-12-07 ENCOUNTER — LABORATORY RESULT (OUTPATIENT)
Age: 51
End: 2021-12-07

## 2021-12-07 ENCOUNTER — NON-APPOINTMENT (OUTPATIENT)
Age: 51
End: 2021-12-07

## 2021-12-07 ENCOUNTER — APPOINTMENT (OUTPATIENT)
Dept: CARDIOLOGY | Facility: CLINIC | Age: 51
End: 2021-12-07
Payer: COMMERCIAL

## 2021-12-07 VITALS
HEART RATE: 72 BPM | RESPIRATION RATE: 16 BRPM | OXYGEN SATURATION: 97 % | DIASTOLIC BLOOD PRESSURE: 70 MMHG | SYSTOLIC BLOOD PRESSURE: 120 MMHG

## 2021-12-07 DIAGNOSIS — M79.89 OTHER SPECIFIED SOFT TISSUE DISORDERS: ICD-10-CM

## 2021-12-07 PROCEDURE — 99215 OFFICE O/P EST HI 40 MIN: CPT

## 2021-12-07 NOTE — PHYSICAL EXAM
[General Appearance - Well Developed] : well developed [Normal Appearance] : normal appearance [Well Groomed] : well groomed [General Appearance - Well Nourished] : well nourished [No Deformities] : no deformities [General Appearance - In No Acute Distress] : no acute distress [Normal Conjunctiva] : the conjunctiva exhibited no abnormalities [Eyelids - No Xanthelasma] : the eyelids demonstrated no xanthelasmas [Normal Oral Mucosa] : normal oral mucosa [No Oral Pallor] : no oral pallor [No Oral Cyanosis] : no oral cyanosis [Normal Jugular Venous A Waves Present] : normal jugular venous A waves present [Normal Jugular Venous V Waves Present] : normal jugular venous V waves present [No Jugular Venous Gallego A Waves] : no jugular venous gallego A waves [Respiration, Rhythm And Depth] : normal respiratory rhythm and effort [Exaggerated Use Of Accessory Muscles For Inspiration] : no accessory muscle use [Auscultation Breath Sounds / Voice Sounds] : lungs were clear to auscultation bilaterally [Heart Rate And Rhythm] : heart rate and rhythm were normal [Heart Sounds] : normal S1 and S2 [Murmurs] : no murmurs present [Abdomen Soft] : soft [Abdomen Tenderness] : non-tender [Abdomen Mass (___ Cm)] : no abdominal mass palpated [Abnormal Walk] : normal gait [Gait - Sufficient For Exercise Testing] : the gait was sufficient for exercise testing [Skin Color & Pigmentation] : normal skin color and pigmentation [] : no rash [No Venous Stasis] : no venous stasis [Skin Lesions] : no skin lesions [No Skin Ulcers] : no skin ulcer [No Xanthoma] : no  xanthoma was observed [Oriented To Time, Place, And Person] : oriented to person, place, and time [Affect] : the affect was normal [Mood] : the mood was normal [No Anxiety] : not feeling anxious

## 2021-12-08 NOTE — REASON FOR VISIT
[Follow-Up - From Hospitalization] : follow-up of a recent hospitalization for [FreeTextEntry2] : PE [FreeTextEntry1] : 12/7/2021\par Doing ok\par On Lovenox 120mg BID\par Breathing is worse than yesterday.  Overall breathing is BETTER than it was in the hospital\par Injecting lovenox BID into abdomen\par Seen by Dr. Rodriguez  - all good\par No blood in the urine or stool\par no blood work since hospital stay\par Cardio is Dr. Blackburn.  \par no palpitations\par Leg swelling \par On liquid diet still\par not fasting today\par \par

## 2021-12-08 NOTE — ASSESSMENT
[FreeTextEntry1] : 1. Bilateral PE without right heart strain\par --HD stable, on RA with SpO2 high 90s\par --Negative cardiac biomarkers\par --TTE with grossly normal LV systolic function.\par --PESI risk index 53 points, Class I, Very Low Risk: 0-1.6% 30-day mortality in\par this group.\par 2. R peroneal and soleal vein DVT\par --No evidence of phlegmasia on exam, report of numbness over right inner thigh,\par intact motion\par 3. Post-surgical state (gastric sleeve surgery 5 days prior to presentation),\par immobile, morbidly obese, male sex\par \par Plan:\par 1 Doing well on lovenox\par 2. Continue lovenox 1mg/kg BID - refill sent in\par 3. Appreciate surgical care\par 4. Labwork - will arrange.   FVL and prothrombin gene mutation checked at hospital were negative.  \par 5.  Repeat venous duplex and echocardiogram\par 6.  Return in 1 month . \par 7.  If dyspnea persists or worsens, then will consider repeat CTA chest, await above testing first. \par

## 2021-12-09 ENCOUNTER — APPOINTMENT (OUTPATIENT)
Dept: ULTRASOUND IMAGING | Facility: CLINIC | Age: 51
End: 2021-12-09
Payer: COMMERCIAL

## 2021-12-09 PROCEDURE — 93970 EXTREMITY STUDY: CPT

## 2021-12-10 LAB
ALBUMIN SERPL ELPH-MCNC: 4.6 G/DL
ALP BLD-CCNC: 71 U/L
ALT SERPL-CCNC: 41 U/L
ANION GAP SERPL CALC-SCNC: 13 MMOL/L
AST SERPL-CCNC: 30 U/L
BASOPHILS # BLD AUTO: 0.06 K/UL
BASOPHILS NFR BLD AUTO: 1.3 %
BILIRUB SERPL-MCNC: 0.4 MG/DL
BUN SERPL-MCNC: 12 MG/DL
CALCIUM SERPL-MCNC: 9.6 MG/DL
CHLORIDE SERPL-SCNC: 100 MMOL/L
CO2 SERPL-SCNC: 24 MMOL/L
CREAT SERPL-MCNC: 0.91 MG/DL
EOSINOPHIL # BLD AUTO: 0.08 K/UL
EOSINOPHIL NFR BLD AUTO: 1.7 %
GLUCOSE SERPL-MCNC: 92 MG/DL
HCT VFR BLD CALC: 46.9 %
HGB BLD-MCNC: 14.9 G/DL
IMM GRANULOCYTES NFR BLD AUTO: 0.2 %
LYMPHOCYTES # BLD AUTO: 1.95 K/UL
LYMPHOCYTES NFR BLD AUTO: 40.7 %
MAN DIFF?: NORMAL
MCHC RBC-ENTMCNC: 28.9 PG
MCHC RBC-ENTMCNC: 31.8 GM/DL
MCV RBC AUTO: 90.9 FL
MONOCYTES # BLD AUTO: 0.62 K/UL
MONOCYTES NFR BLD AUTO: 12.9 %
NEUTROPHILS # BLD AUTO: 2.07 K/UL
NEUTROPHILS NFR BLD AUTO: 43.2 %
NT-PROBNP SERPL-MCNC: 40 PG/ML
PLATELET # BLD AUTO: 244 K/UL
POTASSIUM SERPL-SCNC: 4.7 MMOL/L
PROT SERPL-MCNC: 7.1 G/DL
RBC # BLD: 5.16 M/UL
RBC # FLD: 12.3 %
SODIUM SERPL-SCNC: 138 MMOL/L
TROPONIN-T, HIGH SENSITIVITY: 6 NG/L
WBC # FLD AUTO: 4.79 K/UL

## 2021-12-13 ENCOUNTER — APPOINTMENT (OUTPATIENT)
Dept: CARDIOLOGY | Facility: CLINIC | Age: 51
End: 2021-12-13
Payer: COMMERCIAL

## 2021-12-13 ENCOUNTER — APPOINTMENT (OUTPATIENT)
Dept: SURGERY | Facility: CLINIC | Age: 51
End: 2021-12-13
Payer: COMMERCIAL

## 2021-12-13 VITALS
OXYGEN SATURATION: 98 % | TEMPERATURE: 97.2 F | HEIGHT: 67 IN | BODY MASS INDEX: 36.73 KG/M2 | RESPIRATION RATE: 17 BRPM | WEIGHT: 234 LBS | HEART RATE: 63 BPM | DIASTOLIC BLOOD PRESSURE: 80 MMHG | SYSTOLIC BLOOD PRESSURE: 122 MMHG

## 2021-12-13 DIAGNOSIS — I10 ESSENTIAL (PRIMARY) HYPERTENSION: ICD-10-CM

## 2021-12-13 DIAGNOSIS — R01.1 CARDIAC MURMUR, UNSPECIFIED: ICD-10-CM

## 2021-12-13 DIAGNOSIS — Z98.84 BARIATRIC SURGERY STATUS: ICD-10-CM

## 2021-12-13 PROCEDURE — 93306 TTE W/DOPPLER COMPLETE: CPT

## 2021-12-13 PROCEDURE — 99024 POSTOP FOLLOW-UP VISIT: CPT

## 2021-12-13 NOTE — PLAN
[FreeTextEntry1] : [] continue bariatric diet advancement per instructions\par [] reviewed importance of eating slowly, chewing thoroughly, stop eating when full\par [] recommend cardiovascular exercise as soon as he is cleared by the cardiologist\par [] continue multivitamins\par [] check nutrition panel\par [] Continue Lovenox for PE\par [] f/u in 2 months

## 2021-12-13 NOTE — ASSESSMENT
[FreeTextEntry1] : 51-year-old male recovering well status post laparoscopic sleeve gastrectomy on 11/17/2021, complicated by acute DVT and PE in setting of inactivity.  Also with suspected fungal rash in both groins.\par Recovering well, continuing on therapeutic Lovenox.

## 2021-12-13 NOTE — PHYSICAL EXAM
[Obese, well nourished, in no acute distress] : obese, well nourished, in no acute distress [Normal] : affect appropriate [de-identified] : Normal respirations [de-identified] : Soft, nontender, nondistended.  Incisions well-healed without erythema or drainage [de-identified] : Bilateral lower extremities no edema

## 2021-12-13 NOTE — HISTORY OF PRESENT ILLNESS
[de-identified] : LUIS is a 51 year old male here for routine postoperative visit s/p laparoscopic sleeve gastrectomy on 11/17/2021.\par His course was complicated by PE and he is now on therapeutic Lovenox.\par He is feeling much better since his last visit.  He is tolerating liquids and some puréed foods and thicker soups.  His groin rash has gotten better and he has follow-up scheduled with the dermatologist next month.\par He is walking for exercise, however has been advised to avoid strenuous activities by his cardiologist.\par He reports no pain or reflux.  He has no fevers.

## 2021-12-14 ENCOUNTER — NON-APPOINTMENT (OUTPATIENT)
Age: 51
End: 2021-12-14

## 2021-12-15 NOTE — PROCEDURE NOTE - NSRESPFAILURE_RESP_A_CORE
DOT Fine   Daughter Corinne returned call and I discussed results below. She verbalized an understanding and states pt is not good at expressing how she is feeling after her stoke. However, pt is very active at home and she does not think she is having any cardiac symptoms such as CP, SOB, fatigue, weakness etc. She will wait until her appt to see Dr Fine on 2/28/2022.    Thanks  Yuliya   
Left VM #1 for pt to return call.     
TRANSTHORACIC ECHO(TTE) COMPLETE W/ W/O IMAGING AGENT    ----- Message from Dread Pace MD sent at 12/14/2021  1:13 PM CST -----  Aortic valve bioprosthesis has moderate regurgitation.  Mitral stenosis is moderate to severe and MR is severe.  If she remains asymptomatic, we can further discuss next appointment    
hypoxic/acute

## 2021-12-20 ENCOUNTER — APPOINTMENT (OUTPATIENT)
Dept: DERMATOLOGY | Facility: CLINIC | Age: 51
End: 2021-12-20
Payer: COMMERCIAL

## 2021-12-20 DIAGNOSIS — B37.2 CANDIDIASIS OF SKIN AND NAIL: ICD-10-CM

## 2021-12-20 DIAGNOSIS — L30.9 DERMATITIS, UNSPECIFIED: ICD-10-CM

## 2021-12-20 DIAGNOSIS — L81.0 POSTINFLAMMATORY HYPERPIGMENTATION: ICD-10-CM

## 2021-12-20 PROCEDURE — 99213 OFFICE O/P EST LOW 20 MIN: CPT

## 2021-12-20 RX ORDER — KETOCONAZOLE 20 MG/G
2 CREAM TOPICAL
Qty: 1 | Refills: 2 | Status: ACTIVE | COMMUNITY
Start: 2021-11-30 | End: 1900-01-01

## 2021-12-20 RX ORDER — HYDROCORTISONE 25 MG/G
2.5 CREAM TOPICAL
Qty: 1 | Refills: 0 | Status: ACTIVE | COMMUNITY
Start: 2021-12-20 | End: 1900-01-01

## 2021-12-22 ENCOUNTER — NON-APPOINTMENT (OUTPATIENT)
Age: 51
End: 2021-12-22

## 2021-12-22 LAB
CULTURE RESULTS: SIGNIFICANT CHANGE UP
SPECIMEN SOURCE: SIGNIFICANT CHANGE UP

## 2021-12-23 NOTE — POST DISCHARGE NOTE - NOTIFICATION:
Patient notified of final fungal cx result showing rare candida albicans at his outpatient follow up appointment 12/20 and managed appropriately.

## 2021-12-26 ENCOUNTER — EMERGENCY (EMERGENCY)
Facility: HOSPITAL | Age: 51
LOS: 1 days | Discharge: HOME | End: 2021-12-26
Attending: EMERGENCY MEDICINE | Admitting: EMERGENCY MEDICINE
Payer: COMMERCIAL

## 2021-12-26 VITALS
RESPIRATION RATE: 18 BRPM | WEIGHT: 229.94 LBS | SYSTOLIC BLOOD PRESSURE: 111 MMHG | TEMPERATURE: 98 F | HEIGHT: 67 IN | DIASTOLIC BLOOD PRESSURE: 57 MMHG | OXYGEN SATURATION: 100 % | HEART RATE: 66 BPM

## 2021-12-26 VITALS
HEART RATE: 53 BPM | RESPIRATION RATE: 16 BRPM | OXYGEN SATURATION: 97 % | SYSTOLIC BLOOD PRESSURE: 125 MMHG | TEMPERATURE: 99 F | DIASTOLIC BLOOD PRESSURE: 75 MMHG

## 2021-12-26 DIAGNOSIS — Z98.52 VASECTOMY STATUS: Chronic | ICD-10-CM

## 2021-12-26 DIAGNOSIS — Z90.3 ACQUIRED ABSENCE OF STOMACH [PART OF]: Chronic | ICD-10-CM

## 2021-12-26 DIAGNOSIS — Z98.890 OTHER SPECIFIED POSTPROCEDURAL STATES: Chronic | ICD-10-CM

## 2021-12-26 DIAGNOSIS — Z98.89 OTHER SPECIFIED POSTPROCEDURAL STATES: Chronic | ICD-10-CM

## 2021-12-26 LAB
ALBUMIN SERPL ELPH-MCNC: 3.1 G/DL — LOW (ref 3.3–5)
ALP SERPL-CCNC: 57 U/L — SIGNIFICANT CHANGE UP (ref 40–120)
ALT FLD-CCNC: 51 U/L — SIGNIFICANT CHANGE UP (ref 12–78)
ANION GAP SERPL CALC-SCNC: 7 MMOL/L — SIGNIFICANT CHANGE UP (ref 5–17)
AST SERPL-CCNC: 26 U/L — SIGNIFICANT CHANGE UP (ref 15–37)
BASOPHILS # BLD AUTO: 0.03 K/UL — SIGNIFICANT CHANGE UP (ref 0–0.2)
BASOPHILS NFR BLD AUTO: 0.6 % — SIGNIFICANT CHANGE UP (ref 0–2)
BILIRUB SERPL-MCNC: 0.4 MG/DL — SIGNIFICANT CHANGE UP (ref 0.2–1.2)
BUN SERPL-MCNC: 12 MG/DL — SIGNIFICANT CHANGE UP (ref 7–23)
CALCIUM SERPL-MCNC: 8.3 MG/DL — LOW (ref 8.5–10.1)
CHLORIDE SERPL-SCNC: 110 MMOL/L — HIGH (ref 96–108)
CO2 SERPL-SCNC: 25 MMOL/L — SIGNIFICANT CHANGE UP (ref 22–31)
CREAT SERPL-MCNC: 0.92 MG/DL — SIGNIFICANT CHANGE UP (ref 0.5–1.3)
EOSINOPHIL # BLD AUTO: 0.09 K/UL — SIGNIFICANT CHANGE UP (ref 0–0.5)
EOSINOPHIL NFR BLD AUTO: 1.9 % — SIGNIFICANT CHANGE UP (ref 0–6)
FLUAV AG NPH QL: SIGNIFICANT CHANGE UP
FLUBV AG NPH QL: SIGNIFICANT CHANGE UP
GLUCOSE SERPL-MCNC: 123 MG/DL — HIGH (ref 70–99)
HCT VFR BLD CALC: 38.7 % — LOW (ref 39–50)
HGB BLD-MCNC: 13.3 G/DL — SIGNIFICANT CHANGE UP (ref 13–17)
IMM GRANULOCYTES NFR BLD AUTO: 0.2 % — SIGNIFICANT CHANGE UP (ref 0–1.5)
LYMPHOCYTES # BLD AUTO: 1.6 K/UL — SIGNIFICANT CHANGE UP (ref 1–3.3)
LYMPHOCYTES # BLD AUTO: 34 % — SIGNIFICANT CHANGE UP (ref 13–44)
MAGNESIUM SERPL-MCNC: 2.1 MG/DL — SIGNIFICANT CHANGE UP (ref 1.6–2.6)
MCHC RBC-ENTMCNC: 29.6 PG — SIGNIFICANT CHANGE UP (ref 27–34)
MCHC RBC-ENTMCNC: 34.4 GM/DL — SIGNIFICANT CHANGE UP (ref 32–36)
MCV RBC AUTO: 86 FL — SIGNIFICANT CHANGE UP (ref 80–100)
MONOCYTES # BLD AUTO: 0.59 K/UL — SIGNIFICANT CHANGE UP (ref 0–0.9)
MONOCYTES NFR BLD AUTO: 12.6 % — SIGNIFICANT CHANGE UP (ref 2–14)
NEUTROPHILS # BLD AUTO: 2.38 K/UL — SIGNIFICANT CHANGE UP (ref 1.8–7.4)
NEUTROPHILS NFR BLD AUTO: 50.7 % — SIGNIFICANT CHANGE UP (ref 43–77)
NRBC # BLD: 0 /100 WBCS — SIGNIFICANT CHANGE UP (ref 0–0)
PLATELET # BLD AUTO: 161 K/UL — SIGNIFICANT CHANGE UP (ref 150–400)
POTASSIUM SERPL-MCNC: 4.2 MMOL/L — SIGNIFICANT CHANGE UP (ref 3.5–5.3)
POTASSIUM SERPL-SCNC: 4.2 MMOL/L — SIGNIFICANT CHANGE UP (ref 3.5–5.3)
PROT SERPL-MCNC: 6.4 G/DL — SIGNIFICANT CHANGE UP (ref 6–8.3)
RBC # BLD: 4.5 M/UL — SIGNIFICANT CHANGE UP (ref 4.2–5.8)
RBC # FLD: 12.4 % — SIGNIFICANT CHANGE UP (ref 10.3–14.5)
RSV RNA NPH QL NAA+NON-PROBE: SIGNIFICANT CHANGE UP
SARS-COV-2 RNA SPEC QL NAA+PROBE: SIGNIFICANT CHANGE UP
SODIUM SERPL-SCNC: 142 MMOL/L — SIGNIFICANT CHANGE UP (ref 135–145)
TROPONIN I, HIGH SENSITIVITY RESULT: 6.7 NG/L — SIGNIFICANT CHANGE UP
WBC # BLD: 4.7 K/UL — SIGNIFICANT CHANGE UP (ref 3.8–10.5)
WBC # FLD AUTO: 4.7 K/UL — SIGNIFICANT CHANGE UP (ref 3.8–10.5)

## 2021-12-26 PROCEDURE — 74177 CT ABD & PELVIS W/CONTRAST: CPT | Mod: MA

## 2021-12-26 PROCEDURE — 96374 THER/PROPH/DIAG INJ IV PUSH: CPT | Mod: XU

## 2021-12-26 PROCEDURE — 71045 X-RAY EXAM CHEST 1 VIEW: CPT | Mod: 26

## 2021-12-26 PROCEDURE — 96375 TX/PRO/DX INJ NEW DRUG ADDON: CPT | Mod: XU

## 2021-12-26 PROCEDURE — 84484 ASSAY OF TROPONIN QUANT: CPT

## 2021-12-26 PROCEDURE — 74177 CT ABD & PELVIS W/CONTRAST: CPT | Mod: 26,MA

## 2021-12-26 PROCEDURE — 96376 TX/PRO/DX INJ SAME DRUG ADON: CPT | Mod: XU

## 2021-12-26 PROCEDURE — 99284 EMERGENCY DEPT VISIT MOD MDM: CPT | Mod: 25

## 2021-12-26 PROCEDURE — 71260 CT THORAX DX C+: CPT | Mod: MA

## 2021-12-26 PROCEDURE — 72125 CT NECK SPINE W/O DYE: CPT | Mod: MA

## 2021-12-26 PROCEDURE — 85025 COMPLETE CBC W/AUTO DIFF WBC: CPT

## 2021-12-26 PROCEDURE — 83735 ASSAY OF MAGNESIUM: CPT

## 2021-12-26 PROCEDURE — 90715 TDAP VACCINE 7 YRS/> IM: CPT

## 2021-12-26 PROCEDURE — 90471 IMMUNIZATION ADMIN: CPT

## 2021-12-26 PROCEDURE — 99285 EMERGENCY DEPT VISIT HI MDM: CPT

## 2021-12-26 PROCEDURE — 71260 CT THORAX DX C+: CPT | Mod: 26,MA

## 2021-12-26 PROCEDURE — 70450 CT HEAD/BRAIN W/O DYE: CPT | Mod: 26,MA

## 2021-12-26 PROCEDURE — 72125 CT NECK SPINE W/O DYE: CPT | Mod: 26,MA

## 2021-12-26 PROCEDURE — 70450 CT HEAD/BRAIN W/O DYE: CPT | Mod: MA

## 2021-12-26 PROCEDURE — 93005 ELECTROCARDIOGRAM TRACING: CPT

## 2021-12-26 PROCEDURE — 80053 COMPREHEN METABOLIC PANEL: CPT

## 2021-12-26 PROCEDURE — 87637 SARSCOV2&INF A&B&RSV AMP PRB: CPT

## 2021-12-26 PROCEDURE — 36415 COLL VENOUS BLD VENIPUNCTURE: CPT

## 2021-12-26 PROCEDURE — 93010 ELECTROCARDIOGRAM REPORT: CPT

## 2021-12-26 PROCEDURE — 71045 X-RAY EXAM CHEST 1 VIEW: CPT

## 2021-12-26 RX ORDER — ONDANSETRON 8 MG/1
4 TABLET, FILM COATED ORAL ONCE
Refills: 0 | Status: COMPLETED | OUTPATIENT
Start: 2021-12-26 | End: 2021-12-26

## 2021-12-26 RX ORDER — SODIUM CHLORIDE 9 MG/ML
1000 INJECTION INTRAMUSCULAR; INTRAVENOUS; SUBCUTANEOUS ONCE
Refills: 0 | Status: COMPLETED | OUTPATIENT
Start: 2021-12-26 | End: 2021-12-26

## 2021-12-26 RX ORDER — HYDROMORPHONE HYDROCHLORIDE 2 MG/ML
1 INJECTION INTRAMUSCULAR; INTRAVENOUS; SUBCUTANEOUS ONCE
Refills: 0 | Status: DISCONTINUED | OUTPATIENT
Start: 2021-12-26 | End: 2021-12-26

## 2021-12-26 RX ORDER — MORPHINE SULFATE 50 MG/1
4 CAPSULE, EXTENDED RELEASE ORAL ONCE
Refills: 0 | Status: DISCONTINUED | OUTPATIENT
Start: 2021-12-26 | End: 2021-12-26

## 2021-12-26 RX ORDER — TETANUS TOXOID, REDUCED DIPHTHERIA TOXOID AND ACELLULAR PERTUSSIS VACCINE, ADSORBED 5; 2.5; 8; 8; 2.5 [IU]/.5ML; [IU]/.5ML; UG/.5ML; UG/.5ML; UG/.5ML
0.5 SUSPENSION INTRAMUSCULAR ONCE
Refills: 0 | Status: COMPLETED | OUTPATIENT
Start: 2021-12-26 | End: 2021-12-26

## 2021-12-26 RX ORDER — HYDROMORPHONE HYDROCHLORIDE 2 MG/ML
0.5 INJECTION INTRAMUSCULAR; INTRAVENOUS; SUBCUTANEOUS ONCE
Refills: 0 | Status: DISCONTINUED | OUTPATIENT
Start: 2021-12-26 | End: 2021-12-26

## 2021-12-26 RX ADMIN — ONDANSETRON 4 MILLIGRAM(S): 8 TABLET, FILM COATED ORAL at 06:09

## 2021-12-26 RX ADMIN — MORPHINE SULFATE 4 MILLIGRAM(S): 50 CAPSULE, EXTENDED RELEASE ORAL at 06:10

## 2021-12-26 RX ADMIN — HYDROMORPHONE HYDROCHLORIDE 0.5 MILLIGRAM(S): 2 INJECTION INTRAMUSCULAR; INTRAVENOUS; SUBCUTANEOUS at 07:46

## 2021-12-26 RX ADMIN — MORPHINE SULFATE 4 MILLIGRAM(S): 50 CAPSULE, EXTENDED RELEASE ORAL at 06:09

## 2021-12-26 RX ADMIN — TETANUS TOXOID, REDUCED DIPHTHERIA TOXOID AND ACELLULAR PERTUSSIS VACCINE, ADSORBED 0.5 MILLILITER(S): 5; 2.5; 8; 8; 2.5 SUSPENSION INTRAMUSCULAR at 06:09

## 2021-12-26 RX ADMIN — HYDROMORPHONE HYDROCHLORIDE 1 MILLIGRAM(S): 2 INJECTION INTRAMUSCULAR; INTRAVENOUS; SUBCUTANEOUS at 10:26

## 2021-12-26 RX ADMIN — SODIUM CHLORIDE 1000 MILLILITER(S): 9 INJECTION INTRAMUSCULAR; INTRAVENOUS; SUBCUTANEOUS at 10:26

## 2021-12-26 NOTE — ED PROVIDER NOTE - MUSCULOSKELETAL, MLM
Spine appears normal, range of motion is not limited, no midline t or l spine ttp, diffuse muscle spasm, lumbar spine
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
## 2021-12-26 NOTE — ED PROVIDER NOTE - CROS ED SKIN ALL NEG
Anesthesia Start and Stop Event Times     Date Time Event    8/19/2019 0949 Ready for Procedure     1000 Anesthesia Start     1242 Anesthesia Stop        Responsible Staff  08/19/19    Name Role Begin End    Kyle Davidson M.D. Anesth 1000 1242        Preop Diagnosis (Free Text):  Pre-op Diagnosis     Pain due to internal orthopedic prosthetic device, sequela [[id number]]        Preop Diagnosis (Codes):  Diagnosis Information     Diagnosis Code(s): Pain due to internal orthopedic prosthetic device, sequela [T84.84XS]     Hx of total knee arthroplasty [Z96.659]        Post op Diagnosis  Pain due to internal orthopedic prosthetic device, sequela  right knee    Premium Reason  Non-Premium    Comments:                                                                       
- - -

## 2021-12-26 NOTE — ED PROVIDER NOTE - NSICDXPASTMEDICALHX_GEN_ALL_CORE_FT
PAST MEDICAL HISTORY:  Dyslipidemia     High cholesterol     HTN (hypertension)     Morbid obesity due to excess calories     Pericardial cyst 1993, resolved    Pulmonary embolism

## 2021-12-26 NOTE — ED PROVIDER NOTE - INTERPRETATION
MR#: [id number]

Account#: [id number]

Accession#: [id number]

Date of Study: 08/16/2021

Ordering Physician: LIANA KEMP, 

Referring Physician: LIANA KEMP, 

Tech: Kelly Sommers, [facility]





--------------- APPROVED REPORT --------------





EXAM: Two-dimensional and M-mode echocardiogram with Doppler and color Doppler.



Other Information 

Quality : AverageHR: 81bpm

Technically limited study due to  Obesity



INDICATION

COPD  

Congestive Heart Failure 



RISK FACTORS

Hypertension 

Diabetes



2D DIMENSIONS 

Left Atrium(2D)3.7 (1.6-4.0cm)IVSd1.1 (0.7-1.1cm)

Aortic Root(2D)3.1 (2.0-3.7cm)LVDd4.9 (3.9-5.9cm)

LVOT Diameter2.0 (1.8-2.4cm)PWd1.0 (0.7-1.1cm)

LVDs2.8 (2.5-4.0cm)FS (%) 41.8 %

SV80.6 ml



Aortic Valve

AoV Peak Luther.171.6cm/sAoV VTI42.1cm

AO Peak GR.11.8mmHgLVOT  VTI 22.53cm

AO Mean GR.7mmHg



Mitral Valve

MV E Ncvwgyne513.6cm/sMV DECEL [id number]

MV A Iiyovcmh801.9cm/sE/A  Ratio1.1



TDI

Lateral E' P. V8.11cm/sMedial E' P. V6.18cm/s

E/Lateral E'16.0E/Medial E'21.0



Tricuspid Valve

TR P. Sammawfu412yn/sRAP [id number]

TR Peak Gr.15hgArLXZZ11csYg



 LEFT VENTRICLE 

The left ventricle is normal size. There is mild concentric left ventricular hypertrophy. The left ve
ntricular systolic function is normal and the ejection fraction is within normal range. The Ejection 
Fraction is 50-55%. Wall motion consistent with conduction abnormality. Transmitral Doppler flow casper
nikita is Grade II-pseudonormal filling dynamics.



 RIGHT VENTRICLE 

The right ventricle is borderline dilated. There is normal right ventricular wall thickness. Systolic
 function is borderline reduced.



 ATRIA 

The left atrium is mildly dilated. The right atrium is borderline dilated. The interatrial septum is 
intact with no evidence for an atrial septal defect or patent foramen ovale as noted on 2-D or Dopple
r imaging.



 AORTIC VALVE 

The aortic valve is normal in structure and function. Doppler and Color Flow revealed no significant 
aortic regurgitation. There is no significant aortic valvular stenosis. Calculated aortic valve area 
is 1.93 cm2 with maximum pressure gradient of 15 mmHg and mean pressure gradient of 7 mmHg.



 MITRAL VALVE 

The mitral valve is normal in structure and function. There is no evidence of mitral valve prolapse. 
There is no mitral valve stenosis. Doppler and Color-flow revealed trace mitral regurgitation.



 TRICUSPID VALVE 

The tricuspid valve is normal in structure and function. Doppler and Color Flow revealed trace to mil
d tricuspid regurgitation with an estimated PAP of 55 mmHg. There is no tricuspid valve stenosis.



 PULMONIC VALVE 

The pulmonic valve is not well visualized. Doppler and Color Flow revealed trace pulmonic valvular re
gurgitation.



 GREAT VESSELS 

The aortic root is normal in size. The IVC is dilated.



 PERICARDIAL EFFUSION 

There is no evidence of significant pericardial effusion.



Critical Notification

Critical Value: No



<Conclusion>

The left ventricle is normal size.

The left ventricular systolic function is normal and the ejection fraction is within normal range.

The Ejection Fraction is 50-55%.

There is mild concentric left ventricular hypertrophy.

Doppler and Color Flow revealed no significant aortic regurgitation.

There is no significant aortic valvular stenosis.

Doppler and Color-flow revealed trace mitral regurgitation.

Doppler and Color Flow revealed trace to mild tricuspid regurgitation with an estimated PAP of 55 mmH
g.



Signed by : Ananda Ly MD

Electronically Approved : 08/16/2021 17:41:30
sinus king
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
## 2021-12-26 NOTE — ED PROVIDER NOTE - PATIENT PORTAL LINK FT
You can access the FollowMyHealth Patient Portal offered by [facility] by registering at the following website: [email or web address]. By joining its learning’s FollowMyHealth portal, you will also be able to view your health information using other applications (apps) compatible with our system.

## 2022-01-07 PROBLEM — I26.99 OTHER PULMONARY EMBOLISM WITHOUT ACUTE COR PULMONALE: Chronic | Status: ACTIVE | Noted: 2021-12-26

## 2022-01-11 ENCOUNTER — APPOINTMENT (OUTPATIENT)
Dept: CARDIOLOGY | Facility: CLINIC | Age: 52
End: 2022-01-11
Payer: COMMERCIAL

## 2022-01-11 DIAGNOSIS — Z86.711 PERSONAL HISTORY OF PULMONARY EMBOLISM: ICD-10-CM

## 2022-01-11 PROCEDURE — 99214 OFFICE O/P EST MOD 30 MIN: CPT

## 2022-01-11 RX ORDER — ENOXAPARIN SODIUM 100 MG/ML
100 INJECTION SUBCUTANEOUS
Qty: 9 | Refills: 0 | Status: DISCONTINUED | COMMUNITY
Start: 2021-12-22 | End: 2022-01-11
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
## 2022-01-12 RX ORDER — APIXABAN 5 MG/1
5 TABLET, FILM COATED ORAL
Qty: 180 | Refills: 1 | Status: DISCONTINUED | COMMUNITY
Start: 2022-01-11 | End: 2022-01-12

## 2022-01-21 PROBLEM — I10 HYPERTENSION: Status: ACTIVE | Noted: 2021-04-12

## 2022-01-21 PROBLEM — R01.1 MURMUR: Status: ACTIVE | Noted: 2022-01-21

## 2022-01-31 PROBLEM — Z98.84 S/P BARIATRIC SURGERY: Status: ACTIVE | Noted: 2022-01-31

## 2022-02-01 LAB
25(OH)D3 SERPL-MCNC: 24.9 NG/ML
ALBUMIN SERPL ELPH-MCNC: 4.1 G/DL
ALP BLD-CCNC: 71 U/L
ALT SERPL-CCNC: 19 U/L
ANION GAP SERPL CALC-SCNC: 12 MMOL/L
AST SERPL-CCNC: 18 U/L
BASOPHILS # BLD AUTO: 0.03 K/UL
BASOPHILS NFR BLD AUTO: 0.6 %
BILIRUB DIRECT SERPL-MCNC: 0.1 MG/DL
BILIRUB INDIRECT SERPL-MCNC: 0.3 MG/DL
BILIRUB SERPL-MCNC: 0.4 MG/DL
BUN SERPL-MCNC: 12 MG/DL
CALCIUM SERPL-MCNC: 9 MG/DL
CHLORIDE SERPL-SCNC: 105 MMOL/L
CO2 SERPL-SCNC: 22 MMOL/L
CREAT SERPL-MCNC: 0.75 MG/DL
EOSINOPHIL # BLD AUTO: 0.06 K/UL
EOSINOPHIL NFR BLD AUTO: 1.1 %
FERRITIN SERPL-MCNC: 307 NG/ML
FOLATE SERPL-MCNC: 16.3 NG/ML
GLUCOSE SERPL-MCNC: 89 MG/DL
HCT VFR BLD CALC: 45.3 %
HGB BLD-MCNC: 14.8 G/DL
IMM GRANULOCYTES NFR BLD AUTO: 0.4 %
IRON SATN MFR SERPL: 29 %
IRON SERPL-MCNC: 65 UG/DL
LYMPHOCYTES # BLD AUTO: 1.85 K/UL
LYMPHOCYTES NFR BLD AUTO: 35 %
MAN DIFF?: NORMAL
MCHC RBC-ENTMCNC: 28.8 PG
MCHC RBC-ENTMCNC: 32.7 GM/DL
MCV RBC AUTO: 88.3 FL
MONOCYTES # BLD AUTO: 0.55 K/UL
MONOCYTES NFR BLD AUTO: 10.4 %
NEUTROPHILS # BLD AUTO: 2.77 K/UL
NEUTROPHILS NFR BLD AUTO: 52.5 %
PLATELET # BLD AUTO: 256 K/UL
POTASSIUM SERPL-SCNC: 4.4 MMOL/L
PROT SERPL-MCNC: 6.1 G/DL
RBC # BLD: 5.13 M/UL
RBC # FLD: 13.1 %
SODIUM SERPL-SCNC: 138 MMOL/L
TIBC SERPL-MCNC: 220 UG/DL
UIBC SERPL-MCNC: 155 UG/DL
VIT B12 SERPL-MCNC: 523 PG/ML
WBC # FLD AUTO: 5.28 K/UL

## 2022-02-07 ENCOUNTER — APPOINTMENT (OUTPATIENT)
Dept: SURGERY | Facility: CLINIC | Age: 52
End: 2022-02-07
Payer: COMMERCIAL

## 2022-02-07 VITALS
SYSTOLIC BLOOD PRESSURE: 121 MMHG | DIASTOLIC BLOOD PRESSURE: 69 MMHG | TEMPERATURE: 97 F | OXYGEN SATURATION: 98 % | WEIGHT: 210.31 LBS | HEIGHT: 67 IN | RESPIRATION RATE: 18 BRPM | HEART RATE: 66 BPM | BODY MASS INDEX: 33.01 KG/M2

## 2022-02-07 DIAGNOSIS — Z09 ENCOUNTER FOR FOLLOW-UP EXAMINATION AFTER COMPLETED TREATMENT FOR CONDITIONS OTHER THAN MALIGNANT NEOPLASM: ICD-10-CM

## 2022-02-07 PROCEDURE — 99024 POSTOP FOLLOW-UP VISIT: CPT

## 2022-02-07 NOTE — HISTORY OF PRESENT ILLNESS
[de-identified] : LUIS is a 51 year old male here for routine postoperative visit s/p laparoscopic sleeve gastrectomy on 11/17/21.\par He reports he is doing very well, is very happy with his results and his weight loss.  He is exercising regularly, walking 3 to 5 miles.  Reports no heartburn or reflux.  Occasionally has trouble eating too quickly and has epigastric discomfort.\par Reports only drinking about 16 ounces of water per day.  Occasionally has some lightheadedness when standing up quickly, which usually resolves quickly.

## 2022-02-07 NOTE — PLAN
[FreeTextEntry1] : [] Recommended eating slowly chewing thoroughly, taking very small bites.\par [] cont bariatric diet and consult with nutritionist as needed\par [] goal 30 min cardiovascular exercise daily, with some weight resistance training as tolerated\par [] continue multivitamins\par [] f/u 3 months

## 2022-02-07 NOTE — ASSESSMENT
[FreeTextEntry1] : 51-year-old male recovering well status post laparoscopic sleeve gastrectomy on 11/17/2021, complicated by acute DVT and PE in setting of inactivity.  \par He is doing well and is very happy with his weight loss.\par Preoperative weight 268 pounds\par Current weight 210 pounds with BMI 32.9.

## 2022-02-07 NOTE — PHYSICAL EXAM
[Obese, well nourished, in no acute distress] : obese, well nourished, in no acute distress [Normal] : affect appropriate [de-identified] : Normal respirations [de-identified] : Soft, nontender, nondistended.  Incisions well-healed [de-identified] : Bilateral lower extremities no edema

## 2022-02-11 ENCOUNTER — LABORATORY RESULT (OUTPATIENT)
Age: 52
End: 2022-02-11

## 2022-02-11 ENCOUNTER — APPOINTMENT (OUTPATIENT)
Dept: ULTRASOUND IMAGING | Facility: CLINIC | Age: 52
End: 2022-02-11
Payer: COMMERCIAL

## 2022-02-11 PROCEDURE — 93970 EXTREMITY STUDY: CPT

## 2022-02-14 LAB — DEPRECATED D DIMER PPP IA-ACNC: <150 NG/ML DDU

## 2022-02-17 LAB — VIT B1 SERPL-MCNC: 136.4 NMOL/L

## 2022-03-21 ENCOUNTER — INPATIENT (INPATIENT)
Facility: HOSPITAL | Age: 52
LOS: 4 days | Discharge: HOME | DRG: 439 | End: 2022-03-26
Attending: FAMILY MEDICINE | Admitting: INTERNAL MEDICINE
Payer: COMMERCIAL

## 2022-03-21 VITALS
RESPIRATION RATE: 18 BRPM | TEMPERATURE: 98 F | HEIGHT: 67 IN | WEIGHT: 190.04 LBS | OXYGEN SATURATION: 97 % | HEART RATE: 80 BPM | SYSTOLIC BLOOD PRESSURE: 113 MMHG | DIASTOLIC BLOOD PRESSURE: 79 MMHG

## 2022-03-21 DIAGNOSIS — Z86.711 PERSONAL HISTORY OF PULMONARY EMBOLISM: ICD-10-CM

## 2022-03-21 DIAGNOSIS — Z98.89 OTHER SPECIFIED POSTPROCEDURAL STATES: Chronic | ICD-10-CM

## 2022-03-21 DIAGNOSIS — Z29.9 ENCOUNTER FOR PROPHYLACTIC MEASURES, UNSPECIFIED: ICD-10-CM

## 2022-03-21 DIAGNOSIS — K85.90 ACUTE PANCREATITIS WITHOUT NECROSIS OR INFECTION, UNSPECIFIED: ICD-10-CM

## 2022-03-21 DIAGNOSIS — Z98.890 OTHER SPECIFIED POSTPROCEDURAL STATES: Chronic | ICD-10-CM

## 2022-03-21 DIAGNOSIS — R10.9 UNSPECIFIED ABDOMINAL PAIN: ICD-10-CM

## 2022-03-21 DIAGNOSIS — Z98.52 VASECTOMY STATUS: Chronic | ICD-10-CM

## 2022-03-21 DIAGNOSIS — Z90.3 ACQUIRED ABSENCE OF STOMACH [PART OF]: Chronic | ICD-10-CM

## 2022-03-21 DIAGNOSIS — R77.8 OTHER SPECIFIED ABNORMALITIES OF PLASMA PROTEINS: ICD-10-CM

## 2022-03-21 DIAGNOSIS — E78.5 HYPERLIPIDEMIA, UNSPECIFIED: ICD-10-CM

## 2022-03-21 LAB
ALBUMIN SERPL ELPH-MCNC: 3.1 G/DL — LOW (ref 3.3–5)
ALP SERPL-CCNC: 69 U/L — SIGNIFICANT CHANGE UP (ref 40–120)
ALT FLD-CCNC: 38 U/L — SIGNIFICANT CHANGE UP (ref 12–78)
ANION GAP SERPL CALC-SCNC: 7 MMOL/L — SIGNIFICANT CHANGE UP (ref 5–17)
APPEARANCE UR: CLEAR — SIGNIFICANT CHANGE UP
APTT BLD: 32.2 SEC — SIGNIFICANT CHANGE UP (ref 27.5–35.5)
AST SERPL-CCNC: 26 U/L — SIGNIFICANT CHANGE UP (ref 15–37)
BASOPHILS # BLD AUTO: 0.03 K/UL — SIGNIFICANT CHANGE UP (ref 0–0.2)
BASOPHILS NFR BLD AUTO: 0.6 % — SIGNIFICANT CHANGE UP (ref 0–2)
BILIRUB SERPL-MCNC: 0.8 MG/DL — SIGNIFICANT CHANGE UP (ref 0.2–1.2)
BILIRUB UR-MCNC: ABNORMAL
BUN SERPL-MCNC: 11 MG/DL — SIGNIFICANT CHANGE UP (ref 7–23)
CALCIUM SERPL-MCNC: 9 MG/DL — SIGNIFICANT CHANGE UP (ref 8.5–10.1)
CHLORIDE SERPL-SCNC: 106 MMOL/L — SIGNIFICANT CHANGE UP (ref 96–108)
CO2 SERPL-SCNC: 25 MMOL/L — SIGNIFICANT CHANGE UP (ref 22–31)
COLOR SPEC: YELLOW — SIGNIFICANT CHANGE UP
CREAT SERPL-MCNC: 0.83 MG/DL — SIGNIFICANT CHANGE UP (ref 0.5–1.3)
DIFF PNL FLD: ABNORMAL
EGFR: 105 ML/MIN/1.73M2 — SIGNIFICANT CHANGE UP
EOSINOPHIL # BLD AUTO: 0.08 K/UL — SIGNIFICANT CHANGE UP (ref 0–0.5)
EOSINOPHIL NFR BLD AUTO: 1.5 % — SIGNIFICANT CHANGE UP (ref 0–6)
GLUCOSE SERPL-MCNC: 113 MG/DL — HIGH (ref 70–99)
GLUCOSE UR QL: NEGATIVE — SIGNIFICANT CHANGE UP
HCT VFR BLD CALC: 44.4 % — SIGNIFICANT CHANGE UP (ref 39–50)
HGB BLD-MCNC: 15.6 G/DL — SIGNIFICANT CHANGE UP (ref 13–17)
IMM GRANULOCYTES NFR BLD AUTO: 0.6 % — SIGNIFICANT CHANGE UP (ref 0–1.5)
INR BLD: 1.68 RATIO — HIGH (ref 0.88–1.16)
KETONES UR-MCNC: ABNORMAL
LACTATE SERPL-SCNC: 1.8 MMOL/L — SIGNIFICANT CHANGE UP (ref 0.7–2)
LEUKOCYTE ESTERASE UR-ACNC: ABNORMAL
LIDOCAIN IGE QN: 555 U/L — HIGH (ref 73–393)
LYMPHOCYTES # BLD AUTO: 1.24 K/UL — SIGNIFICANT CHANGE UP (ref 1–3.3)
LYMPHOCYTES # BLD AUTO: 22.8 % — SIGNIFICANT CHANGE UP (ref 13–44)
MAGNESIUM SERPL-MCNC: 2.3 MG/DL — SIGNIFICANT CHANGE UP (ref 1.6–2.6)
MCHC RBC-ENTMCNC: 29.9 PG — SIGNIFICANT CHANGE UP (ref 27–34)
MCHC RBC-ENTMCNC: 35.1 GM/DL — SIGNIFICANT CHANGE UP (ref 32–36)
MCV RBC AUTO: 85.2 FL — SIGNIFICANT CHANGE UP (ref 80–100)
MONOCYTES # BLD AUTO: 0.7 K/UL — SIGNIFICANT CHANGE UP (ref 0–0.9)
MONOCYTES NFR BLD AUTO: 12.9 % — SIGNIFICANT CHANGE UP (ref 2–14)
NEUTROPHILS # BLD AUTO: 3.36 K/UL — SIGNIFICANT CHANGE UP (ref 1.8–7.4)
NEUTROPHILS NFR BLD AUTO: 61.6 % — SIGNIFICANT CHANGE UP (ref 43–77)
NITRITE UR-MCNC: NEGATIVE — SIGNIFICANT CHANGE UP
NRBC # BLD: 0 /100 WBCS — SIGNIFICANT CHANGE UP (ref 0–0)
NT-PROBNP SERPL-SCNC: 3413 PG/ML — HIGH (ref 0–125)
PH UR: 5 — SIGNIFICANT CHANGE UP (ref 5–8)
PLATELET # BLD AUTO: 194 K/UL — SIGNIFICANT CHANGE UP (ref 150–400)
POTASSIUM SERPL-MCNC: 3.7 MMOL/L — SIGNIFICANT CHANGE UP (ref 3.5–5.3)
POTASSIUM SERPL-SCNC: 3.7 MMOL/L — SIGNIFICANT CHANGE UP (ref 3.5–5.3)
PROT SERPL-MCNC: 6.9 G/DL — SIGNIFICANT CHANGE UP (ref 6–8.3)
PROT UR-MCNC: 30 MG/DL
PROTHROM AB SERPL-ACNC: 19.8 SEC — HIGH (ref 10.5–13.4)
RAPID RVP RESULT: SIGNIFICANT CHANGE UP
RBC # BLD: 5.21 M/UL — SIGNIFICANT CHANGE UP (ref 4.2–5.8)
RBC # FLD: 14 % — SIGNIFICANT CHANGE UP (ref 10.3–14.5)
SARS-COV-2 RNA SPEC QL NAA+PROBE: SIGNIFICANT CHANGE UP
SODIUM SERPL-SCNC: 138 MMOL/L — SIGNIFICANT CHANGE UP (ref 135–145)
SP GR SPEC: 1.01 — SIGNIFICANT CHANGE UP (ref 1.01–1.02)
TROPONIN I, HIGH SENSITIVITY RESULT: 102.6 NG/L — HIGH
TROPONIN I, HIGH SENSITIVITY RESULT: 131.4 NG/L — HIGH
UROBILINOGEN FLD QL: 4
WBC # BLD: 5.44 K/UL — SIGNIFICANT CHANGE UP (ref 3.8–10.5)
WBC # FLD AUTO: 5.44 K/UL — SIGNIFICANT CHANGE UP (ref 3.8–10.5)

## 2022-03-21 PROCEDURE — 99285 EMERGENCY DEPT VISIT HI MDM: CPT

## 2022-03-21 PROCEDURE — 93010 ELECTROCARDIOGRAM REPORT: CPT

## 2022-03-21 PROCEDURE — 99223 1ST HOSP IP/OBS HIGH 75: CPT | Mod: GC

## 2022-03-21 PROCEDURE — 99253 IP/OBS CNSLTJ NEW/EST LOW 45: CPT

## 2022-03-21 PROCEDURE — 74177 CT ABD & PELVIS W/CONTRAST: CPT | Mod: 26,MA

## 2022-03-21 PROCEDURE — 71045 X-RAY EXAM CHEST 1 VIEW: CPT | Mod: 26

## 2022-03-21 RX ORDER — ACETAMINOPHEN 500 MG
650 TABLET ORAL EVERY 6 HOURS
Refills: 0 | Status: DISCONTINUED | OUTPATIENT
Start: 2022-03-21 | End: 2022-03-26

## 2022-03-21 RX ORDER — ATORVASTATIN CALCIUM 80 MG/1
40 TABLET, FILM COATED ORAL AT BEDTIME
Refills: 0 | Status: DISCONTINUED | OUTPATIENT
Start: 2022-03-21 | End: 2022-03-21

## 2022-03-21 RX ORDER — SERTRALINE 25 MG/1
50 TABLET, FILM COATED ORAL DAILY
Refills: 0 | Status: DISCONTINUED | OUTPATIENT
Start: 2022-03-21 | End: 2022-03-26

## 2022-03-21 RX ORDER — MORPHINE SULFATE 50 MG/1
4 CAPSULE, EXTENDED RELEASE ORAL ONCE
Refills: 0 | Status: DISCONTINUED | OUTPATIENT
Start: 2022-03-21 | End: 2022-03-21

## 2022-03-21 RX ORDER — ONDANSETRON 8 MG/1
4 TABLET, FILM COATED ORAL ONCE
Refills: 0 | Status: COMPLETED | OUTPATIENT
Start: 2022-03-21 | End: 2022-03-21

## 2022-03-21 RX ORDER — SODIUM CHLORIDE 9 MG/ML
2000 INJECTION INTRAMUSCULAR; INTRAVENOUS; SUBCUTANEOUS ONCE
Refills: 0 | Status: COMPLETED | OUTPATIENT
Start: 2022-03-21 | End: 2022-03-21

## 2022-03-21 RX ORDER — ENOXAPARIN SODIUM 100 MG/ML
85 INJECTION SUBCUTANEOUS EVERY 12 HOURS
Refills: 0 | Status: DISCONTINUED | OUTPATIENT
Start: 2022-03-21 | End: 2022-03-21

## 2022-03-21 RX ORDER — ATORVASTATIN CALCIUM 80 MG/1
20 TABLET, FILM COATED ORAL AT BEDTIME
Refills: 0 | Status: DISCONTINUED | OUTPATIENT
Start: 2022-03-21 | End: 2022-03-26

## 2022-03-21 RX ORDER — SODIUM CHLORIDE 9 MG/ML
1000 INJECTION INTRAMUSCULAR; INTRAVENOUS; SUBCUTANEOUS ONCE
Refills: 0 | Status: COMPLETED | OUTPATIENT
Start: 2022-03-21 | End: 2022-03-21

## 2022-03-21 RX ORDER — ATORVASTATIN CALCIUM 80 MG/1
20 TABLET, FILM COATED ORAL AT BEDTIME
Refills: 0 | Status: DISCONTINUED | OUTPATIENT
Start: 2022-03-21 | End: 2022-03-21

## 2022-03-21 RX ORDER — ENOXAPARIN SODIUM 100 MG/ML
90 INJECTION SUBCUTANEOUS EVERY 12 HOURS
Refills: 0 | Status: DISCONTINUED | OUTPATIENT
Start: 2022-03-21 | End: 2022-03-21

## 2022-03-21 RX ORDER — ACETAMINOPHEN 500 MG
1000 TABLET ORAL ONCE
Refills: 0 | Status: COMPLETED | OUTPATIENT
Start: 2022-03-21 | End: 2022-03-21

## 2022-03-21 RX ORDER — LOSARTAN POTASSIUM 100 MG/1
1 TABLET, FILM COATED ORAL
Qty: 0 | Refills: 0 | DISCHARGE

## 2022-03-21 RX ORDER — CHOLECALCIFEROL (VITAMIN D3) 125 MCG
1000 CAPSULE ORAL DAILY
Refills: 0 | Status: DISCONTINUED | OUTPATIENT
Start: 2022-03-21 | End: 2022-03-26

## 2022-03-21 RX ORDER — HYDROMORPHONE HYDROCHLORIDE 2 MG/ML
0.5 INJECTION INTRAMUSCULAR; INTRAVENOUS; SUBCUTANEOUS EVERY 8 HOURS
Refills: 0 | Status: DISCONTINUED | OUTPATIENT
Start: 2022-03-21 | End: 2022-03-24

## 2022-03-21 RX ORDER — ENOXAPARIN SODIUM 100 MG/ML
90 INJECTION SUBCUTANEOUS EVERY 12 HOURS
Refills: 0 | Status: DISCONTINUED | OUTPATIENT
Start: 2022-03-21 | End: 2022-03-24

## 2022-03-21 RX ORDER — FAMOTIDINE 10 MG/ML
20 INJECTION INTRAVENOUS ONCE
Refills: 0 | Status: COMPLETED | OUTPATIENT
Start: 2022-03-21 | End: 2022-03-21

## 2022-03-21 RX ORDER — HYDROMORPHONE HYDROCHLORIDE 2 MG/ML
1 INJECTION INTRAMUSCULAR; INTRAVENOUS; SUBCUTANEOUS EVERY 8 HOURS
Refills: 0 | Status: DISCONTINUED | OUTPATIENT
Start: 2022-03-21 | End: 2022-03-24

## 2022-03-21 RX ORDER — SODIUM CHLORIDE 9 MG/ML
1000 INJECTION INTRAMUSCULAR; INTRAVENOUS; SUBCUTANEOUS
Refills: 0 | Status: DISCONTINUED | OUTPATIENT
Start: 2022-03-21 | End: 2022-03-23

## 2022-03-21 RX ORDER — LANOLIN ALCOHOL/MO/W.PET/CERES
3 CREAM (GRAM) TOPICAL AT BEDTIME
Refills: 0 | Status: DISCONTINUED | OUTPATIENT
Start: 2022-03-21 | End: 2022-03-26

## 2022-03-21 RX ADMIN — FAMOTIDINE 20 MILLIGRAM(S): 10 INJECTION INTRAVENOUS at 11:48

## 2022-03-21 RX ADMIN — HYDROMORPHONE HYDROCHLORIDE 0.5 MILLIGRAM(S): 2 INJECTION INTRAMUSCULAR; INTRAVENOUS; SUBCUTANEOUS at 21:15

## 2022-03-21 RX ADMIN — SODIUM CHLORIDE 1000 MILLILITER(S): 9 INJECTION INTRAMUSCULAR; INTRAVENOUS; SUBCUTANEOUS at 16:30

## 2022-03-21 RX ADMIN — SODIUM CHLORIDE 1000 MILLILITER(S): 9 INJECTION INTRAMUSCULAR; INTRAVENOUS; SUBCUTANEOUS at 11:52

## 2022-03-21 RX ADMIN — MORPHINE SULFATE 4 MILLIGRAM(S): 50 CAPSULE, EXTENDED RELEASE ORAL at 13:22

## 2022-03-21 RX ADMIN — Medication 3 MILLIGRAM(S): at 21:16

## 2022-03-21 RX ADMIN — ENOXAPARIN SODIUM 90 MILLIGRAM(S): 100 INJECTION SUBCUTANEOUS at 20:44

## 2022-03-21 RX ADMIN — ONDANSETRON 4 MILLIGRAM(S): 8 TABLET, FILM COATED ORAL at 11:48

## 2022-03-21 RX ADMIN — Medication 1000 MILLIGRAM(S): at 13:15

## 2022-03-21 RX ADMIN — SODIUM CHLORIDE 150 MILLILITER(S): 9 INJECTION INTRAMUSCULAR; INTRAVENOUS; SUBCUTANEOUS at 21:16

## 2022-03-21 RX ADMIN — Medication 400 MILLIGRAM(S): at 13:00

## 2022-03-21 RX ADMIN — Medication 1000 MILLIGRAM(S): at 13:30

## 2022-03-21 RX ADMIN — MORPHINE SULFATE 4 MILLIGRAM(S): 50 CAPSULE, EXTENDED RELEASE ORAL at 13:52

## 2022-03-21 RX ADMIN — SODIUM CHLORIDE 1000 MILLILITER(S): 9 INJECTION INTRAMUSCULAR; INTRAVENOUS; SUBCUTANEOUS at 20:05

## 2022-03-21 RX ADMIN — MORPHINE SULFATE 4 MILLIGRAM(S): 50 CAPSULE, EXTENDED RELEASE ORAL at 11:48

## 2022-03-21 RX ADMIN — MORPHINE SULFATE 4 MILLIGRAM(S): 50 CAPSULE, EXTENDED RELEASE ORAL at 12:15

## 2022-03-21 RX ADMIN — ATORVASTATIN CALCIUM 20 MILLIGRAM(S): 80 TABLET, FILM COATED ORAL at 21:17

## 2022-03-21 RX ADMIN — HYDROMORPHONE HYDROCHLORIDE 0.5 MILLIGRAM(S): 2 INJECTION INTRAMUSCULAR; INTRAVENOUS; SUBCUTANEOUS at 20:44

## 2022-03-21 NOTE — ED PROVIDER NOTE - PROGRESS NOTE DETAILS
Spoke with hospitalist, Dr. Bailey who accepted admission. Pt had episode of possible vasovagal syncope after feeling nauseous in ED, BP dropped to 50s systolic, heart rate 50s, started 3rd L of IVF by RN, cardio aware. Spoke with cardiologist, Dr. Perera who will consult pt for elevated trop. Pt had episode of possible vasovagal syncope after feeling nauseous in ED, sat up by wife and had few seconds of syncope as per RN, BP dropped to 50s systolic, heart rate 50s, started 3rd L of IVF by RN, cardio aware.

## 2022-03-21 NOTE — ED PROVIDER NOTE - CARE PLAN
1 Principal Discharge DX:	Abdominal pain  Secondary Diagnosis:	Dehydration  Secondary Diagnosis:	Syncope  Secondary Diagnosis:	Elevated troponin

## 2022-03-21 NOTE — CONSULT NOTE ADULT - SUBJECTIVE AND OBJECTIVE BOX
[facility] - Jorgito Barragan, Waylon Watson, Cami, Trever, Umer Florentino  Office Number: [phone]    Initial Consult Note  CHIEF COMPLAINT: Patient is a 51y old  Male who presents with a chief complaint of abdominal pain, weakness, chills, while in ED found to have elevated troponin.  HPI: 52 y/o M with hx of HTN, HLD, PE, DVT on xarelto, gastric sleeve in 11/2021 by Dr. Rodriguez at [facility] presents with c/o vomiting, diarrhea, fever, chills, body aches and generalized weakness. Pt states that he was on a business trip in [place] last week, started having vomiting and non-bloody watery diarrhea on 3/16, returned to [place] on 3/19 and started also having tactile fever, chills, generalized weakness and body aches. States that he has no vomiting for 2 days but has worsening lethargy. Pt also admits to upper abdominal discomfort. Denies CP, cough, runny nose, sore throat, known sick contacts. Pt is vaccinated x 3 for covid. Had neg rapid covid test at home yesterday.    Cardiology consulted for elevated troponin. Patient reports that he follows with Dr Blackburn for history of HTN and HLD. He has not been on BP meds possible due to weight loss from gastric sleeve. He lost 80 pounds after procedure. He tells me the PE and DVT were a complication after  surgery. His last dose of Xarelto was last night. He has not taken a dose today. He denies CP, but does recall some ROA over the weekend during his travelling. He currently has epigastric pain described as severe.        Allergies    No Known Allergies    Intolerances      PAST MEDICAL & SURGICAL HISTORY:  Dyslipidemia    Pericardial cyst  1993, resolved    High cholesterol    HTN (hypertension)    Morbid obesity due to excess calories    Pulmonary embolism    Status post hip surgery  R hip, repair of labrum, 2013    H/O hernia repair  2010 x2    H/O vasectomy  2007    S/P colonoscopy  2020    H/O gastric sleeve      MEDICATIONS  (STANDING):    MEDICATIONS  (PRN):    FAMILY HISTORY:  Family history of CABG (Father)    Family history of prostate cancer (Father)    Family history of stroke (Sibling)      SOCIAL HISTORY    Marital Status: M  Occupation:   Lives with: spouse        REVIEW OF SYSTEMS   CONSTITUTIONAL: + fevers, + chills, + fatigue, No weight gain  EYES: No vision changes, No vertigo, No throat pain   ENT: No congestion, No ear pain, No sore throat.  NECK: No pain, No stiffness  RESPIRATORY: + shortness of breath, No cough, No wheezing, No hemoptysis  CARDIOVASCULAR: No chest pain. No palpitations, + ROA, No orthopnea, No PND, No pleuritic pain  GASTROINTESTINAL: + abdominal pain, + nausea, + vomiting, No hematemesis, + diarrhea No constipation. No melena  GENITOURINARY: No dysuria, No frequency, No incontinence, No hematuria  NEUROLOGICAL: No dizziness, No lightheadedness, No syncope, No LOC, No headache, No numbness, + weakness  MUSCULOSKELETAL: No joint pain, No joint swelling.  PSYCHIATRIC: No anxiety, No depression  DERMATOLOGY: + diaphoresis. No itching, No rashes, No pressure ulcers  HEME/LYMPH: No easy bruising, or bleeding gums  All other review of systems is negative unless indicated above.    VITAL SIGNS:   Vital Signs Last 24 Hrs  T(C): 36.4 (21 Mar 2022 11:00), Max: 36.4 (21 Mar 2022 11:00)  T(F): 97.5 (21 Mar 2022 11:00), Max: 97.5 (21 Mar 2022 11:00)  HR: 64 (21 Mar 2022 14:20) (50 - 80)  BP: 92/56 (21 Mar 2022 14:20) (50/35 - 113/79)  BP(mean): --  RR: 18 (21 Mar 2022 11:30) (18 - 18)  SpO2: 100% (21 Mar 2022 11:30) (97% - 100%)    Physical Exam:    Appearance: NAD, no distress, alert, Well developed   HEENT: Moist Mucous Membranes, Anicteric  Cardiovascular: Regular rate and rhythm, Normal S1 S2, No JVD, No murmurs, No rubs, gallops or clicks  Respiratory: Lungs clear to auscultation. No rales, No rhonchi, No wheezing. No tenderness to palpation  Gastrointestinal:  Soft, + epigastric tender, + BS  Neurologic: Non-focal  Skin: Warm and dry, No rashes, No ecchymosis, No cyanosis, No ulcers   Musculoskeletal: No clubbing, No cyanosis  Vascular: Peripheral pulses palpable 2+ bilaterally  Psychiatry: Mood & affect appropriate  Lymph: No peripheral edema.     I&O's Summary      LABS: All Labs Reviewed:                        15.6   5.44  )-----------( 194      ( 21 Mar 2022 11:42 )             44.4     21 Mar 2022 11:42    138    |  106    |  11     ----------------------------<  113    3.7     |  25     |  0.83     Ca    9.0        21 Mar 2022 11:42  Mg     2.3       21 Mar 2022 11:42    TPro  6.9    /  Alb  3.1    /  TBili  0.8    /  DBili  x      /  AST  26     /  ALT  38     /  AlkPhos  69     21 Mar 2022 11:42    PT/INR - ( 21 Mar 2022 11:42 )   PT: 19.8 sec;   INR: 1.68 ratio         PTT - ( 21 Mar 2022 11:42 )  PTT:32.2 sec  Troponin I, High Sensitivity Result: 131.4 ng/L (03-21-22 @ 11:42)  Serum Pro-Brain Natriuretic Peptide: 3413 pg/mL (03-21-22 @ 11:42)      < from: CT Abdomen and Pelvis w/ IV Cont (03.21.22 @ 13:50) >  ACC: [id number] EXAM:  CT ABDOMEN AND PELVIS IC                        PROCEDURE DATE:  03/21/2022    INTERPRETATION:  CLINICAL INFORMATION: Abdominal pain and vomiting and   diarrhea  COMPARISON: 12/26/2021  CONTRAST/COMPLICATIONS:  IV Contrast: Omnipaque 350  90 cc administered   10 cc discarded  Oral Contrast: NONE  Complications: None reported at time of study completion  PROCEDURE:  CT of the Abdomen and Pelvis was performed.  Sagittal and coronal reformats were performed.  FINDINGS:  LOWER CHEST: Lower lobe atelectasis.  LIVER: Multiple subcentimeter hepatic hypodensities, too small to further   characterize.  BILE DUCTS: Mild intrahepatic dilatation.  GALLBLADDER: Within normal limits.  SPLEEN: Within normal limits.  PANCREAS: Within normal limits.  ADRENALS: Within normal limits.  KIDNEYS/URETERS: Within normal limits.  BLADDER: Within normal limits.  REPRODUCTIVE ORGANS: Prostate within normal limits.  BOWEL: Sleeve gastrectomy. No bowel obstruction. Appendix is within   normal limits.  PERITONEUM: No ascites.  VESSELS: Atherosclerotic changes.  RETROPERITONEUM/LYMPH NODES: Several prominent retroperitoneal lymph   nodes.  ABDOMINAL WALL: Right inguinal hernia repair.  BONES: Degenerative changes.    IMPRESSION:  No CT evidence of bowel obstruction or colitis.  Mild intrahepatic biliary duct dilatation. MRCP may be obtained for   further assessment.  --- End of Report ---  < end of copied text >      < from: Xray Chest 1 View- PORTABLE-Urgent (03.21.22 @ 12:19) >  ACC: [id number] EXAM:  XR CHEST PORTABLE URGENT 1V                        PROCEDURE DATE:  03/21/2022    INTERPRETATION:  EXAMINATION: XR CHEST URGENT  CLINICAL INDICATION: Sepsis  TECHNIQUE: Frontal radiograph of the chest was obtained.  COMPARISON: 12/26/2021.  FINDINGS:  Cardiac silhouette normal in size. Lungs are clear. No pleural effusion   or pneumothorax.  IMPRESSION:  Clear lungs.  --- End of Report ---  < end of copied text >           [facility] - Jorgito Barragan, Waylon Watson, Cami, Trever, Umer Florentino  Office Number: [phone]    Initial Consult Note  CHIEF COMPLAINT: Patient is a 51y old  Male who presents with a chief complaint of abdominal pain, weakness, chills, while in ED found to have elevated troponin.  HPI: 52 y/o M with hx of HTN, HLD, PE, DVT on xarelto, gastric sleeve in 11/2021 by Dr. Rodriguez at [facility] presents with c/o vomiting, diarrhea, fever, chills, body aches and generalized weakness. Pt states that he was on a business trip in [place] last week, started having vomiting and non-bloody watery diarrhea on 3/16, returned to [place] on 3/19 and started also having tactile fever, chills, generalized weakness and body aches. States that he has no vomiting for 2 days but has worsening lethargy. Pt also admits to upper abdominal discomfort. Denies CP, cough, runny nose, sore throat, known sick contacts. Pt is vaccinated x 3 for covid. Had neg rapid covid test at home yesterday.    Cardiology consulted for elevated troponin. Patient reports that he follows with Dr Blackburn for history of HTN and HLD. He has not been on BP meds possible due to weight loss from gastric sleeve. He lost 80 pounds after procedure. He tells me the PE and DVT were a complication after  surgery. His last dose of Xarelto was last night. He has not taken a dose today. He denies CP, but does recall some ROA over the weekend during his travelling. He currently has epigastric pain described as severe.        Allergies    No Known Allergies    Intolerances      PAST MEDICAL & SURGICAL HISTORY:  Dyslipidemia    Pericardial cyst  1993, resolved    High cholesterol    HTN (hypertension)    Morbid obesity due to excess calories    Pulmonary embolism    Status post hip surgery  R hip, repair of labrum, 2013    H/O hernia repair  2010 x2    H/O vasectomy  2007    S/P colonoscopy  2020    H/O gastric sleeve      MEDICATIONS  (STANDING):    MEDICATIONS  (PRN):    FAMILY HISTORY:  Family history of CABG (Father)    Family history of prostate cancer (Father)    Family history of stroke (Sibling)      SOCIAL HISTORY no t/e/d    Marital Status: M  Occupation:   Lives with: spouse        REVIEW OF SYSTEMS   CONSTITUTIONAL: + fevers, + chills, + fatigue, No weight gain  EYES: No vision changes, No vertigo, No throat pain   ENT: No congestion, No ear pain, No sore throat.  NECK: No pain, No stiffness  RESPIRATORY: + shortness of breath, No cough, No wheezing, No hemoptysis  CARDIOVASCULAR: No chest pain. No palpitations, + ROA, No orthopnea, No PND, No pleuritic pain  GASTROINTESTINAL: + abdominal pain, + nausea, + vomiting, No hematemesis, + diarrhea No constipation. No melena  GENITOURINARY: No dysuria, No frequency, No incontinence, No hematuria  NEUROLOGICAL: No dizziness, No lightheadedness, No syncope, No LOC, No headache, No numbness, + weakness  MUSCULOSKELETAL: No joint pain, No joint swelling.  PSYCHIATRIC: No anxiety, No depression  DERMATOLOGY: + diaphoresis. No itching, No rashes, No pressure ulcers  HEME/LYMPH: No easy bruising, or bleeding gums  All other review of systems is negative unless indicated above.    VITAL SIGNS:   Vital Signs Last 24 Hrs  T(C): 36.4 (21 Mar 2022 11:00), Max: 36.4 (21 Mar 2022 11:00)  T(F): 97.5 (21 Mar 2022 11:00), Max: 97.5 (21 Mar 2022 11:00)  HR: 64 (21 Mar 2022 14:20) (50 - 80)  BP: 92/56 (21 Mar 2022 14:20) (50/35 - 113/79)  BP(mean): --  RR: 18 (21 Mar 2022 11:30) (18 - 18)  SpO2: 100% (21 Mar 2022 11:30) (97% - 100%)    Physical Exam:    Appearance: NAD, no distress, alert, Well developed   HEENT: Dry Mucous Membranes, Anicteric  Cardiovascular: Regular rate and rhythm, Normal S1 S2, No JVD, No murmurs, No rubs, gallops or clicks  Respiratory: Lungs clear to auscultation. No rales, No rhonchi, No wheezing. No tenderness to palpation  Gastrointestinal:  Soft, + epigastric tender, + BS  Neurologic: Non-focal  Skin: Warm and dry, No rashes, No ecchymosis, No cyanosis, No ulcers   Musculoskeletal: No clubbing, No cyanosis  Vascular: Peripheral pulses palpable 2+ bilaterally  Psychiatry: Mood & affect appropriate  Lymph: No peripheral edema.     I&O's Summary      LABS: All Labs Reviewed:                        15.6   5.44  )-----------( 194      ( 21 Mar 2022 11:42 )             44.4     21 Mar 2022 11:42    138    |  106    |  11     ----------------------------<  113    3.7     |  25     |  0.83     Ca    9.0        21 Mar 2022 11:42  Mg     2.3       21 Mar 2022 11:42    TPro  6.9    /  Alb  3.1    /  TBili  0.8    /  DBili  x      /  AST  26     /  ALT  38     /  AlkPhos  69     21 Mar 2022 11:42    PT/INR - ( 21 Mar 2022 11:42 )   PT: 19.8 sec;   INR: 1.68 ratio         PTT - ( 21 Mar 2022 11:42 )  PTT:32.2 sec  Troponin I, High Sensitivity Result: 131.4 ng/L (03-21-22 @ 11:42)  Serum Pro-Brain Natriuretic Peptide: 3413 pg/mL (03-21-22 @ 11:42)      < from: CT Abdomen and Pelvis w/ IV Cont (03.21.22 @ 13:50) >  ACC: [id number] EXAM:  CT ABDOMEN AND PELVIS IC                        PROCEDURE DATE:  03/21/2022    INTERPRETATION:  CLINICAL INFORMATION: Abdominal pain and vomiting and   diarrhea  COMPARISON: 12/26/2021  CONTRAST/COMPLICATIONS:  IV Contrast: Omnipaque 350  90 cc administered   10 cc discarded  Oral Contrast: NONE  Complications: None reported at time of study completion  PROCEDURE:  CT of the Abdomen and Pelvis was performed.  Sagittal and coronal reformats were performed.  FINDINGS:  LOWER CHEST: Lower lobe atelectasis.  LIVER: Multiple subcentimeter hepatic hypodensities, too small to further   characterize.  BILE DUCTS: Mild intrahepatic dilatation.  GALLBLADDER: Within normal limits.  SPLEEN: Within normal limits.  PANCREAS: Within normal limits.  ADRENALS: Within normal limits.  KIDNEYS/URETERS: Within normal limits.  BLADDER: Within normal limits.  REPRODUCTIVE ORGANS: Prostate within normal limits.  BOWEL: Sleeve gastrectomy. No bowel obstruction. Appendix is within   normal limits.  PERITONEUM: No ascites.  VESSELS: Atherosclerotic changes.  RETROPERITONEUM/LYMPH NODES: Several prominent retroperitoneal lymph   nodes.  ABDOMINAL WALL: Right inguinal hernia repair.  BONES: Degenerative changes.    IMPRESSION:  No CT evidence of bowel obstruction or colitis.  Mild intrahepatic biliary duct dilatation. MRCP may be obtained for   further assessment.  --- End of Report ---  < end of copied text >      < from: Xray Chest 1 View- PORTABLE-Urgent (03.21.22 @ 12:19) >  ACC: [id number] EXAM:  XR CHEST PORTABLE URGENT 1V                        PROCEDURE DATE:  03/21/2022    INTERPRETATION:  EXAMINATION: XR CHEST URGENT  CLINICAL INDICATION: Sepsis  TECHNIQUE: Frontal radiograph of the chest was obtained.  COMPARISON: 12/26/2021.  FINDINGS:  Cardiac silhouette normal in size. Lungs are clear. No pleural effusion   or pneumothorax.  IMPRESSION:  Clear lungs.  --- End of Report ---  < end of copied text >

## 2022-03-21 NOTE — CONSULT NOTE ADULT - ATTENDING COMMENTS
pt with likely pancreatitis. needs GI eval.   Still hypotensive.  He is very dry on exam. needs IVF. Likely very orthostatics. monitor bp closely. hold lisinopril.   Monitor and replete electrolytes. Keep K>4.0 and Mg>2.0.  Trops likely elevated from demand. cont to trend EKG without ischemic changes.   Further cardiac workup will depend on clinical course.

## 2022-03-21 NOTE — H&P ADULT - NSHPREVIEWOFSYSTEMS_GEN_ALL_CORE
CONSTITUTIONAL: admits fever, chills, fatigue, weakness  HEENT: denies blurred vision, sore throat  SKIN: denies new lesions, rash  CARDIOVASCULAR: denies chest pain, chest pressure, palpitations  RESPIRATORY: denies shortness of breath, sputum production  GASTROINTESTINAL: denies nausea, vomiting, diarrhea, admits abdominal pain  GENITOURINARY: admits  dysuria, denies discharge  NEUROLOGICAL: denies numbness, headache, focal weakness  MUSCULOSKELETAL: denies new joint pain, muscle aches  HEMATOLOGIC: denies gross bleeding, bruising  LYMPHATICS: denies enlarged lymph nodes, extremity swelling CONSTITUTIONAL: admits fever, chills, fatigue, weakness  HEENT: denies blurred vision, sore throat  SKIN: denies new lesions, rash  CARDIOVASCULAR: denies chest pain, chest pressure, palpitations  RESPIRATORY: denies shortness of breath, sputum production  GASTROINTESTINAL: admits nausea and vomiting now resolved, admits abdominal pain. denies melena, hematochezia  GENITOURINARY: admits  dysuria, denies discharge  NEUROLOGICAL: denies numbness, headache, focal weakness  MUSCULOSKELETAL: denies new joint pain, muscle aches  HEMATOLOGIC: denies gross bleeding, bruising  LYMPHATICS: denies enlarged lymph nodes, extremity swelling

## 2022-03-21 NOTE — CONSULT NOTE ADULT - SUBJECTIVE AND OBJECTIVE BOX
Surgery Consultation    50 y/o M with hx of HTN, HLD, PE, DVT on xarelto, gastric sleeve in 2021 by Dr. Rodriguez at [facility] presents with c/o vomiting, diarrhea, fever, chills, body aches and generalized weakness. Pt states sx's started Wednesday after eating and caused him to feel lethargic throughout the week ultimately leading to him losing strength today and coming to ED for further assessment. Epigastric pain today described as constant, sharp, non-radiating, 11/10 with associated chills. Denies ETOH. Denies history of, chest pain, shortness of breath, urinary complaints.     PAST MEDICAL & SURGICAL HISTORY:  Dyslipidemia    Pericardial cyst  , resolved    High cholesterol    HTN (hypertension)    Morbid obesity due to excess calories    Pulmonary embolism    Status post hip surgery  R hip, repair of labrum,     H/O hernia repair  2010 x2    H/O vasectomy      S/P colonoscopy      H/O gastric sleeve      Allergies:  No Known Allergies    Home Medications:  losartan 25 mg oral tablet: 1 tab(s) orally once a day  rosuvastatin 10 mg oral tablet: 1 tab(s) orally once a day (at bedtime)  Vitamin D3 25 mcg (1000 intl units) oral tablet: 1 tab(s) orally once a day      Family History:  FAMILY HISTORY:  Family history of CABG (Father)    Family history of prostate cancer (Father)    Family history of stroke (Sibling)        ROS:  Constitutional: Denies fever, fatigue or weight loss.  Skin: Denies rash.  Eyes: Denies recent vision problems or eye pain.  ENT: Denies congestion, ear pain, or sore throat.  Endocrine: Denies thyroid problems.  Cardiovascular: Denies chest pain or palpation.  Respiratory: Denies cough, shortness of breath, congestion, or wheezing.  Gastrointestinal: SEE HPI  Genitourinary: Denies dysuria.  Musculoskeletal: Denies joint swelling.  Neurologic: Denies headache.      PHYSICAL EXAM:  GENERAL: No acute distress, well-developed  HEAD:  Atraumatic, Normocephalic  ABDOMEN: Soft, tender epigastrium, non-distended; bowel sounds+ laparoscopic incisional scars noted  NEUROLOGY: A&O x 3, no focal deficits    Data:  T(C): 36.4 (22 @ 11:00), Max: 36.4 (22 @ 11:00)  HR: 64 (22 @ 14:20) (50 - 80)  BP: 92/56 (22 @ 14:20) (50/35 - 113/79)  RR: 18 (22 @ 11:30) (18 - 18)  SpO2: 100% (22 @ 11:30) (97% - 100%)                        15.6   5.44  )-----------( 194      ( 21 Mar 2022 11:42 )             44.4         138  |  106  |  11  ----------------------------<  113<H>  3.7   |  25  |  0.83    Ca    9.0      21 Mar 2022 11:42  Mg     2.3         TPro  6.9  /  Alb  3.1<L>  /  TBili  0.8  /  DBili  x   /  AST  26  /  ALT  38  /  AlkPhos  69        LIVER FUNCTIONS - ( 21 Mar 2022 11:42 )  Alb: 3.1 g/dL / Pro: 6.9 g/dL / ALK PHOS: 69 U/L / ALT: 38 U/L / AST: 26 U/L / GGT: x           Urinalysis Basic - ( 21 Mar 2022 15:48 )    Color: Yellow / Appearance: Clear / S.015 / pH: x  Gluc: x / Ketone: Moderate  / Bili: Small / Urobili: 4   Blood: x / Protein: 30 mg/dL / Nitrite: Negative   Leuk Esterase: Trace / RBC: 0-2 /HPF / WBC 3-5   Sq Epi: x / Non Sq Epi: Occasional / Bacteria: Occasional    Radiology:  < from: CT Abdomen and Pelvis w/ IV Cont (22 @ 13:50) >  FINDINGS:  LOWER CHEST: Lower lobe atelectasis.    LIVER: Multiple subcentimeter hepatic hypodensities, too small to further   characterize.  BILE DUCTS: Mild intrahepatic dilatation.  GALLBLADDER: Within normal limits.  SPLEEN: Within normal limits.  PANCREAS: Within normal limits.  ADRENALS: Within normal limits.  KIDNEYS/URETERS: Within normal limits.    BLADDER: Within normal limits.  REPRODUCTIVE ORGANS: Prostate within normal limits.    BOWEL: Sleeve gastrectomy. No bowel obstruction. Appendix is within   normal limits.  PERITONEUM: No ascites.  VESSELS: Atherosclerotic changes.  RETROPERITONEUM/LYMPH NODES: Several prominent retroperitoneal lymph   nodes.  ABDOMINAL WALL: Right inguinal hernia repair.  BONES: Degenerative changes.    IMPRESSION:  No CT evidence of bowel obstruction or colitis.  Mild intrahepatic biliary duct dilatation. MRCP may be obtained for   further assessment.    --- End of Report ---    BREE WEISS MD; Attending Radiologist  This document has been electronically signed. Mar 21 2022  2:07PM    < end of copied text >

## 2022-03-21 NOTE — H&P ADULT - HISTORY OF PRESENT ILLNESS
50 y/o M with hx of HTN, HLD, PE, DVT on xarelto, gastric sleeve in 11/2021 by Dr. Rodriguez at [facility] presents with c/o vomiting, diarrhea, fever, chills, body aches and generalized weakness. Pt states that he was on a business trip in [place] last week, started having vomiting and non-bloody watery diarrhea on 3/16, returned to [place] on 3/19 and started also having tactile fever, chills, generalized weakness and body aches. States that he has no vomiting for 2 days but has worsening lethargy. Pt also admits to upper abdominal discomfort. Denies CP, cough, runny nose, sore throat, known sick contacts. Pt is vaccinated x 3 for covid. Had neg rapid covid test at home yesterday.    In the ED:  Vitals 113/79 BP, HR 80, T 97.5F, 18 RR, 97% SpO2  Pertinent labs include Lipase 555, serum pro-BNP 3413, troponin 131.4. UA wnl.   CXR shows clear lungs on official read.  CT abd/pelvis with IV contrast shows No CT evidence of bowel obstruction or colitis. Mild intrahepatic biliary duct dilatation. MRCP may be obtained for further assessment.  s/p 2L NS bolus, 1000mg tylenol IV, 20mg famotidine IV, 4mg zofran IV, 4mg morphine IV x 2 doses 52 y/o M with hx of HTN, HLD, PE, DVT on xarelto, gastric sleeve in 11/2021 by Dr. Rodriguez at [facility] presents to [facility] with progressive, severe abd pain. The abdominal pain began on 3/16, sudden in onset, located in the epigastric region, nonradiating. Pt describes the pain as deep and gnawing. Most commonly postprandial pain, worsened by PO solids and liquids. Associated with generalized weakness, feeling feverish, chills, lethargy since 3/16. Denies recent nausea or vomiting, but describes decreased PO intake with weight loss estimated 10 pounds. Pt was lightheaded and dizzy this AM when attempting to take his OTC supplements/vitamins and was helped down to the ground by his daughter, then reported to [facility] ED. Denied LOC, fall, or headstrike. Pt also admits 2 week hx of dysuria with blood tinged urine. Admits alteration in BM patterns, but denies diarrhea/constipation. Pt in recent hx denies cough, sore throat, SOB, chest pain, nausea, vomiting, melena, hematochezia.     In the ED:  Vitals 113/79 BP, HR 80, T 97.5F, 18 RR, 97% SpO2  Pertinent labs include Lipase 555, serum pro-BNP 3413, troponin 131.4. UA wnl.   CXR shows clear lungs on official read.  CT abd/pelvis with IV contrast shows No CT evidence of bowel obstruction or colitis. Mild intrahepatic biliary duct dilatation. MRCP may be obtained for further assessment.  s/p 2L NS bolus, 1000mg tylenol IV, 20mg famotidine IV, 4mg zofran IV, 4mg morphine IV x 2 doses

## 2022-03-21 NOTE — H&P ADULT - NSHPSOCIALHISTORY_GEN_ALL_CORE
Lives with wife in [place]  Independent with ADLs  ambulates independently  Denies tobacco use  Denies alcohol use since 11/2021. Prior, would have "a few drinks" a week, described as social drinker, and denied daily ETOH use.   denies illicit drug use.

## 2022-03-21 NOTE — ED ADULT TRIAGE NOTE - BEFAST ARM NUMBNESS
[address] [id number]  History and Physical Update    Pt Name: Ulysses Hilda  MRN: [id number]  YOB: 1947  Date of evaluation: 3/7/2022    I have examined the patient and reviewed the H&P/Consult and there are no changes to the patient or plans.       Emanuel Flores MD  Electronically signed 3/7/2022 at 9:07 AM No

## 2022-03-21 NOTE — CONSULT NOTE ADULT - SUBJECTIVE AND OBJECTIVE BOX
CHIEF COMPLAINT:  Weakness, abdominal pain    HPI:  51 year old male with PE on Xarelto, HTN, HLD, obesity s/p gastric sleeve 2021 presents with 4-5 days of vomiting, diarrhea, fever, chills, myalgias, and weakness. Has not been able to eat or drink anything substantial in past few days. PO intake exacerbates symptoms. Reports no alleviating factors. Symptoms started while on a business trip in [place]. After returning to [place] 3/19 symptoms worsened. Denies chest pain, palpitations, swelling, sick contacts. He is compliant with his anticoagulation. Lipase is elevated on labs. CT A/P negative for bowel obstruction or colitis. Does show mild intrahepatic biliary dilation. ICU called for hypotension. Has received 2L NS in ED. Last /68.      PAST MEDICAL & SURGICAL HISTORY:  Dyslipidemia    Pericardial cyst  , resolved    High cholesterol    HTN (hypertension)    Morbid obesity due to excess calories    Pulmonary embolism    Status post hip surgery  R hip, repair of labrum,     H/O hernia repair  2010 x2    H/O vasectomy      S/P colonoscopy      H/O gastric sleeve        FAMILY HISTORY:  Family history of CABG (Father)    Family history of prostate cancer (Father)    Family history of stroke (Sibling)        SOCIAL HISTORY:  Smoking: Denies  Substance Use: Denies  EtOH Use: Denies  Marital Status: [ ] Single [ ]  [ ]  [ ]   Sexual History:   Occupation:  Recent Travel:  Country of Birth:  Advance Directives:    Allergies    No Known Allergies    Intolerances        HOME MEDICATIONS:    REVIEW OF SYSTEMS:  Constitutional: [ ] negative [+] fevers [-] chills [ ] weight loss [ ] weight gain  HEENT: [ ] negative [ ] dry eyes [ ] eye irritation [ ] postnasal drip [ ] nasal congestion  CV: [ ] negative  [-] chest pain [-] orthopnea [-] palpitations [ ] murmur  Resp: [ ] negative [-] cough [-] shortness of breath [-] wheezing [-] sputum [-] hemoptysis  GI: [ ] negative [+] nausea [+] vomiting [+] diarrhea [-] constipation [+] abd pain [ ] dysphagia   : [ ] negative [-] dysuria [ ] nocturia [ ] hematuria [ ] increased urinary frequency  Musculoskeletal: [ ] negative [ ] back pain [-] myalgias [-] arthralgias [ ] fracture  Skin: [ ] negative [-] rash [ ] itch  Neurological: [ ] negative [-] headache [-] dizziness [ ] syncope [ ] weakness [ ] numbness  Psychiatric: [-] negative [ ] anxiety [ ] depression  Endocrine: [-] negative [ ] diabetes [ ] thyroid problem  Hematologic/Lymphatic: [-] negative [ ] anemia [ ] bleeding problem  Allergic/Immunologic: [-] negative [ ] itchy eyes [ ] nasal discharge [ ] hives [ ] angioedema  [x] All other systems negative  [ ] Unable to assess ROS because ________    OBJECTIVE:  ICU Vital Signs Last 24 Hrs  T(C): 36.4 (21 Mar 2022 11:00), Max: 36.4 (21 Mar 2022 11:00)  T(F): 97.5 (21 Mar 2022 11:00), Max: 97.5 (21 Mar 2022 11:00)  HR: 64 (21 Mar 2022 14:20) (50 - 80)  BP: 92/56 (21 Mar 2022 14:20) (50/35 - 113/79)  BP(mean): --  ABP: --  ABP(mean): --  RR: 18 (21 Mar 2022 11:30) (18 - 18)  SpO2: 100% (21 Mar 2022 11:30) (97% - 100%)        CAPILLARY BLOOD GLUCOSE          PHYSICAL EXAM:  General: No apparent distress  HEENT: NC/AT  Neck: Supple  Respiratory: CTAB, no wheezing or crackles, good air entry  Cardiovascular: RRR, no murmur, no LE edema  Abdomen: Soft, non-distended. Tender to light palpation over epigastric area.  Extremities: Warm  Skin: Intact  Neurological: A&Ox3, no focal deficits  Psychiatry: Normal mood and affect    HOSPITAL MEDICATIONS:        sodium chloride 0.9% Bolus 1000 milliLiter(s) IV Bolus once            LABS:                        15.6   5.44  )-----------( 194      ( 21 Mar 2022 11:42 )             44.4     Hgb Trend: 15.6<--  03-21    138  |  106  |  11  ----------------------------<  113<H>  3.7   |  25  |  0.83    Ca    9.0      21 Mar 2022 11:42  Mg     2.3     03-21    TPro  6.9  /  Alb  3.1<L>  /  TBili  0.8  /  DBili  x   /  AST  26  /  ALT  38  /  AlkPhos  69      Creatinine Trend: 0.83<--  PT/INR - ( 21 Mar 2022 11:42 )   PT: 19.8 sec;   INR: 1.68 ratio         PTT - ( 21 Mar 2022 11:42 )  PTT:32.2 sec  Urinalysis Basic - ( 21 Mar 2022 15:48 )    Color: Yellow / Appearance: Clear / S.015 / pH: x  Gluc: x / Ketone: Moderate  / Bili: Small / Urobili: 4   Blood: x / Protein: 30 mg/dL / Nitrite: Negative   Leuk Esterase: Trace / RBC: 0-2 /HPF / WBC 3-5   Sq Epi: x / Non Sq Epi: Occasional / Bacteria: Occasional      MICROBIOLOGY:     RADIOLOGY:  [x] Reviewed and interpreted by me  CXR 3/21/22 - no consolidations, edema, effusions

## 2022-03-21 NOTE — H&P ADULT - PROBLEM SELECTOR PLAN 3
Chronic, known hx of HLD  - on home rosuvastatin 10mg qD Trop 131 on admission  - f/u STAT cardiac enzymes, trend to peak  - f/u TTE  - likely elevated in the setting of demand ischemia due to dec PO intake  - cardiology [facility] consulted, f/u recs

## 2022-03-21 NOTE — CONSULT NOTE ADULT - ASSESSMENT
51 year old male with PE on Xarelto, HTN, HLD, obesity s/p gastric sleeve 11/2021 presents with 4-5 days of vomiting, diarrhea, fever, chills, myalgias, and weakness. Elevated lipase in setting of epigastric pain suggests acute pancreatitis. BP improved with IVF. Bedside point of care ultrasound performed. Lung exam shows normal aeration pattern anteriorly. No evidence of pleural effusions effusions. LVOT VTI 13.5 cm, suggesting adequate stroke volume. He does have an elevated pro-BNP but does not appear clinically volume overloaded. He has an elevated troponin, possible demand ischemia.     Recommend additional bolus of 1-2 L LR with reassessment of BP  GI consult for acute pancreatitis  Trend troponin / EKG. Cardiology follow up  AC for PE  No need for ICU at this time

## 2022-03-21 NOTE — ED PROVIDER NOTE - CLINICAL SUMMARY MEDICAL DECISION MAKING FREE TEXT BOX
50 y/o M with hx of HTN, HLD, PE, DVT on xarelto, gastric sleeve in 11/2021 by Dr. Rodriguez at [facility] presents with c/o vomiting, diarrhea, fever, chills, body aches and generalized weakness. Pt states that he was on a business trip in [place] last week, started having vomiting and non-bloody watery diarrhea on 3/16, returned to [place] on 3/19 and started also having tactile fever, chills, generalized weakness and body aches. States that he has no vomiting for 2 days but has worsening lethargy. Pt also admits to upper abdominal discomfort. Denies CP, cough, runny nose, sore throat, known sick contacts. Pt is vaccinated x 3 for covid. Had neg rapid covid test at home yesterday. PE; as above A/P: Viral syndrome, r/o covid r/o intraabdominal pathology; will get labs, cultures, RVP, CXR, CT abdomen/pelvis, IVF/Zofran/pepcid, reassess 50 y/o M with hx of HTN, HLD, PE, DVT on xarelto, gastric sleeve in 11/2021 by Dr. Rodriguez at [facility] presents with c/o vomiting, diarrhea, fever, chills, body aches and generalized weakness. Pt states that he was on a business trip in [place] last week, started having vomiting and non-bloody watery diarrhea on 3/16, returned to [place] on 3/19 and started also having tactile fever, chills, generalized weakness and body aches. States that he has no vomiting for 2 days but has worsening lethargy. Pt also admits to upper abdominal discomfort. Denies CP, cough, runny nose, sore throat, known sick contacts. Pt is vaccinated x 3 for covid. Had neg rapid covid test at home yesterday. PE; as above A/P: Viral syndrome, r/o covid r/o intraabdominal pathology; will get labs, cultures, RVP, CXR, CT abdomen/pelvis, IVF/Zofran/pepcid, reassess  MD agrees awith above

## 2022-03-21 NOTE — H&P ADULT - PROBLEM SELECTOR PLAN 2
Chronic, known hx of PE  - on home xarelto 20mg qHS  - c/w regimen Chronic, known hx of PE  - on home xarelto 20mg qHS  - will start lovenox BID dosing 85mg subq Chronic, known hx of PE  - on home xarelto 20mg qHS  - will start lovenox BID dosing 90mg subq

## 2022-03-21 NOTE — H&P ADULT - ATTENDING COMMENTS
52 y/o M with hx of HTN, HLD, PE, DVT on xarelto, gastric sleeve in 11/2021 by Dr. Rodriguez at [facility] presents to [facility] with progressive, severe abd pain. Admitted with acute pancreatitis.     HPI as above.   Patient seen and examined at bedside. Reports nausea, vomiting and epigastric pain for the last few days. Denies abdominal trauma. No new medications.   He is compliant with his Xarelto.     T(C): 36.9 (03-21-22 @ 16:45), Max: 36.9 (03-21-22 @ 16:45)  HR: 61 (03-21-22 @ 16:45) (50 - 80)  BP: 100/68 (03-21-22 @ 16:45) (50/35 - 113/79)  RR: 16 (03-21-22 @ 16:45) (16 - 18)  SpO2: 100% (03-21-22 @ 16:45) (97% - 100%)  Wt(kg): --    Physical Exam:   GENERAL: well-groomed, well-developed, NAD  HEENT: head NC/AT; EOM intact, conjunctiva & sclera clear; hearing grossly intact, moist mucous membranes  NECK: supple, no JVD  RESPIRATORY: CTA B/L, no wheezing, rales, rhonchi or rubs  CARDIOVASCULAR: S1&S2, RRR, no murmurs or gallops  ABDOMEN: soft, TTP of epigastrium, no echymoses noted.  non-distended, + Bowel sounds x4 quadrants, no guarding, rebound or rigidity  MUSCULOSKELETAL:  no clubbing, cyanosis or edema of all 4 extremities  LYMPH: no cervical lymphadenopathy  VASCULAR: Radial pulses 2+ bilaterally, no varicose veins   SKIN: warm and dry, color normal  NEUROLOGIC: AA&O X3, CN2-12 intact w/ no focal deficits, no sensory loss, motor Strength 5/5 in UE & LE B/L  Psych: Normal mood and affect, normal behavior    Plan:   Pancreatitis: continue with IVF.   -hypotension is resolved.   -keep NPO  -monitor LFT's  -obtain MRCP  -ketones in urine likely due to starvation ketosis.     Syncope: check TTE and orthostatics  -was likely in setting of hypotension.   -trend cardiac enzymes    Hx of PE/DVT.   -will place on Lovenox pending surgical recommendations and GI recommendations.     Incidental findings: Several prominent retroperitoneal lymph nodes.  -will need outpatient f/u.    Will hold clomiphene while inpatient.     DVT ppx: lovenox 50 y/o M with hx of HTN, HLD, PE, DVT on xarelto, gastric sleeve in 11/2021 by Dr. Rodriguez at [facility] presents to [facility] with progressive, severe abd pain. Admitted with acute pancreatitis.     HPI as above.   Patient seen and examined at bedside. Reports nausea, vomiting and epigastric pain for the last few days. Denies abdominal trauma. No new medications.   He is compliant with his Xarelto.     T(C): 36.9 (03-21-22 @ 16:45), Max: 36.9 (03-21-22 @ 16:45)  HR: 61 (03-21-22 @ 16:45) (50 - 80)  BP: 100/68 (03-21-22 @ 16:45) (50/35 - 113/79)  RR: 16 (03-21-22 @ 16:45) (16 - 18)  SpO2: 100% (03-21-22 @ 16:45) (97% - 100%)  Wt(kg): --    Physical Exam:   GENERAL: well-groomed, well-developed, NAD  HEENT: head NC/AT; EOM intact, conjunctiva & sclera clear; hearing grossly intact, moist mucous membranes  NECK: supple, no JVD  RESPIRATORY: CTA B/L, no wheezing, rales, rhonchi or rubs  CARDIOVASCULAR: S1&S2, RRR, no murmurs or gallops  ABDOMEN: soft, TTP of epigastrium, no echymoses noted.  non-distended, + Bowel sounds x4 quadrants, no guarding, rebound or rigidity  MUSCULOSKELETAL:  no clubbing, cyanosis or edema of all 4 extremities  LYMPH: no cervical lymphadenopathy  VASCULAR: Radial pulses 2+ bilaterally, no varicose veins   SKIN: warm and dry, color normal  NEUROLOGIC: AA&O X3, CN2-12 intact w/ no focal deficits, no sensory loss, motor Strength 5/5 in UE & LE B/L  Psych: Normal mood and affect, normal behavior    Plan:   Pancreatitis: continue with IVF.   -hypotension is resolved.   -keep NPO  -monitor LFT's  -obtain MRCP  -ketones in urine likely due to starvation ketosis.     Syncope: check TTE and orthostatics  -was likely in setting of hypotension.   -trend cardiac enzymes    Hx of PE/DVT. -he is compliant with Xarelto.   -will place on Lovenox pending surgical recommendations and GI recommendations.     Incidental findings: Several prominent retroperitoneal lymph nodes.  -will need outpatient f/u.    Will hold clomiphene while inpatient.     DVT ppx: lovenox

## 2022-03-21 NOTE — CONSULT NOTE ADULT - ASSESSMENT
Assessment:  50 y/o M with hx of HTN, HLD, PE, DVT on Xarelto gastric sleeve in 11/2021 by Dr. Rodriguez at [facility] presents with c/o vomiting, diarrhea, fever, chills, body aches and generalized weakness. Patient's symptoms c/w acute pancreatitis vs gastroenteritis. No CT evidence of acute pancreatitis. Concern for PE, unable to get second contrast load. POCUS reveals nl RV, less concern for submassive/massive PE. Patient w/elevated Troponin and BNP. Likely demand ischemia. Patient w/possible biliary duct dilation, possible cause of acute pancreatitis      Plan:  - Mucous membranes dry on exam. Patient can tolerate another 1L fluid bolus based of physical exam findings and POCUS (patient improved w/passive leg raise)   - Then place patient on maintenance fluid, PO intake has not been well according to patient, 8lb weight loss in one week   - Pain control   - Monitor HR and BP  - Trend cardiac enzymes   - Consider V/Q scan   - Consider formal TTE   - C/w Xarelto   - MRCP to r/o biliary pathology   - F/u cultures, less likely infective, no need for Abx at this time     At this time, patient does NOT require ICU level of care given hemodynamic stability. If patient's condition worsens, please feel free to reconsult     Discussed case w/MIC[facility] attending, Dr. Sellers

## 2022-03-21 NOTE — CONSULT NOTE ADULT - ASSESSMENT
50 y/o M with hx of HTN, HLD, PE, DVT on xarelto, gastric sleeve in 11/2021 by Dr. Rodriguez at [facility] presents with pancreatitis, epigastric pain and elevated BNP/Trops.

## 2022-03-21 NOTE — H&P ADULT - NSHPPHYSICALEXAM_GEN_ALL_CORE
T(C): 36.9 (03-21-22 @ 16:45), Max: 36.9 (03-21-22 @ 16:45)  HR: 61 (03-21-22 @ 16:45) (50 - 80)  BP: 100/68 (03-21-22 @ 16:45) (50/35 - 113/79)  RR: 16 (03-21-22 @ 16:45) (16 - 18)  SpO2: 100% (03-21-22 @ 16:45) (97% - 100%)    GENERAL: lethargic, appears stated age, no acute distress, conversant  EYES: anicteric sclerae, no exudates  ENMT: oropharynx clear without erythema, no exudates, mildly dry mucous membranes  NECK: supple, soft  LUNGS: clear to auscultation, no intercostal retractions  HEART: S1/S2, regular rate and rhythm, no murmurs noted, no lower extremity edema  GASTROINTESTINAL: abdomen is soft. tender to palpation in RUQ and epigastric region. no obvious organomegaly. nondistended. no rebound tenderness.   INTEGUMENT: good skin turgor, warm skin, appears well perfused  MUSCULOSKELETAL: no clubbing or cyanosis, no obvious deformity  NEUROLOGIC: awake, alert, oriented x3, good muscle tone in 4 extremities, no obvious sensory deficits  PSYCHIATRIC: mood is good, affect is congruent, linear and logical thought process  HEME/LYMPH: no obvious ecchymosis or petechiae T(C): 36.9 (03-21-22 @ 16:45), Max: 36.9 (03-21-22 @ 16:45)  HR: 61 (03-21-22 @ 16:45) (50 - 80)  BP: 100/68 (03-21-22 @ 16:45) (50/35 - 113/79)  RR: 16 (03-21-22 @ 16:45) (16 - 18)  SpO2: 100% (03-21-22 @ 16:45) (97% - 100%)    GENERAL: lethargic, appears stated age, no acute distress, conversant  EYES: anicteric sclerae, no exudates  ENMT: oropharynx clear without erythema, no exudates, mildly dry mucous membranes  NECK: supple, soft  LUNGS: clear to auscultation, no intercostal retractions  HEART: S1/S2, regular rate and rhythm, no murmurs noted, no lower extremity edema  GASTROINTESTINAL: abdomen is soft. tender to palpation in RUQ and epigastric region. no CVA tenderness b/l. no obvious organomegaly. nondistended. no rebound tenderness.   INTEGUMENT: good skin turgor, warm skin, appears well perfused.   MUSCULOSKELETAL: no clubbing or cyanosis, no obvious deformity  NEUROLOGIC: awake, alert, oriented x3, good muscle tone in 4 extremities, no obvious sensory deficits  PSYCHIATRIC: mood is good, affect is congruent, linear and logical thought process  HEME/LYMPH: no obvious ecchymosis or petechiae T(C): 36.9 (03-21-22 @ 16:45), Max: 36.9 (03-21-22 @ 16:45)  HR: 61 (03-21-22 @ 16:45) (50 - 80)  BP: 100/68 (03-21-22 @ 16:45) (50/35 - 113/79)  RR: 16 (03-21-22 @ 16:45) (16 - 18)  SpO2: 100% (03-21-22 @ 16:45) (97% - 100%)    GENERAL: appears stated age, no acute distress, conversant  EYES: anicteric sclerae, no exudates  ENMT: oropharynx clear without erythema, no exudates, mildly dry mucous membranes  NECK: supple, soft  LUNGS: clear to auscultation, no intercostal retractions  HEART: S1/S2, regular rate and rhythm, no murmurs noted, no lower extremity edema  GASTROINTESTINAL: abdomen is soft. tender to palpation in RUQ and epigastric region. no CVA tenderness b/l. no obvious organomegaly. nondistended. no rebound tenderness.   INTEGUMENT: good skin turgor, warm skin, appears well perfused.   MUSCULOSKELETAL: no clubbing or cyanosis, no obvious deformity  NEUROLOGIC: awake, alert, oriented x3, good muscle tone in 4 extremities, no obvious sensory deficits  PSYCHIATRIC: mood is good, affect is congruent, linear and logical thought process  HEME/LYMPH: no obvious ecchymosis or petechiae

## 2022-03-21 NOTE — ED ADULT NURSE NOTE - OBJECTIVE STATEMENT
Pt brought in by ambulance from home for weakness/syncopal episode.  Pt recently returned from [facility] he had "stomach virus" fever, chills,n,v,d-pt had gastric sleeve surgery in November and lost 60 pounds

## 2022-03-21 NOTE — PATIENT PROFILE ADULT - FALL HARM RISK - HARM RISK INTERVENTIONS
Assistance with ambulation/Assistance OOB with selected safe patient handling equipment/Communicate Risk of Fall with Harm to all staff/Discuss with provider need for PT consult/Monitor gait and stability/Reinforce activity limits and safety measures with patient and family/Sit up slowly, dangle for a short time, stand at bedside before walking/Tailored Fall Risk Interventions/Visual Cue: Yellow wristband and red socks/Bed in lowest position, wheels locked, appropriate side rails in place/Call bell, personal items and telephone in reach/Instruct patient to call for assistance before getting out of bed or chair/Non-slip footwear when patient is out of bed/[place] to call system/Physically safe environment - no spills, clutter or unnecessary equipment/Purposeful Proactive Rounding/Room/bathroom lighting operational, light cord in reach

## 2022-03-21 NOTE — ED PROVIDER NOTE - OBJECTIVE STATEMENT
52 y/o M with hx of HTN, HLD, PE, DVT on xarelto, gastric sleeve in 11/2021 by Dr. Rodriguez at [facility] presents with c/o vomiting, diarrhea, fever, chills, body aches and generalized weakness. Pt states that he was on a business trip in [place] last week, started having vomiting and non-bloody watery diarrhea on 3/16, returned to [place] on 3/19 and started also having tactile fever, chills, generalized weakness and body aches. States that he has no vomiting for 2 days but has worsening lethargy. Pt also admits to upper abdominal discomfort. Denies CP, cough, runny nose, sore throat, known sick contacts. Pt is vaccinated x 3 for covid. Had neg rapid covid test at home yesterday.  pcp: Dr. Solis 50 y/o M with hx of HTN, HLD, PE, DVT on xarelto, gastric sleeve in 11/2021 by Dr. Rodriguez at [facility] presents with c/o vomiting, diarrhea, fever, chills, body aches and generalized weakness. Pt states that he was on a business trip in [place] last week, started having vomiting and non-bloody watery diarrhea on 3/16, returned to [place] on 3/19 and started also having tactile fever, chills, generalized weakness and body aches. States that he has no vomiting for 2 days but has worsening lethargy. Pt also admits to upper abdominal pain x 2 days. Denies CP, cough, runny nose, sore throat, known sick contacts. Pt is vaccinated x 3 for covid. Had neg rapid covid test at home yesterday.  pcp: Dr. Solis

## 2022-03-21 NOTE — ED PROVIDER NOTE - CONSTITUTIONAL, MLM
appears lethargic, awake, alert, oriented to person, place, time/situation and in mild distress from pain normal...

## 2022-03-21 NOTE — H&P ADULT - PROBLEM SELECTOR PLAN 4
on xarelto on lovenox BID subq Chronic, known hx of HLD  - on home rosuvastatin 10mg qD  - interchange with atorvastatin 40mg qD

## 2022-03-21 NOTE — CONSULT NOTE ADULT - PROBLEM SELECTOR RECOMMENDATION 9
-Discussed with Dr. Shelby  -[facility] MRCP to r/o biliary cause  -[facility] GI consult  -manage conservatively with IVF resuscitation  -NPO, IVF, supportive care  -Care per primary team    Surgical Team Contact Information  Spectralink: Ext: [phone] or [phone]  Pager: [phone]

## 2022-03-21 NOTE — ED ADULT TRIAGE NOTE - CHIEF COMPLAINT QUOTE
Generalized weakness and chills w/ abdominal pain x 2 days. recent return from [place]. pt lowered self to floor, unable to stand due to weakness

## 2022-03-21 NOTE — PROVIDER CONTACT NOTE (CHANGE IN STATUS NOTIFICATION) - SITUATION
Pts wife came out of room stating pt was nauseous - when I arrived to pt's room he was pale diaphoretic and had a blank stare - pts b/p 50/35- HR 50's- pt was placed in Trendelenburg position

## 2022-03-21 NOTE — ED PROVIDER NOTE - NS ED ATTENDING STATEMENT MOD
This was a shared visit with the SOBIA. I reviewed and verified the documentation and independently performed the documented:

## 2022-03-21 NOTE — CONSULT NOTE ADULT - ATTENDING COMMENTS
50 yo male h/o obesity, lap sleeve gastrectomy Nov 2021, Weight 267, developed DVT/PE, placed on Xarelto. patient has been having some diarrhea x last 3 days, malaise, fever. Current weight 207. Patient having epigastric abd pain , better now compared to this am, CT Abd normal, mildy dilated intrahepatic bile duct. Normal WBC, normal LFts, lip 500's, elevated BMP, troponin,  PMH: obesity, DVT, PE  Med: xarelto  PSH: lap sleevegastrectomy  NKDA  ROS: abdpain, diarrhea  PE:  Abd soft, NT, ND    50 yo male with h/o PE/DVT on xarelto s/p lap sleeve gastrectomy, diarrhea, malaise, fever, likely due to viral syndrome, elevated BMP, troponin?  -Will f/u MRCP (dilated intraheap duct)  -No gallstone seen, No alcohol intake, doubt pancreatitris  -liquid diet OK  -cardiology f/u for elevated BMP

## 2022-03-21 NOTE — ED ADULT NURSE NOTE - CHIEF COMPLAINT QUOTE
Generalized weakness and chills w/ abdominal pain x 2 days. recent return from [place]. pt lowered self to floor, unable to stand due to weakness

## 2022-03-21 NOTE — CONSULT NOTE ADULT - ASSESSMENT
1 y/o M with hx of HTN, HLD, PE, DVT on xarelto, gastric sleeve in 11/2021 by Dr. Rodriguez at [facility] presents with c/o vomiting, diarrhea, fever, chills, body aches and generalized weakness now with elevated troponin.    6/21 Graded Exercise Stress Test  negative for ischemia, rare isolated PVCs noted  6/21 TTE: Normal LV and RV systolic function EF 64%. No significant valve disease     Elevated Troponin/PE DVT/HTN  - Patient presented with abdominal/epigastric pain, diarrhea and weakness x 4 days  - EKG showed SR @ 80 Bpm non specific t wave abnormality,   - Troponin 131.4 Trend CE x 2   - Check CK  CKMB    - CXR no acute pathology  - CTAP: Mild intrahepatic biliary duct dilatation, Lipase elevated, GI eval  - Xarelto on hold for now, last dose 3/20 in the evening  - BP soft 90s agree with IVF, chart event noted while in ED pt diaphoretic BP 50/35, placed in Trendelenburg, IVF bolus given with repeat 90/60,   - BNP elevated:  <--3413 however patient dioes not appear volume loaded, instead intravascularly dry  - Hold Lisinopril and statin  - Monitor and replete lytes, keep K>4, Mg>2.  - Will continue to follow.    Vinita Galicia, MS ANP, AGAP  Nurse Practitioner- Cardiology   Spectra #[phone]   1 y/o M with hx of HTN, HLD, PE, DVT on xarelto, gastric sleeve in 11/2021 by Dr. Rodriguez at [facility] presents with c/o vomiting, diarrhea, fever, chills, body aches and generalized weakness now with elevated troponin.    6/21 Graded Exercise Stress Test  negative for ischemia, rare isolated PVCs noted  6/21 TTE: Normal LV and RV systolic function EF 64%. No significant valve disease     Elevated Troponin/PE DVT/HTN  - Patient presented with abdominal/epigastric pain, diarrhea and weakness x 4 days  - EKG showed SR @ 80 Bpm non specific t wave abnormality,   - Troponin mildly elevated 131.4 in setting of dehydration, ?infection can trend until peak,   - CXR no acute pathology  - CTAP: Mild intrahepatic biliary duct dilatation, lipase elevated, GI eval  - Xarelto on hold for now, last dose 3/20 in the evening  - BP soft 90s agree with IVF, chart event noted while in ED pt diaphoretic BP 50/35, placed in Trendelenburg, IVF bolus given with repeat 90/60,   - BNP elevated:  <--3413 however patient does not appear volume overloaded, instead intravascularly dry   - Hold Lisinopril and statin  - Monitor and replete lytes, keep K>4, Mg>2.  - Will continue to follow.    Vinita Galicia, MS ANP, [facility]  Nurse Practitioner- Cardiology   Spectra #[phone]

## 2022-03-21 NOTE — H&P ADULT - PROBLEM SELECTOR PLAN 1
Acute, lipase 555 on admission  - Admit to [facility]  - persistent epigastric pain, exacerbated by PO intake, elevated lipase  - CT abd/pelvis w/IVC showed Mild intrahepatic biliary duct dilatation.  - less likely gallstone pancreatitis as Tbili, LFTs wnl  - lipase elevated 555  - in the ICU, was diaphoretic with SBP of 50, responded to 1L NS bolus with repeat SBP in 90s-100s which pt reports is baseline.  - s/p 3L NS bolus in the ED, no evidence of volume overload on exam and on POCUS at time of admission.  - order 1L NS STAT. start maintenance IVF at 150cc/hr for 12 hours.   - NPO except meds  - Pain control: mild pain   - GI Dr. Rowland consulted, f/u recs  - ICU consulted, recs appreiated  - Surgery consulted, f/u recs  - Cardiology consulted, recs appreciated Acute, lipase 555 on admission  - Admit to [facility]  - persistent epigastric pain, exacerbated by PO intake, elevated lipase  - CT abd/pelvis w/IVC showed Mild intrahepatic biliary duct dilatation.  - less likely gallstone pancreatitis as Tbili, LFTs wnl, will order MRCP  - lipase elevated 555  - in the ICU, was diaphoretic with SBP of 50, responded to 1L NS bolus with repeat SBP in 90s-100s which pt reports is baseline.  - s/p 3L NS bolus in the ED, no evidence of volume overload on exam and on POCUS at time of admission.  - order 1L NS STAT. start maintenance IVF at 150cc/hr for 14 hours.   - F/U MRCP without contrast  - NPO except meds  - Pain control: mild pain tylenol 650mg PO q4hrs, moderate dilaudid 0.5mg IV q8hrs, dilaudid 1mg IV q8hrs severe pain  - GI Dr. Rowland consulted, f/u recs  - ICU consulted, recs appreiated  - Surgery consulted, f/u recs  - Cardiology consulted, recs appreciated

## 2022-03-21 NOTE — H&P ADULT - ASSESSMENT
52 y/o M with hx of HTN, HLD, PE, DVT on xarelto, gastric sleeve in 11/2021 by Dr. Rodriguez at [facility] presents to [facility] with progressive, severe abd pain. Admitted with acute pancreatitis.

## 2022-03-22 LAB
ALBUMIN SERPL ELPH-MCNC: 2.3 G/DL — LOW (ref 3.3–5)
ALP SERPL-CCNC: 56 U/L — SIGNIFICANT CHANGE UP (ref 40–120)
ALT FLD-CCNC: 27 U/L — SIGNIFICANT CHANGE UP (ref 12–78)
ANION GAP SERPL CALC-SCNC: 8 MMOL/L — SIGNIFICANT CHANGE UP (ref 5–17)
AST SERPL-CCNC: 16 U/L — SIGNIFICANT CHANGE UP (ref 15–37)
BILIRUB SERPL-MCNC: 0.5 MG/DL — SIGNIFICANT CHANGE UP (ref 0.2–1.2)
BUN SERPL-MCNC: 7 MG/DL — SIGNIFICANT CHANGE UP (ref 7–23)
CALCIUM SERPL-MCNC: 8.3 MG/DL — LOW (ref 8.5–10.1)
CHLORIDE SERPL-SCNC: 112 MMOL/L — HIGH (ref 96–108)
CO2 SERPL-SCNC: 22 MMOL/L — SIGNIFICANT CHANGE UP (ref 22–31)
CREAT SERPL-MCNC: 0.56 MG/DL — SIGNIFICANT CHANGE UP (ref 0.5–1.3)
CULTURE RESULTS: SIGNIFICANT CHANGE UP
EGFR: 118 ML/MIN/1.73M2 — SIGNIFICANT CHANGE UP
GLUCOSE SERPL-MCNC: 81 MG/DL — SIGNIFICANT CHANGE UP (ref 70–99)
HCT VFR BLD CALC: 37.3 % — LOW (ref 39–50)
HGB BLD-MCNC: 12.7 G/DL — LOW (ref 13–17)
LIDOCAIN IGE QN: 559 U/L — HIGH (ref 73–393)
MAGNESIUM SERPL-MCNC: 1.9 MG/DL — SIGNIFICANT CHANGE UP (ref 1.6–2.6)
MCHC RBC-ENTMCNC: 29.7 PG — SIGNIFICANT CHANGE UP (ref 27–34)
MCHC RBC-ENTMCNC: 34 GM/DL — SIGNIFICANT CHANGE UP (ref 32–36)
MCV RBC AUTO: 87.1 FL — SIGNIFICANT CHANGE UP (ref 80–100)
NRBC # BLD: 0 /100 WBCS — SIGNIFICANT CHANGE UP (ref 0–0)
PHOSPHATE SERPL-MCNC: 2.6 MG/DL — SIGNIFICANT CHANGE UP (ref 2.5–4.5)
PLATELET # BLD AUTO: 159 K/UL — SIGNIFICANT CHANGE UP (ref 150–400)
POTASSIUM SERPL-MCNC: 4 MMOL/L — SIGNIFICANT CHANGE UP (ref 3.5–5.3)
POTASSIUM SERPL-SCNC: 4 MMOL/L — SIGNIFICANT CHANGE UP (ref 3.5–5.3)
PROT SERPL-MCNC: 5.4 G/DL — LOW (ref 6–8.3)
RBC # BLD: 4.28 M/UL — SIGNIFICANT CHANGE UP (ref 4.2–5.8)
RBC # FLD: 14.2 % — SIGNIFICANT CHANGE UP (ref 10.3–14.5)
SODIUM SERPL-SCNC: 142 MMOL/L — SIGNIFICANT CHANGE UP (ref 135–145)
SPECIMEN SOURCE: SIGNIFICANT CHANGE UP
TRIGL SERPL-MCNC: 122 MG/DL — SIGNIFICANT CHANGE UP
WBC # BLD: 4.77 K/UL — SIGNIFICANT CHANGE UP (ref 3.8–10.5)
WBC # FLD AUTO: 4.77 K/UL — SIGNIFICANT CHANGE UP (ref 3.8–10.5)

## 2022-03-22 PROCEDURE — 74181 MRI ABDOMEN W/O CONTRAST: CPT | Mod: 26

## 2022-03-22 PROCEDURE — 99232 SBSQ HOSP IP/OBS MODERATE 35: CPT

## 2022-03-22 PROCEDURE — 93306 TTE W/DOPPLER COMPLETE: CPT | Mod: 26

## 2022-03-22 RX ORDER — ONDANSETRON 8 MG/1
4 TABLET, FILM COATED ORAL ONCE
Refills: 0 | Status: COMPLETED | OUTPATIENT
Start: 2022-03-22 | End: 2022-03-22

## 2022-03-22 RX ADMIN — SERTRALINE 50 MILLIGRAM(S): 25 TABLET, FILM COATED ORAL at 11:30

## 2022-03-22 RX ADMIN — HYDROMORPHONE HYDROCHLORIDE 1 MILLIGRAM(S): 2 INJECTION INTRAMUSCULAR; INTRAVENOUS; SUBCUTANEOUS at 22:18

## 2022-03-22 RX ADMIN — ENOXAPARIN SODIUM 90 MILLIGRAM(S): 100 INJECTION SUBCUTANEOUS at 06:38

## 2022-03-22 RX ADMIN — HYDROMORPHONE HYDROCHLORIDE 1 MILLIGRAM(S): 2 INJECTION INTRAMUSCULAR; INTRAVENOUS; SUBCUTANEOUS at 14:50

## 2022-03-22 RX ADMIN — ATORVASTATIN CALCIUM 20 MILLIGRAM(S): 80 TABLET, FILM COATED ORAL at 21:14

## 2022-03-22 RX ADMIN — HYDROMORPHONE HYDROCHLORIDE 0.5 MILLIGRAM(S): 2 INJECTION INTRAMUSCULAR; INTRAVENOUS; SUBCUTANEOUS at 22:15

## 2022-03-22 RX ADMIN — Medication 3 MILLIGRAM(S): at 21:18

## 2022-03-22 RX ADMIN — ONDANSETRON 4 MILLIGRAM(S): 8 TABLET, FILM COATED ORAL at 21:46

## 2022-03-22 RX ADMIN — Medication 1000 UNIT(S): at 11:30

## 2022-03-22 RX ADMIN — HYDROMORPHONE HYDROCHLORIDE 1 MILLIGRAM(S): 2 INJECTION INTRAMUSCULAR; INTRAVENOUS; SUBCUTANEOUS at 23:10

## 2022-03-22 RX ADMIN — HYDROMORPHONE HYDROCHLORIDE 0.5 MILLIGRAM(S): 2 INJECTION INTRAMUSCULAR; INTRAVENOUS; SUBCUTANEOUS at 21:14

## 2022-03-22 RX ADMIN — ENOXAPARIN SODIUM 90 MILLIGRAM(S): 100 INJECTION SUBCUTANEOUS at 17:07

## 2022-03-22 RX ADMIN — HYDROMORPHONE HYDROCHLORIDE 1 MILLIGRAM(S): 2 INJECTION INTRAMUSCULAR; INTRAVENOUS; SUBCUTANEOUS at 13:53

## 2022-03-22 NOTE — PROGRESS NOTE ADULT - ASSESSMENT
52 y/o M with hx of HTN, HLD, PE, DVT on Xarelto, gastric sleeve in 11/2021 by Dr. Rodriguez at [facility] presents with c/o vomiting, diarrhea, fever, chills, body aches and generalized weakness now with elevated troponin.    6/21 Graded Exercise Stress Test  negative for ischemia, rare isolated PVCs noted  6/21 TTE: Normal LV and RV systolic function EF 64%. No significant valve disease     Elevated Troponin/PE/DVT/HTN  - Elevated Troponin not consistent with ACS.  Pain is releated to pancreatitis  - No  need to trend CE's.  EKG showed SR @ 80 Bpm non specific t wave abnormality.  No true anginal pain  - Xarelto on hold for now, last dose 3/20 in the evening  - Significantly hypotensive in ED, symptomatic at BP 50/35, s/p IV boluses with good response.  BP now stable  - Hold home Lisinopril  - BNP elevated:  <--3413.  No evidence of volume overload    - Monitor and replete lytes, keep K>4, Mg>2.  - Pancreatitis care per Surgery  - Will continue to follow.    Cassi Rubio DNP, NP-C  Cardiology   Spectra #[phone]

## 2022-03-22 NOTE — PROGRESS NOTE ADULT - SUBJECTIVE AND OBJECTIVE BOX
HOD # 1  admitted with pancreatitis     SUBJECTIVE:  50 y/o M seen and examined with no acute overnight events. Pt offers no complaints states "feels much better", without n/v. PT c/o continued epigastric pain however mild, pt in nAD. PT denies radiation of the pain. Pt denies chest pain, SOB, palpitations, fevers, chills, melena, or hematochezia.     Vital Signs Last 24 Hrs  T(C): 36.5 (22 Mar 2022 04:46), Max: 37.2 (21 Mar 2022 19:00)  T(F): 97.7 (22 Mar 2022 04:46), Max: 98.9 (21 Mar 2022 19:00)  HR: 53 (22 Mar 2022 04:46) (50 - 80)  BP: 110/55 (22 Mar 2022 04:46) (50/35 - 113/79)  BP(mean): --  RR: 17 (22 Mar 2022 04:46) (16 - 18)  SpO2: 95% (22 Mar 2022 04:46) (95% - 100%)    PHYSICAL EXAM:  GENERAL: No acute distress, well-developed  ABDOMEN: Soft, nonttp, nondistended   NEUROLOGY: A&O x 3, no focal deficits    I&O's Summary    21 Mar 2022 07:  -  22 Mar 2022 06:57  --------------------------------------------------------  IN: 2050 mL / OUT: 725 mL / NET: 1325 mL      I&O's Detail    21 Mar 2022 07:  -  22 Mar 2022 06:57  --------------------------------------------------------  IN:    sodium chloride 0.9%: 1050 mL    Sodium Chloride 0.9% Bolus: 1000 mL  Total IN: 2050 mL    OUT:    Voided (mL): 725 mL  Total OUT: 725 mL    Total NET: 1325 mL        MEDICATIONS  (STANDING):  atorvastatin 20 milliGRAM(s) Oral at bedtime  cholecalciferol 1000 Unit(s) Oral daily  enoxaparin Injectable 90 milliGRAM(s) SubCutaneous every 12 hours  sertraline 50 milliGRAM(s) Oral daily  sodium chloride 0.9%. 1000 milliLiter(s) (150 mL/Hr) IV Continuous <Continuous>    MEDICATIONS  (PRN):  acetaminophen     Tablet .. 650 milliGRAM(s) Oral every 6 hours PRN Temp greater or equal to 38C (100.4F), Mild Pain (1 - 3)  HYDROmorphone  Injectable 0.5 milliGRAM(s) IV Push every 8 hours PRN Moderate Pain (4 - 6)  HYDROmorphone  Injectable 1 milliGRAM(s) IV Push every 8 hours PRN Severe Pain (7 - 10)  melatonin 3 milliGRAM(s) Oral at bedtime PRN Insomnia    LABS:                        15.6   5.44  )-----------( 194      ( 21 Mar 2022 11:42 )             44.4     03-    138  |  106  |  11  ----------------------------<  113<H>  3.7   |  25  |  0.83    Ca    9.0      21 Mar 2022 11:42  Mg     2.3     -    TPro  6.9  /  Alb  3.1<L>  /  TBili  0.8  /  DBili  x   /  AST  26  /  ALT  38  /  AlkPhos  69  03-21    PT/INR - ( 21 Mar 2022 11:42 )   PT: 19.8 sec;   INR: 1.68 ratio         PTT - ( 21 Mar 2022 11:42 )  PTT:32.2 sec  Urinalysis Basic - ( 21 Mar 2022 15:48 )    Color: Yellow / Appearance: Clear / S.015 / pH: x  Gluc: x / Ketone: Moderate  / Bili: Small / Urobili: 4   Blood: x / Protein: 30 mg/dL / Nitrite: Negative   Leuk Esterase: Trace / RBC: 0-2 /HPF / WBC 3-5   Sq Epi: x / Non Sq Epi: Occasional / Bacteria: Occasional    ASSESSMENT  50 y/o M HOD # 1 admitted with pancreatitis,         Surgical Team Contact Information  Spectralink: Ext: [phone] or [phone]  Pager: [phone] HOD # 1  admitted with pancreatitis     SUBJECTIVE:  52 y/o M seen and examined with no acute overnight events. Pt offers no complaints states "feels much better", without n/v. PT c/o continued epigastric pain however mild, pt in nAD. PT denies radiation of the pain. Pt denies chest pain, SOB, palpitations, fevers, chills, melena, or hematochezia.     Vital Signs Last 24 Hrs  T(C): 36.5 (22 Mar 2022 04:46), Max: 37.2 (21 Mar 2022 19:00)  T(F): 97.7 (22 Mar 2022 04:46), Max: 98.9 (21 Mar 2022 19:00)  HR: 53 (22 Mar 2022 04:46) (50 - 80)  BP: 110/55 (22 Mar 2022 04:46) (50/35 - 113/79)  BP(mean): --  RR: 17 (22 Mar 2022 04:46) (16 - 18)  SpO2: 95% (22 Mar 2022 04:46) (95% - 100%)    PHYSICAL EXAM:  GENERAL: No acute distress, well-developed  ABDOMEN: Soft, nonttp, nondistended   NEUROLOGY: A&O x 3, no focal deficits    I&O's Summary    21 Mar 2022 07:  -  22 Mar 2022 06:57  --------------------------------------------------------  IN: 2050 mL / OUT: 725 mL / NET: 1325 mL      I&O's Detail    21 Mar 2022 07:  -  22 Mar 2022 06:57  --------------------------------------------------------  IN:    sodium chloride 0.9%: 1050 mL    Sodium Chloride 0.9% Bolus: 1000 mL  Total IN: 2050 mL    OUT:    Voided (mL): 725 mL  Total OUT: 725 mL    Total NET: 1325 mL        MEDICATIONS  (STANDING):  atorvastatin 20 milliGRAM(s) Oral at bedtime  cholecalciferol 1000 Unit(s) Oral daily  enoxaparin Injectable 90 milliGRAM(s) SubCutaneous every 12 hours  sertraline 50 milliGRAM(s) Oral daily  sodium chloride 0.9%. 1000 milliLiter(s) (150 mL/Hr) IV Continuous <Continuous>    MEDICATIONS  (PRN):  acetaminophen     Tablet .. 650 milliGRAM(s) Oral every 6 hours PRN Temp greater or equal to 38C (100.4F), Mild Pain (1 - 3)  HYDROmorphone  Injectable 0.5 milliGRAM(s) IV Push every 8 hours PRN Moderate Pain (4 - 6)  HYDROmorphone  Injectable 1 milliGRAM(s) IV Push every 8 hours PRN Severe Pain (7 - 10)  melatonin 3 milliGRAM(s) Oral at bedtime PRN Insomnia    LABS:                        15.6   5.44  )-----------( 194      ( 21 Mar 2022 11:42 )             44.4     03-    138  |  106  |  11  ----------------------------<  113<H>  3.7   |  25  |  0.83    Ca    9.0      21 Mar 2022 11:42  Mg     2.3     -    TPro  6.9  /  Alb  3.1<L>  /  TBili  0.8  /  DBili  x   /  AST  26  /  ALT  38  /  AlkPhos  69  03-21    PT/INR - ( 21 Mar 2022 11:42 )   PT: 19.8 sec;   INR: 1.68 ratio         PTT - ( 21 Mar 2022 11:42 )  PTT:32.2 sec  Urinalysis Basic - ( 21 Mar 2022 15:48 )    Color: Yellow / Appearance: Clear / S.015 / pH: x  Gluc: x / Ketone: Moderate  / Bili: Small / Urobili: 4   Blood: x / Protein: 30 mg/dL / Nitrite: Negative   Leuk Esterase: Trace / RBC: 0-2 /HPF / WBC 3-5   Sq Epi: x / Non Sq Epi: Occasional / Bacteria: Occasional    ASSESSMENT  52 y/o M HOD # 1 admitted with pancreatitis, with lipase 559 (555),    Plan:  - f/u mrcp   - trend lipase  - NPO, IVF  - pain control, supportive measures  - serial abd exams  - f/u am labs  - to be discussed with Dr. Shelby       Surgical Team Contact Information  Spectralink: Ext: [phone] or [phone]  Pager: [phone]

## 2022-03-22 NOTE — CONSULT NOTE ADULT - SUBJECTIVE AND OBJECTIVE BOX
[facility]  Ralph Taylor PA-C  [address] [place] [address]  [phone]      Chief Complaint:  Patient is a 51y old  Male who presents with a chief complaint of pancreatitis (22 Mar 2022 10:38)      HPI:  52 y/o M with hx of HTN, HLD, PE, DVT on xarelto, gastric sleeve in 2021 by Dr. Rodriguez at [facility] presents to [facility] with progressive, severe abd pain. The abdominal pain began on 3/16, sudden in onset, located in the epigastric region, nonradiating. Pt describes the pain as deep and gnawing. Most commonly postprandial pain, worsened by PO solids and liquids. Associated with generalized weakness, feeling feverish, chills, lethargy since 3/16. Denies recent nausea or vomiting, but describes decreased PO intake with weight loss estimated 10 pounds. Pt was lightheaded and dizzy this AM when attempting to take his OTC supplements/vitamins and was helped down to the ground by his daughter, then reported to [facility] ED. Denied LOC, fall, or headstrike. Pt also admits 2 week hx of dysuria with blood tinged urine. Admits alteration in BM patterns, but denies diarrhea/constipation. Pt in recent hx denies cough, sore throat, SOB, chest pain, nausea, vomiting, melena, hematochezia.     In the ED:  Vitals 113/79 BP, HR 80, T 97.5F, 18 RR, 97% SpO2  Pertinent labs include Lipase 555, serum pro-BNP 3413, troponin 131.4. UA wnl.   CXR shows clear lungs on official read.  CT abd/pelvis with IV contrast shows No CT evidence of bowel obstruction or colitis. Mild intrahepatic biliary duct dilatation. MRCP may be obtained for further assessment.  s/p 2L NS bolus, 1000mg tylenol IV, 20mg famotidine IV, 4mg zofran IV, 4mg morphine IV x 2 doses    INTERVAL HPI:  Pt s/e with wife at bedside  Pt reports history as above  Reports his abdominal pain is improving today but still present in epigastric region  Reports some dysuria as well  States outpatient GI doctor is Dr. Conn and last colonoscopy was about 2-3 years ago  Denies further GI complaints    Allergies:  No Known Allergies      Medications:  acetaminophen     Tablet .. 650 milliGRAM(s) Oral every 6 hours PRN  atorvastatin 20 milliGRAM(s) Oral at bedtime  cholecalciferol 1000 Unit(s) Oral daily  enoxaparin Injectable 90 milliGRAM(s) SubCutaneous every 12 hours  HYDROmorphone  Injectable 0.5 milliGRAM(s) IV Push every 8 hours PRN  HYDROmorphone  Injectable 1 milliGRAM(s) IV Push every 8 hours PRN  melatonin 3 milliGRAM(s) Oral at bedtime PRN  sertraline 50 milliGRAM(s) Oral daily  sodium chloride 0.9%. 1000 milliLiter(s) IV Continuous <Continuous>      PMHX/PSHX:  No pertinent past medical history    Dyslipidemia    Pericardial cyst    High cholesterol    HTN (hypertension)    Morbid obesity due to excess calories    Pulmonary embolism    Status post hip surgery    H/O hernia repair    H/O vasectomy    H/O hernia repair    S/P colonoscopy    H/O gastric sleeve        Family history:  Family history of CABG (Father)    Family history of prostate cancer (Father)    Family history of stroke (Sibling)    No pertinent family history in first degree relatives        Social History:   Pt reports he has not had alcohol in over 6 months  States he used to drink alcohol occasionally but was never alcohol dependent    ROS:   General:  No wt loss, fevers, chills, night sweats, fatigue,   Eyes:  Good vision, no reported pain  ENT:  No sore throat, pain, runny nose, dysphagia  CV:  No pain, palpitations, hypo/hypertension  Resp:  No dyspnea, cough, tachypnea, wheezing  GI:  +pain, No nausea, No vomiting, No diarrhea, No constipation, No weight loss, No fever, No pruritis, No rectal bleeding, No tarry stools, No dysphagia,  :  No pain, bleeding, incontinence, nocturia  Muscle:  No pain, weakness  Neuro:  No weakness, tingling, memory problems  Psych:  No fatigue, insomnia, mood problems, depression  Endocrine:  No polyuria, polydipsia, cold/heat intolerance  Heme:  No petechiae, ecchymosis, easy bruisability  Skin:  No rash, tattoos, scars, edema      PHYSICAL EXAM:   Vital Signs:  Vital Signs Last 24 Hrs  T(C): 36.5 (22 Mar 2022 04:46), Max: 37.2 (21 Mar 2022 19:00)  T(F): 97.7 (22 Mar 2022 04:46), Max: 98.9 (21 Mar 2022 19:00)  HR: 53 (22 Mar 2022 04:46) (50 - 64)  BP: 110/55 (22 Mar 2022 04:46) (50/35 - 110/55)  BP(mean): --  RR: 17 (22 Mar 2022 09:29) (16 - 17)  SpO2: 96% (22 Mar 2022 09:29) (95% - 100%)  Daily Height in cm: 170.18 (21 Mar 2022 20:45)    Daily Weight in k.7 (22 Mar 2022 04:46)    GENERAL:  Appears stated age,   HEENT:  NC/AT,    CHEST:  Full & symmetric excursion,   HEART:  Regular rhythm  ABDOMEN:  Soft, non-tender, non-distended,   EXTEREMITIES:  no cyanosis,clubbing or edema  SKIN:  No rash  NEURO:  Alert,    LABS:                        12.7   4.77  )-----------( 159      ( 22 Mar 2022 07:27 )             37.3     03-22    142  |  112<H>  |  7   ----------------------------<  81  4.0   |  22  |  0.56    Ca    8.3<L>      22 Mar 2022 07:27  Phos  2.6     03-22  Mg     1.9     03-22    TPro  5.4<L>  /  Alb  2.3<L>  /  TBili  0.5  /  DBili  x   /  AST  16  /  ALT  27  /  AlkPhos  56  03-22    LIVER FUNCTIONS - ( 22 Mar 2022 07:27 )  Alb: 2.3 g/dL / Pro: 5.4 g/dL / ALK PHOS: 56 U/L / ALT: 27 U/L / AST: 16 U/L / GGT: x           PT/INR - ( 21 Mar 2022 11:42 )   PT: 19.8 sec;   INR: 1.68 ratio         PTT - ( 21 Mar 2022 11:42 )  PTT:32.2 sec  Urinalysis Basic - ( 21 Mar 2022 15:48 )    Color: Yellow / Appearance: Clear / S.015 / pH: x  Gluc: x / Ketone: Moderate  / Bili: Small / Urobili: 4   Blood: x / Protein: 30 mg/dL / Nitrite: Negative   Leuk Esterase: Trace / RBC: 0-2 /HPF / WBC 3-5   Sq Epi: x / Non Sq Epi: Occasional / Bacteria: Occasional      Lipase [id number]          Imaging:  < from: CT Abdomen and Pelvis w/ IV Cont (22 @ 13:50) >    ACC: [id number] EXAM:  CT ABDOMEN AND PELVIS IC                          PROCEDURE DATE:  2022          INTERPRETATION:  CLINICAL INFORMATION: Abdominal pain and vomiting and   diarrhea    COMPARISON: 2021    CONTRAST/COMPLICATIONS:  IV Contrast: Omnipaque 350  90 cc administered   10 cc discarded  Oral Contrast: NONE  Complications: None reported at time of study completion    PROCEDURE:  CT of the Abdomen and Pelvis was performed.  Sagittal and coronal reformats were performed.    FINDINGS:  LOWER CHEST: Lower lobe atelectasis.    LIVER: Multiple subcentimeter hepatic hypodensities, too small to further   characterize.  BILE DUCTS: Mild intrahepatic dilatation.  GALLBLADDER: Within normal limits.  SPLEEN: Within normal limits.  PANCREAS: Within normal limits.  ADRENALS: Within normal limits.  KIDNEYS/URETERS: Within normal limits.    BLADDER: Within normal limits.  REPRODUCTIVE ORGANS: Prostate within normal limits.    BOWEL: Sleeve gastrectomy. No bowel obstruction. Appendix is within   normal limits.  PERITONEUM: No ascites.  VESSELS: Atherosclerotic changes.  RETROPERITONEUM/LYMPH NODES: Several prominent retroperitoneal lymph   nodes.  ABDOMINAL WALL: Right inguinal hernia repair.  BONES: Degenerative changes.    IMPRESSION:  No CT evidence of bowel obstruction or colitis.  Mild intrahepatic biliary duct dilatation. MRCP may be obtained for   further assessment.      --- End of Report ---            BREE WEISS MD; Attending Radiologist  This document has been electronically signed. Mar 21 2022  2:07PM    < end of copied text >    < from: MR MRCP No Cont (22 @ 10:44) >    ACC: [id number] EXAM:  MR MRCP                          PROCEDURE DATE:  2022          INTERPRETATION:  CLINICAL INFORMATION: Pancreatitis.    COMPARISON: No prior abdominal MR is available for comparison. Reference   is made with a previous abdominal CT dated 3/21/2022    CONTRAST/COMPLICATIONS:  IV Contrast: NONE  Oral Contrast: NONE  Complications: None reported at time of study completion    PROCEDURE:  MRI of the abdomen was performed.  MRCP was performed.    FINDINGS:  LOWER CHEST: Trace bilateral pleural effusions.    LIVER: A few small brightly T2 hyperintense hepatic lesions may represent   cysts and/or hemangiomas.  BILE DUCTS: Mild intrahepatic biliary ductal dilatation. The common duct   measures up to 6 mm in caliber which is within normal limits. No evidence   for choledocholithiasis.  GALLBLADDER: No evidence for gallstones. Nonspecific slight gallbladder   wall thickening/edema.  SPLEEN: Within normal limits.  PANCREAS: Mild edema adjacent to the pancreatic tail, suggestive of acute   pancreatitis.  ADRENALS: Within normal limits.  KIDNEYS/URETERS: Within normal limits.    VISUALIZED PORTIONS:  BOWEL: Apparent focal mural thickening at the hepatic flexure;   colonoscopy may be pursued to rule out colon cancer.  PERITONEUM: Trace perihepatic and perisplenic ascites.  VESSELS: Within normal limits.  RETROPERITONEUM/LYMPH NODES: No lymphadenopathy.  ABDOMINAL WALL: Within normal limits.  BONES: Within normal limits.    IMPRESSION: No evidence for gallstones. Nonspecific slight gallbladder   wall thickening/edema. If there is a clinical suspicion for acalculus   acute cholecystitis, HIDA scan may be pursued for further evaluation.    Mild intrahepatic biliary ductal dilatation. The common duct measures up   to 6 mm in caliber which is within normal limits. No evidence for   choledocholithiasis.    Mild edema adjacent to the pancreatic tail, suggestive of acute   pancreatitis.    Apparent focal mural thickening at the hepatic flexure; colonoscopy may   be pursued to rule out colon cancer.    Trace ascites.    Trace bilateral pleural effusions.    --- End of Report ---      BARTOLOME CARCAMO MD; Attending Radiologist  This document has been electronically signed. Mar 22 2022 11:22AM    < end of copied text >

## 2022-03-22 NOTE — PROGRESS NOTE ADULT - SUBJECTIVE AND OBJECTIVE BOX
SUBJECTIVE:    No acute events overnight, afebrile, hds.    VITAL SIGNS:    Vital Signs Last 24 Hrs  T(C): 36.5 (22 Mar 2022 04:46), Max: 37.2 (21 Mar 2022 19:00)  T(F): 97.7 (22 Mar 2022 04:46), Max: 98.9 (21 Mar 2022 19:00)  HR: 53 (22 Mar 2022 04:46) (50 - 64)  BP: 110/55 (22 Mar 2022 04:46) (50/35 - 110/55)  BP(mean): --  RR: 17 (22 Mar 2022 09:29) (16 - 17)  SpO2: 96% (22 Mar 2022 09:29) (95% - 100%)    PHYSICAL EXAM:     GENERAL: no acute distress  HEENT: EOMI, neck supple, MMM  RESPIRATORY: LCTAB/L, no rhonchi, rales, or wheezing  CARDIOVASCULAR: RRR, no murmurs, gallops, rubs  ABDOMINAL: soft, mild TTP diffusely, non-distended, positive bowel sounds   EXTREMITIES: no clubbing, cyanosis, or edema  NEUROLOGICAL: alert and oriented x 3, non-focal  SKIN: no new rashes or lesions   MUSCULOSKELETAL: no gross joint deformity                          12.7   4.77  )-----------( 159      ( 22 Mar 2022 07:27 )             37.3     03-22    142  |  112<H>  |  7   ----------------------------<  81  4.0   |  22  |  0.56    Ca    8.3<L>      22 Mar 2022 07:27  Phos  2.6     03-22  Mg     1.9     03-22    TPro  5.4<L>  /  Alb  2.3<L>  /  TBili  0.5  /  DBili  x   /  AST  16  /  ALT  27  /  AlkPhos  56  03-22      CAPILLARY BLOOD GLUCOSE      PROCEDURE DATE:  03/22/2022          INTERPRETATION:  CLINICAL INFORMATION: Pancreatitis.    COMPARISON: No prior abdominal MR is available for comparison. Reference   is made with a previous abdominal CT dated 3/21/2022    CONTRAST/COMPLICATIONS:  IV Contrast: NONE  Oral Contrast: NONE  Complications: None reported at time of study completion    PROCEDURE:  MRI of the abdomen was performed.  MRCP was performed.    FINDINGS:  LOWER CHEST: Trace bilateral pleural effusions.    LIVER: A few small brightly T2 hyperintense hepatic lesions may represent   cysts and/or hemangiomas.  BILE DUCTS: Mild intrahepatic biliary ductal dilatation. The common duct   measures up to 6 mm in caliber which is within normal limits. No evidence   for choledocholithiasis.  GALLBLADDER: No evidence for gallstones. Nonspecific slight gallbladder   wall thickening/edema.  SPLEEN: Within normal limits.  PANCREAS: Mild edema adjacent to the pancreatic tail, suggestive of acute   pancreatitis.  ADRENALS: Within normal limits.  KIDNEYS/URETERS: Within normal limits.    VISUALIZED PORTIONS:  BOWEL: Apparent focal mural thickening at the hepatic flexure;   colonoscopy may be pursued to rule out colon cancer.  PERITONEUM: Trace perihepatic and perisplenic ascites.  VESSELS: Within normal limits.  RETROPERITONEUM/LYMPH NODES: No lymphadenopathy.  ABDOMINAL WALL: Within normal limits.  BONES: Within normal limits.    IMPRESSION: No evidence for gallstones. Nonspecific slight gallbladder   wall thickening/edema. If there is a clinical suspicion for acalculus   acute cholecystitis, HIDA scan may be pursued for further evaluation.    Mild intrahepatic biliary ductal dilatation. The common duct measures up   to 6 mm in caliber which is within normal limits. No evidence for   choledocholithiasis.    Mild edema adjacent to the pancreatic tail, suggestive of acute   pancreatitis.    Apparent focal mural thickening at the hepatic flexure; colonoscopy may   be pursued to rule out colon cancer.    Trace ascites.    Trace bilateral pleural effusions.    MEDICATIONS  (STANDING):  atorvastatin 20 milliGRAM(s) Oral at bedtime  cholecalciferol 1000 Unit(s) Oral daily  enoxaparin Injectable 90 milliGRAM(s) SubCutaneous every 12 hours  sertraline 50 milliGRAM(s) Oral daily  sodium chloride 0.9%. 1000 milliLiter(s) (150 mL/Hr) IV Continuous <Continuous>

## 2022-03-22 NOTE — PROGRESS NOTE ADULT - ASSESSMENT
52 y/o M with hx of HTN, HLD, PE, DVT on xarelto, gastric sleeve in 11/2021 by Dr. Rodriguez at [facility] presents to [facility] with progressive, severe abd pain. Admitted with acute pancreatitis.

## 2022-03-22 NOTE — CONSULT NOTE ADULT - ASSESSMENT
Abdominal pain  Pancreatitis  Abnormal imaging    CT and MRCP noted, results d/w pt and wife  Unclear etiology of pancreatitis  Pt reports no alcohol history, no evidence of CBD stone on MRCP  No role for ERCP  IVF  Pain control  Pt to try liquids today  Outpatient f/u with primary GI Dr. Conn for outpatient colonoscopy  D/w pt and wife at length at bedside  Will follow    I reviewed the overnight course of events on the unit, re-confirming the patient history. I discussed the care with the patient and their family  Differential diagnosis and plan of care discussed with patient after the evaluation  35 minutes spent on total encounter of which more than fifty percent of the encounter was spent counseling and/or coordinating care by the attending physician.  Advanced care planning was discussed with patient and family.  Advanced care planning forms were reviewed and discussed.  Risks, benefits and alternatives of gastroenterologic procedures were discussed in detail and all questions were answered.

## 2022-03-22 NOTE — PROGRESS NOTE ADULT - SUBJECTIVE AND OBJECTIVE BOX
[facility] -- Jorgito Barragan, Waylon Watson, Trever Almanza Savella, Goodger  Office # [phone]    Follow Up: Elevated Troponin    Subjective/Observations: Awake and     REVIEW OF SYSTEMS: All other review of systems is negative unless indicated above  PAST MEDICAL & SURGICAL HISTORY:  Dyslipidemia    Pericardial cyst  1993, resolved    High cholesterol    HTN (hypertension)    Morbid obesity due to excess calories    Pulmonary embolism    Status post hip surgery  R hip, repair of labrum, 2013    H/O hernia repair  2010 x2    H/O vasectomy  2007    S/P colonoscopy  2020    H/O gastric sleeve      MEDICATIONS  (STANDING):  atorvastatin 20 milliGRAM(s) Oral at bedtime  cholecalciferol 1000 Unit(s) Oral daily  enoxaparin Injectable 90 milliGRAM(s) SubCutaneous every 12 hours  sertraline 50 milliGRAM(s) Oral daily  sodium chloride 0.9%. 1000 milliLiter(s) (150 mL/Hr) IV Continuous <Continuous>    MEDICATIONS  (PRN):  acetaminophen     Tablet .. 650 milliGRAM(s) Oral every 6 hours PRN Temp greater or equal to 38C (100.4F), Mild Pain (1 - 3)  HYDROmorphone  Injectable 0.5 milliGRAM(s) IV Push every 8 hours PRN Moderate Pain (4 - 6)  HYDROmorphone  Injectable 1 milliGRAM(s) IV Push every 8 hours PRN Severe Pain (7 - 10)  melatonin 3 milliGRAM(s) Oral at bedtime PRN Insomnia    Allergies    No Known Allergies    Intolerances      Vital Signs Last 24 Hrs  T(C): 36.5 (22 Mar 2022 04:46), Max: 37.2 (21 Mar 2022 19:00)  T(F): 97.7 (22 Mar 2022 04:46), Max: 98.9 (21 Mar 2022 19:00)  HR: 53 (22 Mar 2022 04:46) (50 - 80)  BP: 110/55 (22 Mar 2022 04:46) (50/35 - 113/79)  BP(mean): --  RR: 17 (22 Mar 2022 09:29) (16 - 18)  SpO2: 96% (22 Mar 2022 09:29) (95% - 100%)  I&O's Summary    21 Mar 2022 07:01  -  22 Mar 2022 07:00  --------------------------------------------------------  IN: 2050 mL / OUT: 725 mL / NET: 1325 mL      Weight (kg): 90.7 (03-21 @ 20:45)  PHYSICAL EXAM:  TELE:   Constitutional: NAD, awake and alert, well-developed  HEENT: Moist Mucous Membranes, Anicteric  Pulmonary: Non-labored, breath sounds are clear bilaterally, No wheezing, rales or rhonchi  Cardiovascular: Regular, S1 and S2, No murmurs, rubs, gallops or clicks  Gastrointestinal: Bowel Sounds present, soft, nontender.   Lymph: No peripheral edema. No lymphadenopathy.  Skin: No visible rashes or ulcers.  Psych:  Mood & affect appropriate  LABS: All Labs Reviewed:                        12.7   4.77  )-----------( 159      ( 22 Mar 2022 07:27 )             37.3                         15.6   5.44  )-----------( 194      ( 21 Mar 2022 11:42 )             44.4     22 Mar 2022 07:27    142    |  112    |  7      ----------------------------<  81     4.0     |  22     |  0.56   21 Mar 2022 11:42    138    |  106    |  11     ----------------------------<  113    3.7     |  25     |  0.83     Ca    8.3        22 Mar 2022 07:27  Ca    9.0        21 Mar 2022 11:42  Phos  2.6       22 Mar 2022 07:27  Mg     1.9       22 Mar 2022 07:27  Mg     2.3       21 Mar 2022 11:42    TPro  5.4    /  Alb  2.3    /  TBili  0.5    /  DBili  x      /  AST  16     /  ALT  27     /  AlkPhos  56     22 Mar 2022 07:27  TPro  6.9    /  Alb  3.1    /  TBili  0.8    /  DBili  x      /  AST  26     /  ALT  38     /  AlkPhos  69     21 Mar 2022 11:42    PT/INR - ( 21 Mar 2022 11:42 )   PT: 19.8 sec;   INR: 1.68 ratio         PTT - ( 21 Mar 2022 11:42 )  PTT:32.2 sec         Cassi Rubio [facility]  Cardiology   Spectra #[phone]      [facility] -- Jorgito Barragan, Waylon Watson, Trever Almanza Savella, Goodger  Office # [phone]    Follow Up: Elevated Troponin    Subjective/Observations: Awake and alert, comfortable on RA.  Denies chest pain or palpitations.  Denies SOB, ROA or orthopnea.  Denies N/V or abdominal pain.  Last abdominal pain at 3am     REVIEW OF SYSTEMS: All other review of systems is negative unless indicated above  PAST MEDICAL & SURGICAL HISTORY:  Dyslipidemia    Pericardial cyst  1993, resolved    High cholesterol    HTN (hypertension)    Morbid obesity due to excess calories    Pulmonary embolism    Status post hip surgery  R hip, repair of labrum, 2013    H/O hernia repair  2010 x2    H/O vasectomy  2007    S/P colonoscopy  2020    H/O gastric sleeve    MEDICATIONS  (STANDING):  atorvastatin 20 milliGRAM(s) Oral at bedtime  cholecalciferol 1000 Unit(s) Oral daily  enoxaparin Injectable 90 milliGRAM(s) SubCutaneous every 12 hours  sertraline 50 milliGRAM(s) Oral daily  sodium chloride 0.9%. 1000 milliLiter(s) (150 mL/Hr) IV Continuous <Continuous>    MEDICATIONS  (PRN):  acetaminophen     Tablet .. 650 milliGRAM(s) Oral every 6 hours PRN Temp greater or equal to 38C (100.4F), Mild Pain (1 - 3)  HYDROmorphone  Injectable 0.5 milliGRAM(s) IV Push every 8 hours PRN Moderate Pain (4 - 6)  HYDROmorphone  Injectable 1 milliGRAM(s) IV Push every 8 hours PRN Severe Pain (7 - 10)  melatonin 3 milliGRAM(s) Oral at bedtime PRN Insomnia    Allergies    No Known Allergies    Intolerances    Vital Signs Last 24 Hrs  T(C): 36.5 (22 Mar 2022 04:46), Max: 37.2 (21 Mar 2022 19:00)  T(F): 97.7 (22 Mar 2022 04:46), Max: 98.9 (21 Mar 2022 19:00)  HR: 53 (22 Mar 2022 04:46) (50 - 80)  BP: 110/55 (22 Mar 2022 04:46) (50/35 - 113/79)  BP(mean): --  RR: 17 (22 Mar 2022 09:29) (16 - 18)  SpO2: 96% (22 Mar 2022 09:29) (95% - 100%)  I&O's Summary    21 Mar 2022 07:01  -  22 Mar 2022 07:00  --------------------------------------------------------  IN: 2050 mL / OUT: 725 mL / NET: 1325 mL      Weight (kg): 90.7 (03-21 @ 20:45)    PHYSICAL EXAM:  TELE: NSR  Constitutional: NAD, awake and alert, well-developed  HEENT: Moist Mucous Membranes, Anicteric  Pulmonary: Non-labored, breath sounds are clear bilaterally, No wheezing, rales or rhonchi  Cardiovascular: Regular, S1 and S2, No murmurs, rubs, gallops or clicks  Gastrointestinal: Bowel Sounds present, soft, nontender.   Lymph: No peripheral edema. No lymphadenopathy.  Skin: No visible rashes or ulcers.  Psych:  Mood & affect appropriate  LABS: All Labs Reviewed:                        12.7   4.77  )-----------( 159      ( 22 Mar 2022 07:27 )             37.3                         15.6   5.44  )-----------( 194      ( 21 Mar 2022 11:42 )             44.4     22 Mar 2022 07:27    142    |  112    |  7      ----------------------------<  81     4.0     |  22     |  0.56   21 Mar 2022 11:42    138    |  106    |  11     ----------------------------<  113    3.7     |  25     |  0.83     Ca    8.3        22 Mar 2022 07:27  Ca    9.0        21 Mar 2022 11:42  Phos  2.6       22 Mar 2022 07:27  Mg     1.9       22 Mar 2022 07:27  Mg     2.3       21 Mar 2022 11:42    TPro  5.4    /  Alb  2.3    /  TBili  0.5    /  DBili  x      /  AST  16     /  ALT  27     /  AlkPhos  56     22 Mar 2022 07:27  TPro  6.9    /  Alb  3.1    /  TBili  0.8    /  DBili  x      /  AST  26     /  ALT  38     /  AlkPhos  69     21 Mar 2022 11:42    PT/INR - ( 21 Mar 2022 11:42 )   PT: 19.8 sec;   INR: 1.68 ratio      PTT - ( 21 Mar 2022 11:42 )  PTT:32.2 sec      Patient name: LUIS GARAY  YOB: 1970   Age: 51 (M)   MR#: [id number]  Study Date: 11/22/2021  Location: [place] [address]  DSonographer: Margarita Trujillo  [facility]  Study quality: Technically difficult  Referring Physician: Maine Garcia MD  Blood Pressure: 128/85 mmHg  Height: 168 cm  Weight: 118 kg  BSA: 2.2 m2  ------------------------------------------------------------------------  PROCEDURE: Transthoracic echocardiogram with 2-D, M-Mode  and complete spectral and color flow Doppler.  INDICATION: Other pulmonary embolism without acute cor  pulmonale (I26.99)  ------------------------------------------------------------------------  Dimensions:    Normal Values:  LA:     3.5    2.0 - 4.0 cm  Ao:     3.8    2.0 - 3.8 cm  SEPTUM: 0.9    0.6 - 1.2 cm  PWT:    1.1    0.6 - 1.1 cm  LVIDd:  4.5    3.0 - 5.6 cm  LVIDs:  3.1    1.8 - 4.0 cm  Derived variables:  LVMI: 68 g/m2  RWT: 0.48  Fractional short: 31 %  EF (Visual Estimate): 60-65 %  EF (Ku Rule): 63 %Doppler Peak Velocity (m/sec):  AoV=0.8  ------------------------------------------------------------------------  Observations:  Mitral Valve: Normal mitral valve. Minimal mitral  regurgitation.  Aortic Valve/Aorta: Aortic valve not well visualized;  probably normal. Peak transaortic valve gradient equals 3  mm Hg. Peak left ventricular outflow tract gradient equals  2 mm Hg.  Aortic Root: 3.8 cm.Root size indexed to BSA is 1.7 cm/m 2  BSA-WNL.  Left Atrium: Normal left atrium.  Left Ventricle: Normal left ventricular systolic function.  No segmental wall motion abnormalities. Normal left  ventricular internal dimensions and wall thicknesses. Mild  diastolic dysfunction (Stage I).  Right Heart: Normal right atrium. The right ventricle is  not well visualized; grossly normal right ventricular  systolic function.TAPSE 2.93 cm. Limited endocardial  definition limits assessment of strain imaging. Normal  tricuspid valve. Pulmonic valve not well visualized.  Pericardium/Pleura: Normal pericardium with trace  pericardial effusion.  Hemodynamic: Estimated right atrial pressure is 8 mm Hg.  ------------------------------------------------------------------------  Conclusions:  Technically difficult study.  1. Normal mitral valve. Minimal mitral regurgitation.  2. Aortic valve not well visualized; probably normal.  3. Normal left ventricular systolic function. No segmental  wall motion abnormalities.  4. The right ventricle is not well visualized; grossly  normal right ventricular systolic function. TAPSE 2.93 cm.  Limited endocardial definition limits assessment of strain  imaging.  Findings discussed with Dr Short from primary team.  *** No previous Echo exam.  ------------------------------------------------------------------------  Confirmed on11/22/2021 - 21:29:10 by Chema Alfaro M.D.  ------------------------------------------------------------------------    ACC: [id number] EXAM:  XR CHEST PORTABLE URGENT 1V                          PROCEDURE DATE:  03/21/2022      INTERPRETATION:  EXAMINATION: XR CHEST URGENT    CLINICAL INDICATION: Sepsis    TECHNIQUE: Frontal radiograph of the chest was obtained.    COMPARISON: 12/26/2021.    FINDINGS:    Cardiac silhouette normal in size. Lungs are clear. No pleural effusion   or pneumothorax.    IMPRESSION:  Clear lungs.    --- End of Report ---    KEVIN SIN MD; Attending Radiologist  This document has been electronically signed. Mar 21 2022  2:40PM    Ventricular Rate 80 BPM    Atrial Rate 80 BPM    P-R Interval 162 ms    QRS Duration 80 ms    Q-T Interval 380 ms    QTC Calculation(Bazett) 438 ms    P Axis 59 degrees    R Axis 60 degrees    T Axis 76 degrees    Diagnosis Line Normal sinus rhythm  Nonspecific T wave abnormality  Abnormal ECG  When compared with ECG of 26-DEC-2021 04:39,  Vent. rate has increased BY  28 BPM  Nonspecific T wave abnormality now evident in Lateral leads  Confirmed by SARA KHAN ([id number]) on 3/21/2022 5:16:15 PM

## 2022-03-22 NOTE — PROGRESS NOTE ADULT - PROBLEM SELECTOR PLAN 3
Trop 131 on admission  - f/u STAT cardiac enzymes, trend to peak; C.E. downtrending  - f/u TTE - pending results  - likely elevated in the setting of demand ischemia due to dec PO intake  - cardiology [facility] consulted, f/u recs

## 2022-03-22 NOTE — PROGRESS NOTE ADULT - PROBLEM SELECTOR PLAN 1
Acute, lipase 555 on admission  - CT abd/pelvis w/IVC showed Mild intrahepatic biliary duct dilatation.  - less likely gallstone pancreatitis as Tbili, LFTs wnl  - MRCP done today shows no gallstones, but acalculous cholecystitis can't be completely ruled out, given pt is afebrile, no leukocytosis, hds, and appears to be improving, cholecystitis less likely, however, will f/u GI on whether HIDA indicated  - as per d/w GI team - planning to advance die to clears and see how patient tolerates  - cont IVF  - Pain control: mild pain tylenol 650mg PO q4hrs, moderate dilaudid 0.5mg IV q8hrs, dilaudid 1mg IV q8hrs severe pain  - GI Dr. Rowland consulted, f/u recs  - ICU consulted, recs appreiated  - Surgery consulted, f/u recs  - Cardiology consulted, recs appreciated  - UA negative Acute, lipase 555 on admission  - CT abd/pelvis w/IVC showed Mild intrahepatic biliary duct dilatation.  - less likely gallstone pancreatitis as Tbili, LFTs wnl  - MRCP done today shows no gallstones, but acalculous cholecystitis can't be completely ruled out, given pt is afebrile, no leukocytosis, hds, and appears to be improving, cholecystitis less likely, however, will f/u GI on whether HIDA indicated  - mural thickening of hepatic flexure of colon on MRI - plan for colonoscopy by GI likely in outpt setting  - as per d/w GI team - planning to advance die to clears and see how patient tolerates  - cont IVF  - Pain control: mild pain tylenol 650mg PO q4hrs, moderate dilaudid 0.5mg IV q8hrs, dilaudid 1mg IV q8hrs severe pain  - GI Dr. Rowland consulted, f/u recs  - ICU consulted, recs appreiated  - Surgery consulted, f/u recs  - Cardiology consulted, recs appreciated  - UA negative

## 2022-03-23 ENCOUNTER — TRANSCRIPTION ENCOUNTER (OUTPATIENT)
Age: 52
End: 2022-03-23

## 2022-03-23 ENCOUNTER — NON-APPOINTMENT (OUTPATIENT)
Age: 52
End: 2022-03-23

## 2022-03-23 LAB
ANION GAP SERPL CALC-SCNC: 9 MMOL/L — SIGNIFICANT CHANGE UP (ref 5–17)
APPEARANCE UR: CLEAR — SIGNIFICANT CHANGE UP
BILIRUB UR-MCNC: NEGATIVE — SIGNIFICANT CHANGE UP
BUN SERPL-MCNC: 5 MG/DL — LOW (ref 7–23)
CALCIUM SERPL-MCNC: 8.6 MG/DL — SIGNIFICANT CHANGE UP (ref 8.5–10.1)
CHLORIDE SERPL-SCNC: 109 MMOL/L — HIGH (ref 96–108)
CO2 SERPL-SCNC: 23 MMOL/L — SIGNIFICANT CHANGE UP (ref 22–31)
COLOR SPEC: YELLOW — SIGNIFICANT CHANGE UP
CREAT SERPL-MCNC: 0.44 MG/DL — LOW (ref 0.5–1.3)
DIFF PNL FLD: NEGATIVE — SIGNIFICANT CHANGE UP
EGFR: 127 ML/MIN/1.73M2 — SIGNIFICANT CHANGE UP
GLUCOSE SERPL-MCNC: 78 MG/DL — SIGNIFICANT CHANGE UP (ref 70–99)
GLUCOSE UR QL: NEGATIVE — SIGNIFICANT CHANGE UP
HCT VFR BLD CALC: 37 % — LOW (ref 39–50)
HGB BLD-MCNC: 12.8 G/DL — LOW (ref 13–17)
KETONES UR-MCNC: ABNORMAL
LEUKOCYTE ESTERASE UR-ACNC: NEGATIVE — SIGNIFICANT CHANGE UP
LIDOCAIN IGE QN: 355 U/L — SIGNIFICANT CHANGE UP (ref 73–393)
MCHC RBC-ENTMCNC: 30 PG — SIGNIFICANT CHANGE UP (ref 27–34)
MCHC RBC-ENTMCNC: 34.6 GM/DL — SIGNIFICANT CHANGE UP (ref 32–36)
MCV RBC AUTO: 86.9 FL — SIGNIFICANT CHANGE UP (ref 80–100)
NITRITE UR-MCNC: NEGATIVE — SIGNIFICANT CHANGE UP
NRBC # BLD: 0 /100 WBCS — SIGNIFICANT CHANGE UP (ref 0–0)
PH UR: 5 — SIGNIFICANT CHANGE UP (ref 5–8)
PLATELET # BLD AUTO: 198 K/UL — SIGNIFICANT CHANGE UP (ref 150–400)
POTASSIUM SERPL-MCNC: 3.6 MMOL/L — SIGNIFICANT CHANGE UP (ref 3.5–5.3)
POTASSIUM SERPL-SCNC: 3.6 MMOL/L — SIGNIFICANT CHANGE UP (ref 3.5–5.3)
PROT UR-MCNC: NEGATIVE — SIGNIFICANT CHANGE UP
RBC # BLD: 4.26 M/UL — SIGNIFICANT CHANGE UP (ref 4.2–5.8)
RBC # FLD: 13.7 % — SIGNIFICANT CHANGE UP (ref 10.3–14.5)
SODIUM SERPL-SCNC: 141 MMOL/L — SIGNIFICANT CHANGE UP (ref 135–145)
SP GR SPEC: 1.01 — SIGNIFICANT CHANGE UP (ref 1.01–1.02)
UROBILINOGEN FLD QL: NEGATIVE — SIGNIFICANT CHANGE UP
WBC # BLD: 4.04 K/UL — SIGNIFICANT CHANGE UP (ref 3.8–10.5)
WBC # FLD AUTO: 4.04 K/UL — SIGNIFICANT CHANGE UP (ref 3.8–10.5)

## 2022-03-23 PROCEDURE — 78226 HEPATOBILIARY SYSTEM IMAGING: CPT | Mod: 26

## 2022-03-23 PROCEDURE — 99232 SBSQ HOSP IP/OBS MODERATE 35: CPT

## 2022-03-23 PROCEDURE — 99233 SBSQ HOSP IP/OBS HIGH 50: CPT | Mod: GC

## 2022-03-23 RX ORDER — CALCIUM CARBONATE 500(1250)
1 TABLET ORAL ONCE
Refills: 0 | Status: COMPLETED | OUTPATIENT
Start: 2022-03-23 | End: 2022-03-23

## 2022-03-23 RX ADMIN — ATORVASTATIN CALCIUM 20 MILLIGRAM(S): 80 TABLET, FILM COATED ORAL at 21:08

## 2022-03-23 RX ADMIN — HYDROMORPHONE HYDROCHLORIDE 0.5 MILLIGRAM(S): 2 INJECTION INTRAMUSCULAR; INTRAVENOUS; SUBCUTANEOUS at 09:59

## 2022-03-23 RX ADMIN — HYDROMORPHONE HYDROCHLORIDE 1 MILLIGRAM(S): 2 INJECTION INTRAMUSCULAR; INTRAVENOUS; SUBCUTANEOUS at 16:29

## 2022-03-23 RX ADMIN — SERTRALINE 50 MILLIGRAM(S): 25 TABLET, FILM COATED ORAL at 11:01

## 2022-03-23 RX ADMIN — HYDROMORPHONE HYDROCHLORIDE 0.5 MILLIGRAM(S): 2 INJECTION INTRAMUSCULAR; INTRAVENOUS; SUBCUTANEOUS at 09:29

## 2022-03-23 RX ADMIN — HYDROMORPHONE HYDROCHLORIDE 1 MILLIGRAM(S): 2 INJECTION INTRAMUSCULAR; INTRAVENOUS; SUBCUTANEOUS at 16:59

## 2022-03-23 RX ADMIN — ENOXAPARIN SODIUM 90 MILLIGRAM(S): 100 INJECTION SUBCUTANEOUS at 05:31

## 2022-03-23 RX ADMIN — ENOXAPARIN SODIUM 90 MILLIGRAM(S): 100 INJECTION SUBCUTANEOUS at 17:06

## 2022-03-23 RX ADMIN — HYDROMORPHONE HYDROCHLORIDE 0.5 MILLIGRAM(S): 2 INJECTION INTRAMUSCULAR; INTRAVENOUS; SUBCUTANEOUS at 22:15

## 2022-03-23 RX ADMIN — Medication 1000 UNIT(S): at 11:01

## 2022-03-23 RX ADMIN — Medication 1 TABLET(S): at 11:01

## 2022-03-23 RX ADMIN — Medication 3 MILLIGRAM(S): at 21:08

## 2022-03-23 RX ADMIN — HYDROMORPHONE HYDROCHLORIDE 1 MILLIGRAM(S): 2 INJECTION INTRAMUSCULAR; INTRAVENOUS; SUBCUTANEOUS at 06:58

## 2022-03-23 RX ADMIN — HYDROMORPHONE HYDROCHLORIDE 0.5 MILLIGRAM(S): 2 INJECTION INTRAMUSCULAR; INTRAVENOUS; SUBCUTANEOUS at 21:14

## 2022-03-23 NOTE — DISCHARGE NOTE PROVIDER - ATTENDING DISCHARGE PHYSICAL EXAMINATION:
Vital Signs Last 24 Hrs  T(C): 36.6 (26 Mar 2022 05:19), Max: 36.6 (25 Mar 2022 19:32)  T(F): 97.9 (26 Mar 2022 05:19), Max: 97.9 (26 Mar 2022 05:19)  HR: 45 (26 Mar 2022 05:19) (45 - 54)  BP: 131/79 (26 Mar 2022 05:19) (117/72 - 131/79)  BP(mean): --  RR: 18 (26 Mar 2022 05:19) (18 - 18)  SpO2: 95% (26 Mar 2022 05:19) (94% - 95%)    General: Well developed, well nourished, NAD  HEENT: NCAT, PERRLA, EOMI bl, moist mucous membranes   Neck: Supple, nontender, no mass  Neurology: A&Ox3, nonfocal  Respiratory: CTA B/L, No W/R/R  CV: RRR, +S1/S2, no murmurs, rubs or gallops  Abdominal: Soft, mild TTP RUQ. no rebound or guarding   Extremities: No C/C/E, + peripheral pulses  MSK: Normal ROM, no joint erythema or warmth, no joint swelling   Skin: warm, dry, normal color

## 2022-03-23 NOTE — DISCHARGE NOTE PROVIDER - NSDCMRMEDTOKEN_GEN_ALL_CORE_FT
clomiPHENE 50 mg oral tablet: 1 tab(s) orally once a day  rosuvastatin 10 mg oral tablet: 1 tab(s) orally once a day (at bedtime)  sertraline 25 mg oral tablet: 2 tab(s) orally once a day  tadalafil 20 mg oral tablet: 1 tab(s) orally once a day  Vitamin D3 25 mcg (1000 intl units) oral tablet: 1 tab(s) orally once a day  Xarelto 20 mg oral tablet: 1 tab(s) orally once a day (in the evening)   clomiPHENE 50 mg oral tablet: 1 tab(s) orally once a day  Multiple Vitamins oral tablet: 1 tab(s) orally once a day  pantoprazole 40 mg oral delayed release tablet: 1 tab(s) orally once a day (before a meal)  rosuvastatin 10 mg oral tablet: 1 tab(s) orally once a day (at bedtime)  sertraline 25 mg oral tablet: 2 tab(s) orally once a day  tadalafil 20 mg oral tablet: 1 tab(s) orally once a day  Vitamin D3 25 mcg (1000 intl units) oral tablet: 1 tab(s) orally once a day  Xarelto 20 mg oral tablet: 1 tab(s) orally once a day (in the evening)

## 2022-03-23 NOTE — PROGRESS NOTE ADULT - ASSESSMENT
Abdominal pain  Pancreatitis  Abnormal imaging    CT and MRCP noted, results d/w pt and wife  Unclear etiology of pancreatitis, possibly viral syndrome vs passed sludge s/p weight loss  Pt reports no alcohol history, no evidence of CBD stone on MRCP  No role for ERCP  IVF  Pain control  Continue diet as tolerated  D/w surgery, to obtain HIDA   Outpatient f/u with primary GI Dr. Conn for outpatient colonoscopy  Will follow    I reviewed the overnight course of events on the unit, re-confirming the patient history. I discussed the care with the patient and their family  Differential diagnosis and plan of care discussed with patient after the evaluation  35 minutes spent on total encounter of which more than fifty percent of the encounter was spent counseling and/or coordinating care by the attending physician.  Advanced care planning was discussed with patient and family.  Advanced care planning forms were reviewed and discussed.  Risks, benefits and alternatives of gastroenterologic procedures were discussed in detail and all questions were answered.

## 2022-03-23 NOTE — PROGRESS NOTE ADULT - SUBJECTIVE AND OBJECTIVE BOX
SURGERY  Spectralink [phone]    Pt seen and examined. Pt denies any overnight events. Pt reports pain is well controlled at rest but continues to have RUQ pain and epigastric region pain. Pt reports passing flatus but denies any BM's since . Pt denies any nausea/vomiting. Pt reports ambulating with assistance short distances. Tolerating clear liquid diet.    ICU Vital Signs Last 24 Hrs  T(C): 37.2 (23 Mar 2022 04:04), Max: 37.2 (23 Mar 2022 04:04)  T(F): 99 (23 Mar 2022 04:04), Max: 99 (23 Mar 2022 04:04)  HR: 53 (23 Mar 2022 04:04) (53 - 56)  BP: 101/61 (23 Mar 2022 04:04) (101/61 - 106/69)  BP(mean): --  ABP: --  ABP(mean): --  RR: 18 (23 Mar 2022 04:04) (17 - 19)  SpO2: 92% (23 Mar 2022 04:04) (92% - 97%)    I&O's Detail    22 Mar 2022 07:01  -  23 Mar 2022 07:00  --------------------------------------------------------  IN:  Total IN: 0 mL    OUT:    Voided (mL): 2100 mL  Total OUT: 2100 mL    Total NET: -2100 mL    Physical exam: Pt sitting comfortably in bed in NAD  Chest- CTA bilaterally   CV- S1 & S2, RRR  Abdomen- Soft, (+) RUQ ttp, (+) mild epigastric region ttp, well healed scars from sleeve gastrectomy, no rebound, no rigidity    LABS:                        12.8   4.04  )-----------( 198      ( 23 Mar 2022 07:39 )             37.0     03-23    141  |  109<H>  |  5<L>  ----------------------------<  78  3.6   |  23  |  0.44<L>    Ca    8.6      23 Mar 2022 07:39  Phos  2.6     03-22  Mg     1.9         TPro  5.4<L>  /  Alb  2.3<L>  /  TBili  0.5  /  DBili  x   /  AST  16  /  ALT  27  /  AlkPhos  56      PT/INR - ( 21 Mar 2022 11:42 )   PT: 19.8 sec;   INR: 1.68 ratio         PTT - ( 21 Mar 2022 11:42 )  PTT:32.2 sec  Urinalysis Basic - ( 21 Mar 2022 15:48 )    Color: Yellow / Appearance: Clear / S.015 / pH: x  Gluc: x / Ketone: Moderate  / Bili: Small / Urobili: 4   Blood: x / Protein: 30 mg/dL / Nitrite: Negative   Leuk Esterase: Trace / RBC: 0-2 /HPF / WBC 3-5   Sq Epi: x / Non Sq Epi: Occasional / Bacteria: Occasional    Culture - Urine (collected 21 Mar 2022 21:42)  Source: Clean Catch Clean Catch (Midstream)  Final Report (22 Mar 2022 18:18):    <10,000 CFU/mL Normal Urogenital Nathalia    Culture - Blood (collected 21 Mar 2022 15:04)  Source: .Blood Blood-Peripheral  Preliminary Report (22 Mar 2022 16:00):    No growth to date.    Culture - Blood (collected 21 Mar 2022 15:04)  Source: .Blood Blood-Peripheral  Preliminary Report (22 Mar 2022 16:00):    No growth to date.    RADIOLOGY & ADDITIONAL STUDIES:    < from: MR MRCP No Cont (22 @ 10:44) >    ACC: [id number] EXAM:  MR MRCP                          PROCEDURE DATE:  2022          INTERPRETATION:  CLINICAL INFORMATION: Pancreatitis.    COMPARISON: No prior abdominal MR is available for comparison. Reference   is made with a previous abdominal CT dated 3/21/2022    CONTRAST/COMPLICATIONS:  IV Contrast: NONE  Oral Contrast: NONE  Complications: None reported at time of study completion    PROCEDURE:  MRI of the abdomen was performed.  MRCP was performed.    FINDINGS:  LOWER CHEST: Trace bilateral pleural effusions.    LIVER: A few small brightly T2 hyperintense hepatic lesions may represent   cysts and/or hemangiomas.  BILE DUCTS: Mild intrahepatic biliary ductal dilatation. The common duct   measures up to 6 mm in caliber which is within normal limits. No evidence   for choledocholithiasis.  GALLBLADDER: No evidence for gallstones. Nonspecific slight gallbladder   wall thickening/edema.  SPLEEN: Within normal limits.  PANCREAS: Mild edema adjacent to the pancreatic tail, suggestive of acute   pancreatitis.  ADRENALS: Within normal limits.  KIDNEYS/URETERS: Within normal limits.    VISUALIZED PORTIONS:  BOWEL: Apparent focal mural thickening at the hepatic flexure;   colonoscopy may be pursued to rule out colon cancer.  PERITONEUM: Trace perihepatic and perisplenic ascites.  VESSELS: Within normal limits.  RETROPERITONEUM/LYMPH NODES: No lymphadenopathy.  ABDOMINAL WALL: Within normal limits.  BONES: Within normal limits.    IMPRESSION: No evidence for gallstones. Nonspecific slight gallbladder   wall thickening/edema. If there is a clinical suspicion for acalculus   acute cholecystitis, HIDA scan may be pursued for further evaluation.    Mild intrahepatic biliary ductal dilatation. The common duct measures up   to 6 mm in caliber which is within normal limits. No evidence for   choledocholithiasis.    Mild edema adjacent to the pancreatic tail, suggestive of acute   pancreatitis.    Apparent focal mural thickening at the hepatic flexure; colonoscopy may   be pursued to rule out colon cancer.    Trace ascites.    Trace bilateral pleural effusions.    --- End of Report ---    BARTOLOME CARCAMO MD; Attending Radiologist  This document has been electronically signed. Mar 22 2022 11:22AM    < end of copied text >      52 y/o M with hx of HTN, HLD, PE, DVT on xarelto, gastric sleeve in 2021 by Dr. Rodriguez at [facility] presents to [facility] with progressive, severe abd pain, symptoms could be related ?colitis, fevers/chills now resolved, diarrhea now resolved, pancreatitis improving, lipase pending this am, tolerating CLD    -CT scan and MRCP with no evidence to suggest acute cholecystitis pt afebrile, no leukocytosis, LFT's, TBili normal, unclear source of pancreatitis  -Diarrhea/fevers prior to admission could suggest viral enteritis, mural thickening could represent colitis, further work up per primary GI- Dr. Conn as outpatient, last colonoscopy 3 years ago, reportedly normal per patient  -Advance diet per GI and primary service  -Continue DVT Prophylaxis with SCD's & Lovenox 90 BID for recent PE/DVT hx  -Continue Incentive Spirometry  -Continue analgesia  -Encourage OOB/ambulation with assistance  -Monitor bowel function  -Above discussed with Dr. Kahn

## 2022-03-23 NOTE — DISCHARGE NOTE PROVIDER - PROVIDER TOKENS
PROVIDER:[TOKEN:[[id number]],FOLLOWUP:[1 week]],PROVIDER:[TOKEN:[[id number]],FOLLOWUP:[1 week]],PROVIDER:[TOKEN:[[id number]],FOLLOWUP:[1 week]] PROVIDER:[TOKEN:[[id number]],FOLLOWUP:[1 week]],PROVIDER:[TOKEN:[[id number]],FOLLOWUP:[1 week]],PROVIDER:[TOKEN:[[id number]],FOLLOWUP:[1 week]],PROVIDER:[TOKEN:[[id number]]]

## 2022-03-23 NOTE — PROGRESS NOTE ADULT - SUBJECTIVE AND OBJECTIVE BOX
[facility] -- Jorgito Barragan, Shirley, Waylon, Trever Almanza Savella  Office # [phone]      Follow Up:    elevated troponin     Subjective/Observations:   No events overnight resting comfortably in bed.  No complaints of chest pain, dyspnea, or palpitations reported. No signs of orthopnea or PND.      REVIEW OF SYSTEMS: All other review of systems is negative unless indicated above    PAST MEDICAL & SURGICAL HISTORY:  Dyslipidemia    Pericardial cyst  1993, resolved    High cholesterol    HTN (hypertension)    Morbid obesity due to excess calories    Pulmonary embolism    Status post hip surgery  R hip, repair of labrum, 2013    H/O hernia repair  2010 x2    H/O vasectomy  2007    S/P colonoscopy  2020    H/O gastric sleeve        MEDICATIONS  (STANDING):  atorvastatin 20 milliGRAM(s) Oral at bedtime  cholecalciferol 1000 Unit(s) Oral daily  enoxaparin Injectable 90 milliGRAM(s) SubCutaneous every 12 hours  sertraline 50 milliGRAM(s) Oral daily  sodium chloride 0.9%. 1000 milliLiter(s) (150 mL/Hr) IV Continuous <Continuous>    MEDICATIONS  (PRN):  acetaminophen     Tablet .. 650 milliGRAM(s) Oral every 6 hours PRN Temp greater or equal to 38C (100.4F), Mild Pain (1 - 3)  HYDROmorphone  Injectable 0.5 milliGRAM(s) IV Push every 8 hours PRN Moderate Pain (4 - 6)  HYDROmorphone  Injectable 1 milliGRAM(s) IV Push every 8 hours PRN Severe Pain (7 - 10)  melatonin 3 milliGRAM(s) Oral at bedtime PRN Insomnia      Allergies    No Known Allergies    Intolerances        Vital Signs Last 24 Hrs  T(C): 37.2 (23 Mar 2022 04:04), Max: 37.2 (23 Mar 2022 04:04)  T(F): 99 (23 Mar 2022 04:04), Max: 99 (23 Mar 2022 04:04)  HR: 53 (23 Mar 2022 04:04) (53 - 56)  BP: 101/61 (23 Mar 2022 04:04) (101/61 - 106/69)  BP(mean): --  RR: 18 (23 Mar 2022 04:04) (17 - 19)  SpO2: 92% (23 Mar 2022 04:04) (92% - 97%)    I&O's Summary    22 Mar 2022 07:01  -  23 Mar 2022 07:00  --------------------------------------------------------  IN: 0 mL / OUT: 2100 mL / NET: -2100 mL          PHYSICAL EXAM:  TELE: not on tele   Constitutional: NAD, awake and alert, well-developed  HEENT: Moist Mucous Membranes, Anicteric  Pulmonary: Non-labored, breath sounds are clear bilaterally, No wheezing, crackles or rhonchi  Cardiovascular: Regular, S1 and S2 nl, No murmurs, rubs, gallops or clicks  Gastrointestinal: Bowel Sounds present, soft, nontender.   Lymph: No lymphadenopathy. No peripheral edema.  Skin: No visible rashes or ulcers.  Psych:  Mood & affect appropriate    LABS: All Labs Reviewed:                        12.8   4.04  )-----------( 198      ( 23 Mar 2022 07:39 )             37.0                         12.7   4.77  )-----------( 159      ( 22 Mar 2022 07:27 )             37.3                         15.6   5.44  )-----------( 194      ( 21 Mar 2022 11:42 )             44.4     23 Mar 2022 07:39    141    |  109    |  5      ----------------------------<  78     3.6     |  23     |  0.44   22 Mar 2022 07:27    142    |  112    |  7      ----------------------------<  81     4.0     |  22     |  0.56   21 Mar 2022 11:42    138    |  106    |  11     ----------------------------<  113    3.7     |  25     |  0.83     Ca    8.6        23 Mar 2022 07:39  Ca    8.3        22 Mar 2022 07:27  Ca    9.0        21 Mar 2022 11:42  Phos  2.6       22 Mar 2022 07:27  Mg     1.9       22 Mar 2022 07:27  Mg     2.3       21 Mar 2022 11:42    TPro  5.4    /  Alb  2.3    /  TBili  0.5    /  DBili  x      /  AST  16     /  ALT  27     /  AlkPhos  56     22 Mar 2022 07:27  TPro  6.9    /  Alb  3.1    /  TBili  0.8    /  DBili  x      /  AST  26     /  ALT  38     /  AlkPhos  69     21 Mar 2022 11:42    PT/INR - ( 21 Mar 2022 11:42 )   PT: 19.8 sec;   INR: 1.68 ratio         PTT - ( 21 Mar 2022 11:42 )  PTT:32.2 sec        LUIS GARAY  YOB: 1970   Age: 51 (M)   MR#: [id number]  Study Date: 11/22/2021  Location: [place] [address]  DSonographer: Margarita Trujillo  [facility]  Study quality: Technically difficult  Referring Physician: Maine Garcia MD  Blood Pressure: 128/85 mmHg  Height: 168 cm  Weight: 118 kg  BSA: 2.2 m2  ------------------------------------------------------------------------  PROCEDURE: Transthoracic echocardiogram with 2-D, M-Mode  and complete spectral and color flow Doppler.  INDICATION: Other pulmonary embolism without acute cor  pulmonale (I26.99)  ------------------------------------------------------------------------  Dimensions:    Normal Values:  LA:     3.5    2.0 - 4.0 cm  Ao:     3.8    2.0 - 3.8 cm  SEPTUM: 0.9    0.6 - 1.2 cm  PWT:    1.1    0.6 - 1.1 cm  LVIDd:  4.5    3.0 - 5.6 cm  LVIDs:  3.1    1.8 - 4.0 cm  Derived variables:  LVMI: 68 g/m2  RWT: 0.48  Fractional short: 31 %  EF (Visual Estimate): 60-65 %  EF (Ku Rule): 63 %Doppler Peak Velocity (m/sec):  AoV=0.8  ------------------------------------------------------------------------  Observations:  Mitral Valve: Normal mitral valve. Minimal mitral  regurgitation.  Aortic Valve/Aorta: Aortic valve not well visualized;  probably normal. Peak transaortic valve gradient equals 3  mm Hg. Peak left ventricular outflow tract gradient equals  2 mm Hg.  Aortic Root: 3.8 cm.Root size indexed to BSA is 1.7 cm/m 2  BSA-WNL.  Left Atrium: Normal left atrium.  Left Ventricle: Normal left ventricular systolic function.  No segmental wall motion abnormalities. Normal left  ventricular internal dimensions and wall thicknesses. Mild  diastolic dysfunction (Stage I).  Right Heart: Normal right atrium. The right ventricle is  not well visualized; grossly normal right ventricular  systolic function.TAPSE 2.93 cm. Limited endocardial  definition limits assessment of strain imaging. Normal  tricuspid valve. Pulmonic valve not well visualized.  Pericardium/Pleura: Normal pericardium with trace  pericardial effusion.  Hemodynamic: Estimated right atrial pressure is 8 mm Hg.  ------------------------------------------------------------------------  Conclusions:  Technically difficult study.  1. Normal mitral valve. Minimal mitral regurgitation.  2. Aortic valve not well visualized; probably normal.  3. Normal left ventricular systolic function. No segmental  wall motion abnormalities.  4. The right ventricle is not well visualized; grossly  normal right ventricular systolic function. TAPSE 2.93 cm.  Limited endocardial definition limits assessment of strain  imaging.  Findings discussed with Dr Short from primary team.  *** No previous Echo exam.  ------------------------------------------------------------------------  Confirmed on11/22/2021 - 21:29:10 by Chema Alfaro M.D.  ------------------------------------------------------------------------    ACC: [id number] EXAM:  XR CHEST PORTABLE URGENT 1V                          PROCEDURE DATE:  03/21/2022      INTERPRETATION:  EXAMINATION: XR CHEST URGENT    CLINICAL INDICATION: Sepsis    TECHNIQUE: Frontal radiograph of the chest was obtained.    COMPARISON: 12/26/2021.    FINDINGS:    Cardiac silhouette normal in size. Lungs are clear. No pleural effusion   or pneumothorax.    IMPRESSION:  Clear lungs.    --- End of Report ---    KEVIN SIN MD; Attending Radiologist  This document has been electronically signed. Mar 21 2022  2:40PM    Ventricular Rate 80 BPM    Atrial Rate 80 BPM    P-R Interval 162 ms    QRS Duration 80 ms    Q-T Interval 380 ms    QTC Calculation(Bazett) 438 ms    P Axis 59 degrees    R Axis 60 degrees    T Axis 76 degrees    Diagnosis Line Normal sinus rhythm  Nonspecific T wave abnormality  Abnormal ECG  When compared with ECG of 26-DEC-2021 04:39,  Vent. rate has increased BY  28 BPM  Nonspecific T wave abnormality now evident in Lateral leads  Confirmed by SARA KHAN ([id number]) on 3/21/2022 5:16:15 PM

## 2022-03-23 NOTE — PROGRESS NOTE ADULT - PROBLEM SELECTOR PLAN 4
Chronic, known hx of HLD  - on home rosuvastatin 10mg qD  - interchange with atorvastatin 40mg qD Chronic, known hx of HLD  - On home rosuvastatin 10mg qD  - Continue therapeutic interchange Atorvastatin 40mg qD

## 2022-03-23 NOTE — DISCHARGE NOTE PROVIDER - HOSPITAL COURSE
HPI:  52 y/o M with hx of HTN, HLD, PE, DVT on xarelto, gastric sleeve in 11/2021 by Dr. Rodriguez at [facility] presents to V with progressive, severe abd pain. The abdominal pain began on 3/16, sudden in onset, located in the epigastric region, nonradiating. Pt describes the pain as deep and gnawing. Most commonly postprandial pain, worsened by PO solids and liquids. Associated with generalized weakness, feeling feverish, chills, lethargy since 3/16. Denies recent nausea or vomiting, but describes decreased PO intake with weight loss estimated 10 pounds. Pt was lightheaded and dizzy this AM when attempting to take his OTC supplements/vitamins and was helped down to the ground by his daughter, then reported to \[facility]\"" ED. Denied LOC, fall, or headstrike. Pt also admits 2 week hx of dysuria with blood tinged urine. Admits alteration in BM patterns, but denies diarrhea/constipation. Pt in recent hx denies cough, sore throat, SOB, chest pain, nausea, vomiting, melena, hematochezia.     In the ED:  Vitals 113/79 BP, HR 80, T 97.5F, 18 RR, 97% SpO2  Pertinent labs include Lipase 555, serum pro-BNP 3413, troponin 131.4. UA wnl.   CXR shows clear lungs on official read.  CT abd/pelvis with IV contrast shows No CT evidence of bowel obstruction or colitis. Mild intrahepatic biliary duct dilatation. MRCP may be obtained for further assessment.  s/p 2L NS bolus, 1000mg tylenol IV, 20mg famotidine IV, 4mg zofran IV, 4mg morphine IV x 2 doses (21 Mar 2022 16:05)      ---  HOSPITAL COURSE:   Patient admitted to telemetry with progressive, severe abd pain and was admitted with acute pancreatitis. Patient started on IVF, made NPO, started on pain regimen. Lipase 555 on admission, downtrended throughout stay. CT abd/p showed mild intrahepatic biliary duct dilatation. GI was consulted. MRCP showed no gallstones, but acalculous cholecystitis could not be ruled out. HIDA scan was negative for acute cholecystitis, no surgical intervention warranted. Lipid panel showed ***** . As patient improved, diet was advanced as tolerated. Etiology of pancreatits unclear, possibly viral syndrome vs. passed sludge s/p weight loss ******. Patient also had elevated troponin on admission, cardiology consulted, likely due to demand ischemia, no signs of ACS. TTE showed low-normal LV systolic function, LVEF 50-55%, trace MR/TR. For PMH of PE, patient continued on full dose AC. MRCP also showed incidental finding of mural thickening of hepatic flexure of colon, patient advised to follow up outpatient for colonoscopy.        ---  CONSULTANTS:   GI: Dr. Rowland  Surg: Dr. Shelby  Cardio: [facility]      ---  TIME SPENT:  I, the attending physician, was physically present for the key portions of the evaluation and management (E/M) service provided. The total amount of time spent reviewing the hospital notes, laboratory values, imaging findings, assessing/counseling the patient, discussing with consultant physicians, social work, nursing staff was -- minutes    ---     HPI:  50 y/o M with hx of HTN, HLD, PE, DVT on xarelto, gastric sleeve in 11/2021 by Dr. Rodriguez at [facility] presents to V with progressive, severe abd pain. The abdominal pain began on 3/16, sudden in onset, located in the epigastric region, nonradiating. Pt describes the pain as deep and gnawing. Most commonly postprandial pain, worsened by PO solids and liquids. Associated with generalized weakness, feeling feverish, chills, lethargy since 3/16. Denies recent nausea or vomiting, but describes decreased PO intake with weight loss estimated 10 pounds. Pt was lightheaded and dizzy this AM when attempting to take his OTC supplements/vitamins and was helped down to the ground by his daughter, then reported to [facility] ED. Denied LOC, fall, or headstrike. Pt also admits 2 week hx of dysuria with blood tinged urine. Admits alteration in BM patterns, but denies diarrhea/constipation. Pt in recent hx denies cough, sore throat, SOB, chest pain, nausea, vomiting, melena, hematochezia.     In the ED:  Vitals 113/79 BP, HR 80, T 97.5F, 18 RR, 97% SpO2  Pertinent labs include Lipase 555, serum pro-BNP 3413, troponin 131.4. UA wnl.   CXR shows clear lungs on official read.  CT abd/pelvis with IV contrast shows No CT evidence of bowel obstruction or colitis. Mild intrahepatic biliary duct dilatation. MRCP may be obtained for further assessment.  s/p 2L NS bolus, 1000mg tylenol IV, 20mg famotidine IV, 4mg zofran IV, 4mg morphine IV x 2 doses (21 Mar 2022 16:05)      ---  HOSPITAL COURSE:   Patient admitted to telemetry with progressive, severe abd pain and was admitted with acute pancreatitis. Patient started on IVF, made NPO, started on pain regimen. Lipase 555 on admission, downtrended throughout stay. Lipid panel was unremarkable minus low HDL. CT abd/p showed mild intrahepatic biliary duct dilatation. GI was consulted. MRCP showed no gallstones, but acalculous cholecystitis could not be ruled out. HIDA scan was negative for acute cholecystitis, no surgical intervention warranted. As patient improved, diet was advanced as tolerated. Etiology of pancreatitis unclear, possibly viral syndrome vs. passed sludge s/p weight loss after gastric sleeve procedure. Patient also had elevated troponin on admission, cardiology consulted, likely due to demand ischemia, no signs of ACS. TTE showed low-normal LV systolic function, LVEF 50-55%, trace MR/TR. For PMH of PE, patient continued on full dose AC and transitioned to home Xarelto. MRCP also showed incidental finding of mural thickening of hepatic flexure of colon, patient advised to follow up outpatient for colonoscopy.    Patient seen and examined on day of discharge. Overall feeling well, denies any acute complaints.    Vital Signs Last 24 Hrs  T(C): 36.4 (24 Mar 2022 04:24), Max: 36.9 (23 Mar 2022 12:21)  T(F): 97.5 (24 Mar 2022 04:24), Max: 98.4 (23 Mar 2022 12:21)  HR: 61 (24 Mar 2022 04:24) (54 - 61)  BP: 122/71 (24 Mar 2022 04:24) (104/67 - 122/71)  BP(mean): --  RR: 18 (24 Mar 2022 04:24) (16 - 18)  SpO2: 96% (24 Mar 2022 04:24) (94% - 96%)    PHYSICAL EXAM:  GENERAL: NAD  HEENT: PERRL, EOMI  CHEST/LUNG: CTA b/l, no rales, wheezes, or rhonchi  HEART: RRR, S1, S2  ABDOMEN: tenderness to palpation in RUQ, no guarding  EXTREMITIES: no edema, cyanosis, or calf tenderness  NERVOUS SYSTEM: answers questions and follows commands appropriately    Patient optimized from medical standpoint for discharge home with close outpatient follow up.       ---  CONSULTANTS:   GI: Dr. Rowland  Surg: Dr. Shelby  Cardio: [facility]      ---  TIME SPENT:  I, the attending physician, was physically present for the key portions of the evaluation and management (E/M) service provided. The total amount of time spent reviewing the hospital notes, laboratory values, imaging findings, assessing/counseling the patient, discussing with consultant physicians, social work, nursing staff was -- minutes    ---     HPI:  52 y/o M with hx of HTN, HLD, PE, DVT on xarelto, gastric sleeve in 11/2021 by Dr. Rodriguez at [facility] presents to V with progressive, severe abd pain. The abdominal pain began on 3/16, sudden in onset, located in the epigastric region, nonradiating. Pt describes the pain as deep and gnawing. Most commonly postprandial pain, worsened by PO solids and liquids. Associated with generalized weakness, feeling feverish, chills, lethargy since 3/16. Denies recent nausea or vomiting, but describes decreased PO intake with weight loss estimated 10 pounds. Pt was lightheaded and dizzy this AM when attempting to take his OTC supplements/vitamins and was helped down to the ground by his daughter, then reported to [facility] ED. Denied LOC, fall, or headstrike. Pt also admits 2 week hx of dysuria with blood tinged urine. Admits alteration in BM patterns, but denies diarrhea/constipation. Pt in recent hx denies cough, sore throat, SOB, chest pain, nausea, vomiting, melena, hematochezia.     In the ED:  Vitals 113/79 BP, HR 80, T 97.5F, 18 RR, 97% SpO2  Pertinent labs include Lipase 555, serum pro-BNP 3413, troponin 131.4. UA wnl.   CXR shows clear lungs on official read.  CT abd/pelvis with IV contrast shows No CT evidence of bowel obstruction or colitis. Mild intrahepatic biliary duct dilatation. MRCP may be obtained for further assessment.  s/p 2L NS bolus, 1000mg tylenol IV, 20mg famotidine IV, 4mg zofran IV, 4mg morphine IV x 2 doses (21 Mar 2022 16:05)      ---  HOSPITAL COURSE:   Patient admitted to telemetry with progressive, severe abd pain and was admitted with acute pancreatitis. Patient started on IVF, made NPO, started on pain regimen. Lipase 555 on admission, downtrended throughout stay. Lipid panel was unremarkable minus low HDL. CT abd/p showed mild intrahepatic biliary duct dilatation. GI was consulted. MRCP showed no gallstones, but acalculous cholecystitis could not be ruled out. HIDA scan was negative for acute cholecystitis, no surgical intervention warranted. As patient improved, diet was advanced as tolerated. Etiology of pancreatitis unclear, possibly viral syndrome vs. passed sludge s/p weight loss after gastric sleeve procedure. Patient also had elevated troponin on admission, cardiology consulted, likely due to demand ischemia, no signs of ACS. TTE showed low-normal LV systolic function, LVEF 50-55%, trace MR/TR. For PMH of PE, patient continued on full dose AC and transitioned to home Xarelto. MRCP also showed incidental finding of mural thickening of hepatic flexure of colon, patient advised to follow up outpatient for colonoscopy.    Patient seen and examined on day of discharge. Overall feeling well, denies any acute complaints.    Vital Signs Last 24 Hrs  T(C): 36.7 (25 Mar 2022 04:19), Max: 36.7 (24 Mar 2022 12:03)  T(F): 98.1 (25 Mar 2022 04:19), Max: 98.1 (25 Mar 2022 04:19)  HR: 44 (25 Mar 2022 04:19) (44 - 51)  BP: 105/61 (25 Mar 2022 04:19) (105/61 - 120/73)  BP(mean): --  RR: 18 (25 Mar 2022 04:19) (16 - 19)  SpO2: 95% (25 Mar 2022 04:19) (92% - 95%)    PHYSICAL EXAM:  GENERAL: NAD  HEENT: PERRL, EOMI  CHEST/LUNG: CTA b/l, no rales, wheezes, or rhonchi  HEART: RRR, S1, S2  ABDOMEN: tenderness to palpation in RUQ, no guarding  EXTREMITIES: no edema, cyanosis, or calf tenderness  NERVOUS SYSTEM: answers questions and follows commands appropriately    Patient optimized from medical standpoint for discharge home with close outpatient follow up.       ---  CONSULTANTS:   GI: Dr. Rowland  Surg: Dr. Shelby  Cardio: [facility]      ---  TIME SPENT:  I, the attending physician, was physically present for the key portions of the evaluation and management (E/M) service provided. The total amount of time spent reviewing the hospital notes, laboratory values, imaging findings, assessing/counseling the patient, discussing with consultant physicians, social work, nursing staff was -- minutes    ---     HPI:  50 y/o M with hx of HTN, HLD, PE, DVT on xarelto, gastric sleeve in 11/2021 by Dr. Rodriguez at [facility] presents to V with progressive, severe abd pain. The abdominal pain began on 3/16, sudden in onset, located in the epigastric region, nonradiating. Pt describes the pain as deep and gnawing. Most commonly postprandial pain, worsened by PO solids and liquids. Associated with generalized weakness, feeling feverish, chills, lethargy since 3/16. Denies recent nausea or vomiting, but describes decreased PO intake with weight loss estimated 10 pounds. Pt was lightheaded and dizzy this AM when attempting to take his OTC supplements/vitamins and was helped down to the ground by his daughter, then reported to [facility] ED. Denied LOC, fall, or headstrike. Pt also admits 2 week hx of dysuria with blood tinged urine. Admits alteration in BM patterns, but denies diarrhea/constipation. Pt in recent hx denies cough, sore throat, SOB, chest pain, nausea, vomiting, melena, hematochezia.     In the ED:  Vitals 113/79 BP, HR 80, T 97.5F, 18 RR, 97% SpO2  Pertinent labs include Lipase 555, serum pro-BNP 3413, troponin 131.4. UA wnl.   CXR shows clear lungs on official read.  CT abd/pelvis with IV contrast shows No CT evidence of bowel obstruction or colitis. Mild intrahepatic biliary duct dilatation. MRCP may be obtained for further assessment.  s/p 2L NS bolus, 1000mg tylenol IV, 20mg famotidine IV, 4mg zofran IV, 4mg morphine IV x 2 doses (21 Mar 2022 16:05)      ---  HOSPITAL COURSE:   Patient admitted to telemetry with progressive, severe abd pain and was admitted with acute pancreatitis. Patient started on IVF, made NPO, started on pain regimen. Lipase 555 on admission, downtrended throughout stay. Lipid panel was unremarkable minus low HDL. CT abd/p showed mild intrahepatic biliary duct dilatation. GI was consulted. MRCP showed no gallstones, but acalculous cholecystitis could not be ruled out. HIDA scan was negative for acute cholecystitis, no surgical intervention warranted. As patient improved, diet was advanced as tolerated. Etiology of pancreatitis unclear, possibly viral syndrome vs. passed sludge s/p weight loss after gastric sleeve procedure. Patient also had elevated troponin on admission, cardiology consulted, likely due to demand ischemia, no signs of ACS. TTE showed low-normal LV systolic function, LVEF 50-55%, trace MR/TR. For PMH of PE, patient continued on full dose AC and transitioned to home Xarelto. MRCP also showed incidental finding of mural thickening of hepatic flexure of colon, patient advised to follow up outpatient for colonoscopy. Patient required IV Dilaudid for pain control during the early days of his hospital stay. IV Dilaudid was transitioned to PO Dilaudid, which was then downgraded to Tylenol as needed for pain control. Patient's diet was advanced as tolerated and he was tolerating a regular diet on day of discharge.      Patient seen and examined on day of discharge. Overall feeling well, denies any acute complaints.    Vital Signs Last 24 Hrs  T(C): 36.7 (25 Mar 2022 04:19), Max: 36.7 (24 Mar 2022 12:03)  T(F): 98.1 (25 Mar 2022 04:19), Max: 98.1 (25 Mar 2022 04:19)  HR: 44 (25 Mar 2022 04:19) (44 - 51)  BP: 105/61 (25 Mar 2022 04:19) (105/61 - 120/73)  BP(mean): --  RR: 18 (25 Mar 2022 04:19) (16 - 19)  SpO2: 95% (25 Mar 2022 04:19) (92% - 95%)    PHYSICAL EXAM:  GENERAL: NAD  HEENT: PERRL, EOMI  CHEST/LUNG: CTA b/l, no rales, wheezes, or rhonchi  HEART: RRR, S1, S2  ABDOMEN: tenderness to palpation in RUQ, no guarding  EXTREMITIES: no edema, cyanosis, or calf tenderness  NERVOUS SYSTEM: answers questions and follows commands appropriately    Patient optimized from medical standpoint for discharge home with close outpatient follow up with surgeon, Dr. Rodriguez.       ---  CONSULTANTS:   GI: Dr. Rowland  Surgery: Dr. Shelby  Cardio: [facility]      ---  TIME SPENT:  I, the attending physician, was physically present for the key portions of the evaluation and management (E/M) service provided. The total amount of time spent reviewing the hospital notes, laboratory values, imaging findings, assessing/counseling the patient, discussing with consultant physicians, social work, nursing staff was -- minutes    ---     HPI:  52 y/o M with hx of HTN, HLD, PE, DVT on xarelto, gastric sleeve in 11/2021 by Dr. Rodriguez at [facility] presents to V with progressive, severe abd pain. The abdominal pain began on 3/16, sudden in onset, located in the epigastric region, nonradiating. Pt describes the pain as deep and gnawing. Most commonly postprandial pain, worsened by PO solids and liquids. Associated with generalized weakness, feeling feverish, chills, lethargy since 3/16. Denies recent nausea or vomiting, but describes decreased PO intake with weight loss estimated 10 pounds. Pt was lightheaded and dizzy this AM when attempting to take his OTC supplements/vitamins and was helped down to the ground by his daughter, then reported to [facility] ED. Denied LOC, fall, or headstrike. Pt also admits 2 week hx of dysuria with blood tinged urine. Admits alteration in BM patterns, but denies diarrhea/constipation. Pt in recent hx denies cough, sore throat, SOB, chest pain, nausea, vomiting, melena, hematochezia.     In the ED:  Vitals 113/79 BP, HR 80, T 97.5F, 18 RR, 97% SpO2  Pertinent labs include Lipase 555, serum pro-BNP 3413, troponin 131.4. UA wnl.   CXR shows clear lungs on official read.  CT abd/pelvis with IV contrast shows No CT evidence of bowel obstruction or colitis. Mild intrahepatic biliary duct dilatation. MRCP may be obtained for further assessment.  s/p 2L NS bolus, 1000mg tylenol IV, 20mg famotidine IV, 4mg zofran IV, 4mg morphine IV x 2 doses (21 Mar 2022 16:05)      ---  HOSPITAL COURSE:   Patient admitted to telemetry with progressive, severe abd pain and was admitted with acute pancreatitis. Patient started on IVF, made NPO, started on pain regimen. Lipase 555 on admission, downtrended throughout stay. Lipid panel was unremarkable minus low HDL. CT abd/p showed mild intrahepatic biliary duct dilatation. GI was consulted. MRCP showed no gallstones, but acalculous cholecystitis could not be ruled out. HIDA scan was negative for acute cholecystitis, no surgical intervention warranted. As patient improved, diet was advanced as tolerated. Etiology of pancreatitis unclear, possibly viral syndrome vs. passed sludge s/p weight loss after gastric sleeve procedure. Patient also had elevated troponin on admission, cardiology consulted, likely due to demand ischemia, no signs of ACS. TTE showed low-normal LV systolic function, LVEF 50-55%, trace MR/TR. For PMH of PE, patient continued on full dose AC and transitioned to home Xarelto. MRCP also showed incidental finding of mural thickening of hepatic flexure of colon, patient advised to follow up outpatient for colonoscopy. Patient required IV Dilaudid for pain control during the early days of his hospital stay. IV Dilaudid was transitioned to PO Dilaudid, which was then downgraded to Tylenol as needed for pain control. Patient's diet was advanced as tolerated and he was tolerating a regular diet on day of discharge.  Pt still continued to have some pain but pain was same as pain he has had since gastric sleeve surgery. Pt to follow up outpatient with his surgeon     Patient seen and examined on day of discharge. Overall feeling well, denies any acute complaints.    Vital Signs Last 24 Hrs  T(C): 36.7 (25 Mar 2022 04:19), Max: 36.7 (24 Mar 2022 12:03)  T(F): 98.1 (25 Mar 2022 04:19), Max: 98.1 (25 Mar 2022 04:19)  HR: 44 (25 Mar 2022 04:19) (44 - 51)  BP: 105/61 (25 Mar 2022 04:19) (105/61 - 120/73)  BP(mean): --  RR: 18 (25 Mar 2022 04:19) (16 - 19)  SpO2: 95% (25 Mar 2022 04:19) (92% - 95%)    PHYSICAL EXAM:  GENERAL: NAD  HEENT: PERRL, EOMI  CHEST/LUNG: CTA b/l, no rales, wheezes, or rhonchi  HEART: RRR, S1, S2  ABDOMEN: mild tenderness to palpation in RUQ, no rebound or guarding  EXTREMITIES: no edema, cyanosis, or calf tenderness  NERVOUS SYSTEM: answers questions and follows commands appropriately    Patient optimized from medical standpoint for discharge home with close outpatient follow up with surgeon, Dr. Rodriguez.       ---  CONSULTANTS:   GI: Dr. Rowland  Surgery: Dr. Shelby  Cardio: [facility]      ---  TIME SPENT:  I, the attending physician, was physically present for the key portions of the evaluation and management (E/M) service provided. The total amount of time spent reviewing the hospital notes, laboratory values, imaging findings, assessing/counseling the patient, discussing with consultant physicians, social work, nursing staff was  minutes    ---

## 2022-03-23 NOTE — PROGRESS NOTE ADULT - SUBJECTIVE AND OBJECTIVE BOX
Patient is a 51y old  Male who presents with a chief complaint of pancreatitis (23 Mar 2022 11:13)      INTERVAL HPI/OVERNIGHT EVENTS: No acute events overnight. Patient seen and examined at bedside. Admits to RUQ pain, non-radiating, described as dull/achy, rated 6/10 in severity. He is tolerating his liquid diet, although he has some associated nausea without vomiting. Patient also reports a 3-4 week history of difficulty urinating and states he had his prostate checked by his PCP 2 weeks ago. A bladder scan was conducted this morning and showed <50cc residual volume. He denies fever/chills, chest pain, shortness of breath, headache, dizziness.     MEDICATIONS  (STANDING):  atorvastatin 20 milliGRAM(s) Oral at bedtime  cholecalciferol 1000 Unit(s) Oral daily  enoxaparin Injectable 90 milliGRAM(s) SubCutaneous every 12 hours  sertraline 50 milliGRAM(s) Oral daily    MEDICATIONS  (PRN):  acetaminophen     Tablet .. 650 milliGRAM(s) Oral every 6 hours PRN Temp greater or equal to 38C (100.4F), Mild Pain (1 - 3)  HYDROmorphone  Injectable 0.5 milliGRAM(s) IV Push every 8 hours PRN Moderate Pain (4 - 6)  HYDROmorphone  Injectable 1 milliGRAM(s) IV Push every 8 hours PRN Severe Pain (7 - 10)  melatonin 3 milliGRAM(s) Oral at bedtime PRN Insomnia      Allergies    No Known Allergies    Intolerances        REVIEW OF SYSTEMS:  CONSTITUTIONAL: No fever or chills  HEENT:  No headache  RESPIRATORY: No shortness of breath  CARDIOVASCULAR: No chest pain, palpitations  GASTROINTESTINAL: + RUQ abd pain, + nausea, No vomiting  GENITOURINARY: + difficulty urinating, + feeling his bladder is not empty after urination; No hematuria  NEUROLOGICAL: no dizziness     Vital Signs Last 24 Hrs  T(C): 37.2 (23 Mar 2022 04:04), Max: 37.2 (23 Mar 2022 04:04)  T(F): 99 (23 Mar 2022 04:04), Max: 99 (23 Mar 2022 04:04)  HR: 53 (23 Mar 2022 04:04) (53 - 54)  BP: 101/61 (23 Mar 2022 04:04) (101/61 - 106/69)  BP(mean): --  RR: 18 (23 Mar 2022 04:04) (18 - 19)  SpO2: 92% (23 Mar 2022 04:04) (92% - 96%)    PHYSICAL EXAM:  GENERAL: NAD  HEENT: PERRL, EOMI  CHEST/LUNG: CTA b/l, no rales, wheezes, or rhonchi  HEART: RRR, S1, S2  ABDOMEN: tenderness to palpation in RUQ, no guarding  EXTREMITIES: no edema, cyanosis, or calf tenderness  NERVOUS SYSTEM: answers questions and follows commands appropriately    LABS:                        12.8   4.04  )-----------( 198      ( 23 Mar 2022 07:39 )             37.0     CBC Full  -  ( 23 Mar 2022 07:39 )  WBC Count : 4.04 K/uL  Hemoglobin : 12.8 g/dL  Hematocrit : 37.0 %  Platelet Count - Automated : 198 K/uL  Mean Cell Volume : 86.9 fl  Mean Cell Hemoglobin : 30.0 pg  Mean Cell Hemoglobin Concentration : 34.6 gm/dL  Auto Neutrophil # : x  Auto Lymphocyte # : x  Auto Monocyte # : x  Auto Eosinophil # : x  Auto Basophil # : x  Auto Neutrophil % : x  Auto Lymphocyte % : x  Auto Monocyte % : x  Auto Eosinophil % : x  Auto Basophil % : x    23 Mar 2022 07:39    141    |  109    |  5      ----------------------------<  78     3.6     |  23     |  0.44     Ca    8.6        23 Mar 2022 07:39        Urinalysis Basic - ( 23 Mar 2022 10:56 )    Color: Yellow / Appearance: Clear / S.010 / pH: x  Gluc: x / Ketone: Moderate  / Bili: Negative / Urobili: Negative   Blood: x / Protein: Negative / Nitrite: Negative   Leuk Esterase: Negative / RBC: x / WBC x   Sq Epi: x / Non Sq Epi: x / Bacteria: x      CAPILLARY BLOOD GLUCOSE            Culture - Urine (collected 22 @ 21:42)  Source: Clean Catch Clean Catch (Midstream)  Final Report (22 @ 18:18):    <10,000 CFU/mL Normal Urogenital Nathalia    Culture - Blood (collected 22 @ 15:04)  Source: .Blood Blood-Peripheral  Preliminary Report (22 @ 16:00):    No growth to date.    Culture - Blood (collected 22 @ 15:04)  Source: .Blood Blood-Peripheral  Preliminary Report (22 @ 16:00):    No growth to date.        RADIOLOGY & ADDITIONAL TESTS:    Personally reviewed.     Consultant(s) Notes Reviewed:  [x] YES  [ ] NO

## 2022-03-23 NOTE — PROGRESS NOTE ADULT - SUBJECTIVE AND OBJECTIVE BOX
[facility]  Ralph Taylor PA-C  [facility] [address]   [place] [address]  [phone]      INTERVAL HPI/OVERNIGHT EVENTS:  Pt s/e  Pt reports he is having some abdominal discomfort when he has the liquids  Feeling slightly improved compared to when he was admitted  Denies further GI complaints    MEDICATIONS  (STANDING):  atorvastatin 20 milliGRAM(s) Oral at bedtime  calcium carbonate    500 mG (Tums) Chewable 1 Tablet(s) Chew once  cholecalciferol 1000 Unit(s) Oral daily  enoxaparin Injectable 90 milliGRAM(s) SubCutaneous every 12 hours  sertraline 50 milliGRAM(s) Oral daily  sodium chloride 0.9%. 1000 milliLiter(s) (150 mL/Hr) IV Continuous <Continuous>    MEDICATIONS  (PRN):  acetaminophen     Tablet .. 650 milliGRAM(s) Oral every 6 hours PRN Temp greater or equal to 38C (100.4F), Mild Pain (1 - 3)  HYDROmorphone  Injectable 0.5 milliGRAM(s) IV Push every 8 hours PRN Moderate Pain (4 - 6)  HYDROmorphone  Injectable 1 milliGRAM(s) IV Push every 8 hours PRN Severe Pain (7 - 10)  melatonin 3 milliGRAM(s) Oral at bedtime PRN Insomnia      Allergies:  No Known Allergies    PHYSICAL EXAM:   Vital Signs:  Vital Signs Last 24 Hrs  T(C): 37.2 (23 Mar 2022 04:04), Max: 37.2 (23 Mar 2022 04:04)  T(F): 99 (23 Mar 2022 04:04), Max: 99 (23 Mar 2022 04:04)  HR: 53 (23 Mar 2022 04:04) (53 - 56)  BP: 101/61 (23 Mar 2022 04:04) (101/61 - 106/69)  BP(mean): --  RR: 18 (23 Mar 2022 04:04) (17 - 19)  SpO2: 92% (23 Mar 2022 04:04) (92% - 97%)  Daily     Daily     GENERAL:  Appears stated age  ABDOMEN:  Soft, +tender to palpation of epigastric region, non-distended  NEURO:  Alert      LABS:                        12.8   4.04  )-----------( 198      ( 23 Mar 2022 07:39 )             37.0     03-    141  |  109<H>  |  5<L>  ----------------------------<  78  3.6   |  23  |  0.44<L>    Ca    8.6      23 Mar 2022 07:39  Phos  2.6     03-  Mg     1.9         TPro  5.4<L>  /  Alb  2.3<L>  /  TBili  0.5  /  DBili  x   /  AST  16  /  ALT  27  /  AlkPhos  56  03    PT/INR - ( 21 Mar 2022 11:42 )   PT: 19.8 sec;   INR: 1.68 ratio         PTT - ( 21 Mar 2022 11:42 )  PTT:32.2 sec  Urinalysis Basic - ( 21 Mar 2022 15:48 )    Color: Yellow / Appearance: Clear / S.015 / pH: x  Gluc: x / Ketone: Moderate  / Bili: Small / Urobili: 4   Blood: x / Protein: 30 mg/dL / Nitrite: Negative   Leuk Esterase: Trace / RBC: 0-2 /HPF / WBC 3-5   Sq Epi: x / Non Sq Epi: Occasional / Bacteria: Occasional

## 2022-03-23 NOTE — PROGRESS NOTE ADULT - ASSESSMENT
50 y/o M with hx of HTN, HLD, PE, DVT on Xarelto, gastric sleeve in 11/2021 by Dr. Rodriguez at [facility] presents with c/o vomiting, diarrhea, fever, chills, body aches and generalized weakness now with elevated troponin.    Elevated Troponin/PE/DVT/HTN  - Elevated Troponin not consistent with ACS.  Pain is releated to pancreatitis  -6/21 Graded Exercise Stress Test  negative for ischemia, rare isolated PVCs noted  -6/21 TTE: Normal LV and RV systolic function EF 64%. No significant valve disease   - EKG showed SR @ 80 Bpm non specific t wave abnormality.  No true anginal pain  - Xarelto on hold for now, resume if no surgical intervention is planned   - Significantly hypotensive in ED, symptomatic at BP 50/35, s/p IV boluses   - Hold home Lisinopril bp still soft 101/60  - BNP elevated:  <--3413.  No evidence of volume overload  -for HIDA as per GI and surgery notes  - Monitor and replete lytes, keep K>4, Mg>2.    Ingrid Yao FNP-C  Cardiology NP  [id number] [phone]

## 2022-03-23 NOTE — DISCHARGE NOTE PROVIDER - NSDCCPCAREPLAN_GEN_ALL_CORE_FT
PRINCIPAL DISCHARGE DIAGNOSIS  Diagnosis: Pancreatitis  Assessment and Plan of Treatment:       SECONDARY DISCHARGE DIAGNOSES  Diagnosis: Dehydration  Assessment and Plan of Treatment:     Diagnosis: Syncope  Assessment and Plan of Treatment:     Diagnosis: Elevated troponin  Assessment and Plan of Treatment:      PRINCIPAL DISCHARGE DIAGNOSIS  Diagnosis: Pancreatitis  Assessment and Plan of Treatment: You were admitted to the hospital with abdominal pain, and found to have pancreatitis. Imaging of your abdomen showed possible gallstones as the cause of your pancreatitis, however further scanning showed no abnromality of your gallbladder. Your pancreatitis was likely due to an underlying viral syndrome vs. passed sludge after weight loss from gastric sleeve surgery. You were seen by a GI doctor and started on IV fluids. You eventually were able to tolerate normal foods/fluids and were discharged home.   Please follow up with your primary care doctor and GI doctor within 1 week of discharge from the hospital.  You or your family member are responsible for making all follow up appointments.  If you develop worsening abdominal pain, significant nausea or vomiting where you cannot keep any foods/fluids down, please return to the ED immediately.      SECONDARY DISCHARGE DIAGNOSES  Diagnosis: History of pulmonary embolism  Assessment and Plan of Treatment: Continue your Xarelto as scheduled for history of pulmonary embolism.    Diagnosis: Elevated troponin  Assessment and Plan of Treatment: You heart enzymes were slightly elevated on admission, meaning your infection and dehyrdation likely caused acute stress on your heart. You were seen by a cardiologist and your enzymes downtrended throughout stay.   Please follow up with your primary care doctor within 1 week of discharge from the hospital.    Diagnosis: Imaging abnormality  Assessment and Plan of Treatment: MRI of your abdomen showed mural thickening of hepatic flexure of colon (liver side of colon). It is highly advised you follow up with your GI doctor, Dr. Conn, for outpatient colonoscopy to futher evaluate.     PRINCIPAL DISCHARGE DIAGNOSIS  Diagnosis: Pancreatitis  Assessment and Plan of Treatment: You were admitted to the hospital with abdominal pain, and found to have pancreatitis. Imaging of your abdomen showed possible gallstones as the cause of your pancreatitis, however further scanning showed no abnromality of your gallbladder. Your pancreatitis was likely due to an underlying viral syndrome vs. passed sludge after weight loss from gastric sleeve surgery or due to your potential allergy to sulfa drugs. You were seen by a GI doctor and started on IV fluids. You eventually were able to tolerate normal foods/fluids and were discharged home.   Please follow up with your primary care doctor and GI doctor within 1 week of discharge from the hospital.  It is also advised you follow with an allergist/immunologist to determine if Bactrim is a true allergy.  You or your family member are responsible for making all follow up appointments.  If you develop worsening abdominal pain, significant nausea or vomiting where you cannot keep any foods/fluids down, please return to the ED immediately.      SECONDARY DISCHARGE DIAGNOSES  Diagnosis: History of pulmonary embolism  Assessment and Plan of Treatment: Continue your Xarelto as scheduled for history of pulmonary embolism.    Diagnosis: Elevated troponin  Assessment and Plan of Treatment: You heart enzymes were slightly elevated on admission, meaning your infection and dehyrdation likely caused acute stress on your heart. You were seen by a cardiologist and your enzymes downtrended throughout stay.   Please follow up with your primary care doctor within 1 week of discharge from the hospital.    Diagnosis: Imaging abnormality  Assessment and Plan of Treatment: MRI of your abdomen showed mural thickening of hepatic flexure of colon (liver side of colon). It is highly advised you follow up with your GI doctor, Dr. Conn, for outpatient colonoscopy to futher evaluate.     PRINCIPAL DISCHARGE DIAGNOSIS  Diagnosis: Pancreatitis  Assessment and Plan of Treatment: You were admitted to the hospital with abdominal pain, and found to have pancreatitis. Imaging of your abdomen showed possible gallstones as the cause of your pancreatitis, however further scanning showed no abnromality of your gallbladder. Your pancreatitis was likely due to an underlying viral syndrome vs. passed sludge after weight loss from gastric sleeve surgery or due to your potential allergy to sulfa drugs. You were seen by a GI doctor and started on IV fluids. You eventually were able to tolerate normal foods/fluids and were discharged home.   Please follow up with your primary care doctor and GI doctor within 1 week of discharge from the hospital.  It is also advised you follow with an allergist/immunologist to determine if Bactrim is a true allergy.  Some of your pain may be related to your prior surgery. You are to follow up with your surgeon upon discharge.   You or your family member are responsible for making all follow up appointments.  If you develop worsening abdominal pain, significant nausea or vomiting where you cannot keep any foods/fluids down, please return to the ED immediately.      SECONDARY DISCHARGE DIAGNOSES  Diagnosis: Elevated troponin  Assessment and Plan of Treatment: You heart enzymes were slightly elevated on admission, meaning your infection and dehyrdation likely caused acute stress on your heart. You were seen by a cardiologist and your enzymes downtrended throughout stay.   Please follow up with your primary care doctor within 1 week of discharge from the hospital.    Diagnosis: History of pulmonary embolism  Assessment and Plan of Treatment: Continue your Xarelto as scheduled for history of pulmonary embolism.    Diagnosis: Imaging abnormality  Assessment and Plan of Treatment: MRI of your abdomen showed mural thickening of hepatic flexure of colon (liver side of colon). It is highly advised you follow up with your GI doctor, Dr. Conn, for outpatient colonoscopy to futher evaluate.

## 2022-03-23 NOTE — PROGRESS NOTE ADULT - PROBLEM SELECTOR PLAN 1
Acute, lipase 555 on admission, now wnl   - CT abd/pelvis w/IVC showed Mild intrahepatic biliary duct dilatation.  - Less likely gallstone pancreatitis as Tbili, LFTs wnl  - MRCP showed no gallstones, but acalculous cholecystitis can't be completely ruled out, given pt is afebrile, no leukocytosis, hds, and appears to be improving, cholecystitis less likely, however, will f/u GI on whether HIDA indicated  - mural thickening of hepatic flexure of colon on MRI - plan for colonoscopy by GI likely in outpt setting  - as per d/w GI team - planning to advance die to clears and see how patient tolerates  - cont IVF  - Pain control: mild pain tylenol 650mg PO q4hrs, moderate dilaudid 0.5mg IV q8hrs, dilaudid 1mg IV q8hrs severe pain  - GI Dr. Rowland consulted, f/u recs  - ICU consulted, recs appreiated  - Surgery consulted, f/u recs  - Cardiology consulted, recs appreciated  - UA negative Acute, lipase 555 on admission, now wnl   - CT abd/pelvis w/IVC showed Mild intrahepatic biliary duct dilatation.  - Less likely gallstone pancreatitis as Tbili, LFTs wnl  - MRCP showed no gallstones, but acalculous cholecystitis can't be completely ruled out, given pt is afebrile, no leukocytosis, hds, and appears to be improving, cholecystitis less likely  - MRI: Mural thickening of hepatic flexure of colon- plan for colonoscopy by outpt GI Dr. Conn  - UA negative  - F/u HIDA scan  - Continue liquid diet, d/c IVF   - Analgesia: mild pain tylenol 650mg PO q4hrs, mod dilaudid 0.5mg IV q8hrs, severe dilaudid 1mg IV q8hrs  - GI (Dr. Rowland) consulted, recs appreciated: possibly viral syndrome vs passed sludge s/p weight loss  - ICU consulted, recs appreciated  - Surgery consulted, recs appreciated  - Cardiology consulted, recs appreciated

## 2022-03-23 NOTE — DISCHARGE NOTE PROVIDER - NSDCFUADDINST_GEN_ALL_CORE_FT
Please follow up with your primary care doctor and GI doctor within 1 week of discharge from the hospital.  It is also advised you see an immunologist to determine if you are allergic to Bactrim.  You or your family member are responsible for making all follow up appointments.  If you develop worsening abdominal pain, significant nausea or vomiting where you cannot keep any foods/fluids down, please return to the ED immediately.

## 2022-03-23 NOTE — PROGRESS NOTE ADULT - ASSESSMENT
50 y/o M with hx of HTN, HLD, PE, DVT on xarelto, gastric sleeve in 11/2021 by Dr. Rodriguez at [facility] presents to [facility] with progressive, severe abd pain. Admitted with acute pancreatitis, tolerating clear liquid diet, pending HIDA scan.

## 2022-03-23 NOTE — DISCHARGE NOTE PROVIDER - CARE PROVIDER_API CALL
JESSE MARSH  [facility]  [address] [place] [address]  Phone: [phone]  Fax: ()-  Follow Up Time: 1 week    Shanna Ledbetter  GASTROENTEROLOGY  [address], [address]  [place] [address]  Phone: [phone]  Fax: [phone]  Follow Up Time: 1 week    Nova Yuen)  Allergy and Immunology  [address], [address]  [place] [address]  Phone: [phone]  Fax: [phone]  Follow Up Time: 1 week   JESSE MARSH  [facility]  [address] [place] [address]  Phone: [phone]  Fax: ()-  Follow Up Time: 1 week    Shanna Ledbetter  GASTROENTEROLOGY  [address], [address]  [place] [address]  Phone: [phone]  Fax: [phone]  Follow Up Time: 1 week    Nova Yuen)  Allergy and Immunology  [address], [address]  [place] [address]  Phone: [phone]  Fax: [phone]  Follow Up Time: 1 week    Chintan Rodriguez  General Surgery  [address], [address]  [place], [place] [id number]  Phone: [phone]  Fax: [phone]  Follow Up Time:

## 2022-03-23 NOTE — PROGRESS NOTE ADULT - PROBLEM SELECTOR PLAN 3
Trop 131 on admission  - f/u STAT cardiac enzymes, trend to peak; C.E. downtrending  - f/u TTE - pending results  - likely elevated in the setting of demand ischemia due to dec PO intake  - cardiology [facility] consulted, f/u recs Trop 131 on admission, downtrended  - likely elevated in the setting of demand ischemia due to dec PO intake  - TTE: EF of 50-55%  - Cardiology ([facility]) consulted, recs appreciated

## 2022-03-23 NOTE — DISCHARGE NOTE PROVIDER - CARE PROVIDERS DIRECT ADDRESSES
,DirectAddress_Unknown,DirectAddress_Unknown,[email or web address] ,DirectAddress_Unknown,DirectAddress_Unknown,[email or web address]

## 2022-03-23 NOTE — PROGRESS NOTE ADULT - PROBLEM SELECTOR PLAN 2
Chronic, known hx of PE  - on home xarelto 20mg qHS  - will start lovenox BID dosing 90mg subq Chronic, known hx of PE  - Hold home xarelto 20mg qHS  - Continue Lovenox 90mg subq bid

## 2022-03-24 LAB
ALBUMIN SERPL ELPH-MCNC: 2.5 G/DL — LOW (ref 3.3–5)
ALP SERPL-CCNC: 73 U/L — SIGNIFICANT CHANGE UP (ref 40–120)
ALT FLD-CCNC: 25 U/L — SIGNIFICANT CHANGE UP (ref 12–78)
ANION GAP SERPL CALC-SCNC: 8 MMOL/L — SIGNIFICANT CHANGE UP (ref 5–17)
AST SERPL-CCNC: 16 U/L — SIGNIFICANT CHANGE UP (ref 15–37)
BASOPHILS # BLD AUTO: 0.04 K/UL — SIGNIFICANT CHANGE UP (ref 0–0.2)
BASOPHILS NFR BLD AUTO: 1 % — SIGNIFICANT CHANGE UP (ref 0–2)
BILIRUB SERPL-MCNC: 0.5 MG/DL — SIGNIFICANT CHANGE UP (ref 0.2–1.2)
BUN SERPL-MCNC: 5 MG/DL — LOW (ref 7–23)
CALCIUM SERPL-MCNC: 8.5 MG/DL — SIGNIFICANT CHANGE UP (ref 8.5–10.1)
CHLORIDE SERPL-SCNC: 107 MMOL/L — SIGNIFICANT CHANGE UP (ref 96–108)
CHOLEST SERPL-MCNC: 103 MG/DL — SIGNIFICANT CHANGE UP
CO2 SERPL-SCNC: 26 MMOL/L — SIGNIFICANT CHANGE UP (ref 22–31)
CREAT SERPL-MCNC: 0.57 MG/DL — SIGNIFICANT CHANGE UP (ref 0.5–1.3)
EGFR: 117 ML/MIN/1.73M2 — SIGNIFICANT CHANGE UP
EOSINOPHIL # BLD AUTO: 0.14 K/UL — SIGNIFICANT CHANGE UP (ref 0–0.5)
EOSINOPHIL NFR BLD AUTO: 3.6 % — SIGNIFICANT CHANGE UP (ref 0–6)
GLUCOSE SERPL-MCNC: 75 MG/DL — SIGNIFICANT CHANGE UP (ref 70–99)
HCT VFR BLD CALC: 38.3 % — LOW (ref 39–50)
HDLC SERPL-MCNC: 19 MG/DL — LOW
HGB BLD-MCNC: 13.2 G/DL — SIGNIFICANT CHANGE UP (ref 13–17)
IMM GRANULOCYTES NFR BLD AUTO: 0.5 % — SIGNIFICANT CHANGE UP (ref 0–1.5)
LIPID PNL WITH DIRECT LDL SERPL: 61 MG/DL — SIGNIFICANT CHANGE UP
LYMPHOCYTES # BLD AUTO: 1.26 K/UL — SIGNIFICANT CHANGE UP (ref 1–3.3)
LYMPHOCYTES # BLD AUTO: 32.8 % — SIGNIFICANT CHANGE UP (ref 13–44)
MAGNESIUM SERPL-MCNC: 2.2 MG/DL — SIGNIFICANT CHANGE UP (ref 1.6–2.6)
MCHC RBC-ENTMCNC: 30 PG — SIGNIFICANT CHANGE UP (ref 27–34)
MCHC RBC-ENTMCNC: 34.5 GM/DL — SIGNIFICANT CHANGE UP (ref 32–36)
MCV RBC AUTO: 87 FL — SIGNIFICANT CHANGE UP (ref 80–100)
MONOCYTES # BLD AUTO: 0.43 K/UL — SIGNIFICANT CHANGE UP (ref 0–0.9)
MONOCYTES NFR BLD AUTO: 11.2 % — SIGNIFICANT CHANGE UP (ref 2–14)
NEUTROPHILS # BLD AUTO: 1.95 K/UL — SIGNIFICANT CHANGE UP (ref 1.8–7.4)
NEUTROPHILS NFR BLD AUTO: 50.9 % — SIGNIFICANT CHANGE UP (ref 43–77)
NON HDL CHOLESTEROL: 84 MG/DL — SIGNIFICANT CHANGE UP
NRBC # BLD: 0 /100 WBCS — SIGNIFICANT CHANGE UP (ref 0–0)
PHOSPHATE SERPL-MCNC: 2.9 MG/DL — SIGNIFICANT CHANGE UP (ref 2.5–4.5)
PLATELET # BLD AUTO: 246 K/UL — SIGNIFICANT CHANGE UP (ref 150–400)
POTASSIUM SERPL-MCNC: 3.7 MMOL/L — SIGNIFICANT CHANGE UP (ref 3.5–5.3)
POTASSIUM SERPL-SCNC: 3.7 MMOL/L — SIGNIFICANT CHANGE UP (ref 3.5–5.3)
PROT SERPL-MCNC: 6 G/DL — SIGNIFICANT CHANGE UP (ref 6–8.3)
RBC # BLD: 4.4 M/UL — SIGNIFICANT CHANGE UP (ref 4.2–5.8)
RBC # FLD: 13.2 % — SIGNIFICANT CHANGE UP (ref 10.3–14.5)
SODIUM SERPL-SCNC: 141 MMOL/L — SIGNIFICANT CHANGE UP (ref 135–145)
TRIGL SERPL-MCNC: 116 MG/DL — SIGNIFICANT CHANGE UP
WBC # BLD: 3.84 K/UL — SIGNIFICANT CHANGE UP (ref 3.8–10.5)
WBC # FLD AUTO: 3.84 K/UL — SIGNIFICANT CHANGE UP (ref 3.8–10.5)

## 2022-03-24 PROCEDURE — 99232 SBSQ HOSP IP/OBS MODERATE 35: CPT

## 2022-03-24 PROCEDURE — 99233 SBSQ HOSP IP/OBS HIGH 50: CPT | Mod: GC

## 2022-03-24 RX ORDER — RIVAROXABAN 15 MG-20MG
20 KIT ORAL
Refills: 0 | Status: DISCONTINUED | OUTPATIENT
Start: 2022-03-24 | End: 2022-03-26

## 2022-03-24 RX ORDER — PANTOPRAZOLE SODIUM 20 MG/1
40 TABLET, DELAYED RELEASE ORAL EVERY 12 HOURS
Refills: 0 | Status: DISCONTINUED | OUTPATIENT
Start: 2022-03-24 | End: 2022-03-25

## 2022-03-24 RX ORDER — HYDROMORPHONE HYDROCHLORIDE 2 MG/ML
0.5 INJECTION INTRAMUSCULAR; INTRAVENOUS; SUBCUTANEOUS ONCE
Refills: 0 | Status: DISCONTINUED | OUTPATIENT
Start: 2022-03-24 | End: 2022-03-24

## 2022-03-24 RX ORDER — HYDROMORPHONE HYDROCHLORIDE 2 MG/ML
4 INJECTION INTRAMUSCULAR; INTRAVENOUS; SUBCUTANEOUS EVERY 8 HOURS
Refills: 0 | Status: DISCONTINUED | OUTPATIENT
Start: 2022-03-24 | End: 2022-03-25

## 2022-03-24 RX ORDER — CALCIUM CARBONATE 500(1250)
1 TABLET ORAL ONCE
Refills: 0 | Status: COMPLETED | OUTPATIENT
Start: 2022-03-24 | End: 2022-03-24

## 2022-03-24 RX ORDER — SODIUM CHLORIDE 9 MG/ML
1000 INJECTION INTRAMUSCULAR; INTRAVENOUS; SUBCUTANEOUS
Refills: 0 | Status: DISCONTINUED | OUTPATIENT
Start: 2022-03-25 | End: 2022-03-26

## 2022-03-24 RX ORDER — HYDROMORPHONE HYDROCHLORIDE 2 MG/ML
0.5 INJECTION INTRAMUSCULAR; INTRAVENOUS; SUBCUTANEOUS EVERY 8 HOURS
Refills: 0 | Status: DISCONTINUED | OUTPATIENT
Start: 2022-03-24 | End: 2022-03-24

## 2022-03-24 RX ORDER — HYDROMORPHONE HYDROCHLORIDE 2 MG/ML
2 INJECTION INTRAMUSCULAR; INTRAVENOUS; SUBCUTANEOUS EVERY 8 HOURS
Refills: 0 | Status: DISCONTINUED | OUTPATIENT
Start: 2022-03-24 | End: 2022-03-25

## 2022-03-24 RX ORDER — HYDROMORPHONE HYDROCHLORIDE 2 MG/ML
1 INJECTION INTRAMUSCULAR; INTRAVENOUS; SUBCUTANEOUS EVERY 8 HOURS
Refills: 0 | Status: DISCONTINUED | OUTPATIENT
Start: 2022-03-24 | End: 2022-03-24

## 2022-03-24 RX ADMIN — HYDROMORPHONE HYDROCHLORIDE 0.5 MILLIGRAM(S): 2 INJECTION INTRAMUSCULAR; INTRAVENOUS; SUBCUTANEOUS at 11:06

## 2022-03-24 RX ADMIN — HYDROMORPHONE HYDROCHLORIDE 0.5 MILLIGRAM(S): 2 INJECTION INTRAMUSCULAR; INTRAVENOUS; SUBCUTANEOUS at 10:27

## 2022-03-24 RX ADMIN — HYDROMORPHONE HYDROCHLORIDE 1 MILLIGRAM(S): 2 INJECTION INTRAMUSCULAR; INTRAVENOUS; SUBCUTANEOUS at 06:12

## 2022-03-24 RX ADMIN — ATORVASTATIN CALCIUM 20 MILLIGRAM(S): 80 TABLET, FILM COATED ORAL at 22:59

## 2022-03-24 RX ADMIN — Medication 1 TABLET(S): at 14:28

## 2022-03-24 RX ADMIN — SERTRALINE 50 MILLIGRAM(S): 25 TABLET, FILM COATED ORAL at 11:10

## 2022-03-24 RX ADMIN — HYDROMORPHONE HYDROCHLORIDE 2 MILLIGRAM(S): 2 INJECTION INTRAMUSCULAR; INTRAVENOUS; SUBCUTANEOUS at 14:29

## 2022-03-24 RX ADMIN — RIVAROXABAN 20 MILLIGRAM(S): KIT at 17:03

## 2022-03-24 RX ADMIN — HYDROMORPHONE HYDROCHLORIDE 0.5 MILLIGRAM(S): 2 INJECTION INTRAMUSCULAR; INTRAVENOUS; SUBCUTANEOUS at 19:01

## 2022-03-24 RX ADMIN — Medication 1000 UNIT(S): at 11:10

## 2022-03-24 RX ADMIN — Medication 30 MILLILITER(S): at 17:49

## 2022-03-24 RX ADMIN — HYDROMORPHONE HYDROCHLORIDE 2 MILLIGRAM(S): 2 INJECTION INTRAMUSCULAR; INTRAVENOUS; SUBCUTANEOUS at 14:54

## 2022-03-24 RX ADMIN — HYDROMORPHONE HYDROCHLORIDE 4 MILLIGRAM(S): 2 INJECTION INTRAMUSCULAR; INTRAVENOUS; SUBCUTANEOUS at 15:33

## 2022-03-24 RX ADMIN — HYDROMORPHONE HYDROCHLORIDE 4 MILLIGRAM(S): 2 INJECTION INTRAMUSCULAR; INTRAVENOUS; SUBCUTANEOUS at 16:03

## 2022-03-24 RX ADMIN — ENOXAPARIN SODIUM 90 MILLIGRAM(S): 100 INJECTION SUBCUTANEOUS at 05:38

## 2022-03-24 NOTE — PROGRESS NOTE ADULT - SUBJECTIVE AND OBJECTIVE BOX
50 yo male with improved abd pain, tolerating diet. Normal HIDA scan. Patient refers took some bactrim about a week ago for UTI    No emesis, minimal abd pain    Abd soft, NT, ND              Vital Signs Last 24 Hrs  T(C): 36.4 (24 Mar 2022 04:24), Max: 36.9 (23 Mar 2022 12:21)  T(F): 97.5 (24 Mar 2022 04:24), Max: 98.4 (23 Mar 2022 12:21)  HR: 61 (24 Mar 2022 04:24) (54 - 61)  BP: 122/71 (24 Mar 2022 04:24) (104/67 - 122/71)  BP(mean): --  RR: 18 (24 Mar 2022 04:24) (16 - 18)  SpO2: 96% (24 Mar 2022 04:24) (94% - 96%)                          13.2   3.84  )-----------( 246      ( 24 Mar 2022 07:49 )             38.3       03-24    141  |  107  |  5<L>  ----------------------------<  75  3.7   |  26  |  0.57    Ca    8.5      24 Mar 2022 07:49  Phos  2.9     03-24  Mg     2.2     03-24    TPro  6.0  /  Alb  2.5<L>  /  TBili  0.5  /  DBili  x   /  AST  16  /  ALT  25  /  AlkPhos  73  03-24      LIVER FUNCTIONS - ( 24 Mar 2022 07:49 )  Alb: 2.5 g/dL / Pro: 6.0 g/dL / ALK PHOS: 73 U/L / ALT: 25 U/L / AST: 16 U/L / GGT: x             MEDICATIONS  (STANDING):  atorvastatin 20 milliGRAM(s) Oral at bedtime  cholecalciferol 1000 Unit(s) Oral daily  enoxaparin Injectable 90 milliGRAM(s) SubCutaneous every 12 hours  sertraline 50 milliGRAM(s) Oral daily    MEDICATIONS  (PRN):  acetaminophen     Tablet .. 650 milliGRAM(s) Oral every 6 hours PRN Temp greater or equal to 38C (100.4F), Mild Pain (1 - 3)  HYDROmorphone  Injectable 0.5 milliGRAM(s) IV Push every 8 hours PRN Moderate Pain (4 - 6)  HYDROmorphone  Injectable 1 milliGRAM(s) IV Push every 8 hours PRN Severe Pain (7 - 10)  melatonin 3 milliGRAM(s) Oral at bedtime PRN Insomnia      MEDICATIONS  (STANDING):  atorvastatin 20 milliGRAM(s) Oral at bedtime  cholecalciferol 1000 Unit(s) Oral daily  enoxaparin Injectable 90 milliGRAM(s) SubCutaneous every 12 hours  sertraline 50 milliGRAM(s) Oral daily

## 2022-03-24 NOTE — PROGRESS NOTE ADULT - ASSESSMENT
Abdominal pain  Pancreatitis  Abnormal imaging    CT and MRCP noted, results d/w pt and wife  Unclear etiology of pancreatitis, possibly viral syndrome vs passed sludge s/p weight loss  Pt reports no alcohol history, no evidence of CBD stone on MRCP  No role for ERCP  IVF  Pain control  Continue diet as tolerated  D/w surgery, to obtain HIDA   Outpatient f/u with primary GI Dr. Conn for outpatient colonoscopy  No GI objection to d/c planning once tolerating diet    I reviewed the overnight course of events on the unit, re-confirming the patient history. I discussed the care with the patient and their family  Differential diagnosis and plan of care discussed with patient after the evaluation  35 minutes spent on total encounter of which more than fifty percent of the encounter was spent counseling and/or coordinating care by the attending physician.  Advanced care planning was discussed with patient and family.  Advanced care planning forms were reviewed and discussed.  Risks, benefits and alternatives of gastroenterologic procedures were discussed in detail and all questions were answered.

## 2022-03-24 NOTE — PROGRESS NOTE ADULT - PROBLEM SELECTOR PLAN 1
Acute, lipase 555 on admission, now wnl   - CT abd/pelvis w/IVC showed Mild intrahepatic biliary duct dilatation.  - Less likely gallstone pancreatitis as Tbili, LFTs wnl  - MRCP showed no gallstones, but acalculous cholecystitis can't be completely ruled out, given pt is afebrile, no leukocytosis, hds, and appears to be improving, cholecystitis less likely  - MRI: Mural thickening of hepatic flexure of colon- plan for colonoscopy by outpt GI Dr. Conn  - UA negative  - HIDA scan negative  - Diet advanced to soft and bite sized per GI   - Analgesia: mild pain tylenol 650mg PO q4hrs, mod dilaudid 0.5mg IV q8hrs, severe dilaudid 1mg IV q8hrs  - GI (Dr. Rowland) consulted, recs appreciated: possibly viral syndrome vs passed sludge s/p weight loss  - ICU consulted, recs appreciated  - Surgery consulted, recs appreciated  - Cardiology consulted, recs appreciated

## 2022-03-24 NOTE — PROGRESS NOTE ADULT - PROBLEM SELECTOR PLAN 4
Chronic, known hx of HLD  - On home rosuvastatin 10mg qD  - Continue therapeutic interchange Atorvastatin 20mg qhs

## 2022-03-24 NOTE — PROGRESS NOTE ADULT - ASSESSMENT
50 yo male with improved abd pain, tolerating diet  -D/c home  -Avoid bactrim until he sees an immunologist to determine if he has a true bactrim allergy  -Outpatient GI follow up

## 2022-03-24 NOTE — PROGRESS NOTE ADULT - ASSESSMENT
50 y/o M with hx of HTN, HLD, PE, DVT on xarelto, gastric sleeve in 11/2021 by Dr. Rodriguez at [facility] presents to [facility] with progressive, severe abd pain. Admitted with acute pancreatitis, tolerating advanced soft and bite-sized diet, HIDA scan negative.  50 y/o M with hx of HTN, HLD, PE, DVT on xarelto, gastric sleeve in 11/2021 by Dr. Rodriguez at [facility] presents to [facility] with progressive, severe abd pain. Admitted with acute pancreatitis, advanced soft and bite-sized diet however still requiriing pain medication, HIDA scan negative.

## 2022-03-24 NOTE — PROGRESS NOTE ADULT - SUBJECTIVE AND OBJECTIVE BOX
Patient is a 51y old  Male who presents with a chief complaint of pancreatitis (24 Mar 2022 12:41)      INTERVAL HPI/OVERNIGHT EVENTS: No acute events overnight. Patient seen and examined at bedside. Admits he is tolerating new diet of soft and bite sized food. He admits to abdominal pain that is unchanged from presentation. Denies chest pain, shortness of breath, headache, dizziness, nausea/vomiting. Of note, patient reports he completed a course of TMP-SMX prescribed by his outpt urologist approximately 1 week before his abdominal pain began. He states he contacted his urologist who informed him that his current pancreatitis may be medication-induced.     MEDICATIONS  (STANDING):  atorvastatin 20 milliGRAM(s) Oral at bedtime  cholecalciferol 1000 Unit(s) Oral daily  rivaroxaban 20 milliGRAM(s) Oral with dinner  sertraline 50 milliGRAM(s) Oral daily    MEDICATIONS  (PRN):  acetaminophen     Tablet .. 650 milliGRAM(s) Oral every 6 hours PRN Temp greater or equal to 38C (100.4F), Mild Pain (1 - 3)  HYDROmorphone  Injectable 0.5 milliGRAM(s) IV Push every 8 hours PRN Moderate Pain (4 - 6)  HYDROmorphone  Injectable 1 milliGRAM(s) IV Push every 8 hours PRN Severe Pain (7 - 10)  melatonin 3 milliGRAM(s) Oral at bedtime PRN Insomnia      Allergies    No Known Allergies    Intolerances        REVIEW OF SYSTEMS:  CONSTITUTIONAL: No fever or chills  HEENT:  No headache  RESPIRATORY: No shortness of breath  CARDIOVASCULAR: No chest pain, palpitations  GASTROINTESTINAL: + RUQ abd pain, no nausea, No vomiting  GENITOURINARY: + chronic difficulty urinating; No hematuria  NEUROLOGICAL: no dizziness     Vital Signs Last 24 Hrs  T(C): 36.7 (24 Mar 2022 12:03), Max: 36.7 (23 Mar 2022 20:22)  T(F): 98 (24 Mar 2022 12:03), Max: 98.1 (23 Mar 2022 20:22)  HR: 50 (24 Mar 2022 12:03) (50 - 61)  BP: 120/73 (24 Mar 2022 12:03) (104/67 - 122/71)  BP(mean): --  RR: 17 (24 Mar 2022 12:03) (16 - 18)  SpO2: 95% (24 Mar 2022 12:03) (94% - 96%)    PHYSICAL EXAM:  GENERAL: NAD  HEENT: PERRL, EOMI  CHEST/LUNG: CTA b/l, no rales, wheezes, or rhonchi  HEART: RRR, S1, S2  ABDOMEN: tenderness to palpation in RUQ, no guarding  EXTREMITIES: no edema, cyanosis, or calf tenderness  NERVOUS SYSTEM: answers questions and follows commands appropriately    LABS:                        13.2   3.84  )-----------( 246      ( 24 Mar 2022 07:49 )             38.3     CBC Full  -  ( 24 Mar 2022 07:49 )  WBC Count : 3.84 K/uL  Hemoglobin : 13.2 g/dL  Hematocrit : 38.3 %  Platelet Count - Automated : 246 K/uL  Mean Cell Volume : 87.0 fl  Mean Cell Hemoglobin : 30.0 pg  Mean Cell Hemoglobin Concentration : 34.5 gm/dL  Auto Neutrophil # : 1.95 K/uL  Auto Lymphocyte # : 1.26 K/uL  Auto Monocyte # : 0.43 K/uL  Auto Eosinophil # : 0.14 K/uL  Auto Basophil # : 0.04 K/uL  Auto Neutrophil % : 50.9 %  Auto Lymphocyte % : 32.8 %  Auto Monocyte % : 11.2 %  Auto Eosinophil % : 3.6 %  Auto Basophil % : 1.0 %    24 Mar 2022 07:49    141    |  107    |  5      ----------------------------<  75     3.7     |  26     |  0.57     Ca    8.5        24 Mar 2022 07:49  Phos  2.9       24 Mar 2022 07:49  Mg     2.2       24 Mar 2022 07:49    TPro  6.0    /  Alb  2.5    /  TBili  0.5    /  DBili  x      /  AST  16     /  ALT  25     /  AlkPhos  73     24 Mar 2022 07:49      Urinalysis Basic - ( 23 Mar 2022 10:56 )    Color: Yellow / Appearance: Clear / S.010 / pH: x  Gluc: x / Ketone: Moderate  / Bili: Negative / Urobili: Negative   Blood: x / Protein: Negative / Nitrite: Negative   Leuk Esterase: Negative / RBC: x / WBC x   Sq Epi: x / Non Sq Epi: x / Bacteria: x      CAPILLARY BLOOD GLUCOSE            Culture - Urine (collected 22 @ 21:42)  Source: Clean Catch Clean Catch (Midstream)  Final Report (22 @ 18:18):    <10,000 CFU/mL Normal Urogenital Nathalia    Culture - Blood (collected 22 @ 15:04)  Source: .Blood Blood-Peripheral  Preliminary Report (22 @ 16:00):    No growth to date.    Culture - Blood (collected 22 @ 15:04)  Source: .Blood Blood-Peripheral  Preliminary Report (22 @ 16:00):    No growth to date.        RADIOLOGY & ADDITIONAL TESTS:    Personally reviewed.     Consultant(s) Notes Reviewed:  [x] YES  [ ] NO

## 2022-03-24 NOTE — PROGRESS NOTE ADULT - PROBLEM SELECTOR PLAN 3
Trop 131 on admission, downtrended  - likely elevated in the setting of demand ischemia due to dec PO intake  - TTE: EF of 50-55%, trace MR and TR  - Cardiology ([facility]) consulted, recs appreciated

## 2022-03-24 NOTE — PROGRESS NOTE ADULT - ASSESSMENT
50 y/o M with hx of HTN, HLD, PE, DVT on Xarelto, gastric sleeve in 2021 by Dr. Rodriguez at [facility] presents with c/o vomiting, diarrhea, fever, chills, body aches and generalized weakness now with elevated troponin.    Elevated Troponin/PE/DVT/HTN  - Elevated Troponin not consistent with ACS.  Pain is releated to pancreatitis  - Graded Exercise Stress Test  negative for ischemia, rare isolated PVCs noted  - TTE: Normal LV and RV systolic function EF 64%. No significant valve disease   - EKG showed SR @ 80 Bpm non specific t wave abnormality.  No true anginal pain  - Xarelto Resumed  no evidence of CBD stone on MRCP.  No surgical intervention is planned.  Plan to f/u o/p for colonoscopy  - Avoid Bactrim as per Surgery and f/u with o/p immunologist   - Significantly hypotensive in ED, symptomatic at BP 50/35, s/p IV boluses   - Hold home Lisinopril bp improvin/73 (24 Mar 2022 12:03) (104/67 - 122/71).  May consider resuming as o/p.   - BNP elevated:  <--3413.  No evidence of volume overload  -for HIDA as per GI and surgery notes  - Monitor and replete lytes, keep K>4, Mg>2.  - D/C planning possibly today.  No objection for d/c from a cardiac POV.     Tonja Garza, MSN, NP-C, AGACNP-BC  Cardiology NP  [facility] [phone]

## 2022-03-24 NOTE — PROGRESS NOTE ADULT - SUBJECTIVE AND OBJECTIVE BOX
[facility]  Ralph Taylor PA-C  [facility] [place] [address] [phone]      INTERVAL HPI/OVERNIGHT EVENTS:  Pt s/e  Pt reports pain improving, tolerating diet  Denies further GI complaints    MEDICATIONS  (STANDING):  atorvastatin 20 milliGRAM(s) Oral at bedtime  cholecalciferol 1000 Unit(s) Oral daily  rivaroxaban 20 milliGRAM(s) Oral with dinner  sertraline 50 milliGRAM(s) Oral daily    MEDICATIONS  (PRN):  acetaminophen     Tablet .. 650 milliGRAM(s) Oral every 6 hours PRN Temp greater or equal to 38C (100.4F), Mild Pain (1 - 3)  melatonin 3 milliGRAM(s) Oral at bedtime PRN Insomnia      Allergies:  No Known Allergies    PHYSICAL EXAM:   Vital Signs:  Vital Signs Last 24 Hrs  T(C): 36.4 (24 Mar 2022 04:24), Max: 36.9 (23 Mar 2022 12:21)  T(F): 97.5 (24 Mar 2022 04:24), Max: 98.4 (23 Mar 2022 12:21)  HR: 61 (24 Mar 2022 04:24) (54 - 61)  BP: 122/71 (24 Mar 2022 04:24) (104/67 - 122/71)  BP(mean): --  RR: 18 (24 Mar 2022 04:24) (16 - 18)  SpO2: 96% (24 Mar 2022 04:24) (94% - 96%)  Daily     Daily     GENERAL:  Appears stated age  ABDOMEN:  Soft, +very mildly tender to deep palpation of epigastric region, non-distended  NEURO:  Alert      LABS:                        13.2   3.84  )-----------( 246      ( 24 Mar 2022 07:49 )             38.3     03-24    141  |  107  |  5<L>  ----------------------------<  75  3.7   |  26  |  0.57    Ca    8.5      24 Mar 2022 07:49  Phos  2.9     -  Mg     2.2     -    TPro  6.0  /  Alb  2.5<L>  /  TBili  0.5  /  DBili  x   /  AST  16  /  ALT  25  /  AlkPhos  73  -      Urinalysis Basic - ( 23 Mar 2022 10:56 )    Color: Yellow / Appearance: Clear / S.010 / pH: x  Gluc: x / Ketone: Moderate  / Bili: Negative / Urobili: Negative   Blood: x / Protein: Negative / Nitrite: Negative   Leuk Esterase: Negative / RBC: x / WBC x   Sq Epi: x / Non Sq Epi: x / Bacteria: x    RADIOLOGY:  < from: NM Hepatobiliary Imaging (22 @ 14:34) >    ACC: [id number] EXAM:  NM HEPATOBILIARY IMG                          PROCEDURE DATE:  2022          INTERPRETATION:  CLINICAL INFORMATION: 51 -year-old male, with   nonspecific slight gallbladder wall thickening/edema. Evaluate for acute   cholecystitis.    RADIOPHARMACEUTICAL: 3.1 mCi Tc-99m-Mebrofenin, I.V.    TECHNIQUE: Dynamic imaging of the anterior abdomen was performed for 45   minutes following injection of radiotracer. Static images of the abdomen   in the anterior, right lateral and right anterior oblique views were   obtained immediately thereafter.    COMPARISON: No prior hepatobiliary scan is available for comparison.    FINDINGS: There is prompt, homogeneous uptake of radiotracer by the   hepatocytes. Activity is first seen in the gallbladder at 30 minutes and   in the bowel at 25 minutes. There is good clearance of activity from the   liver by the end of the study.    IMPRESSION: Normal hepatobiliary scan.    No radionuclide evidence of acute cholecystitis.    --- Endof Report ---            SHAYAN CHAEVZ MD; Attending Nuclear Medicine  This document has been electronically signed. Mar 23 2022  2:30PM    < end of copied text >

## 2022-03-24 NOTE — PROGRESS NOTE ADULT - SUBJECTIVE AND OBJECTIVE BOX
[facility] -- Jorgito Barragan, Shirley, Waylon, Trever Almanza Savella  Office # [phone]      Follow Up:  elevated troponin    Subjective/Observations: Seen and examined.  Sitting in bed resting awake and alert feeling well.  Pt denies cp, sob or palpitations.  NAD.  No events overnight.       REVIEW OF SYSTEMS: All other review of systems is negative unless indicated above    PAST MEDICAL & SURGICAL HISTORY:  Dyslipidemia    Pericardial cyst  1993, resolved    High cholesterol    HTN (hypertension)    Morbid obesity due to excess calories    Pulmonary embolism    Status post hip surgery  R hip, repair of labrum, 2013    H/O hernia repair  2010 x2    H/O vasectomy  2007    S/P colonoscopy  2020    H/O gastric sleeve        MEDICATIONS  (STANDING):  atorvastatin 20 milliGRAM(s) Oral at bedtime  cholecalciferol 1000 Unit(s) Oral daily  rivaroxaban 20 milliGRAM(s) Oral with dinner  sertraline 50 milliGRAM(s) Oral daily    MEDICATIONS  (PRN):  acetaminophen     Tablet .. 650 milliGRAM(s) Oral every 6 hours PRN Temp greater or equal to 38C (100.4F), Mild Pain (1 - 3)  melatonin 3 milliGRAM(s) Oral at bedtime PRN Insomnia      Allergies    No Known Allergies    Intolerances            Vital Signs Last 24 Hrs  T(C): 36.7 (24 Mar 2022 12:03), Max: 36.7 (23 Mar 2022 20:22)  T(F): 98 (24 Mar 2022 12:03), Max: 98.1 (23 Mar 2022 20:22)  HR: 50 (24 Mar 2022 12:03) (50 - 61)  BP: 120/73 (24 Mar 2022 12:03) (104/67 - 122/71)  BP(mean): --  RR: 17 (24 Mar 2022 12:03) (16 - 18)  SpO2: 95% (24 Mar 2022 12:03) (94% - 96%)    I&O's Summary    23 Mar 2022 07:01  -  24 Mar 2022 07:00  --------------------------------------------------------  IN: 0 mL / OUT: 1501 mL / NET: -1501 mL          PHYSICAL EXAM:  TELE: Not on tele  Constitutional: NAD, awake and alert, well-developed  HEENT: Moist Mucous Membranes, Anicteric  Pulmonary: Non-labored, breath sounds are clear bilaterally, No wheezing, rales or rhonchi  Cardiovascular: Regular, S1 and S2, No murmurs, rubs, gallops or clicks  Gastrointestinal: Bowel Sounds present, soft, nontender.   Lymph: No peripheral edema. No lymphadenopathy.  Skin: No visible rashes or ulcers.  Psych:  Mood & affect appropriate    LABS: All Labs Reviewed:                        13.2   3.84  )-----------( 246      ( 24 Mar 2022 07:49 )             38.3                         12.8   4.04  )-----------( 198      ( 23 Mar 2022 07:39 )             37.0                         12.7   4.77  )-----------( 159      ( 22 Mar 2022 07:27 )             37.3     24 Mar 2022 07:49    141    |  107    |  5      ----------------------------<  75     3.7     |  26     |  0.57   23 Mar 2022 07:39    141    |  109    |  5      ----------------------------<  78     3.6     |  23     |  0.44   22 Mar 2022 07:27    142    |  112    |  7      ----------------------------<  81     4.0     |  22     |  0.56     Ca    8.5        24 Mar 2022 07:49  Ca    8.6        23 Mar 2022 07:39  Ca    8.3        22 Mar 2022 07:27  Phos  2.9       24 Mar 2022 07:49  Phos  2.6       22 Mar 2022 07:27  Mg     2.2       24 Mar 2022 07:49  Mg     1.9       22 Mar 2022 07:27    TPro  6.0    /  Alb  2.5    /  TBili  0.5    /  DBili  x      /  AST  16     /  ALT  25     /  AlkPhos  73     24 Mar 2022 07:49  TPro  5.4    /  Alb  2.3    /  TBili  0.5    /  DBili  x      /  AST  16     /  ALT  27     /  AlkPhos  56     22 Mar 2022 07:27    Ventricular Rate 80 BPM    Atrial Rate 80 BPM    P-R Interval 162 ms    QRS Duration 80 ms    Q-T Interval 380 ms    QTC Calculation(Bazett) 438 ms    P Axis 59 degrees    R Axis 60 degrees    T Axis 76 degrees    Diagnosis Line Normal sinus rhythm  Nonspecific T wave abnormality  Abnormal ECG  When compared with ECG of 26-DEC-2021 04:39,  Vent. rate has increased BY  28 BPM  Nonspecific T wave abnormality now evident in Lateral leads  Confirmed by SARA KHAN ([id number]) on 3/21/2022 5:16:15 PM    ACC: [id number] EXAM:  ECHO TTE WO CON COMP W DOPP                          PROCEDURE DATE:  03/22/2022          INTERPRETATION:  INDICATION: PE  Sonographer KL    Blood Pressure 110/55    Height 170 cm     Weight 90.7 kg       BSA 2.0   sq m    Dimensions:  LA 2.9       Normal Values: 2.0 - 4.0 cm  Ao 3.7        Normal Values: 2.0 - 3.8 cm  SEPTUM 1.1       Normal Values: 0.6 - 1.2 cm  PWT 1.1       Normal Values: 0.6 - 1.1 cm  LVIDd 5.7         Normal Values: 3.0 - 5.6 cm  LVIDs 4.3         Normal Values: 1.8 - 4.0 cm      OBSERVATIONS:  Mitral Valve: normal, trace physiologic MR.  Aortic Valve/Aorta: normal trileaflet aortic valve.  Tricuspid Valve: normal with trace TR.  Pulmonic Valve: Not well-visualized  Left Atrium: normal  Right Atrium: Not well-visualized  Left Ventricle: Low-normal left ventricular systolic function, estimated   LVEF of 50-55%.  Right Ventricle: Grossly normal size and systolic function.  Pericardium: no significant pericardial effusion.  Pulmonary/RV Pressure:estimated PA systolic pressure of 20 mmHg        IMPRESSION:  Low-normal left ventricular systolic function, estimated LVEF of 50-55%.  Grossly normal RV size and systolic function.  Normal trileaflet aortic valve, without AI.  Trace physiologic MR and TR.  No significant pericardial effusion.    --- End of Report ---            CONNIE WILSON MD; Attending Cardiologist  This document has been electronically signed. Mar 23 2022  1:11PM    ACC: [id number] EXAM:  NM HEPATOBILIARY IMG                          PROCEDURE DATE:  03/23/2022          INTERPRETATION:  CLINICAL INFORMATION: 51 -year-old male, with   nonspecific slight gallbladder wall thickening/edema. Evaluate for acute   cholecystitis.    RADIOPHARMACEUTICAL: 3.1 mCi Tc-99m-Mebrofenin, I.V.    TECHNIQUE: Dynamic imaging of the anterior abdomen was performed for 45   minutes following injection of radiotracer. Static images of the abdomen   in the anterior, right lateral and right anterior oblique views were   obtained immediately thereafter.    COMPARISON: No prior hepatobiliary scan is available for comparison.    FINDINGS: There is prompt, homogeneous uptake of radiotracer by the   hepatocytes. Activity is first seen in the gallbladder at 30 minutes and   in the bowel at 25 minutes. There is good clearance of activity from the   liver by the end of the study.    IMPRESSION: Normal hepatobiliary scan.    No radionuclide evidence of acute cholecystitis.    --- Endof Report ---            SHAYAN CHAVEZ MD; Attending Nuclear Medicine  This document has been electronically signed. Mar 23 2022  2:30PM       ACC: [id number] EXAM:  MR MRCP                          PROCEDURE DATE:  03/22/2022          INTERPRETATION:  CLINICAL INFORMATION: Pancreatitis.    COMPARISON: No prior abdominal MR is available for comparison. Reference   is made with a previous abdominal CT dated 3/21/2022    CONTRAST/COMPLICATIONS:  IV Contrast: NONE  Oral Contrast: NONE  Complications: None reported at time of study completion    PROCEDURE:  MRI of the abdomen was performed.  MRCP was performed.    FINDINGS:  LOWER CHEST: Trace bilateral pleural effusions.    LIVER: A few small brightly T2 hyperintense hepatic lesions may represent   cysts and/or hemangiomas.  BILE DUCTS: Mild intrahepatic biliary ductal dilatation. The common duct   measures up to 6 mm in caliber which is within normal limits. No evidence   for choledocholithiasis.  GALLBLADDER: No evidence for gallstones. Nonspecific slight gallbladder   wall thickening/edema.  SPLEEN: Within normal limits.  PANCREAS: Mild edema adjacent to the pancreatic tail, suggestive of acute   pancreatitis.  ADRENALS: Within normal limits.  KIDNEYS/URETERS: Within normal limits.    VISUALIZED PORTIONS:  BOWEL: Apparent focal mural thickening at the hepatic flexure;   colonoscopy may be pursued to rule out colon cancer.  PERITONEUM: Trace perihepatic and perisplenic ascites.  VESSELS: Within normal limits.  RETROPERITONEUM/LYMPH NODES: No lymphadenopathy.  ABDOMINAL WALL: Within normal limits.  BONES: Within normal limits.    IMPRESSION: No evidence for gallstones. Nonspecific slight gallbladder   wall thickening/edema. If there is a clinical suspicion for acalculus   acute cholecystitis, HIDA scan may be pursued for further evaluation.    Mild intrahepatic biliary ductal dilatation. The common duct measures up   to 6 mm in caliber which is within normal limits. No evidence for   choledocholithiasis.    Mild edema adjacent to the pancreatic tail, suggestive of acute   pancreatitis.    Apparent focal mural thickening at the hepatic flexure; colonoscopy may   be pursued to rule out colon cancer.    Trace ascites.    Trace bilateral pleural effusions.    --- End of Report ---            BARTOLOME CARCAMO MD; Attending Radiologist  This document has been electronically signed. Mar 22 2022 11:22AM

## 2022-03-25 DIAGNOSIS — R00.1 BRADYCARDIA, UNSPECIFIED: ICD-10-CM

## 2022-03-25 LAB
ALBUMIN SERPL ELPH-MCNC: 2.7 G/DL — LOW (ref 3.3–5)
ALP SERPL-CCNC: 74 U/L — SIGNIFICANT CHANGE UP (ref 40–120)
ALT FLD-CCNC: 40 U/L — SIGNIFICANT CHANGE UP (ref 12–78)
ANION GAP SERPL CALC-SCNC: 6 MMOL/L — SIGNIFICANT CHANGE UP (ref 5–17)
AST SERPL-CCNC: 33 U/L — SIGNIFICANT CHANGE UP (ref 15–37)
BASOPHILS # BLD AUTO: 0.03 K/UL — SIGNIFICANT CHANGE UP (ref 0–0.2)
BASOPHILS NFR BLD AUTO: 0.8 % — SIGNIFICANT CHANGE UP (ref 0–2)
BILIRUB SERPL-MCNC: 0.4 MG/DL — SIGNIFICANT CHANGE UP (ref 0.2–1.2)
BUN SERPL-MCNC: 6 MG/DL — LOW (ref 7–23)
CALCIUM SERPL-MCNC: 8.8 MG/DL — SIGNIFICANT CHANGE UP (ref 8.5–10.1)
CHLORIDE SERPL-SCNC: 109 MMOL/L — HIGH (ref 96–108)
CO2 SERPL-SCNC: 28 MMOL/L — SIGNIFICANT CHANGE UP (ref 22–31)
CREAT SERPL-MCNC: 0.62 MG/DL — SIGNIFICANT CHANGE UP (ref 0.5–1.3)
EGFR: 114 ML/MIN/1.73M2 — SIGNIFICANT CHANGE UP
EOSINOPHIL # BLD AUTO: 0.18 K/UL — SIGNIFICANT CHANGE UP (ref 0–0.5)
EOSINOPHIL NFR BLD AUTO: 4.7 % — SIGNIFICANT CHANGE UP (ref 0–6)
GLUCOSE SERPL-MCNC: 83 MG/DL — SIGNIFICANT CHANGE UP (ref 70–99)
HCT VFR BLD CALC: 39.7 % — SIGNIFICANT CHANGE UP (ref 39–50)
HGB BLD-MCNC: 13.5 G/DL — SIGNIFICANT CHANGE UP (ref 13–17)
IMM GRANULOCYTES NFR BLD AUTO: 0.5 % — SIGNIFICANT CHANGE UP (ref 0–1.5)
LIDOCAIN IGE QN: 240 U/L — SIGNIFICANT CHANGE UP (ref 73–393)
LYMPHOCYTES # BLD AUTO: 1.13 K/UL — SIGNIFICANT CHANGE UP (ref 1–3.3)
LYMPHOCYTES # BLD AUTO: 29.6 % — SIGNIFICANT CHANGE UP (ref 13–44)
MAGNESIUM SERPL-MCNC: 2.4 MG/DL — SIGNIFICANT CHANGE UP (ref 1.6–2.6)
MCHC RBC-ENTMCNC: 29.8 PG — SIGNIFICANT CHANGE UP (ref 27–34)
MCHC RBC-ENTMCNC: 34 GM/DL — SIGNIFICANT CHANGE UP (ref 32–36)
MCV RBC AUTO: 87.6 FL — SIGNIFICANT CHANGE UP (ref 80–100)
MONOCYTES # BLD AUTO: 0.39 K/UL — SIGNIFICANT CHANGE UP (ref 0–0.9)
MONOCYTES NFR BLD AUTO: 10.2 % — SIGNIFICANT CHANGE UP (ref 2–14)
NEUTROPHILS # BLD AUTO: 2.07 K/UL — SIGNIFICANT CHANGE UP (ref 1.8–7.4)
NEUTROPHILS NFR BLD AUTO: 54.2 % — SIGNIFICANT CHANGE UP (ref 43–77)
NRBC # BLD: 0 /100 WBCS — SIGNIFICANT CHANGE UP (ref 0–0)
PHOSPHATE SERPL-MCNC: 2.8 MG/DL — SIGNIFICANT CHANGE UP (ref 2.5–4.5)
PLATELET # BLD AUTO: 262 K/UL — SIGNIFICANT CHANGE UP (ref 150–400)
POTASSIUM SERPL-MCNC: 4.4 MMOL/L — SIGNIFICANT CHANGE UP (ref 3.5–5.3)
POTASSIUM SERPL-SCNC: 4.4 MMOL/L — SIGNIFICANT CHANGE UP (ref 3.5–5.3)
PROT SERPL-MCNC: 6.2 G/DL — SIGNIFICANT CHANGE UP (ref 6–8.3)
RBC # BLD: 4.53 M/UL — SIGNIFICANT CHANGE UP (ref 4.2–5.8)
RBC # FLD: 13 % — SIGNIFICANT CHANGE UP (ref 10.3–14.5)
SODIUM SERPL-SCNC: 143 MMOL/L — SIGNIFICANT CHANGE UP (ref 135–145)
WBC # BLD: 3.82 K/UL — SIGNIFICANT CHANGE UP (ref 3.8–10.5)
WBC # FLD AUTO: 3.82 K/UL — SIGNIFICANT CHANGE UP (ref 3.8–10.5)

## 2022-03-25 PROCEDURE — 99233 SBSQ HOSP IP/OBS HIGH 50: CPT | Mod: GC

## 2022-03-25 PROCEDURE — 99232 SBSQ HOSP IP/OBS MODERATE 35: CPT

## 2022-03-25 RX ORDER — ACETAMINOPHEN 500 MG
1000 TABLET ORAL ONCE
Refills: 0 | Status: COMPLETED | OUTPATIENT
Start: 2022-03-25 | End: 2022-03-25

## 2022-03-25 RX ORDER — CALCIUM CARBONATE 500(1250)
1 TABLET ORAL ONCE
Refills: 0 | Status: COMPLETED | OUTPATIENT
Start: 2022-03-25 | End: 2022-03-25

## 2022-03-25 RX ORDER — PANTOPRAZOLE SODIUM 20 MG/1
40 TABLET, DELAYED RELEASE ORAL
Refills: 0 | Status: DISCONTINUED | OUTPATIENT
Start: 2022-03-26 | End: 2022-03-26

## 2022-03-25 RX ADMIN — Medication 400 MILLIGRAM(S): at 19:46

## 2022-03-25 RX ADMIN — SERTRALINE 50 MILLIGRAM(S): 25 TABLET, FILM COATED ORAL at 12:51

## 2022-03-25 RX ADMIN — Medication 3 MILLIGRAM(S): at 22:21

## 2022-03-25 RX ADMIN — PANTOPRAZOLE SODIUM 40 MILLIGRAM(S): 20 TABLET, DELAYED RELEASE ORAL at 05:14

## 2022-03-25 RX ADMIN — SODIUM CHLORIDE 125 MILLILITER(S): 9 INJECTION INTRAMUSCULAR; INTRAVENOUS; SUBCUTANEOUS at 00:25

## 2022-03-25 RX ADMIN — Medication 1 TABLET(S): at 17:45

## 2022-03-25 RX ADMIN — Medication 1000 UNIT(S): at 12:51

## 2022-03-25 RX ADMIN — ATORVASTATIN CALCIUM 20 MILLIGRAM(S): 80 TABLET, FILM COATED ORAL at 21:03

## 2022-03-25 RX ADMIN — RIVAROXABAN 20 MILLIGRAM(S): KIT at 17:46

## 2022-03-25 RX ADMIN — Medication 1 TABLET(S): at 22:21

## 2022-03-25 RX ADMIN — Medication 1000 MILLIGRAM(S): at 20:00

## 2022-03-25 NOTE — PROGRESS NOTE ADULT - NS ATTEND AMEND GEN_ALL_CORE FT
-there is no evidence of acute ischemia.  -there is no evidence of significant arrhythmia.  -there is no evidence for meaningful  volume overload.  -gi eval seems complete for now, and may be dc to complete remainder of gi eval as outpt
52 yo male with abd pain, diarrhea, fever, mildly elevated lipase, elevated BMP, troponins, feels better, DVT/PE on xarelto    Abd soft, NT    Normal WBC, lip 559, normal LFTs    52 yo male with viral syndrome?, mildly elevated lipase, BMP, troponins, dehydration  -Awaiting for MRCP due to dilated intrahepatic duct  -OK top start liquids, advance as tolerated  -IVF
dvt on ac, on hold for pancreatitis  no acute ischemia, arrhythmia, vol ol   soft bp  normal stress and echo 6/21
No evidence of active ischemia or volume overload.  Continue management for pancreatitis.  To follow closely while admitted.
dvt on ac, now restarted  no acute ischemia, arrhythmia, vol ol   soft bp  normal stress and echo 6/21.

## 2022-03-25 NOTE — PROGRESS NOTE ADULT - PROBLEM SELECTOR PLAN 1
Acute, lipase 555 on admission, now wnl   - CT abd/pelvis w/IVC showed Mild intrahepatic biliary duct dilatation.  - MRCP showed no gallstones, but acalculous cholecystitis   - HIDA scan negative  - Pain regimen decreased, mild pain tylenol 650mg PO q4hrs, PO dilaudid d/c, will see if patient tolerates  -Diet advanced to regular per patient request, will see if patient tolerates without significant pain  -Etiology of pancreatitis possibly viral syndrome vs passed sludge s/p weight loss per GI, also consider gastric sleeve too tight causing pain per surg  - GI (Dr. Rowland) consulted, recs appreciated  - Surgery consulted, recs appreciated  - Cardiology consulted, recs appreciated

## 2022-03-25 NOTE — PROGRESS NOTE ADULT - ASSESSMENT
52 y/o M with hx of HTN, HLD, PE, DVT on Xarelto, gastric sleeve in 11/2021 by Dr. Rodriguez at [facility] presents with c/o vomiting, diarrhea, fever, chills, body aches and generalized weakness now with elevated troponin.    Elevated Troponin / PE / DVT / HTN  - Elevated Troponin not consistent with ACS.    -6/21 Graded Exercise Stress Test  negative for ischemia, rare isolated PVCs noted  -6/21 TTE: Normal LV and RV systolic function EF 64%. No significant valve disease   - EKG showed SR @ 80 Bpm non specific t wave abnormality.  No true anginal pain  - Xarelto Resumed 2/2 no evidence of CBD stone on MRCP.- can hold if needed for egd  - Significantly hypotensive in ED, symptomatic at BP 50/35, s/p IV boluses now improving 105/61 continue to hold home ace   - BNP elevated:  <--3413.  No evidence of volume overload    as per patient for EGD today ? fu gi notes- no cardiac objection to proceeding with low risk procedure   Monitor and replete electrolytes. Keep K>4.0 and Mg>2.0.  Ingrid Yao FNP-C  Cardiology NP  [facility] [phone]

## 2022-03-25 NOTE — PROGRESS NOTE ADULT - SUBJECTIVE AND OBJECTIVE BOX
[facility]  Ralph Taylor PA-C  [facility] [address]  [phone]      INTERVAL HPI/OVERNIGHT EVENTS:  Pt s/e  Pt reports he was having some discomfort with eating yesterday but wishes to try to be advanced today  Reports minimal pain with the clear liquids  Denies further GI complaints    MEDICATIONS  (STANDING):  atorvastatin 20 milliGRAM(s) Oral at bedtime  cholecalciferol 1000 Unit(s) Oral daily  rivaroxaban 20 milliGRAM(s) Oral with dinner  sertraline 50 milliGRAM(s) Oral daily  sodium chloride 0.9%. 1000 milliLiter(s) (125 mL/Hr) IV Continuous <Continuous>    MEDICATIONS  (PRN):  acetaminophen     Tablet .. 650 milliGRAM(s) Oral every 6 hours PRN Temp greater or equal to 38C (100.4F), Mild Pain (1 - 3)  aluminum hydroxide/magnesium hydroxide/simethicone Suspension 30 milliLiter(s) Oral every 6 hours PRN Dyspepsia  melatonin 3 milliGRAM(s) Oral at bedtime PRN Insomnia      Allergies:  No Known Allergies      PHYSICAL EXAM:   Vital Signs:  Vital Signs Last 24 Hrs  T(C): 36.7 (25 Mar 2022 04:19), Max: 36.7 (24 Mar 2022 12:03)  T(F): 98.1 (25 Mar 2022 04:19), Max: 98.1 (25 Mar 2022 04:19)  HR: 44 (25 Mar 2022 04:19) (44 - 51)  BP: 105/61 (25 Mar 2022 04:19) (105/61 - 120/73)  BP(mean): --  RR: 18 (25 Mar 2022 04:19) (16 - 19)  SpO2: 95% (25 Mar 2022 04:19) (92% - 95%)  Daily     Daily Weight in k.2 (25 Mar 2022 04:19)    GENERAL:  Appears stated age  ABDOMEN:  Soft, non-tender, non-distended  NEURO:  Alert    LABS:                        13.5   3.82  )-----------( 262      ( 25 Mar 2022 07:19 )             39.7     -    143  |  109<H>  |  6<L>  ----------------------------<  83  4.4   |  28  |  0.62    Ca    8.8      25 Mar 2022 07:19  Phos  2.8       Mg     2.4         TPro  6.2  /  Alb  2.7<L>  /  TBili  0.4  /  DBili  x   /  AST  33  /  ALT  40  /  AlkPhos  74        Urinalysis Basic - ( 23 Mar 2022 10:56 )    Color: Yellow / Appearance: Clear / S.010 / pH: x  Gluc: x / Ketone: Moderate  / Bili: Negative / Urobili: Negative   Blood: x / Protein: Negative / Nitrite: Negative   Leuk Esterase: Negative / RBC: x / WBC x   Sq Epi: x / Non Sq Epi: x / Bacteria: x

## 2022-03-25 NOTE — PROGRESS NOTE ADULT - ASSESSMENT
50 y/o M with hx of HTN, HLD, PE, DVT on xarelto, gastric sleeve in 11/2021 by Dr. Rodriguez at [facility] presents to [facility] with progressive, severe abd pain. Admitted with acute pancreatitis.

## 2022-03-25 NOTE — PROGRESS NOTE ADULT - SUBJECTIVE AND OBJECTIVE BOX
Patient refers having this type of abdominal epigastric pain since he had sleeve gastrectomy, mostly after eating. His diet is mostly shakes with protein bars. He had meat loaf yesterday and developed some discomfort. Passing flatus. Normal vital signs, normal labs.     aBD SOFT, nt, nd              Vital Signs Last 24 Hrs  T(C): 36.7 (25 Mar 2022 04:19), Max: 36.7 (24 Mar 2022 12:03)  T(F): 98.1 (25 Mar 2022 04:19), Max: 98.1 (25 Mar 2022 04:19)  HR: 44 (25 Mar 2022 04:19) (44 - 51)  BP: 105/61 (25 Mar 2022 04:19) (105/61 - 120/73)  BP(mean): --  RR: 18 (25 Mar 2022 04:19) (16 - 19)  SpO2: 95% (25 Mar 2022 04:19) (92% - 95%)                          13.5   3.82  )-----------( 262      ( 25 Mar 2022 07:19 )             39.7       03-25    143  |  109<H>  |  6<L>  ----------------------------<  83  4.4   |  28  |  0.62    Ca    8.8      25 Mar 2022 07:19  Phos  2.8     03-25  Mg     2.4     03-25    TPro  6.2  /  Alb  2.7<L>  /  TBili  0.4  /  DBili  x   /  AST  33  /  ALT  40  /  AlkPhos  74  03-25      LIVER FUNCTIONS - ( 25 Mar 2022 07:19 )  Alb: 2.7 g/dL / Pro: 6.2 g/dL / ALK PHOS: 74 U/L / ALT: 40 U/L / AST: 33 U/L / GGT: x             MEDICATIONS  (STANDING):  atorvastatin 20 milliGRAM(s) Oral at bedtime  cholecalciferol 1000 Unit(s) Oral daily  rivaroxaban 20 milliGRAM(s) Oral with dinner  sertraline 50 milliGRAM(s) Oral daily  sodium chloride 0.9%. 1000 milliLiter(s) (125 mL/Hr) IV Continuous <Continuous>    MEDICATIONS  (PRN):  acetaminophen     Tablet .. 650 milliGRAM(s) Oral every 6 hours PRN Temp greater or equal to 38C (100.4F), Mild Pain (1 - 3)  aluminum hydroxide/magnesium hydroxide/simethicone Suspension 30 milliLiter(s) Oral every 6 hours PRN Dyspepsia  melatonin 3 milliGRAM(s) Oral at bedtime PRN Insomnia      MEDICATIONS  (STANDING):  atorvastatin 20 milliGRAM(s) Oral at bedtime  cholecalciferol 1000 Unit(s) Oral daily  rivaroxaban 20 milliGRAM(s) Oral with dinner  sertraline 50 milliGRAM(s) Oral daily  sodium chloride 0.9%. 1000 milliLiter(s) (125 mL/Hr) IV Continuous <Continuous>

## 2022-03-25 NOTE — PROGRESS NOTE ADULT - ATTENDING COMMENTS
50 yo male with abd pain, diarrhea, feels better, still with RUQ abd pain, no diarrhea, Normal wbc, normal vital signs MRCP showed fluid by tail of pancreas, fluid around gallbladder, thickening in hepatic flexure, no cbd stones. Patient c/o difficulty emptying bladder. last colonoscopy 3 years ago.    Abd soft, mildly tender RUQ, obese    50 yo male with pancreatitis, colitis, abd pain  -Will order HIDA scan. My suspicious for cholecystitis is low , no gallstones, normal WBC, normal LFTs; however his abd pain is in RUQ. Patient on lovenox for h/o PE  -Will order bladder scan r/o urinary retention  -Gi recommending outpatient colonoscopy. Hepatic flexure thickening could be due to colitis  -Vital enteritis causing abd pain, diarrhea, fever, mild pancreatitis?
52 y/o M with hx of HTN, HLD, PE, DVT on xarelto, gastric sleeve in 11/2021 by Dr. Rodriguez at [facility] presents to [facility] with progressive, severe abd pain. Admitted with acute pancreatitis, advanced soft and bite-sized diet however still requiriing pain medication, HIDA scan negative. Plan: wean off pain meds, apprec pain management recs PM&R, will re-discuss with GI, lfts and lipase wnl, monitor clinicalc p[place] planning however re-asses abd pain in AM
50 y/o M with hx of HTN, HLD, PE, DVT on xarelto, gastric sleeve in 11/2021 by Dr. Rodriguez at [facility] presents to [facility] with progressive, severe abd pain. Admitted with acute pancreatitis. PLan: diet advanced, pt tolerating diet well, apprec GI and gen surg recs, poss dc with close follow up witth surgeon outpt if tolerating po well to re-eval gastric sleeve, prn pain control but assess nature on demand dosing
52 y/o M with hx of HTN, HLD, PE, DVT on xarelto, gastric sleeve in 11/2021 by Dr. Rodriguez at [facility] presents to [facility] with progressive, severe abd pain. Admitted with acute pancreatitis, tolerating clear liquid diet, pending HIDA scan. Plan: encourage po, apprec gen surg and GI collaboration in care, monitor clinical course, trend lipase, monitor clinical course, may restart xarelto if cont to improve

## 2022-03-25 NOTE — PROGRESS NOTE ADULT - ASSESSMENT
Abdominal pain  Pancreatitis  Abnormal imaging    CT and MRCP noted, results d/w pt and wife  Unclear etiology of pancreatitis, possibly viral syndrome vs passed sludge s/p weight loss  Pt reports no alcohol history, no evidence of CBD stone on MRCP  No role for ERCP  IVF  Pain control  Continue diet as tolerated  HIDA noted, negative for ccy  Monitor tolerance to diet  Outpatient f/u with primary GI Dr. Conn for outpatient colonoscopy  Will follow    I reviewed the overnight course of events on the unit, re-confirming the patient history. I discussed the care with the patient and their family  Differential diagnosis and plan of care discussed with patient after the evaluation  35 minutes spent on total encounter of which more than fifty percent of the encounter was spent counseling and/or coordinating care by the attending physician.  Advanced care planning was discussed with patient and family.  Advanced care planning forms were reviewed and discussed.  Risks, benefits and alternatives of gastroenterologic procedures were discussed in detail and all questions were answered.

## 2022-03-25 NOTE — PROGRESS NOTE ADULT - ASSESSMENT
52 yo male s/p sleeve gastrectomy, PE, resolved mild pancreatitis, no more diarrhea  -I think his abd pain is likely related to sleeve gastrectomy since he's had it since surgery, maybe has a certain degree of stenosis or narrowing giving him pain after eating.   -I think patient could be on his usual shake diet, I don't think his pain is related to pancreatitis since his abdomen is soft, non tender  -I think he might go home and follow with his surgeon/GI. He might need Esophagram, EGD to r/o gastric stenosis

## 2022-03-25 NOTE — PROGRESS NOTE ADULT - NUTRITIONAL ASSESSMENT
- Less likely gallstone pancreatitis as Tbili, LFTs wnl  - MRI: Mural thickening of hepatic flexure of colon- plan for colonoscopy by outpt GI Dr. Conn  - UA negative

## 2022-03-25 NOTE — PROGRESS NOTE ADULT - SUBJECTIVE AND OBJECTIVE BOX
[facility] -- Jorgito Barragan, Shirley, Waylon, Trever Almanza Savella  Office # [phone]      Follow Up:    elevated troponin   Subjective/Observations:   No events overnight resting comfortably in bed.  No complaints of chest pain, dyspnea, or palpitations reported. No signs of orthopnea or PND.  reports he is going for Egd today     REVIEW OF SYSTEMS: All other review of systems is negative unless indicated above    PAST MEDICAL & SURGICAL HISTORY:  Dyslipidemia    Pericardial cyst  1993, resolved    High cholesterol    HTN (hypertension)    Morbid obesity due to excess calories    Pulmonary embolism    Status post hip surgery  R hip, repair of labrum, 2013    H/O hernia repair  2010 x2    H/O vasectomy  2007    S/P colonoscopy  2020    H/O gastric sleeve        MEDICATIONS  (STANDING):  atorvastatin 20 milliGRAM(s) Oral at bedtime  cholecalciferol 1000 Unit(s) Oral daily  rivaroxaban 20 milliGRAM(s) Oral with dinner  sertraline 50 milliGRAM(s) Oral daily  sodium chloride 0.9%. 1000 milliLiter(s) (125 mL/Hr) IV Continuous <Continuous>    MEDICATIONS  (PRN):  acetaminophen     Tablet .. 650 milliGRAM(s) Oral every 6 hours PRN Temp greater or equal to 38C (100.4F), Mild Pain (1 - 3)  aluminum hydroxide/magnesium hydroxide/simethicone Suspension 30 milliLiter(s) Oral every 6 hours PRN Dyspepsia  melatonin 3 milliGRAM(s) Oral at bedtime PRN Insomnia      Allergies    No Known Allergies    Intolerances        Vital Signs Last 24 Hrs  T(C): 36.7 (25 Mar 2022 04:19), Max: 36.7 (24 Mar 2022 12:03)  T(F): 98.1 (25 Mar 2022 04:19), Max: 98.1 (25 Mar 2022 04:19)  HR: 44 (25 Mar 2022 04:19) (44 - 51)  BP: 105/61 (25 Mar 2022 04:19) (105/61 - 120/73)  BP(mean): --  RR: 18 (25 Mar 2022 04:19) (16 - 19)  SpO2: 95% (25 Mar 2022 04:19) (92% - 95%)    I&O's Summary    24 Mar 2022 07:01  -  25 Mar 2022 07:00  --------------------------------------------------------  IN: 1305 mL / OUT: 1000 mL / NET: 305 mL          PHYSICAL EXAM:  TELE: not on tele   Constitutional: NAD, awake and alert, well-developed  HEENT: Moist Mucous Membranes, Anicteric  Pulmonary: Non-labored, breath sounds are clear bilaterally, No wheezing, crackles or rhonchi  Cardiovascular: Regular, S1 and S2 nl, No murmurs, rubs, gallops or clicks  Gastrointestinal: Bowel Sounds present, soft, nontender.   Lymph: No lymphadenopathy. No peripheral edema.  Skin: No visible rashes or ulcers.  Psych:  Mood & affect appropriate    LABS: All Labs Reviewed:                        13.5   3.82  )-----------( 262      ( 25 Mar 2022 07:19 )             39.7                         13.2   3.84  )-----------( 246      ( 24 Mar 2022 07:49 )             38.3                         12.8   4.04  )-----------( 198      ( 23 Mar 2022 07:39 )             37.0     25 Mar 2022 07:19    143    |  109    |  6      ----------------------------<  83     4.4     |  28     |  0.62   24 Mar 2022 07:49    141    |  107    |  5      ----------------------------<  75     3.7     |  26     |  0.57   23 Mar 2022 07:39    141    |  109    |  5      ----------------------------<  78     3.6     |  23     |  0.44     Ca    8.8        25 Mar 2022 07:19  Ca    8.5        24 Mar 2022 07:49  Ca    8.6        23 Mar 2022 07:39  Phos  2.8       25 Mar 2022 07:19  Phos  2.9       24 Mar 2022 07:49  Mg     2.4       25 Mar 2022 07:19  Mg     2.2       24 Mar 2022 07:49    TPro  6.2    /  Alb  2.7    /  TBili  0.4    /  DBili  x      /  AST  33     /  ALT  40     /  AlkPhos  74     25 Mar 2022 07:19  TPro  6.0    /  Alb  2.5    /  TBili  0.5    /  DBili  x      /  AST  16     /  ALT  25     /  AlkPhos  73     24 Mar 2022 07:49    Ventricular Rate 80 BPM    Atrial Rate 80 BPM    P-R Interval 162 ms    QRS Duration 80 ms    Q-T Interval 380 ms    QTC Calculation(Bazett) 438 ms    P Axis 59 degrees    R Axis 60 degrees    T Axis 76 degrees    Diagnosis Line Normal sinus rhythm  Nonspecific T wave abnormality  Abnormal ECG  When compared with ECG of 26-DEC-2021 04:39,  Vent. rate has increased BY  28 BPM  Nonspecific T wave abnormality now evident in Lateral leads  Confirmed by SARA KHAN ([id number]) on 3/21/2022 5:16:15 PM    ACC: [id number] EXAM:  ECHO TTE WO CON COMP W DOPP                          PROCEDURE DATE:  03/22/2022          INTERPRETATION:  INDICATION: PE  Sonographer KL    Blood Pressure 110/55    Height 170 cm     Weight 90.7 kg       BSA 2.0   sq m    Dimensions:  LA 2.9       Normal Values: 2.0 - 4.0 cm  Ao 3.7        Normal Values: 2.0 - 3.8 cm  SEPTUM 1.1       Normal Values: 0.6 - 1.2 cm  PWT 1.1       Normal Values: 0.6 - 1.1 cm  LVIDd 5.7         Normal Values: 3.0 - 5.6 cm  LVIDs 4.3         Normal Values: 1.8 - 4.0 cm      OBSERVATIONS:  Mitral Valve: normal, trace physiologic MR.  Aortic Valve/Aorta: normal trileaflet aortic valve.  Tricuspid Valve: normal with trace TR.  Pulmonic Valve: Not well-visualized  Left Atrium: normal  Right Atrium: Not well-visualized  Left Ventricle: Low-normal left ventricular systolic function, estimated   LVEF of 50-55%.  Right Ventricle: Grossly normal size and systolic function.  Pericardium: no significant pericardial effusion.  Pulmonary/RV Pressure:estimated PA systolic pressure of 20 mmHg        IMPRESSION:  Low-normal left ventricular systolic function, estimated LVEF of 50-55%.  Grossly normal RV size and systolic function.  Normal trileaflet aortic valve, without AI.  Trace physiologic MR and TR.  No significant pericardial effusion.

## 2022-03-25 NOTE — PROGRESS NOTE ADULT - TIME BILLING
care coordination, plan of care discussed with patient face to face, wife at bedside with pt permission, 3E IDR team, gi dr gonzalez left [facility] for call back
care coordination, plan of care discussed with patient face to face, 3E IDR team, Dr Kahn gen surg, [facility]
care coordinatin, plan of care discussed with patient face to face, 3E IDR team

## 2022-03-25 NOTE — PROGRESS NOTE ADULT - PROBLEM SELECTOR PLAN 3
Patient persistently bradycardic, HR ranging 44-61  -Likely JEFFREY induced sleep apnea per cardio  -Patient asymptomatic, continue to monitor on tele  -Cardio following, recs appreciated

## 2022-03-25 NOTE — PROGRESS NOTE ADULT - PROBLEM SELECTOR PROBLEM 5
Need for prophylactic measure
Hyperlipemia
Need for prophylactic measure
Need for prophylactic measure

## 2022-03-25 NOTE — PROGRESS NOTE ADULT - SUBJECTIVE AND OBJECTIVE BOX
Patient is a 51y old  Male who presents with a chief complaint of pancreatitis (25 Mar 2022 10:27)      INTERVAL HPI/OVERNIGHT EVENTS: Yesterday patient was transitioned from IV dilaudid to PO dilaudid. Patient did require one additional dose of IV dilaudid at 7pm, but did not require additional pain medication    MEDICATIONS  (STANDING):  atorvastatin 20 milliGRAM(s) Oral at bedtime  cholecalciferol 1000 Unit(s) Oral daily  rivaroxaban 20 milliGRAM(s) Oral with dinner  sertraline 50 milliGRAM(s) Oral daily  sodium chloride 0.9%. 1000 milliLiter(s) (125 mL/Hr) IV Continuous <Continuous>    MEDICATIONS  (PRN):  acetaminophen     Tablet .. 650 milliGRAM(s) Oral every 6 hours PRN Temp greater or equal to 38C (100.4F), Mild Pain (1 - 3)  aluminum hydroxide/magnesium hydroxide/simethicone Suspension 30 milliLiter(s) Oral every 6 hours PRN Dyspepsia  melatonin 3 milliGRAM(s) Oral at bedtime PRN Insomnia      Allergies    No Known Allergies    Intolerances        REVIEW OF SYSTEMS:  CONSTITUTIONAL: No fever or chills  HEENT:  No headache, no sore throat  RESPIRATORY: No cough, wheezing, or shortness of breath  CARDIOVASCULAR: No chest pain, palpitations  GASTROINTESTINAL: No abd pain, nausea, vomiting, or diarrhea  GENITOURINARY: No dysuria, frequency, or hematuria  NEUROLOGICAL: no focal weakness or dizziness  MUSCULOSKELETAL: no myalgias     Vital Signs Last 24 Hrs  T(C): 36.7 (25 Mar 2022 10:30), Max: 36.7 (24 Mar 2022 12:03)  T(F): 98.1 (25 Mar 2022 10:30), Max: 98.1 (25 Mar 2022 04:19)  HR: 55 (25 Mar 2022 10:30) (44 - 55)  BP: 111/70 (25 Mar 2022 10:30) (105/61 - 120/73)  BP(mean): --  RR: 18 (25 Mar 2022 10:30) (16 - 19)  SpO2: 95% (25 Mar 2022 10:30) (92% - 95%)    PHYSICAL EXAM:  GENERAL: NAD  HEENT:  anicteric, moist mucous membranes  CHEST/LUNG:  CTA b/l, no rales, wheezes, or rhonchi  HEART:  RRR, S1, S2  ABDOMEN:  BS+, soft, nontender, nondistended  EXTREMITIES: no edema, cyanosis, or calf tenderness  NERVOUS SYSTEM: answers questions and follows commands appropriately    LABS:                        13.5   3.82  )-----------( 262      ( 25 Mar 2022 07:19 )             39.7     CBC Full  -  ( 25 Mar 2022 07:19 )  WBC Count : 3.82 K/uL  Hemoglobin : 13.5 g/dL  Hematocrit : 39.7 %  Platelet Count - Automated : 262 K/uL  Mean Cell Volume : 87.6 fl  Mean Cell Hemoglobin : 29.8 pg  Mean Cell Hemoglobin Concentration : 34.0 gm/dL  Auto Neutrophil # : 2.07 K/uL  Auto Lymphocyte # : 1.13 K/uL  Auto Monocyte # : 0.39 K/uL  Auto Eosinophil # : 0.18 K/uL  Auto Basophil # : 0.03 K/uL  Auto Neutrophil % : 54.2 %  Auto Lymphocyte % : 29.6 %  Auto Monocyte % : 10.2 %  Auto Eosinophil % : 4.7 %  Auto Basophil % : 0.8 %    25 Mar 2022 07:19    143    |  109    |  6      ----------------------------<  83     4.4     |  28     |  0.62     Ca    8.8        25 Mar 2022 07:19  Phos  2.8       25 Mar 2022 07:19  Mg     2.4       25 Mar 2022 07:19    TPro  6.2    /  Alb  2.7    /  TBili  0.4    /  DBili  x      /  AST  33     /  ALT  40     /  AlkPhos  74     25 Mar 2022 07:19        CAPILLARY BLOOD GLUCOSE            Culture - Urine (collected 03-21-22 @ 21:42)  Source: Clean Catch Clean Catch (Midstream)  Final Report (03-22-22 @ 18:18):    <10,000 CFU/mL Normal Urogenital Nathalia    Culture - Blood (collected 03-21-22 @ 15:04)  Source: .Blood Blood-Peripheral  Preliminary Report (03-22-22 @ 16:00):    No growth to date.    Culture - Blood (collected 03-21-22 @ 15:04)  Source: .Blood Blood-Peripheral  Preliminary Report (03-22-22 @ 16:00):    No growth to date.        RADIOLOGY & ADDITIONAL TESTS:    Personally reviewed.     Consultant(s) Notes Reviewed:  [x] YES  [ ] NO     Patient is a 51y old  Male who presents with a chief complaint of pancreatitis (25 Mar 2022 10:27)      INTERVAL HPI/OVERNIGHT EVENTS: Yesterday patient was transitioned from IV dilaudid to PO dilaudid. Patient did require one additional dose of IV dilaudid at 7pm, but did not require additional pain medication afterwards. Patient seen and examined at bedside today. Feeling better. Abd pain improved. Denies and SOB, CP, LE edema.    MEDICATIONS  (STANDING):  atorvastatin 20 milliGRAM(s) Oral at bedtime  cholecalciferol 1000 Unit(s) Oral daily  rivaroxaban 20 milliGRAM(s) Oral with dinner  sertraline 50 milliGRAM(s) Oral daily  sodium chloride 0.9%. 1000 milliLiter(s) (125 mL/Hr) IV Continuous <Continuous>    MEDICATIONS  (PRN):  acetaminophen     Tablet .. 650 milliGRAM(s) Oral every 6 hours PRN Temp greater or equal to 38C (100.4F), Mild Pain (1 - 3)  aluminum hydroxide/magnesium hydroxide/simethicone Suspension 30 milliLiter(s) Oral every 6 hours PRN Dyspepsia  melatonin 3 milliGRAM(s) Oral at bedtime PRN Insomnia      Allergies    No Known Allergies    Intolerances        REVIEW OF SYSTEMS:  CONSTITUTIONAL: No fever or chills  HEENT:  No headache, no sore throat  RESPIRATORY: No cough, wheezing, or shortness of breath  CARDIOVASCULAR: No chest pain, palpitations  GASTROINTESTINAL: +abd pain improved, no nausea, vomiting, or diarrhea  GENITOURINARY: No dysuria, frequency, or hematuria  NEUROLOGICAL: no focal weakness or dizziness  MUSCULOSKELETAL: no myalgias     Vital Signs Last 24 Hrs  T(C): 36.7 (25 Mar 2022 10:30), Max: 36.7 (24 Mar 2022 12:03)  T(F): 98.1 (25 Mar 2022 10:30), Max: 98.1 (25 Mar 2022 04:19)  HR: 55 (25 Mar 2022 10:30) (44 - 55)  BP: 111/70 (25 Mar 2022 10:30) (105/61 - 120/73)  BP(mean): --  RR: 18 (25 Mar 2022 10:30) (16 - 19)  SpO2: 95% (25 Mar 2022 10:30) (92% - 95%)    PHYSICAL EXAM:  GENERAL: NAD  HEENT:  anicteric, moist mucous membranes  CHEST/LUNG:  CTA b/l, no rales, wheezes, or rhonchi  HEART:  RRR, S1, S2  ABDOMEN:  BS+, soft, nontender, nondistended  EXTREMITIES: no edema, cyanosis, or calf tenderness  NERVOUS SYSTEM: answers questions and follows commands appropriately    LABS:                        13.5   3.82  )-----------( 262      ( 25 Mar 2022 07:19 )             39.7     CBC Full  -  ( 25 Mar 2022 07:19 )  WBC Count : 3.82 K/uL  Hemoglobin : 13.5 g/dL  Hematocrit : 39.7 %  Platelet Count - Automated : 262 K/uL  Mean Cell Volume : 87.6 fl  Mean Cell Hemoglobin : 29.8 pg  Mean Cell Hemoglobin Concentration : 34.0 gm/dL  Auto Neutrophil # : 2.07 K/uL  Auto Lymphocyte # : 1.13 K/uL  Auto Monocyte # : 0.39 K/uL  Auto Eosinophil # : 0.18 K/uL  Auto Basophil # : 0.03 K/uL  Auto Neutrophil % : 54.2 %  Auto Lymphocyte % : 29.6 %  Auto Monocyte % : 10.2 %  Auto Eosinophil % : 4.7 %  Auto Basophil % : 0.8 %    25 Mar 2022 07:19    143    |  109    |  6      ----------------------------<  83     4.4     |  28     |  0.62     Ca    8.8        25 Mar 2022 07:19  Phos  2.8       25 Mar 2022 07:19  Mg     2.4       25 Mar 2022 07:19    TPro  6.2    /  Alb  2.7    /  TBili  0.4    /  DBili  x      /  AST  33     /  ALT  40     /  AlkPhos  74     25 Mar 2022 07:19        CAPILLARY BLOOD GLUCOSE            Culture - Urine (collected 03-21-22 @ 21:42)  Source: Clean Catch Clean Catch (Midstream)  Final Report (03-22-22 @ 18:18):    <10,000 CFU/mL Normal Urogenital Nathalia    Culture - Blood (collected 03-21-22 @ 15:04)  Source: .Blood Blood-Peripheral  Preliminary Report (03-22-22 @ 16:00):    No growth to date.    Culture - Blood (collected 03-21-22 @ 15:04)  Source: .Blood Blood-Peripheral  Preliminary Report (03-22-22 @ 16:00):    No growth to date.        RADIOLOGY & ADDITIONAL TESTS: NA      Personally reviewed.     Consultant(s) Notes Reviewed:  [x] YES  [ ] NO

## 2022-03-25 NOTE — PROGRESS NOTE ADULT - NS ATTEND OPT1 GEN_ALL_CORE
I attest my time as attending is greater than 50% of the total combined time spent on qualifying patient care activities by the PA/NP and attending.

## 2022-03-26 ENCOUNTER — TRANSCRIPTION ENCOUNTER (OUTPATIENT)
Age: 52
End: 2022-03-26

## 2022-03-26 VITALS
RESPIRATION RATE: 18 BRPM | TEMPERATURE: 98 F | SYSTOLIC BLOOD PRESSURE: 110 MMHG | DIASTOLIC BLOOD PRESSURE: 69 MMHG | HEART RATE: 46 BPM | OXYGEN SATURATION: 96 %

## 2022-03-26 LAB
ANION GAP SERPL CALC-SCNC: 5 MMOL/L — SIGNIFICANT CHANGE UP (ref 5–17)
BUN SERPL-MCNC: 8 MG/DL — SIGNIFICANT CHANGE UP (ref 7–23)
CALCIUM SERPL-MCNC: 8.9 MG/DL — SIGNIFICANT CHANGE UP (ref 8.5–10.1)
CHLORIDE SERPL-SCNC: 112 MMOL/L — HIGH (ref 96–108)
CO2 SERPL-SCNC: 28 MMOL/L — SIGNIFICANT CHANGE UP (ref 22–31)
CREAT SERPL-MCNC: 0.62 MG/DL — SIGNIFICANT CHANGE UP (ref 0.5–1.3)
CULTURE RESULTS: SIGNIFICANT CHANGE UP
CULTURE RESULTS: SIGNIFICANT CHANGE UP
EGFR: 114 ML/MIN/1.73M2 — SIGNIFICANT CHANGE UP
GLUCOSE SERPL-MCNC: 85 MG/DL — SIGNIFICANT CHANGE UP (ref 70–99)
HCT VFR BLD CALC: 39.5 % — SIGNIFICANT CHANGE UP (ref 39–50)
HGB BLD-MCNC: 13.5 G/DL — SIGNIFICANT CHANGE UP (ref 13–17)
MCHC RBC-ENTMCNC: 29.7 PG — SIGNIFICANT CHANGE UP (ref 27–34)
MCHC RBC-ENTMCNC: 34.2 GM/DL — SIGNIFICANT CHANGE UP (ref 32–36)
MCV RBC AUTO: 87 FL — SIGNIFICANT CHANGE UP (ref 80–100)
NRBC # BLD: 0 /100 WBCS — SIGNIFICANT CHANGE UP (ref 0–0)
PLATELET # BLD AUTO: 292 K/UL — SIGNIFICANT CHANGE UP (ref 150–400)
POTASSIUM SERPL-MCNC: 3.7 MMOL/L — SIGNIFICANT CHANGE UP (ref 3.5–5.3)
POTASSIUM SERPL-SCNC: 3.7 MMOL/L — SIGNIFICANT CHANGE UP (ref 3.5–5.3)
RBC # BLD: 4.54 M/UL — SIGNIFICANT CHANGE UP (ref 4.2–5.8)
RBC # FLD: 13.3 % — SIGNIFICANT CHANGE UP (ref 10.3–14.5)
SODIUM SERPL-SCNC: 145 MMOL/L — SIGNIFICANT CHANGE UP (ref 135–145)
SPECIMEN SOURCE: SIGNIFICANT CHANGE UP
SPECIMEN SOURCE: SIGNIFICANT CHANGE UP
WBC # BLD: 3.96 K/UL — SIGNIFICANT CHANGE UP (ref 3.8–10.5)
WBC # FLD AUTO: 3.96 K/UL — SIGNIFICANT CHANGE UP (ref 3.8–10.5)

## 2022-03-26 PROCEDURE — 85610 PROTHROMBIN TIME: CPT

## 2022-03-26 PROCEDURE — A9537: CPT

## 2022-03-26 PROCEDURE — 99232 SBSQ HOSP IP/OBS MODERATE 35: CPT

## 2022-03-26 PROCEDURE — 78226 HEPATOBILIARY SYSTEM IMAGING: CPT

## 2022-03-26 PROCEDURE — 36415 COLL VENOUS BLD VENIPUNCTURE: CPT

## 2022-03-26 PROCEDURE — 99285 EMERGENCY DEPT VISIT HI MDM: CPT | Mod: 25

## 2022-03-26 PROCEDURE — 0225U NFCT DS DNA&RNA 21 SARSCOV2: CPT

## 2022-03-26 PROCEDURE — 85730 THROMBOPLASTIN TIME PARTIAL: CPT

## 2022-03-26 PROCEDURE — 71045 X-RAY EXAM CHEST 1 VIEW: CPT

## 2022-03-26 PROCEDURE — 85025 COMPLETE CBC W/AUTO DIFF WBC: CPT

## 2022-03-26 PROCEDURE — 81001 URINALYSIS AUTO W/SCOPE: CPT

## 2022-03-26 PROCEDURE — 84478 ASSAY OF TRIGLYCERIDES: CPT

## 2022-03-26 PROCEDURE — 83880 ASSAY OF NATRIURETIC PEPTIDE: CPT

## 2022-03-26 PROCEDURE — 96376 TX/PRO/DX INJ SAME DRUG ADON: CPT

## 2022-03-26 PROCEDURE — 74181 MRI ABDOMEN W/O CONTRAST: CPT

## 2022-03-26 PROCEDURE — 84484 ASSAY OF TROPONIN QUANT: CPT

## 2022-03-26 PROCEDURE — 96374 THER/PROPH/DIAG INJ IV PUSH: CPT

## 2022-03-26 PROCEDURE — 80048 BASIC METABOLIC PNL TOTAL CA: CPT

## 2022-03-26 PROCEDURE — 87040 BLOOD CULTURE FOR BACTERIA: CPT

## 2022-03-26 PROCEDURE — 99239 HOSP IP/OBS DSCHRG MGMT >30: CPT | Mod: GC

## 2022-03-26 PROCEDURE — 93306 TTE W/DOPPLER COMPLETE: CPT

## 2022-03-26 PROCEDURE — 83735 ASSAY OF MAGNESIUM: CPT

## 2022-03-26 PROCEDURE — 83605 ASSAY OF LACTIC ACID: CPT

## 2022-03-26 PROCEDURE — 96375 TX/PRO/DX INJ NEW DRUG ADDON: CPT

## 2022-03-26 PROCEDURE — 93005 ELECTROCARDIOGRAM TRACING: CPT

## 2022-03-26 PROCEDURE — 80053 COMPREHEN METABOLIC PANEL: CPT

## 2022-03-26 PROCEDURE — 83690 ASSAY OF LIPASE: CPT

## 2022-03-26 PROCEDURE — 81003 URINALYSIS AUTO W/O SCOPE: CPT

## 2022-03-26 PROCEDURE — 87086 URINE CULTURE/COLONY COUNT: CPT

## 2022-03-26 PROCEDURE — 85027 COMPLETE CBC AUTOMATED: CPT

## 2022-03-26 PROCEDURE — 80061 LIPID PANEL: CPT

## 2022-03-26 PROCEDURE — 74177 CT ABD & PELVIS W/CONTRAST: CPT | Mod: MA

## 2022-03-26 PROCEDURE — 84100 ASSAY OF PHOSPHORUS: CPT

## 2022-03-26 RX ORDER — PANTOPRAZOLE SODIUM 20 MG/1
1 TABLET, DELAYED RELEASE ORAL
Qty: 0 | Refills: 0 | DISCHARGE
Start: 2022-03-26

## 2022-03-26 RX ADMIN — Medication 1 TABLET(S): at 11:05

## 2022-03-26 RX ADMIN — Medication 1000 UNIT(S): at 11:05

## 2022-03-26 RX ADMIN — PANTOPRAZOLE SODIUM 40 MILLIGRAM(S): 20 TABLET, DELAYED RELEASE ORAL at 05:38

## 2022-03-26 RX ADMIN — SERTRALINE 50 MILLIGRAM(S): 25 TABLET, FILM COATED ORAL at 11:05

## 2022-03-26 NOTE — PROGRESS NOTE ADULT - ASSESSMENT
-there is no evidence of acute ischemia.  -there is no evidence of significant arrhythmia.  -there is no evidence for meaningful  volume overload.  -DVT prophylaxis  -monitor electrolytes, keep k>4, Mg>2  -dc planning for today

## 2022-03-26 NOTE — PROGRESS NOTE ADULT - SUBJECTIVE AND OBJECTIVE BOX
[facility]  Ralph Taylor PA-C  [facility] [address] [place] [address] [phone]      INTERVAL HPI/OVERNIGHT EVENTS:  Pt s/e  tolerating diet  No abd pain, n/v   Denies further GI complaints      MEDICATIONS  (STANDING):  atorvastatin 20 milliGRAM(s) Oral at bedtime  cholecalciferol 1000 Unit(s) Oral daily  multivitamin 1 Tablet(s) Oral daily  pantoprazole    Tablet 40 milliGRAM(s) Oral before breakfast  rivaroxaban 20 milliGRAM(s) Oral with dinner  sertraline 50 milliGRAM(s) Oral daily  sodium chloride 0.9%. 1000 milliLiter(s) (125 mL/Hr) IV Continuous <Continuous>    MEDICATIONS  (PRN):  acetaminophen     Tablet .. 650 milliGRAM(s) Oral every 6 hours PRN Temp greater or equal to 38C (100.4F), Mild Pain (1 - 3)  aluminum hydroxide/magnesium hydroxide/simethicone Suspension 30 milliLiter(s) Oral every 6 hours PRN Dyspepsia  melatonin 3 milliGRAM(s) Oral at bedtime PRN Insomnia      Allergies  No Known Allergies    Intolerances      Vital Signs Last 24 Hrs  T(C): 36.6 (26 Mar 2022 05:19), Max: 36.6 (25 Mar 2022 19:32)  T(F): 97.9 (26 Mar 2022 05:19), Max: 97.9 (26 Mar 2022 05:19)  HR: 45 (26 Mar 2022 05:19) (45 - 54)  BP: 131/79 (26 Mar 2022 05:19) (117/72 - 131/79)  BP(mean): --  RR: 18 (26 Mar 2022 05:19) (18 - 18)  SpO2: 95% (26 Mar 2022 05:19) (94% - 95%)      03-25-22 @ 07:01  -  03-26-22 @ 07:00  --------------------------------------------------------  IN: 375 mL / OUT: 0 mL / NET: 375 mL    03-26-22 @ 07:01  -  03-26-22 @ 11:17  --------------------------------------------------------  IN: 200 mL / OUT: 0 mL / NET: 200 mL      GENERAL:  Appears stated age  ABDOMEN:  Soft, non-tender, non-distended  NEURO:  Alert      LABS:                        13.5   3.96  )-----------( 292      ( 26 Mar 2022 07:41 )             39.5     03-26    145  |  112<H>  |  8   ----------------------------<  85  3.7   |  28  |  0.62    Ca    8.9      26 Mar 2022 07:41  Phos  2.8     03-25  Mg     2.4     03-25    TPro  6.2  /  Alb  2.7<L>  /  TBili  0.4  /  DBili  x   /  AST  33  /  ALT  40  /  AlkPhos  74  03-25

## 2022-03-26 NOTE — DISCHARGE NOTE NURSING/CASE MANAGEMENT/SOCIAL WORK - NSDCPEFALRISK_GEN_ALL_CORE
For information on Fall & Injury Prevention, visit: [email or web address] OR  [email or web address] OR  https://www.cdc.gov/steadi/patient.html

## 2022-03-26 NOTE — DISCHARGE NOTE NURSING/CASE MANAGEMENT/SOCIAL WORK - NSDCVIVACCINE_GEN_ALL_CORE_FT
Tdap; 26-Dec-2021 06:09; Iona Gunn (RN); [employer]; [id number] (Exp. Date: 09-Sep-2023); IntraMuscular; Deltoid Right.; 0.5 milliLiter(s); VIS (VIS Published: 09-May-2013, VIS Presented: 26-Dec-2021);

## 2022-03-26 NOTE — PROGRESS NOTE ADULT - SUBJECTIVE AND OBJECTIVE BOX
[facility]    Jorgito Barragan, Shirley, Waylon, Cami, Trever, Umer      [phone]    CHIEF COMPLAINT: Patient is a 51y old  Male who presents with a chief complaint of pancreatitis (26 Mar 2022 11:15)      Follow Up: pancreatitis    Interim history: The patient reports no new symptoms.  Denies chest discomfort and shortness of breath.  No abdominal pain.  No new neurologic symptoms.      MEDICATIONS  (STANDING):  atorvastatin 20 milliGRAM(s) Oral at bedtime  cholecalciferol 1000 Unit(s) Oral daily  multivitamin 1 Tablet(s) Oral daily  pantoprazole    Tablet 40 milliGRAM(s) Oral before breakfast  rivaroxaban 20 milliGRAM(s) Oral with dinner  sertraline 50 milliGRAM(s) Oral daily  sodium chloride 0.9%. 1000 milliLiter(s) (125 mL/Hr) IV Continuous <Continuous>    MEDICATIONS  (PRN):  acetaminophen     Tablet .. 650 milliGRAM(s) Oral every 6 hours PRN Temp greater or equal to 38C (100.4F), Mild Pain (1 - 3)  aluminum hydroxide/magnesium hydroxide/simethicone Suspension 30 milliLiter(s) Oral every 6 hours PRN Dyspepsia  melatonin 3 milliGRAM(s) Oral at bedtime PRN Insomnia      REVIEW OF SYSTEMS:  eye, ent, GI, , allergic, dermatologic, musculoskeletal and neurologic are negative except as described above    Vital Signs Last 24 Hrs  T(C): 36.9 (26 Mar 2022 11:30), Max: 36.9 (26 Mar 2022 11:30)  T(F): 98.5 (26 Mar 2022 11:30), Max: 98.5 (26 Mar 2022 11:30)  HR: 46 (26 Mar 2022 11:30) (45 - 54)  BP: 110/69 (26 Mar 2022 11:30) (110/69 - 131/79)  BP(mean): --  RR: 18 (26 Mar 2022 11:30) (18 - 18)  SpO2: 96% (26 Mar 2022 11:30) (94% - 96%)    I&O's Summary    25 Mar 2022 07:01  -  26 Mar 2022 07:00  --------------------------------------------------------  IN: 375 mL / OUT: 0 mL / NET: 375 mL    26 Mar 2022 07:01  -  26 Mar 2022 14:10  --------------------------------------------------------  IN: 200 mL / OUT: 0 mL / NET: 200 mL        Telemetry past 24h:    PHYSICAL EXAM:    Constitutional: well-nourished, well-developed, NAD   HEENT:  MMM, sclerae anicteric, conjunctivae clear, no oral cyanosis.  Pulmonary: Non-labored, breath sounds are clear bilaterally, No wheezing, rales or rhonchi  Cardiovascular: Regular, S1 and S2.  No murmur.  No rubs, gallops or clicks  Gastrointestinal: Bowel Sounds present, soft, nontender.   Lymph: No peripheral edema.   Neurological: Alert, no focal deficits  Skin: No rashes.  Psych:  Mood & affect appropriate    LABS: All Labs Reviewed:                        13.5   3.96  )-----------( 292      ( 26 Mar 2022 07:41 )             39.5                         13.5   3.82  )-----------( 262      ( 25 Mar 2022 07:19 )             39.7                         13.2   3.84  )-----------( 246      ( 24 Mar 2022 07:49 )             38.3     26 Mar 2022 07:41    145    |  112    |  8      ----------------------------<  85     3.7     |  28     |  0.62   25 Mar 2022 07:19    143    |  109    |  6      ----------------------------<  83     4.4     |  28     |  0.62   24 Mar 2022 07:49    141    |  107    |  5      ----------------------------<  75     3.7     |  26     |  0.57     Ca    8.9        26 Mar 2022 07:41  Ca    8.8        25 Mar 2022 07:19  Ca    8.5        24 Mar 2022 07:49  Phos  2.8       25 Mar 2022 07:19  Phos  2.9       24 Mar 2022 07:49  Mg     2.4       25 Mar 2022 07:19  Mg     2.2       24 Mar 2022 07:49    TPro  6.2    /  Alb  2.7    /  TBili  0.4    /  DBili  x      /  AST  33     /  ALT  40     /  AlkPhos  74     25 Mar 2022 07:19  TPro  6.0    /  Alb  2.5    /  TBili  0.5    /  DBili  x      /  AST  16     /  ALT  25     /  AlkPhos  73     24 Mar 2022 07:49          Blood Culture: Organism --  Gram Stain Blood -- Gram Stain --  Specimen Source Clean Catch Clean Catch (Midstream)  Culture-Blood --    Organism --  Gram Stain Blood -- Gram Stain --  Specimen Source .Blood Blood-Peripheral  Culture-Blood --            RADIOLOGY:    EKG:    Echo:

## 2022-03-26 NOTE — PROGRESS NOTE ADULT - PROVIDER SPECIALTY LIST ADULT
Cardiology
Cardiology
Gastroenterology
Cardiology
Surgery
Cardiology
Cardiology
Gastroenterology
Hospitalist
Surgery
Hospitalist

## 2022-03-26 NOTE — DISCHARGE NOTE NURSING/CASE MANAGEMENT/SOCIAL WORK - PATIENT PORTAL LINK FT
You can access the FollowMyHealth Patient Portal offered by [facility] by registering at the following website: [email or web address]. By joining [facility]’s FollowMyHealth portal, you will also be able to view your health information using other applications (apps) compatible with our system.

## 2022-03-26 NOTE — PROGRESS NOTE ADULT - REASON FOR ADMISSION
pancreatitis

## 2022-04-01 ENCOUNTER — APPOINTMENT (OUTPATIENT)
Dept: SURGERY | Facility: CLINIC | Age: 52
End: 2022-04-01
Payer: COMMERCIAL

## 2022-04-01 ENCOUNTER — RESULT REVIEW (OUTPATIENT)
Age: 52
End: 2022-04-01

## 2022-04-01 VITALS
HEART RATE: 65 BPM | WEIGHT: 192 LBS | HEIGHT: 67 IN | RESPIRATION RATE: 18 BRPM | DIASTOLIC BLOOD PRESSURE: 66 MMHG | TEMPERATURE: 97.2 F | OXYGEN SATURATION: 98 % | BODY MASS INDEX: 30.13 KG/M2 | SYSTOLIC BLOOD PRESSURE: 105 MMHG

## 2022-04-01 DIAGNOSIS — R10.9 UNSPECIFIED ABDOMINAL PAIN: ICD-10-CM

## 2022-04-01 DIAGNOSIS — Z98.84 BARIATRIC SURGERY STATUS: ICD-10-CM

## 2022-04-01 PROCEDURE — 99214 OFFICE O/P EST MOD 30 MIN: CPT

## 2022-04-01 NOTE — ASSESSMENT
[FreeTextEntry1] : 51-year-old male recovering from acute pancreatitis, presumably secondary to sulfa drug.\par He is status post sleeve gastrectomy 11/17/2021 with excellent weight loss.\par He continues to complain of postprandial epigastric pain.

## 2022-04-01 NOTE — HISTORY OF PRESENT ILLNESS
[de-identified] : LUIS is a 51 year old male here for postoperative visit s/p laparoscopic sleeve gastrectomy on 11/17/21.\par He was recently admitted to hospital for acute pancreatitis, likely secondary to sulfa antibiotic that he was taking for presumed UTI.  Extensive work-up at outside hospital demonstrated no evidence of any gallstones or biliary abnormalities.\par He has recovered well, however does note that he continues to experience postprandial epigastric pain.  He is tolerating liquids and soups without problems.  He is drinking his protein shakes daily.  He reports normal GI function.  He does not report any reflux or heartburn.\par

## 2022-04-01 NOTE — PHYSICAL EXAM
[Normal] : affect appropriate [de-identified] : Normal respirations [de-identified] : Soft, nondistended, minimally tender to deep palpation in epigastrium without guarding or rebound.

## 2022-04-06 ENCOUNTER — APPOINTMENT (OUTPATIENT)
Dept: RADIOLOGY | Facility: HOSPITAL | Age: 52
End: 2022-04-06
Payer: COMMERCIAL

## 2022-04-06 ENCOUNTER — OUTPATIENT (OUTPATIENT)
Dept: OUTPATIENT SERVICES | Facility: HOSPITAL | Age: 52
LOS: 1 days | End: 2022-04-06
Payer: COMMERCIAL

## 2022-04-06 DIAGNOSIS — Z98.52 VASECTOMY STATUS: Chronic | ICD-10-CM

## 2022-04-06 DIAGNOSIS — Z98.890 OTHER SPECIFIED POSTPROCEDURAL STATES: Chronic | ICD-10-CM

## 2022-04-06 DIAGNOSIS — Z98.89 OTHER SPECIFIED POSTPROCEDURAL STATES: Chronic | ICD-10-CM

## 2022-04-06 DIAGNOSIS — R10.9 UNSPECIFIED ABDOMINAL PAIN: ICD-10-CM

## 2022-04-06 DIAGNOSIS — Z00.00 ENCOUNTER FOR GENERAL ADULT MEDICAL EXAMINATION WITHOUT ABNORMAL FINDINGS: ICD-10-CM

## 2022-04-06 DIAGNOSIS — Z90.3 ACQUIRED ABSENCE OF STOMACH [PART OF]: Chronic | ICD-10-CM

## 2022-04-06 LAB
ALBUMIN SERPL ELPH-MCNC: 3.7 G/DL
ALP BLD-CCNC: 56 U/L
ALT SERPL-CCNC: 21 U/L
AMYLASE/CREAT SERPL: 65 U/L
AST SERPL-CCNC: 19 U/L
BILIRUB DIRECT SERPL-MCNC: 0.1 MG/DL
BILIRUB INDIRECT SERPL-MCNC: 0.1 MG/DL
BILIRUB SERPL-MCNC: 0.2 MG/DL
LPL SERPL-CCNC: 51 U/L
PROT SERPL-MCNC: 5.8 G/DL

## 2022-04-06 PROCEDURE — 74240 X-RAY XM UPR GI TRC 1CNTRST: CPT | Mod: 26

## 2022-04-06 PROCEDURE — 74240 X-RAY XM UPR GI TRC 1CNTRST: CPT

## 2022-04-12 ENCOUNTER — APPOINTMENT (OUTPATIENT)
Dept: CARDIOLOGY | Facility: CLINIC | Age: 52
End: 2022-04-12
Payer: COMMERCIAL

## 2022-04-12 PROCEDURE — 99214 OFFICE O/P EST MOD 30 MIN: CPT
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
## 2022-04-15 NOTE — ASSESSMENT
[FreeTextEntry1] : 1. Bilateral PE without right heart strain\par --HD stable, on RA with SpO2 high 90s\par --Negative cardiac biomarkers\par --TTE with grossly normal LV systolic function.\par --PESI risk index 53 points, Class I, Very Low Risk: 0-1.6% 30-day mortality in\par this group.\par 2. R peroneal and soleal vein DVT\par --No evidence of phlegmasia on exam, report of numbness over right inner thigh,\par intact motion\par 3. Post-surgical state (gastric sleeve surgery 5 days prior to presentation),\par immobile, morbidly obese, male sex\par \par Plan:\par 1  Continue w xarelto 20mg daily for now\par 2.  He will discuss with his urologist utility of clomiphene - he has a recent PE and there is a small increase in incidence of VTE events with clomid \par 3.  Return in 3 months.  If he is off clomid and feeling well, then will likely stop A/C.\par 4.  Provoked DVT, need to treat for minimum of 6 months, then will decide on cessation of therapy . \par 5.  If he needs to be on testosterone boosting agents, then can consider half dose a/c\par 6.  If endoscopy is needed, he may hold Xarelto 3 days prior and then resume afterwards.

## 2022-04-20 NOTE — ED ADULT NURSE NOTE - SUICIDE SCREENING QUESTION 1
Reviewed patients chart, xray orders are required. Order placed for right knee xrays.  Please contact patient advise to arrive 30 mins prior to patients appt to complete x-ray order and schedule patients xray appt-Thank you No

## 2022-06-22 ENCOUNTER — INPATIENT (INPATIENT)
Facility: HOSPITAL | Age: 52
LOS: 20 days | Discharge: HOME | End: 2022-07-13
Attending: PSYCHIATRY & NEUROLOGY | Admitting: PSYCHIATRY & NEUROLOGY

## 2022-06-22 VITALS
DIASTOLIC BLOOD PRESSURE: 89 MMHG | TEMPERATURE: 97 F | HEIGHT: 67 IN | RESPIRATION RATE: 12 BRPM | OXYGEN SATURATION: 100 % | SYSTOLIC BLOOD PRESSURE: 135 MMHG | HEART RATE: 66 BPM

## 2022-06-22 DIAGNOSIS — F33.2 MAJOR DEPRESSIVE DISORDER, RECURRENT SEVERE WITHOUT PSYCHOTIC FEATURES: ICD-10-CM

## 2022-06-22 DIAGNOSIS — Z98.89 OTHER SPECIFIED POSTPROCEDURAL STATES: Chronic | ICD-10-CM

## 2022-06-22 DIAGNOSIS — Z98.890 OTHER SPECIFIED POSTPROCEDURAL STATES: Chronic | ICD-10-CM

## 2022-06-22 DIAGNOSIS — Z90.3 ACQUIRED ABSENCE OF STOMACH [PART OF]: Chronic | ICD-10-CM

## 2022-06-22 DIAGNOSIS — Z98.52 VASECTOMY STATUS: Chronic | ICD-10-CM

## 2022-06-22 LAB
ALBUMIN SERPL ELPH-MCNC: 4.3 G/DL — SIGNIFICANT CHANGE UP (ref 3.3–5)
ALP SERPL-CCNC: 85 U/L — SIGNIFICANT CHANGE UP (ref 40–120)
ALT FLD-CCNC: 17 U/L — SIGNIFICANT CHANGE UP (ref 4–41)
AMPHET UR-MCNC: NEGATIVE — SIGNIFICANT CHANGE UP
ANION GAP SERPL CALC-SCNC: 15 MMOL/L — HIGH (ref 7–14)
APAP SERPL-MCNC: <10 UG/ML — LOW (ref 15–25)
APPEARANCE UR: CLEAR — SIGNIFICANT CHANGE UP
AST SERPL-CCNC: 21 U/L — SIGNIFICANT CHANGE UP (ref 4–40)
BACTERIA # UR AUTO: NEGATIVE — SIGNIFICANT CHANGE UP
BARBITURATES UR SCN-MCNC: NEGATIVE — SIGNIFICANT CHANGE UP
BASOPHILS # BLD AUTO: 0.06 K/UL — SIGNIFICANT CHANGE UP (ref 0–0.2)
BASOPHILS NFR BLD AUTO: 0.9 % — SIGNIFICANT CHANGE UP (ref 0–2)
BENZODIAZ UR-MCNC: NEGATIVE — SIGNIFICANT CHANGE UP
BILIRUB SERPL-MCNC: 0.5 MG/DL — SIGNIFICANT CHANGE UP (ref 0.2–1.2)
BILIRUB UR-MCNC: NEGATIVE — SIGNIFICANT CHANGE UP
BUN SERPL-MCNC: 10 MG/DL — SIGNIFICANT CHANGE UP (ref 7–23)
CALCIUM SERPL-MCNC: 10.1 MG/DL — SIGNIFICANT CHANGE UP (ref 8.4–10.5)
CHLORIDE SERPL-SCNC: 103 MMOL/L — SIGNIFICANT CHANGE UP (ref 98–107)
CO2 SERPL-SCNC: 25 MMOL/L — SIGNIFICANT CHANGE UP (ref 22–31)
COCAINE METAB.OTHER UR-MCNC: NEGATIVE — SIGNIFICANT CHANGE UP
COLOR SPEC: YELLOW — SIGNIFICANT CHANGE UP
CREAT SERPL-MCNC: 0.72 MG/DL — SIGNIFICANT CHANGE UP (ref 0.5–1.3)
CREATININE URINE RESULT, DAU: 348 MG/DL — SIGNIFICANT CHANGE UP
D DIMER BLD IA.RAPID-MCNC: <150 NG/ML DDU — SIGNIFICANT CHANGE UP
DIFF PNL FLD: NEGATIVE — SIGNIFICANT CHANGE UP
EGFR: 109 ML/MIN/1.73M2 — SIGNIFICANT CHANGE UP
EOSINOPHIL # BLD AUTO: 0.06 K/UL — SIGNIFICANT CHANGE UP (ref 0–0.5)
EOSINOPHIL NFR BLD AUTO: 0.9 % — SIGNIFICANT CHANGE UP (ref 0–6)
EPI CELLS # UR: 2 /HPF — SIGNIFICANT CHANGE UP (ref 0–5)
ETHANOL SERPL-MCNC: <10 MG/DL — SIGNIFICANT CHANGE UP
FLUAV AG NPH QL: SIGNIFICANT CHANGE UP
FLUBV AG NPH QL: SIGNIFICANT CHANGE UP
GIANT PLATELETS BLD QL SMEAR: PRESENT — SIGNIFICANT CHANGE UP
GLUCOSE SERPL-MCNC: 89 MG/DL — SIGNIFICANT CHANGE UP (ref 70–99)
GLUCOSE UR QL: NEGATIVE — SIGNIFICANT CHANGE UP
HCT VFR BLD CALC: 44.5 % — SIGNIFICANT CHANGE UP (ref 39–50)
HGB BLD-MCNC: 14.9 G/DL — SIGNIFICANT CHANGE UP (ref 13–17)
HYALINE CASTS # UR AUTO: 0 /LPF — SIGNIFICANT CHANGE UP (ref 0–7)
IANC: 3.42 K/UL — SIGNIFICANT CHANGE UP (ref 1.8–7.4)
KETONES UR-MCNC: ABNORMAL
LEUKOCYTE ESTERASE UR-ACNC: NEGATIVE — SIGNIFICANT CHANGE UP
LYMPHOCYTES # BLD AUTO: 1.77 K/UL — SIGNIFICANT CHANGE UP (ref 1–3.3)
LYMPHOCYTES # BLD AUTO: 26.9 % — SIGNIFICANT CHANGE UP (ref 13–44)
MANUAL SMEAR VERIFICATION: SIGNIFICANT CHANGE UP
MCHC RBC-ENTMCNC: 29.7 PG — SIGNIFICANT CHANGE UP (ref 27–34)
MCHC RBC-ENTMCNC: 33.5 GM/DL — SIGNIFICANT CHANGE UP (ref 32–36)
MCV RBC AUTO: 88.6 FL — SIGNIFICANT CHANGE UP (ref 80–100)
METHADONE UR-MCNC: NEGATIVE — SIGNIFICANT CHANGE UP
MONOCYTES # BLD AUTO: 0.4 K/UL — SIGNIFICANT CHANGE UP (ref 0–0.9)
MONOCYTES NFR BLD AUTO: 6.1 % — SIGNIFICANT CHANGE UP (ref 2–14)
NEUTROPHILS # BLD AUTO: 3.84 K/UL — SIGNIFICANT CHANGE UP (ref 1.8–7.4)
NEUTROPHILS NFR BLD AUTO: 58.3 % — SIGNIFICANT CHANGE UP (ref 43–77)
NITRITE UR-MCNC: NEGATIVE — SIGNIFICANT CHANGE UP
OPIATES UR-MCNC: NEGATIVE — SIGNIFICANT CHANGE UP
OXYCODONE UR-MCNC: NEGATIVE — SIGNIFICANT CHANGE UP
PCP SPEC-MCNC: SIGNIFICANT CHANGE UP
PCP UR-MCNC: NEGATIVE — SIGNIFICANT CHANGE UP
PH UR: 6 — SIGNIFICANT CHANGE UP (ref 5–8)
PLAT MORPH BLD: NORMAL — SIGNIFICANT CHANGE UP
PLATELET # BLD AUTO: 238 K/UL — SIGNIFICANT CHANGE UP (ref 150–400)
PLATELET COUNT - ESTIMATE: NORMAL — SIGNIFICANT CHANGE UP
POTASSIUM SERPL-MCNC: 4.7 MMOL/L — SIGNIFICANT CHANGE UP (ref 3.5–5.3)
POTASSIUM SERPL-SCNC: 4.7 MMOL/L — SIGNIFICANT CHANGE UP (ref 3.5–5.3)
PROT SERPL-MCNC: 7.3 G/DL — SIGNIFICANT CHANGE UP (ref 6–8.3)
PROT UR-MCNC: ABNORMAL
RBC # BLD: 5.02 M/UL — SIGNIFICANT CHANGE UP (ref 4.2–5.8)
RBC # FLD: 13.6 % — SIGNIFICANT CHANGE UP (ref 10.3–14.5)
RBC BLD AUTO: NORMAL — SIGNIFICANT CHANGE UP
RBC CASTS # UR COMP ASSIST: 2 /HPF — SIGNIFICANT CHANGE UP (ref 0–4)
RSV RNA NPH QL NAA+NON-PROBE: SIGNIFICANT CHANGE UP
SALICYLATES SERPL-MCNC: <0.3 MG/DL — LOW (ref 15–30)
SARS-COV-2 RNA SPEC QL NAA+PROBE: SIGNIFICANT CHANGE UP
SODIUM SERPL-SCNC: 143 MMOL/L — SIGNIFICANT CHANGE UP (ref 135–145)
SP GR SPEC: 1.03 — SIGNIFICANT CHANGE UP (ref 1–1.05)
THC UR QL: POSITIVE
TOXICOLOGY SCREEN, DRUGS OF ABUSE, SERUM RESULT: SIGNIFICANT CHANGE UP
TSH SERPL-MCNC: 3.18 UIU/ML — SIGNIFICANT CHANGE UP (ref 0.27–4.2)
UROBILINOGEN FLD QL: ABNORMAL
VARIANT LYMPHS # BLD: 6.9 % — HIGH (ref 0–6)
WBC # BLD: 6.58 K/UL — SIGNIFICANT CHANGE UP (ref 3.8–10.5)
WBC # FLD AUTO: 6.58 K/UL — SIGNIFICANT CHANGE UP (ref 3.8–10.5)
WBC UR QL: 4 /HPF — SIGNIFICANT CHANGE UP (ref 0–5)

## 2022-06-22 PROCEDURE — 99285 EMERGENCY DEPT VISIT HI MDM: CPT | Mod: 25

## 2022-06-22 PROCEDURE — 93010 ELECTROCARDIOGRAM REPORT: CPT

## 2022-06-22 RX ORDER — SERTRALINE 25 MG/1
50 TABLET, FILM COATED ORAL DAILY
Refills: 0 | Status: DISCONTINUED | OUTPATIENT
Start: 2022-06-23 | End: 2022-06-24

## 2022-06-22 RX ORDER — ACETAMINOPHEN 500 MG
650 TABLET ORAL ONCE
Refills: 0 | Status: COMPLETED | OUTPATIENT
Start: 2022-06-22 | End: 2022-06-22

## 2022-06-22 RX ADMIN — Medication 650 MILLIGRAM(S): at 22:38

## 2022-06-22 NOTE — ED BEHAVIORAL HEALTH ASSESSMENT NOTE - MEDICAL ISSUES AND PLAN (INCLUDE STANDING AND PRN MEDICATION)
.Your current Orthopaedic problem we are working together to treat is:  Knee pain. PHYSICAL THERAPY/OCCUPATIONAL THERAPY  Physical therapy will help your recovery. Please make your appointments at the  or call for 2 times a week for 6 weeks visits at [facility], [phone]. It would be advisable to follow up with me upon completion of your therapy. It is recommended you schedule a follow-up appointment with Brady Chatman MD in 6 weeks if not better  . Office hours are 8:00 am to 5:00 pm Monday through Friday. If it is urgent that you speak with someone outside of these hours, our [facility] will be able to assist you. You can reach the office by calling the [employer] Client - central scheduling line at [phone]. We do highly recommend [facility], if you do not already have this. You can request access via the internet or by simply talking with a  at any of the clinics. [email or web address]. Thank you for choosing [facility] as your Orthopaedic provider!
continue Zoloft 50 mg po daily, Xarelto 20 mg po QHS, Clomid 50 mg po daily, Losartan 25 mg po daily

## 2022-06-22 NOTE — ED ADULT NURSE NOTE - OBJECTIVE STATEMENT
break coverage RN: pt received to , brought in by wife for depression with SI. pt sent SI statement to boss threatening to shoot himself. boss called police, who came to the house and confiscated his weapons. wife brought patient to  ED for admission. pt agrees with plan and is requesting admission d/2 his depression. pt aox4, calm and corporative. pt undressed, placed in  clothing, belongings secured and cataloged. seen by Psych MD, pending dispo. will cont to monitor.
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
## 2022-06-22 NOTE — ED ADULT NURSE NOTE - CHIEF COMPLAINT QUOTE
pt states I had a melt down today, sending messages about ending his life. pt drove to [facility] to be admitted for depression as per wife. pt is calm and cooperative while in triage.  called, pt to go back   PMH: gastric bypass, HDL PE on xarlata
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
## 2022-06-22 NOTE — ED BEHAVIORAL HEALTH ASSESSMENT NOTE - CURRENT MEDICATION
Per [facility] in [place], [place] ([phone]): Zoloft 50 mg po daily, Xarelto 20 mg po QHS, Clomid 50 mg po daily, Losartan 25 mg po daily

## 2022-06-22 NOTE — ED BEHAVIORAL HEALTH ASSESSMENT NOTE - HPI (INCLUDE ILLNESS QUALITY, SEVERITY, DURATION, TIMING, CONTEXT, MODIFYING FACTORS, ASSOCIATED SIGNS AND SYMPTOMS)
51-year-old male, domiciled with wife and three adult children (23, 19, and 18), employed as a , no formal psych history, with no previous psychiatric admissions, on Zoloft prescribed by urologist for premature ejaculation, no h/o suicide attempts, no h/o violence or legal issues, PMH bariatric surgery in Nov 2021 with subsequent blood clots (on Xarelto), HTN, pt has history of sleep apnea but hasn't used CPAP machine in several weeks, h/o alcohol abuse per chart review, last drank yesterday (6/21/22) per wife, denies recent heavy alcohol use, brought in by wife for suicidal ideation with plan to shoot himself. Of note, firearms were removed from the home by police today (6/22/22).  See  note for collateral from wife. In summary, pt with anxiety and depression in context of recent psychosocial stressors and medical issues. Today (6/22/22), patient texted “I may need to go to a hospital or put a bullet in my mouth. I'm shaking and struggling to keep myself from losing it all. I am at the end of my rope."  On interview, pt reports feeling anxious and depressed in context of medical issues (ie surgery in November 2021 and subsequent blood clots) and work stress in recent months. He states his wife brought him to the ED because "I had a nervous breakdown." Pt states he doesn't recall some of today's events including initially going to [facility] or sending suicidal text messages. He denies current SI. He denies HI. He reports poor sleep, poor appetite, low energy, difficulty concentrating. He sometimes feels hopeless. He denies manic or psychotic symptoms. Pt states he has history of sleep apnea but rarely uses his CPAP machine. States he hasn't used CPAP machine in "weeks" (confirmed by pt's wife). He reports medication compliance. States he hadn't consumed alcohol in a few weeks until he had "a few drinks" yesterday (6/21/22). He denies drug use.

## 2022-06-22 NOTE — ED ADULT TRIAGE NOTE - CHIEF COMPLAINT QUOTE
pt states I had a melt down today, sending messages about ending his life. pt drove to [facility] to be admitted for depression as per wife. pt is calm and cooperative while in triage.  called, pt to go back   PMH: gastric bypass, HDL PE on xarlata

## 2022-06-22 NOTE — ED PROVIDER NOTE - CARDIAC, MLM
What Type Of Note Output Would You Prefer (Optional)?: Bullet Format How Severe Is Your Skin Lesion?: mild Has Your Skin Lesion Been Treated?: not been treated Is This A New Presentation, Or A Follow-Up?: Skin Lesion Normal rate, regular rhythm.  Heart sounds S1, S2.  No murmurs, rubs or gallops.

## 2022-06-22 NOTE — ED BEHAVIORAL HEALTH ASSESSMENT NOTE - REMOTE MEMORY
Obstetrical ultrasound completed today.  See report in imaging section of [facility].  
Normal

## 2022-06-22 NOTE — ED BEHAVIORAL HEALTH ASSESSMENT NOTE - DESCRIPTION
calm, in good behavioral control  Vital Signs Last 24 Hrs  T(C): 36.9 (22 Jun 2022 20:30), Max: 36.9 (22 Jun 2022 20:30)  T(F): 98.4 (22 Jun 2022 20:30), Max: 98.4 (22 Jun 2022 20:30)  HR: 53 (22 Jun 2022 20:30) (53 - 66)  BP: 141/90 (22 Jun 2022 20:30) (135/89 - 141/90)  BP(mean): --  RR: 14 (22 Jun 2022 20:30) (12 - 14)  SpO2: 100% (22 Jun 2022 20:30) (100% - 100%) see HPI

## 2022-06-23 DIAGNOSIS — F33.9 MAJOR DEPRESSIVE DISORDER, RECURRENT, UNSPECIFIED: ICD-10-CM

## 2022-06-23 DIAGNOSIS — F32.A DEPRESSION, UNSPECIFIED: ICD-10-CM

## 2022-06-23 LAB
COVID-19 SPIKE DOMAIN AB INTERP: POSITIVE
COVID-19 SPIKE DOMAIN AB INTERP: POSITIVE
COVID-19 SPIKE DOMAIN ANTIBODY RESULT: >250 U/ML — HIGH
COVID-19 SPIKE DOMAIN ANTIBODY RESULT: >250 U/ML — HIGH
SARS-COV-2 IGG+IGM SERPL QL IA: >250 U/ML — HIGH
SARS-COV-2 IGG+IGM SERPL QL IA: >250 U/ML — HIGH
SARS-COV-2 IGG+IGM SERPL QL IA: POSITIVE
SARS-COV-2 IGG+IGM SERPL QL IA: POSITIVE

## 2022-06-23 PROCEDURE — 99221 1ST HOSP IP/OBS SF/LOW 40: CPT

## 2022-06-23 RX ORDER — PANTOPRAZOLE SODIUM 20 MG/1
40 TABLET, DELAYED RELEASE ORAL
Refills: 0 | Status: DISCONTINUED | OUTPATIENT
Start: 2022-06-24 | End: 2022-07-13

## 2022-06-23 RX ORDER — TRAZODONE HCL 50 MG
50 TABLET ORAL AT BEDTIME
Refills: 0 | Status: DISCONTINUED | OUTPATIENT
Start: 2022-06-23 | End: 2022-07-13

## 2022-06-23 RX ORDER — LANOLIN ALCOHOL/MO/W.PET/CERES
3 CREAM (GRAM) TOPICAL AT BEDTIME
Refills: 0 | Status: DISCONTINUED | OUTPATIENT
Start: 2022-06-23 | End: 2022-07-13

## 2022-06-23 RX ORDER — LOSARTAN POTASSIUM 100 MG/1
25 TABLET, FILM COATED ORAL DAILY
Refills: 0 | Status: DISCONTINUED | OUTPATIENT
Start: 2022-06-23 | End: 2022-06-23

## 2022-06-23 RX ORDER — PANTOPRAZOLE SODIUM 20 MG/1
40 TABLET, DELAYED RELEASE ORAL ONCE
Refills: 0 | Status: COMPLETED | OUTPATIENT
Start: 2022-06-23 | End: 2022-06-23

## 2022-06-23 RX ORDER — RIVAROXABAN 15 MG-20MG
20 KIT ORAL DAILY
Refills: 0 | Status: DISCONTINUED | OUTPATIENT
Start: 2022-06-23 | End: 2022-06-23

## 2022-06-23 RX ORDER — RIVAROXABAN 15 MG-20MG
20 KIT ORAL
Refills: 0 | Status: DISCONTINUED | OUTPATIENT
Start: 2022-06-24 | End: 2022-07-13

## 2022-06-23 RX ORDER — MULTIVIT-MIN/FERROUS GLUCONATE 9 MG/15 ML
1 LIQUID (ML) ORAL DAILY
Refills: 0 | Status: DISCONTINUED | OUTPATIENT
Start: 2022-06-23 | End: 2022-07-13

## 2022-06-23 RX ADMIN — PANTOPRAZOLE SODIUM 40 MILLIGRAM(S): 20 TABLET, DELAYED RELEASE ORAL at 13:20

## 2022-06-23 RX ADMIN — Medication 1 MILLIGRAM(S): at 13:20

## 2022-06-23 RX ADMIN — Medication 50 MILLIGRAM(S): at 02:30

## 2022-06-23 RX ADMIN — RIVAROXABAN 20 MILLIGRAM(S): KIT at 09:00

## 2022-06-23 RX ADMIN — Medication 3 MILLIGRAM(S): at 02:30

## 2022-06-23 RX ADMIN — SERTRALINE 50 MILLIGRAM(S): 25 TABLET, FILM COATED ORAL at 08:59

## 2022-06-23 RX ADMIN — Medication 1 MILLIGRAM(S): at 22:15

## 2022-06-23 NOTE — BH INPATIENT PSYCHIATRY ASSESSMENT NOTE - CURRENT MEDICATION
MEDICATIONS  (STANDING):  losartan 25 milliGRAM(s) Oral daily  rivaroxaban 20 milliGRAM(s) Oral daily  sertraline 50 milliGRAM(s) Oral daily    MEDICATIONS  (PRN):  LORazepam     Tablet 2 milliGRAM(s) Oral every 2 hours PRN CIWA score increase by 2 points and current CIWA score GREATER THAN 9  LORazepam     Tablet 1 milliGRAM(s) Oral every 6 hours PRN anxiety/agitation  LORazepam   Injectable 2 milliGRAM(s) IntraMuscular once PRN severe agitation  melatonin. 3 milliGRAM(s) Oral at bedtime PRN Insomnia  traZODone 50 milliGRAM(s) Oral at bedtime PRN insomnia

## 2022-06-23 NOTE — DIETITIAN INITIAL EVALUATION ADULT - FACTORS AFF FOOD INTAKE
abdominal pain since having bariatric surgery; psychological causes/pain/persistent lack of appetite/other (specify)
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
## 2022-06-23 NOTE — BH SOCIAL WORK INITIAL PSYCHOSOCIAL EVALUATION - NSPROPTRIGHTSUPPORTPHONE_GEN_A_NUR
Addendum  created 09/06/19 1030 by Custer Lennox, CRNA    Intraprocedure Flowsheets edited [phone]

## 2022-06-23 NOTE — PSYCHIATRIC REHAB INITIAL EVALUATION - NSBHSIGPRIORMED_PSY_ALL_CORE
Patient has PMH of bariatric surgery in Nov 2021 with subsequent blood clots, HTN and sleep apnea./Yes (Specify)

## 2022-06-23 NOTE — BH INPATIENT PSYCHIATRY ASSESSMENT NOTE - MSE UNSTRUCTURED FT
Appearance: Dressed appropriately.  Somewhat unkempt.  Hygiene fair.  Behavior: Cooperative.    Motor: No abnormal movements, no psychomotor slowing or activation.  Speech: Regular rate.  Mood: "Anxious."  Affect: Anxious, constricted.   Thought Process: Linear.  Associations: Fair.  Thought Content: Denies AVH.  Intermittent SI, not present at this time.  Insight: Limited.  Judgment: Fair on interview.  Attention: Fair.  Language: Fluent.  Gait: Intact.

## 2022-06-23 NOTE — BH INPATIENT PSYCHIATRY ASSESSMENT NOTE - NSBHMETABOLIC_PSY_ALL_CORE_FT
BMI: BMI (kg/m2): 31.3 (06-22-22 @ 18:24)  HbA1c:   Glucose: POCT Blood Glucose.: 83 mg/dL (11-17-21 @ 08:23)    BP: 125/88 (06-23-22 @ 01:49) (125/88 - 141/90)  Lipid Panel: Date/Time: 03-24-22 @ 03:51  Cholesterol, Serum: 103  Direct LDL: --  HDL Cholesterol, Serum: 19  Total Cholesterol/HDL Ration Measurement: --  Triglycerides, Serum: 116

## 2022-06-23 NOTE — BH SOCIAL WORK INITIAL PSYCHOSOCIAL EVALUATION - NSBHHOUSE_PSY_ALL_CORE
Problem: RESPIRATORY - ADULT  Goal: Achieves optimal ventilation and oxygenation  Description  INTERVENTIONS:  - Assess for changes in respiratory status  - Assess for changes in mentation and behavior  - Position to facilitate oxygenation and minimize respiratory effort  - Oxygen administered by appropriate delivery if ordered  - Encourage broncho-pulmonary hygiene including cough, deep breathe, Incentive Spirometry  - Assess and instruct to report SOB or any respiratory difficulty  - Respiratory Therapy support as indicated   Outcome: Progressing     Problem: PAIN - ADULT  Goal: Verbalizes/displays adequate comfort level or baseline comfort level  Description  Interventions:  - Encourage patient to monitor pain and request assistance  - Assess pain using appropriate pain scale  - Administer analgesics based on type and severity of pain and evaluate response  - Implement non-pharmacological measures as appropriate and evaluate response  - Consider cultural and social influences on pain and pain management  - Notify physician/advanced practitioner if interventions unsuccessful or patient reports new pain  Outcome: Progressing     Problem: SAFETY ADULT  Goal: Maintain or return to baseline ADL function  Description  INTERVENTIONS:  -  Assess patient's ability to carry out ADLs; assess patient's baseline for ADL function and identify physical deficits which impact ability to perform ADLs (bathing, care of mouth/teeth, toileting, grooming, dressing, etc )  - Assess/evaluate cause of self-care deficits   - Assess range of motion  - Assess patient's mobility; develop plan if impaired  - Assess patient's need for assistive devices and provide as appropriate  - Encourage maximum independence but intervene and supervise when necessary  - Involve family in performance of ADLs  - Assess for home care needs following discharge   - Consider OT consult to assist with ADL evaluation and planning for discharge  - Provide patient education as appropriate  Outcome: Progressing Home alone

## 2022-06-23 NOTE — BH INPATIENT PSYCHIATRY ASSESSMENT NOTE - NSACTIVEVENT_PSY_ALL_CORE
Triggering events leading to humiliation, shame, and/or despair (e.g., Loss of relationship, financial or health status) (real or anticipated)/Current sexual/physical abuse or other trauma/Chronic pain or other acute medical condition/Substance intoxication or withdrawal

## 2022-06-23 NOTE — BH INPATIENT PSYCHIATRY ASSESSMENT NOTE - NSBHASSESSSUMMFT_PSY_ALL_CORE
51M w/no formal psychiatric history, presenting with anxiety, depression, suicidality, in context of recent severe psychosocial and medical stressors.      Etiology of presentation may be multifactorial, differential includes panic disorder, PTSD, MDD, adjustment, substance induced.    Consider increasing sertraline.  Pt likely may require for immediate acting anxiety PRNs.  Psychology referral.  Collateral.  Continue CIWA for now.

## 2022-06-23 NOTE — BH SOCIAL WORK INITIAL PSYCHOSOCIAL EVALUATION - NSPROGENSOURCEINFO_PSY_ALL_CORE
Pt asked to be left alone to sleep, so BPS done based on chart and feedback from attending./Other(s)

## 2022-06-23 NOTE — PSYCHIATRIC REHAB INITIAL EVALUATION - NSBHALCSUBCHOICE_PSY_ALL_CORE
Per chart, patient endorsed recent alcohol use (a few drinks) the night prior to admission but no other recent use.

## 2022-06-23 NOTE — DIETITIAN INITIAL EVALUATION ADULT - PERTINENT LABORATORY DATA
06-22    143  |  103  |  10  ----------------------------<  89  4.7   |  25  |  0.72    Ca    10.1      22 Jun 2022 20:36    TPro  7.3  /  Alb  4.3  /  TBili  0.5  /  DBili  x   /  AST  21  /  ALT  17  /  AlkPhos  85  06-22
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
## 2022-06-23 NOTE — BH INPATIENT PSYCHIATRY ASSESSMENT NOTE - NSBHCHARTREVIEWVS_PSY_A_CORE FT
Vital Signs Last 24 Hrs  T(C): 36.4 (06-23-22 @ 01:49), Max: 36.9 (06-22-22 @ 20:30)  T(F): 97.5 (06-23-22 @ 01:49), Max: 98.4 (06-22-22 @ 20:30)  HR: 61 (06-23-22 @ 01:49) (50 - 66)  BP: 125/88 (06-23-22 @ 01:49) (125/88 - 141/90)  BP(mean): --  RR: 18 (06-23-22 @ 01:49) (12 - 18)  SpO2: 100% (06-23-22 @ 00:50) (100% - 100%)    Orthostatic VS  06-23-22 @ 01:49  Lying BP: --/-- HR: --  Sitting BP: --/-- HR: --  Standing BP: 126/86 HR: --  Site: --  Mode: --
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
## 2022-06-23 NOTE — DIETITIAN INITIAL EVALUATION ADULT - NSFNSGIIOFT_GEN_A_CORE
Nataliia Edge MD  [id number]      D:  08/07/2018 09:11:35  T:  08/07/2018 13:49:42   AR/bree   Job:  [id number]            Abdominal pain

## 2022-06-23 NOTE — DIETITIAN INITIAL EVALUATION ADULT - OTHER INFO
Patient admitted to [facility] d/t SI with plan. Patient had bariatric surgery November 2021. Patient has had medical complications since gastric sleeve surgery with subsequent blood clots; +DVT- on Xarelto. Was hospitalized in March with severe abdominal pain, dx: pancreatitis.   Patient lying in bed at time of interview. Reports since having bariatric surgery, he has had abdominal pain after he eats. Consumed less than 50% of lunch today.  Eats small, frequent meals/snacks. Usual meal pattern PTA: Breakfast: Premier protein drink ;Am snack: Premier protein bar; Lunch: soup and rice; Dinner- varies; snacks on nuts such as peanuts and cashews, and drinks another Premier protein shake. Takes a multivitamin, Coenzyme Q10, Milk thistle, tumeric, Balance of Nature fruits and vegetables, psyllium husk, and Omega 3. Reports usual weight before surgery was 267#. States currently weighs 173# (states was weighed yesterday) with 10# weight loss in past month. Food preferences explored and obtained. Is amenable to drinking Ensure supplement and taking a multivitamin.     Patient admitted to [facility] d/t SI with plan. Patient had bariatric surgery November 2021. Patient has had medical complications since gastric sleeve surgery with subsequent blood clots; +DVT- on Xarelto. Was hospitalized in March with severe abdominal pain, dx: pancreatitis.   Patient lying in bed at time of interview. Reports since having bariatric surgery, he has had abdominal pain after he eats. Consumed less than 50% of lunch today.  Eats small, frequent meals/snacks. Usual meal pattern PTA: Breakfast: Premier protein drink ;Am snack: Premier protein bar; Lunch: soup and rice; Dinner- varies; snacks on nuts such as peanuts and cashews, and drinks another Premier protein shake. Takes a multivitamin, Coenzyme Q10, Milk thistle, tumeric, Balance of Nature fruits and vegetables, psyllium husk, and Omega 3. Reports usual weight before surgery was 267#. States currently weighs 173# (states was weighed yesterday) with 10# weight loss in past month. Patient reports he was suppose to have an endoscopy/colonoscopy but could not keep appointment. Thinks he may have a hiatal hernia. Food preferences explored and obtained. Is amenable to drinking Ensure supplement and taking a multivitamin.

## 2022-06-23 NOTE — DIETITIAN INITIAL EVALUATION ADULT - PERTINENT MEDS FT
MEDICATIONS  (STANDING):  rivaroxaban 20 milliGRAM(s) Oral daily  sertraline 50 milliGRAM(s) Oral daily    MEDICATIONS  (PRN):  LORazepam     Tablet 2 milliGRAM(s) Oral every 2 hours PRN CIWA score increase by 2 points and current CIWA score GREATER THAN 9  LORazepam     Tablet 1 milliGRAM(s) Oral every 6 hours PRN anxiety/agitation  LORazepam   Injectable 2 milliGRAM(s) IntraMuscular once PRN severe agitation  melatonin. 3 milliGRAM(s) Oral at bedtime PRN Insomnia  traZODone 50 milliGRAM(s) Oral at bedtime PRN insomnia

## 2022-06-23 NOTE — BH PATIENT PROFILE - FALL HARM RISK - RISK INTERVENTIONS

## 2022-06-23 NOTE — BH PATIENT PROFILE - HOME MEDICATIONS
Multiple Vitamins oral tablet , 1 tab(s) orally once a day  pantoprazole 40 mg oral delayed release tablet , 1 tab(s) orally once a day (before a meal)  Xarelto 20 mg oral tablet , 1 tab(s) orally once a day (in the evening)  clomiPHENE 50 mg oral tablet , 1 tab(s) orally once a day  sertraline 25 mg oral tablet , 2 tab(s) orally once a day  tadalafil 20 mg oral tablet , 1 tab(s) orally once a day  Vitamin D3 25 mcg (1000 intl units) oral tablet , 1 tab(s) orally once a day  rosuvastatin 10 mg oral tablet , 1 tab(s) orally once a day (at bedtime)

## 2022-06-23 NOTE — DIETITIAN INITIAL EVALUATION ADULT - RD TO REMAIN AVAILABLE
Encourage/monitor po intake, weights, labs. Provide food preferences to optimize oral intake and GI tolerance./yes

## 2022-06-23 NOTE — PHARMACOTHERAPY INTERVENTION NOTE - COMMENTS
Anticoagulation Stewardship Program Monitoring Note    Patient is a 54 year old male with history of DVT on maintenance rivaroxaban (Xarelto) treatment. Upon admission, order entered for daily. Doses higher than 10 mG of rivaroxaban must be administered with the evening meal. The patient has already received her dose this morning. As clinically appropriate, recommended reordering rivaroxaban 20 mG daily with evening meal starting tomorrow, 6/24/22.    Order updated to daily with dinner.

## 2022-06-24 PROCEDURE — 99232 SBSQ HOSP IP/OBS MODERATE 35: CPT

## 2022-06-24 RX ORDER — SERTRALINE 25 MG/1
75 TABLET, FILM COATED ORAL DAILY
Refills: 0 | Status: DISCONTINUED | OUTPATIENT
Start: 2022-06-24 | End: 2022-06-26

## 2022-06-24 RX ORDER — CLONAZEPAM 1 MG
1 TABLET ORAL
Refills: 0 | Status: DISCONTINUED | OUTPATIENT
Start: 2022-06-24 | End: 2022-06-27

## 2022-06-24 RX ORDER — CLONAZEPAM 1 MG
1 TABLET ORAL ONCE
Refills: 0 | Status: DISCONTINUED | OUTPATIENT
Start: 2022-06-24 | End: 2022-06-24

## 2022-06-24 RX ADMIN — SERTRALINE 75 MILLIGRAM(S): 25 TABLET, FILM COATED ORAL at 09:27

## 2022-06-24 RX ADMIN — Medication 1 MILLIGRAM(S): at 10:53

## 2022-06-24 RX ADMIN — PANTOPRAZOLE SODIUM 40 MILLIGRAM(S): 20 TABLET, DELAYED RELEASE ORAL at 09:27

## 2022-06-24 RX ADMIN — Medication 1 MILLIGRAM(S): at 17:32

## 2022-06-24 RX ADMIN — Medication 1 MILLIGRAM(S): at 20:17

## 2022-06-24 RX ADMIN — Medication 1 MILLIGRAM(S): at 11:39

## 2022-06-24 RX ADMIN — RIVAROXABAN 20 MILLIGRAM(S): KIT at 17:12

## 2022-06-24 RX ADMIN — Medication 3 MILLIGRAM(S): at 21:01

## 2022-06-24 RX ADMIN — Medication 1 TABLET(S): at 09:27

## 2022-06-24 NOTE — BH INPATIENT PSYCHIATRY PROGRESS NOTE - NSBHCHARTREVIEWVS_PSY_A_CORE FT
Vital Signs Last 24 Hrs  T(C): 36.4 (06-24-22 @ 08:18), Max: 36.4 (06-24-22 @ 08:18)  T(F): 97.5 (06-24-22 @ 08:18), Max: 97.5 (06-24-22 @ 08:18)  HR: --  BP: --  BP(mean): --  RR: --  SpO2: --    Orthostatic VS  06-24-22 @ 08:18  Lying BP: --/-- HR: --  Sitting BP: 114/84 HR: 83  Standing BP: --/-- HR: --  Site: --  Mode: --  Orthostatic VS  06-23-22 @ 01:49  Lying BP: --/-- HR: --  Sitting BP: --/-- HR: --  Standing BP: 126/86 HR: --  Site: --  Mode: --

## 2022-06-24 NOTE — BH INPATIENT PSYCHIATRY PROGRESS NOTE - MSE UNSTRUCTURED FT
Appearance: Dressed appropriately.  Good hygiene and grooming.  Behavior: Cooperative.  Very tremulous.   Motor: Psychomotor activated.   Speech: Regular rate.  Mood: "Anxious."  Affect: Anxious, constricted.   Thought Process: Linear.  Associations: Fair.  Thought Content: Ruminations.    Insight: Limited.  Judgment: Fair on interview.  Attention: Fair.  Language: Fluent.  Gait: Intact.

## 2022-06-24 NOTE — BH INPATIENT PSYCHIATRY PROGRESS NOTE - NSBHASSESSSUMMFT_PSY_ALL_CORE
51M w/no formal psychiatric history, presenting with anxiety, depression, suicidality, in context of recent severe psychosocial and medical stressors.      Etiology of presentation may be multifactorial, differential includes panic disorder, PTSD, MDD, adjustment, substance induced.    Pt currently with intense anxiety/panic.  Sertraline increased this AM, will likely require higher doses given gastric sleeve.  Will provide Klonopin.  Psychology to see pt.  Continue CIWA for now.

## 2022-06-24 NOTE — BH INPATIENT PSYCHIATRY PROGRESS NOTE - CURRENT MEDICATION
MEDICATIONS  (STANDING):  clonazePAM  Tablet 1 milliGRAM(s) Oral once  multivitamin/minerals 1 Tablet(s) Oral daily  pantoprazole    Tablet 40 milliGRAM(s) Oral before breakfast  rivaroxaban 20 milliGRAM(s) Oral with dinner  sertraline 75 milliGRAM(s) Oral daily    MEDICATIONS  (PRN):  LORazepam     Tablet 1 milliGRAM(s) Oral every 6 hours PRN anxiety/agitation  LORazepam     Tablet 2 milliGRAM(s) Oral every 2 hours PRN CIWA score increase by 2 points and current CIWA score GREATER THAN 9  LORazepam   Injectable 2 milliGRAM(s) IntraMuscular once PRN severe agitation  melatonin. 3 milliGRAM(s) Oral at bedtime PRN Insomnia  traZODone 50 milliGRAM(s) Oral at bedtime PRN insomnia
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
## 2022-06-24 NOTE — BH INPATIENT PSYCHIATRY PROGRESS NOTE - PRN MEDS
MEDICATIONS  (PRN):  LORazepam     Tablet 1 milliGRAM(s) Oral every 6 hours PRN anxiety/agitation  LORazepam     Tablet 2 milliGRAM(s) Oral every 2 hours PRN CIWA score increase by 2 points and current CIWA score GREATER THAN 9  LORazepam   Injectable 2 milliGRAM(s) IntraMuscular once PRN severe agitation  melatonin. 3 milliGRAM(s) Oral at bedtime PRN Insomnia  traZODone 50 milliGRAM(s) Oral at bedtime PRN insomnia

## 2022-06-25 PROCEDURE — 99231 SBSQ HOSP IP/OBS SF/LOW 25: CPT

## 2022-06-25 RX ADMIN — SERTRALINE 75 MILLIGRAM(S): 25 TABLET, FILM COATED ORAL at 09:49

## 2022-06-25 RX ADMIN — RIVAROXABAN 20 MILLIGRAM(S): KIT at 17:09

## 2022-06-25 RX ADMIN — PANTOPRAZOLE SODIUM 40 MILLIGRAM(S): 20 TABLET, DELAYED RELEASE ORAL at 06:54

## 2022-06-25 RX ADMIN — Medication 1 MILLIGRAM(S): at 11:48

## 2022-06-25 RX ADMIN — Medication 1 TABLET(S): at 09:49

## 2022-06-25 RX ADMIN — Medication 1 MILLIGRAM(S): at 20:28

## 2022-06-25 RX ADMIN — Medication 1 MILLIGRAM(S): at 09:49

## 2022-06-25 RX ADMIN — Medication 1 MILLIGRAM(S): at 18:12

## 2022-06-25 RX ADMIN — Medication 3 MILLIGRAM(S): at 21:25

## 2022-06-25 NOTE — BH INPATIENT PSYCHIATRY PROGRESS NOTE - CURRENT MEDICATION
MEDICATIONS  (STANDING):  clonazePAM  Tablet 1 milliGRAM(s) Oral two times a day  multivitamin/minerals 1 Tablet(s) Oral daily  pantoprazole    Tablet 40 milliGRAM(s) Oral before breakfast  rivaroxaban 20 milliGRAM(s) Oral with dinner  sertraline 75 milliGRAM(s) Oral daily    MEDICATIONS  (PRN):  LORazepam     Tablet 1 milliGRAM(s) Oral every 6 hours PRN anxiety/agitation  LORazepam     Tablet 2 milliGRAM(s) Oral every 2 hours PRN CIWA score increase by 2 points and current CIWA score GREATER THAN 9  LORazepam   Injectable 2 milliGRAM(s) IntraMuscular once PRN severe agitation  melatonin. 3 milliGRAM(s) Oral at bedtime PRN Insomnia  traZODone 50 milliGRAM(s) Oral at bedtime PRN insomnia

## 2022-06-25 NOTE — BH INPATIENT PSYCHIATRY PROGRESS NOTE - NSBHASSESSSUMMFT_PSY_ALL_CORE
51M w/no formal psychiatric history, presenting with anxiety, depression, suicidality, in context of recent severe psychosocial and medical stressors.      Etiology of presentation may be multifactorial, differential includes panic disorder, PTSD, MDD, adjustment, substance induced.    Patient reports ongoing fluctuations in his anxiety that is relatively improved this morning. patient denies a/e to medications. Denies SI. will continue medications as ordered and continue CIWA

## 2022-06-25 NOTE — BH INPATIENT PSYCHIATRY PROGRESS NOTE - MSE UNSTRUCTURED FT
Appearance: Dressed appropriately.  Good hygiene and grooming.  Behavior: Cooperative.  Very tremulous.   Motor: Psychomotor normal.   Speech: Regular rate.  Mood: "Anxious."  Affect: Anxious, constricted.   Thought Process: Linear.  Associations: Fair.  Thought Content: Ruminations.    Insight: Limited.  Judgment: Fair on interview.  Attention: Fair.  Language: Fluent.  Gait: Intact.

## 2022-06-25 NOTE — BH INPATIENT PSYCHIATRY PROGRESS NOTE - NSBHFUPINTERVALHXFT_PSY_A_CORE
chart reviewed including pertinent labs. Case discussed with nursing staff. patient reports fluctuating anxiety that is somewhat improved this AM relative to yesterday. He endorses some abdominal pain especially after eating, reports this has been ongoing since his gastric sleeve procedure last November. Patient inquires about getting additional medications to target anxiety. patient was visible later in the day room conversing appropriately with peers.

## 2022-06-25 NOTE — BH INPATIENT PSYCHIATRY PROGRESS NOTE - NSBHCHARTREVIEWVS_PSY_A_CORE FT
Vital Signs Last 24 Hrs  T(C): 36.2 (06-25-22 @ 08:58), Max: 36.2 (06-25-22 @ 08:58)  T(F): 97.2 (06-25-22 @ 08:58), Max: 97.2 (06-25-22 @ 08:58)  HR: --  BP: --  BP(mean): --  RR: --  SpO2: --    Orthostatic VS  06-25-22 @ 08:58  Lying BP: --/-- HR: --  Sitting BP: 105/69 HR: 60  Standing BP: --/-- HR: --  Site: --  Mode: --  Orthostatic VS  06-24-22 @ 18:43  Lying BP: --/-- HR: --  Sitting BP: 116/71 HR: 65  Standing BP: 114/67 HR: 77  Site: --  Mode: --  Orthostatic VS  06-24-22 @ 12:30  Lying BP: --/-- HR: --  Sitting BP: 135/84 HR: 62  Standing BP: --/-- HR: --  Site: --  Mode: --  Orthostatic VS  06-24-22 @ 08:18  Lying BP: --/-- HR: --  Sitting BP: 114/84 HR: 83  Standing BP: --/-- HR: --  Site: --  Mode: --

## 2022-06-26 PROCEDURE — 99231 SBSQ HOSP IP/OBS SF/LOW 25: CPT

## 2022-06-26 RX ORDER — SERTRALINE 25 MG/1
100 TABLET, FILM COATED ORAL DAILY
Refills: 0 | Status: DISCONTINUED | OUTPATIENT
Start: 2022-06-27 | End: 2022-06-28

## 2022-06-26 RX ADMIN — PANTOPRAZOLE SODIUM 40 MILLIGRAM(S): 20 TABLET, DELAYED RELEASE ORAL at 06:30

## 2022-06-26 RX ADMIN — Medication 1 MILLIGRAM(S): at 20:13

## 2022-06-26 RX ADMIN — Medication 1 MILLIGRAM(S): at 08:49

## 2022-06-26 RX ADMIN — Medication 1 TABLET(S): at 08:49

## 2022-06-26 RX ADMIN — SERTRALINE 75 MILLIGRAM(S): 25 TABLET, FILM COATED ORAL at 08:49

## 2022-06-26 RX ADMIN — Medication 50 MILLIGRAM(S): at 20:46

## 2022-06-26 RX ADMIN — Medication 1 MILLIGRAM(S): at 11:36

## 2022-06-26 RX ADMIN — RIVAROXABAN 20 MILLIGRAM(S): KIT at 16:56

## 2022-06-26 RX ADMIN — Medication 1 MILLIGRAM(S): at 18:10

## 2022-06-26 NOTE — BH INPATIENT PSYCHIATRY PROGRESS NOTE - NSBHCHARTREVIEWVS_PSY_A_CORE FT
Vital Signs Last 24 Hrs  T(C): 36.8 (06-26-22 @ 08:36), Max: 36.8 (06-25-22 @ 17:32)  T(F): 98.2 (06-26-22 @ 08:36), Max: 98.3 (06-25-22 @ 17:32)  HR: --  BP: --  BP(mean): --  RR: --  SpO2: --    Orthostatic VS  06-26-22 @ 08:36  Lying BP: --/-- HR: --  Sitting BP: 100/58 HR: 66  Standing BP: --/-- HR: --  Site: --  Mode: --  Orthostatic VS  06-25-22 @ 08:58  Lying BP: --/-- HR: --  Sitting BP: 105/69 HR: 60  Standing BP: --/-- HR: --  Site: --  Mode: --  Orthostatic VS  06-24-22 @ 18:43  Lying BP: --/-- HR: --  Sitting BP: 116/71 HR: 65  Standing BP: 114/67 HR: 77  Site: --  Mode: --

## 2022-06-26 NOTE — BH INPATIENT PSYCHIATRY PROGRESS NOTE - NSBHASSESSSUMMFT_PSY_ALL_CORE
51M w/no formal psychiatric history, presenting with anxiety, depression, suicidality, in context of recent severe psychosocial and medical stressors.      Etiology of presentation may be multifactorial, differential includes panic disorder, PTSD, MDD, adjustment, substance induced.    reports fluctuating anxiety cites it to be due to environmental factors including disruptive roommate. patient utilizing PRNs. will increase Zoloft to 100mg daily.

## 2022-06-26 NOTE — BH INPATIENT PSYCHIATRY PROGRESS NOTE - MSE UNSTRUCTURED FT
Appearance: Dressed appropriately.  Good hygiene and grooming.  Behavior: cooperative, somewhat irritable   Motor: Psychomotor normal. no observable tremors   Speech: Regular rate.  Mood: "Anxious."  Affect: Anxious, reactive  Thought Process: Linear.  Associations: Fair.  Thought Content: Ruminations.    Insight: Limited.  Judgment: Fair on interview.  Attention: Fair.  Language: Fluent.  Gait: Intact.

## 2022-06-26 NOTE — BH INPATIENT PSYCHIATRY PROGRESS NOTE - NSBHFUPINTERVALHXFT_PSY_A_CORE
chart reviewed including pertinent labs. Case discussed with nursing staff. patient reports ongoing frustration with numerous aspects of the unit, saying staff is not responding to him as quickly as he would like, frustrated with his roommate keeping him up at night. patient utilizing PRN lorazepam, reports it to be helpful, requests staff quickly provide him with PRNs otherwise he might "lose it," explaining that he may have a panic attack. Subsequent to the interview, patient was appropriately engaging with peers and with contextually appropriate affect

## 2022-06-27 PROCEDURE — 99232 SBSQ HOSP IP/OBS MODERATE 35: CPT

## 2022-06-27 RX ORDER — SENNA PLUS 8.6 MG/1
2 TABLET ORAL AT BEDTIME
Refills: 0 | Status: DISCONTINUED | OUTPATIENT
Start: 2022-06-27 | End: 2022-07-04

## 2022-06-27 RX ORDER — CLONAZEPAM 1 MG
1 TABLET ORAL THREE TIMES A DAY
Refills: 0 | Status: DISCONTINUED | OUTPATIENT
Start: 2022-06-27 | End: 2022-06-29

## 2022-06-27 RX ORDER — POLYETHYLENE GLYCOL 3350 17 G/17G
17 POWDER, FOR SOLUTION ORAL DAILY
Refills: 0 | Status: DISCONTINUED | OUTPATIENT
Start: 2022-06-27 | End: 2022-07-04

## 2022-06-27 RX ORDER — HYDROXYZINE HCL 10 MG
50 TABLET ORAL EVERY 6 HOURS
Refills: 0 | Status: DISCONTINUED | OUTPATIENT
Start: 2022-06-27 | End: 2022-07-07

## 2022-06-27 RX ADMIN — Medication 50 MILLIGRAM(S): at 18:16

## 2022-06-27 RX ADMIN — Medication 1 MILLIGRAM(S): at 23:05

## 2022-06-27 RX ADMIN — Medication 1 MILLIGRAM(S): at 16:50

## 2022-06-27 RX ADMIN — SENNA PLUS 2 TABLET(S): 8.6 TABLET ORAL at 21:45

## 2022-06-27 RX ADMIN — Medication 3 MILLIGRAM(S): at 21:45

## 2022-06-27 RX ADMIN — Medication 1 MILLIGRAM(S): at 08:58

## 2022-06-27 RX ADMIN — RIVAROXABAN 20 MILLIGRAM(S): KIT at 16:24

## 2022-06-27 RX ADMIN — Medication 1 TABLET(S): at 08:58

## 2022-06-27 RX ADMIN — Medication 1 MILLIGRAM(S): at 20:21

## 2022-06-27 RX ADMIN — SERTRALINE 100 MILLIGRAM(S): 25 TABLET, FILM COATED ORAL at 08:58

## 2022-06-27 RX ADMIN — Medication 1 MILLIGRAM(S): at 12:12

## 2022-06-27 RX ADMIN — PANTOPRAZOLE SODIUM 40 MILLIGRAM(S): 20 TABLET, DELAYED RELEASE ORAL at 08:58

## 2022-06-27 RX ADMIN — Medication 50 MILLIGRAM(S): at 21:45

## 2022-06-27 NOTE — BH INPATIENT PSYCHIATRY PROGRESS NOTE - MSE UNSTRUCTURED FT
Appearance: Dressed appropriately.  Good hygiene and grooming.  Behavior: cooperative  Motor: No tremors or other abnormal movements.   Speech: Regular rate.  Mood: "Not good"  Affect: Neutral to pleasant, full range, not mood-congruent.  Thought Process: Linear.  Associations: Fair.  Thought Content: Ruminations.    Insight: Limited.  Judgment: Fair on interview.  Attention: Fair.  Language: Fluent.  Gait: Intact.

## 2022-06-27 NOTE — BH INPATIENT PSYCHIATRY PROGRESS NOTE - NSBHASSESSSUMMFT_PSY_ALL_CORE
51M w/no formal psychiatric history, presenting with anxiety, depression, suicidality, in context of recent severe psychosocial and medical stressors.      Etiology of presentation may be multifactorial, differential includes panic disorder, PTSD, MDD, adjustment, substance induced.    Pt appears improved, will adjust Klonopin to tid use, in order to reduce Ativan PRNs in afternoons.

## 2022-06-27 NOTE — BH INPATIENT PSYCHIATRY PROGRESS NOTE - NSBHFUPINTERVALHXFT_PSY_A_CORE
Pt today was observed socializing well with group of peers.  Pt today states he feels better, but that still has anxiety usually in afternoons.

## 2022-06-27 NOTE — BH INPATIENT PSYCHIATRY PROGRESS NOTE - NSBHCHARTREVIEWVS_PSY_A_CORE FT
Vital Signs Last 24 Hrs  T(C): 36.9 (06-27-22 @ 06:49), Max: 37 (06-26-22 @ 17:36)  T(F): 98.4 (06-27-22 @ 06:49), Max: 98.6 (06-26-22 @ 17:36)  HR: 63 (06-27-22 @ 06:49) (63 - 63)  BP: 116/68 (06-27-22 @ 06:49) (116/68 - 116/68)  BP(mean): --  RR: 19 (06-27-22 @ 06:49) (19 - 19)  SpO2: --    Orthostatic VS  06-27-22 @ 06:49  Lying BP: 110/68 HR: --  Sitting BP: --/-- HR: --  Standing BP: 116/68 HR: --  Site: --  Mode: --  Orthostatic VS  06-26-22 @ 08:36  Lying BP: --/-- HR: --  Sitting BP: 100/58 HR: 66  Standing BP: --/-- HR: --  Site: --  Mode: --

## 2022-06-27 NOTE — BH INPATIENT PSYCHIATRY PROGRESS NOTE - CURRENT MEDICATION
MEDICATIONS  (STANDING):  clonazePAM  Tablet 1 milliGRAM(s) Oral three times a day  multivitamin/minerals 1 Tablet(s) Oral daily  pantoprazole    Tablet 40 milliGRAM(s) Oral before breakfast  rivaroxaban 20 milliGRAM(s) Oral with dinner  sertraline 100 milliGRAM(s) Oral daily    MEDICATIONS  (PRN):  LORazepam     Tablet 1 milliGRAM(s) Oral every 6 hours PRN anxiety/agitation  LORazepam     Tablet 2 milliGRAM(s) Oral every 2 hours PRN CIWA score increase by 2 points and current CIWA score GREATER THAN 9  LORazepam   Injectable 2 milliGRAM(s) IntraMuscular once PRN severe agitation  melatonin. 3 milliGRAM(s) Oral at bedtime PRN Insomnia  traZODone 50 milliGRAM(s) Oral at bedtime PRN insomnia

## 2022-06-28 PROCEDURE — 99231 SBSQ HOSP IP/OBS SF/LOW 25: CPT

## 2022-06-28 RX ORDER — SERTRALINE 25 MG/1
150 TABLET, FILM COATED ORAL DAILY
Refills: 0 | Status: DISCONTINUED | OUTPATIENT
Start: 2022-06-29 | End: 2022-06-29

## 2022-06-28 RX ADMIN — POLYETHYLENE GLYCOL 3350 17 GRAM(S): 17 POWDER, FOR SOLUTION ORAL at 11:02

## 2022-06-28 RX ADMIN — Medication 3 MILLIGRAM(S): at 20:47

## 2022-06-28 RX ADMIN — SERTRALINE 100 MILLIGRAM(S): 25 TABLET, FILM COATED ORAL at 08:34

## 2022-06-28 RX ADMIN — PANTOPRAZOLE SODIUM 40 MILLIGRAM(S): 20 TABLET, DELAYED RELEASE ORAL at 06:30

## 2022-06-28 RX ADMIN — Medication 1 MILLIGRAM(S): at 13:05

## 2022-06-28 RX ADMIN — RIVAROXABAN 20 MILLIGRAM(S): KIT at 16:16

## 2022-06-28 RX ADMIN — Medication 1 MILLIGRAM(S): at 08:34

## 2022-06-28 RX ADMIN — Medication 1 MILLIGRAM(S): at 20:20

## 2022-06-28 RX ADMIN — Medication 1 TABLET(S): at 08:40

## 2022-06-28 RX ADMIN — Medication 50 MILLIGRAM(S): at 10:30

## 2022-06-28 RX ADMIN — Medication 50 MILLIGRAM(S): at 17:42

## 2022-06-28 RX ADMIN — Medication 1 MILLIGRAM(S): at 20:47

## 2022-06-28 RX ADMIN — Medication 1 MILLIGRAM(S): at 12:01

## 2022-06-28 NOTE — BH INPATIENT PSYCHIATRY PROGRESS NOTE - NSBHCHARTREVIEWVS_PSY_A_CORE FT
Vital Signs Last 24 Hrs  T(C): 37.1 (06-27-22 @ 16:48), Max: 37.1 (06-27-22 @ 16:48)  T(F): 98.7 (06-27-22 @ 16:48), Max: 98.7 (06-27-22 @ 16:48)  HR: --  BP: --  BP(mean): --  RR: --  SpO2: --    Orthostatic VS  06-27-22 @ 06:49  Lying BP: 110/68 HR: --  Sitting BP: --/-- HR: --  Standing BP: 116/68 HR: --  Site: --  Mode: --

## 2022-06-28 NOTE — BH INPATIENT PSYCHIATRY PROGRESS NOTE - NSBHFUPINTERVALHXFT_PSY_A_CORE
Pt today reports he had some thoughts about jumping off 2nd story or using plastic knife for harm.  Pt reports that Klonopin wears off too fast and asks if dose can be increased.  Pt asks if he taking PRN anxiolytics would preclude him from receiving 1PM meds.  When pt was informed of wife concern that he sounds sedated on phone, pt states he does not feel this way and that his anxiety is severe.

## 2022-06-28 NOTE — BH INPATIENT PSYCHIATRY PROGRESS NOTE - NSBHASSESSSUMMFT_PSY_ALL_CORE
51M w/no formal psychiatric history, presenting with anxiety, depression, suicidality, in context of recent severe psychosocial and medical stressors.    Etiology of presentation may be multifactorial, differential includes panic disorder, PTSD, MDD, adjustment, substance induced.    Despite objective assessment of pt appearing more functional and social on unit, pt with subjective report of ongoing symptoms.      Continue meds as ordered.  Plan for sertraline increase.

## 2022-06-28 NOTE — BH INPATIENT PSYCHIATRY PROGRESS NOTE - MSE UNSTRUCTURED FT
Appearance: Dressed appropriately.  Good hygiene and grooming.  Behavior: Cooperative, calm, well related.   Motor: No tremors or other abnormal movements.   Speech: Regular rate.  Mood: Nervous.  Affect: Neutral to pleasant, full range.  Thought Process: Linear.  Associations: Fair.  Thought Content: Ruminations.    Insight: Limited.  Judgment: Fair on interview.  Attention: Fair.  Language: Fluent.  Gait: Intact.

## 2022-06-28 NOTE — BH INPATIENT PSYCHIATRY PROGRESS NOTE - CURRENT MEDICATION
MEDICATIONS  (STANDING):  clonazePAM  Tablet 1 milliGRAM(s) Oral three times a day  multivitamin/minerals 1 Tablet(s) Oral daily  pantoprazole    Tablet 40 milliGRAM(s) Oral before breakfast  rivaroxaban 20 milliGRAM(s) Oral with dinner  sertraline 100 milliGRAM(s) Oral daily    MEDICATIONS  (PRN):  hydrOXYzine hydrochloride 50 milliGRAM(s) Oral every 6 hours PRN anxiety/agitation  LORazepam     Tablet 1 milliGRAM(s) Oral every 6 hours PRN anxiety/agitation  LORazepam   Injectable 2 milliGRAM(s) IntraMuscular once PRN severe agitation  melatonin. 3 milliGRAM(s) Oral at bedtime PRN Insomnia  polyethylene glycol 3350 17 Gram(s) Oral daily PRN constipation  senna 2 Tablet(s) Oral at bedtime PRN constipation  traZODone 50 milliGRAM(s) Oral at bedtime PRN insomnia

## 2022-06-28 NOTE — BH INPATIENT PSYCHIATRY PROGRESS NOTE - PRN MEDS
MEDICATIONS  (PRN):  hydrOXYzine hydrochloride 50 milliGRAM(s) Oral every 6 hours PRN anxiety/agitation  LORazepam     Tablet 1 milliGRAM(s) Oral every 6 hours PRN anxiety/agitation  LORazepam   Injectable 2 milliGRAM(s) IntraMuscular once PRN severe agitation  melatonin. 3 milliGRAM(s) Oral at bedtime PRN Insomnia  polyethylene glycol 3350 17 Gram(s) Oral daily PRN constipation  senna 2 Tablet(s) Oral at bedtime PRN constipation  traZODone 50 milliGRAM(s) Oral at bedtime PRN insomnia

## 2022-06-29 PROCEDURE — 99232 SBSQ HOSP IP/OBS MODERATE 35: CPT

## 2022-06-29 PROCEDURE — 90837 PSYTX W PT 60 MINUTES: CPT

## 2022-06-29 RX ORDER — SERTRALINE 25 MG/1
200 TABLET, FILM COATED ORAL DAILY
Refills: 0 | Status: DISCONTINUED | OUTPATIENT
Start: 2022-06-30 | End: 2022-07-13

## 2022-06-29 RX ORDER — CLONAZEPAM 1 MG
1 TABLET ORAL THREE TIMES A DAY
Refills: 0 | Status: DISCONTINUED | OUTPATIENT
Start: 2022-06-29 | End: 2022-07-05

## 2022-06-29 RX ORDER — CYCLOBENZAPRINE HYDROCHLORIDE 10 MG/1
10 TABLET, FILM COATED ORAL EVERY 12 HOURS
Refills: 0 | Status: DISCONTINUED | OUTPATIENT
Start: 2022-06-29 | End: 2022-07-13

## 2022-06-29 RX ORDER — MULTIVIT WITH MIN/MFOLATE/K2 340-15/3 G
1 POWDER (GRAM) ORAL ONCE
Refills: 0 | Status: COMPLETED | OUTPATIENT
Start: 2022-06-29 | End: 2022-06-29

## 2022-06-29 RX ADMIN — SERTRALINE 150 MILLIGRAM(S): 25 TABLET, FILM COATED ORAL at 08:44

## 2022-06-29 RX ADMIN — Medication 1 TABLET(S): at 08:44

## 2022-06-29 RX ADMIN — Medication 3 MILLIGRAM(S): at 20:26

## 2022-06-29 RX ADMIN — Medication 1 MILLIGRAM(S): at 20:26

## 2022-06-29 RX ADMIN — Medication 50 MILLIGRAM(S): at 20:34

## 2022-06-29 RX ADMIN — Medication 5 MILLIGRAM(S): at 12:41

## 2022-06-29 RX ADMIN — PANTOPRAZOLE SODIUM 40 MILLIGRAM(S): 20 TABLET, DELAYED RELEASE ORAL at 08:44

## 2022-06-29 RX ADMIN — RIVAROXABAN 20 MILLIGRAM(S): KIT at 17:25

## 2022-06-29 RX ADMIN — Medication 1 BOTTLE: at 20:34

## 2022-06-29 RX ADMIN — Medication 1 MILLIGRAM(S): at 12:30

## 2022-06-29 RX ADMIN — Medication 1 MILLIGRAM(S): at 08:44

## 2022-06-29 RX ADMIN — Medication 1 MILLIGRAM(S): at 18:12

## 2022-06-29 RX ADMIN — Medication 1 MILLIGRAM(S): at 11:02

## 2022-06-29 NOTE — BH INPATIENT PSYCHIATRY PROGRESS NOTE - NSBHASSESSSUMMFT_PSY_ALL_CORE
51M w/no formal psychiatric history, presenting with anxiety, depression, suicidality, in context of recent severe psychosocial and medical stressors.    Etiology of presentation may be multifactorial, differential includes panic disorder, PTSD, MDD, adjustment, substance induced.    Emotional dysregulation, anxiety.    Sertraline increased to 150 mg today, will increase further to 200 mg tomorrow.

## 2022-06-29 NOTE — BH INPATIENT PSYCHIATRY PROGRESS NOTE - NSBHFUPINTERVALHXFT_PSY_A_CORE
Pt discusses frustrations in getting needs met on unit.  States he continues to have anxiety, asks if he has improved at all.  Feels like he is not ready to face psychosocial stressors yet, refers to suicidal ideations he had yesterday.

## 2022-06-29 NOTE — BH INPATIENT PSYCHIATRY PROGRESS NOTE - MSE UNSTRUCTURED FT
Appearance: Dressed appropriately.  Good hygiene and grooming.  Behavior: Cooperative, calm.  Motor: No tremors or other abnormal movements.   Speech: Regular rate.  Mood: Not good.  Affect: Frustrated, but pleasant.  Full range.  Thought Process: Linear.  Associations: Fair.  Thought Content: Ruminations.    Insight: Limited.  Judgment: Fair on interview.  Attention: Fair.  Language: Fluent.  Gait: Intact.

## 2022-06-29 NOTE — BH INPATIENT PSYCHIATRY PROGRESS NOTE - NSBHCHARTREVIEWVS_PSY_A_CORE FT
Vital Signs Last 24 Hrs  T(C): 36.7 (06-29-22 @ 08:10), Max: 36.7 (06-29-22 @ 08:10)  T(F): 98 (06-29-22 @ 08:10), Max: 98 (06-29-22 @ 08:10)  HR: 73 (06-29-22 @ 08:10) (73 - 73)  BP: 103/71 (06-29-22 @ 08:10) (103/71 - 103/71)  BP(mean): --  RR: --  SpO2: --

## 2022-06-29 NOTE — HISTORY OF PRESENT ILLNESS
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

## 2022-06-29 NOTE — BH INPATIENT PSYCHIATRY PROGRESS NOTE - CURRENT MEDICATION
MEDICATIONS  (STANDING):  clonazePAM  Tablet 1 milliGRAM(s) Oral three times a day  multivitamin/minerals 1 Tablet(s) Oral daily  pantoprazole    Tablet 40 milliGRAM(s) Oral before breakfast  rivaroxaban 20 milliGRAM(s) Oral with dinner    MEDICATIONS  (PRN):  bisacodyl 5 milliGRAM(s) Oral every 12 hours PRN Constipation  hydrOXYzine hydrochloride 50 milliGRAM(s) Oral every 6 hours PRN anxiety/agitation  LORazepam     Tablet 1 milliGRAM(s) Oral every 6 hours PRN anxiety/agitation  LORazepam   Injectable 2 milliGRAM(s) IntraMuscular once PRN severe agitation  melatonin. 3 milliGRAM(s) Oral at bedtime PRN Insomnia  polyethylene glycol 3350 17 Gram(s) Oral daily PRN constipation  senna 2 Tablet(s) Oral at bedtime PRN constipation  traZODone 50 milliGRAM(s) Oral at bedtime PRN insomnia

## 2022-06-29 NOTE — BH INPATIENT PSYCHIATRY PROGRESS NOTE - PRN MEDS
MEDICATIONS  (PRN):  bisacodyl 5 milliGRAM(s) Oral every 12 hours PRN Constipation  hydrOXYzine hydrochloride 50 milliGRAM(s) Oral every 6 hours PRN anxiety/agitation  LORazepam     Tablet 1 milliGRAM(s) Oral every 6 hours PRN anxiety/agitation  LORazepam   Injectable 2 milliGRAM(s) IntraMuscular once PRN severe agitation  melatonin. 3 milliGRAM(s) Oral at bedtime PRN Insomnia  polyethylene glycol 3350 17 Gram(s) Oral daily PRN constipation  senna 2 Tablet(s) Oral at bedtime PRN constipation  traZODone 50 milliGRAM(s) Oral at bedtime PRN insomnia

## 2022-06-30 PROCEDURE — 99231 SBSQ HOSP IP/OBS SF/LOW 25: CPT

## 2022-06-30 RX ADMIN — PANTOPRAZOLE SODIUM 40 MILLIGRAM(S): 20 TABLET, DELAYED RELEASE ORAL at 09:05

## 2022-06-30 RX ADMIN — Medication 1 MILLIGRAM(S): at 12:56

## 2022-06-30 RX ADMIN — Medication 1 TABLET(S): at 09:04

## 2022-06-30 RX ADMIN — Medication 50 MILLIGRAM(S): at 11:15

## 2022-06-30 RX ADMIN — Medication 1 MILLIGRAM(S): at 11:16

## 2022-06-30 RX ADMIN — Medication 1 MILLIGRAM(S): at 20:35

## 2022-06-30 RX ADMIN — RIVAROXABAN 20 MILLIGRAM(S): KIT at 18:34

## 2022-06-30 RX ADMIN — CYCLOBENZAPRINE HYDROCHLORIDE 10 MILLIGRAM(S): 10 TABLET, FILM COATED ORAL at 12:56

## 2022-06-30 RX ADMIN — Medication 1 MILLIGRAM(S): at 18:35

## 2022-06-30 RX ADMIN — Medication 1 MILLIGRAM(S): at 09:04

## 2022-06-30 RX ADMIN — SERTRALINE 200 MILLIGRAM(S): 25 TABLET, FILM COATED ORAL at 09:04

## 2022-06-30 RX ADMIN — POLYETHYLENE GLYCOL 3350 17 GRAM(S): 17 POWDER, FOR SOLUTION ORAL at 09:05

## 2022-06-30 NOTE — BH INPATIENT PSYCHIATRY PROGRESS NOTE - NSBHFUPINTERVALHXFT_PSY_A_CORE
Covering attending note   Chart reviewed including pertinent labs, imaging, ekg. case discussed with treatment team.  Over this interval: Patient continues to report fluctuating anxiety and wants increased doses of medications. complaints of chronic muscle tension and inquires whether hospital has a massage therapist. per staff, patient was also requesting conjugal visits. Patient also asks for support animal and if the hospital could provide a letter for this. Currently denies SIIP, HIIP. denies psychotic symptoms.

## 2022-06-30 NOTE — BH INPATIENT PSYCHIATRY PROGRESS NOTE - MSE UNSTRUCTURED FT
Appearance: Dressed appropriately.  Good hygiene and grooming.  Behavior: Cooperative, calm.  Motor: No tremors or other abnormal movements.   Speech: Regular rate.  Mood: anxious  Affect: Frustrated, but pleasant.  Full range.  Thought Process: Linear.  Associations: Fair.  Thought Content: Ruminations.  denies SIIP, HIIP  Insight: Limited.  Judgment: Fair on interview.  Attention: Fair.  Language: Fluent.  Gait: Intact.

## 2022-06-30 NOTE — BH INPATIENT PSYCHIATRY PROGRESS NOTE - NSBHASSESSSUMMFT_PSY_ALL_CORE
51M w/no formal psychiatric history, presenting with anxiety, depression, suicidality, in context of recent severe psychosocial and medical stressors.    Etiology of presentation may be multifactorial, differential includes panic disorder, PTSD, MDD, adjustment, substance induced.    Sertraline continues to be titrated; patient tolerating well without side effects. patient in behavioral control, reports fluctuating anxiety, denies SI. will continue Sertraline

## 2022-06-30 NOTE — BH INPATIENT PSYCHIATRY PROGRESS NOTE - NSBHCHARTREVIEWVS_PSY_A_CORE FT
Vital Signs Last 24 Hrs  T(C): 36.6 (06-30-22 @ 08:10), Max: 36.6 (06-30-22 @ 08:10)  T(F): 97.8 (06-30-22 @ 08:10), Max: 97.8 (06-30-22 @ 08:10)  HR: --  BP: --  BP(mean): --  RR: --  SpO2: --    Orthostatic VS  06-30-22 @ 08:10  Lying BP: 100/65 HR: 74  Sitting BP: --/-- HR: --  Standing BP: --/-- HR: --  Site: --  Mode: --

## 2022-06-30 NOTE — BH INPATIENT PSYCHIATRY PROGRESS NOTE - PRN MEDS
MEDICATIONS  (PRN):  bisacodyl 5 milliGRAM(s) Oral every 12 hours PRN Constipation  cyclobenzaprine 10 milliGRAM(s) Oral every 12 hours PRN Muscle Spasm  hydrOXYzine hydrochloride 50 milliGRAM(s) Oral every 6 hours PRN anxiety/agitation  LORazepam     Tablet 1 milliGRAM(s) Oral every 6 hours PRN anxiety/agitation  LORazepam   Injectable 2 milliGRAM(s) IntraMuscular once PRN severe agitation  melatonin. 3 milliGRAM(s) Oral at bedtime PRN Insomnia  polyethylene glycol 3350 17 Gram(s) Oral daily PRN constipation  senna 2 Tablet(s) Oral at bedtime PRN constipation  traZODone 50 milliGRAM(s) Oral at bedtime PRN insomnia

## 2022-06-30 NOTE — BH INPATIENT PSYCHIATRY PROGRESS NOTE - CURRENT MEDICATION
MEDICATIONS  (STANDING):  clonazePAM  Tablet 1 milliGRAM(s) Oral three times a day  multivitamin/minerals 1 Tablet(s) Oral daily  pantoprazole    Tablet 40 milliGRAM(s) Oral before breakfast  rivaroxaban 20 milliGRAM(s) Oral with dinner  sertraline 200 milliGRAM(s) Oral daily    MEDICATIONS  (PRN):  bisacodyl 5 milliGRAM(s) Oral every 12 hours PRN Constipation  cyclobenzaprine 10 milliGRAM(s) Oral every 12 hours PRN Muscle Spasm  hydrOXYzine hydrochloride 50 milliGRAM(s) Oral every 6 hours PRN anxiety/agitation  LORazepam     Tablet 1 milliGRAM(s) Oral every 6 hours PRN anxiety/agitation  LORazepam   Injectable 2 milliGRAM(s) IntraMuscular once PRN severe agitation  melatonin. 3 milliGRAM(s) Oral at bedtime PRN Insomnia  polyethylene glycol 3350 17 Gram(s) Oral daily PRN constipation  senna 2 Tablet(s) Oral at bedtime PRN constipation  traZODone 50 milliGRAM(s) Oral at bedtime PRN insomnia

## 2022-07-01 ENCOUNTER — APPOINTMENT (OUTPATIENT)
Dept: SURGERY | Facility: CLINIC | Age: 52
End: 2022-07-01

## 2022-07-01 PROCEDURE — 99231 SBSQ HOSP IP/OBS SF/LOW 25: CPT

## 2022-07-01 PROCEDURE — 90834 PSYTX W PT 45 MINUTES: CPT

## 2022-07-01 RX ADMIN — Medication 3 MILLIGRAM(S): at 20:20

## 2022-07-01 RX ADMIN — PANTOPRAZOLE SODIUM 40 MILLIGRAM(S): 20 TABLET, DELAYED RELEASE ORAL at 09:00

## 2022-07-01 RX ADMIN — POLYETHYLENE GLYCOL 3350 17 GRAM(S): 17 POWDER, FOR SOLUTION ORAL at 09:02

## 2022-07-01 RX ADMIN — Medication 1 MILLIGRAM(S): at 09:00

## 2022-07-01 RX ADMIN — Medication 1 MILLIGRAM(S): at 20:20

## 2022-07-01 RX ADMIN — Medication 50 MILLIGRAM(S): at 13:34

## 2022-07-01 RX ADMIN — CYCLOBENZAPRINE HYDROCHLORIDE 10 MILLIGRAM(S): 10 TABLET, FILM COATED ORAL at 13:34

## 2022-07-01 RX ADMIN — Medication 1 TABLET(S): at 09:00

## 2022-07-01 RX ADMIN — RIVAROXABAN 20 MILLIGRAM(S): KIT at 18:10

## 2022-07-01 RX ADMIN — Medication 1 MILLIGRAM(S): at 12:07

## 2022-07-01 RX ADMIN — Medication 1 MILLIGRAM(S): at 20:46

## 2022-07-01 RX ADMIN — SERTRALINE 200 MILLIGRAM(S): 25 TABLET, FILM COATED ORAL at 09:00

## 2022-07-01 RX ADMIN — Medication 1 MILLIGRAM(S): at 14:42

## 2022-07-01 NOTE — BH INPATIENT PSYCHIATRY PROGRESS NOTE - NSBHASSESSSUMMFT_PSY_ALL_CORE
51M w/no formal psychiatric history, presenting with anxiety, depression, suicidality, in context of recent severe psychosocial and medical stressors.    Etiology of presentation may be multifactorial, differential includes panic disorder, PTSD, MDD, adjustment, substance induced.    Patient with worsening low mood and anxiety related to finding out that he was fired from his job due to previous misconduct. Patient denies SIIP, reports feeling increasingly restless, and inquires about additional medications to immediately curb his anxiety. psychoed and counseling provided to patient. Sertraline recently increased to 200mg daily and will continue this for now. Patient with intact capacity to make medically informed decisions. Patient not endorsing nor exhibiting behaviors concerning for self-harm and appropriate for q15min obs.

## 2022-07-01 NOTE — BH INPATIENT PSYCHIATRY PROGRESS NOTE - NSBHFUPINTERVALHXFT_PSY_A_CORE
Chart reviewed including pertinent labs, imaging, ekg. case discussed with treatment team.  Over this interval: patient reports worsening mood and anxiety today. patient had meeting with his job yesterday, and was terminated during the meeting. Patient reports being fired because he "cursed too much." Patient currently denies SIIP. Patient perseveratives on wanting additional medications. we reviewed his current regimen which includes 3mg of Clonazepam (divided)  Chart reviewed including pertinent labs, imaging, ekg. case discussed with treatment team.  Over this interval: patient reports worsening mood and anxiety today. patient had meeting with his job yesterday, and was terminated during the meeting. Patient reports being fired because he "cursed too much." Patient currently denies SIIP. Patient perseveratives on wanting additional medications. we reviewed his current regimen which includes 3mg of Clonazepam (divided), Hydroxyzine, recently added Flexeril. Per staff, patient with ongoing requests for additional medications for anxiety. psychoed provided to patient in that benzodiazepines can be habit forming, and flexeril can interact adversely with Sertraline in causing rarely serotonin syndrome. patient verbalizes understanding.

## 2022-07-01 NOTE — BH INPATIENT PSYCHIATRY PROGRESS NOTE - MSE UNSTRUCTURED FT
Appearance: Dressed appropriately.  Good hygiene and grooming.  Behavior: Cooperative, calm.  Motor: No tremors or other abnormal movements.   Speech: Regular rate.  Mood: anxious  Affect: Frustrated, but pleasant.  Full range.  Thought Process: Linear.  Associations: Fair.  Thought Content: Ruminations.  denies SIIP, HIIP  Insight: Limited.  Judgment: Fair on interview.  Attention: Fair.  Language: Fluent.  Gait: Intact.  Appearance: Dressed appropriately.  Good hygiene and grooming.  Behavior: irritable, anxious  Motor: No tremors or other abnormal movements.   Speech: Regular rate.  Mood: anxious  Affect: anxious, irritable, contextually appropriate   Thought Process: Linear.  Associations: Fair.  Thought Content: Ruminations.  denies SIIP, HIIP  Insight: Limited.  Judgment: Fair on interview.  Attention: Fair.  Language: Fluent.  Gait: Intact.

## 2022-07-01 NOTE — BH INPATIENT PSYCHIATRY PROGRESS NOTE - NSBHCHARTREVIEWVS_PSY_A_CORE FT
Vital Signs Last 24 Hrs  T(C): 36.2 (07-01-22 @ 08:28), Max: 36.2 (07-01-22 @ 08:28)  T(F): 97.1 (07-01-22 @ 08:28), Max: 97.1 (07-01-22 @ 08:28)  HR: --  BP: --  BP(mean): --  RR: --  SpO2: --    Orthostatic VS  07-01-22 @ 08:28  Lying BP: --/-- HR: --  Sitting BP: 101/63 HR: 66  Standing BP: --/-- HR: --  Site: --  Mode: --  Orthostatic VS  06-30-22 @ 08:10  Lying BP: 100/65 HR: 74  Sitting BP: --/-- HR: --  Standing BP: --/-- HR: --  Site: --  Mode: --

## 2022-07-02 RX ADMIN — CYCLOBENZAPRINE HYDROCHLORIDE 10 MILLIGRAM(S): 10 TABLET, FILM COATED ORAL at 22:20

## 2022-07-02 RX ADMIN — Medication 50 MILLIGRAM(S): at 22:20

## 2022-07-02 RX ADMIN — PANTOPRAZOLE SODIUM 40 MILLIGRAM(S): 20 TABLET, DELAYED RELEASE ORAL at 08:20

## 2022-07-02 RX ADMIN — Medication 1 MILLIGRAM(S): at 13:30

## 2022-07-02 RX ADMIN — CYCLOBENZAPRINE HYDROCHLORIDE 10 MILLIGRAM(S): 10 TABLET, FILM COATED ORAL at 09:06

## 2022-07-02 RX ADMIN — Medication 1 TABLET(S): at 08:20

## 2022-07-02 RX ADMIN — Medication 1 MILLIGRAM(S): at 08:20

## 2022-07-02 RX ADMIN — RIVAROXABAN 20 MILLIGRAM(S): KIT at 16:56

## 2022-07-02 RX ADMIN — Medication 50 MILLIGRAM(S): at 14:41

## 2022-07-02 RX ADMIN — Medication 50 MILLIGRAM(S): at 21:10

## 2022-07-02 RX ADMIN — Medication 3 MILLIGRAM(S): at 21:10

## 2022-07-02 RX ADMIN — Medication 1 MILLIGRAM(S): at 11:15

## 2022-07-02 RX ADMIN — Medication 1 MILLIGRAM(S): at 17:49

## 2022-07-02 RX ADMIN — SERTRALINE 200 MILLIGRAM(S): 25 TABLET, FILM COATED ORAL at 08:20

## 2022-07-02 RX ADMIN — Medication 1 MILLIGRAM(S): at 21:10

## 2022-07-03 RX ADMIN — Medication 1 MILLIGRAM(S): at 13:36

## 2022-07-03 RX ADMIN — POLYETHYLENE GLYCOL 3350 17 GRAM(S): 17 POWDER, FOR SOLUTION ORAL at 16:24

## 2022-07-03 RX ADMIN — Medication 1 MILLIGRAM(S): at 20:08

## 2022-07-03 RX ADMIN — CYCLOBENZAPRINE HYDROCHLORIDE 10 MILLIGRAM(S): 10 TABLET, FILM COATED ORAL at 10:23

## 2022-07-03 RX ADMIN — Medication 1 TABLET(S): at 09:04

## 2022-07-03 RX ADMIN — SERTRALINE 200 MILLIGRAM(S): 25 TABLET, FILM COATED ORAL at 09:03

## 2022-07-03 RX ADMIN — Medication 1 ENEMA: at 17:18

## 2022-07-03 RX ADMIN — PANTOPRAZOLE SODIUM 40 MILLIGRAM(S): 20 TABLET, DELAYED RELEASE ORAL at 06:37

## 2022-07-03 RX ADMIN — Medication 1 MILLIGRAM(S): at 12:14

## 2022-07-03 RX ADMIN — Medication 3 MILLIGRAM(S): at 20:07

## 2022-07-03 RX ADMIN — Medication 5 MILLIGRAM(S): at 09:23

## 2022-07-03 RX ADMIN — Medication 1 MILLIGRAM(S): at 09:04

## 2022-07-03 RX ADMIN — RIVAROXABAN 20 MILLIGRAM(S): KIT at 16:56

## 2022-07-03 RX ADMIN — Medication 50 MILLIGRAM(S): at 16:24

## 2022-07-04 RX ORDER — POLYETHYLENE GLYCOL 3350 17 G/17G
17 POWDER, FOR SOLUTION ORAL
Refills: 0 | Status: DISCONTINUED | OUTPATIENT
Start: 2022-07-04 | End: 2022-07-13

## 2022-07-04 RX ORDER — GLYCERIN ADULT
1 SUPPOSITORY, RECTAL RECTAL ONCE
Refills: 0 | Status: COMPLETED | OUTPATIENT
Start: 2022-07-04 | End: 2022-07-05

## 2022-07-04 RX ORDER — SENNA PLUS 8.6 MG/1
2 TABLET ORAL AT BEDTIME
Refills: 0 | Status: DISCONTINUED | OUTPATIENT
Start: 2022-07-04 | End: 2022-07-13

## 2022-07-04 RX ADMIN — RIVAROXABAN 20 MILLIGRAM(S): KIT at 17:09

## 2022-07-04 RX ADMIN — CYCLOBENZAPRINE HYDROCHLORIDE 10 MILLIGRAM(S): 10 TABLET, FILM COATED ORAL at 21:24

## 2022-07-04 RX ADMIN — Medication 1 MILLIGRAM(S): at 13:35

## 2022-07-04 RX ADMIN — Medication 5 MILLIGRAM(S): at 19:04

## 2022-07-04 RX ADMIN — Medication 1 MILLIGRAM(S): at 19:04

## 2022-07-04 RX ADMIN — SERTRALINE 200 MILLIGRAM(S): 25 TABLET, FILM COATED ORAL at 08:50

## 2022-07-04 RX ADMIN — POLYETHYLENE GLYCOL 3350 17 GRAM(S): 17 POWDER, FOR SOLUTION ORAL at 21:24

## 2022-07-04 RX ADMIN — Medication 1 MILLIGRAM(S): at 20:04

## 2022-07-04 RX ADMIN — Medication 1 TABLET(S): at 08:50

## 2022-07-04 RX ADMIN — Medication 1 MILLIGRAM(S): at 08:50

## 2022-07-04 RX ADMIN — Medication 3 MILLIGRAM(S): at 21:24

## 2022-07-04 RX ADMIN — PANTOPRAZOLE SODIUM 40 MILLIGRAM(S): 20 TABLET, DELAYED RELEASE ORAL at 08:50

## 2022-07-04 RX ADMIN — Medication 1 MILLIGRAM(S): at 11:35

## 2022-07-04 RX ADMIN — SENNA PLUS 2 TABLET(S): 8.6 TABLET ORAL at 20:04

## 2022-07-05 PROCEDURE — 99231 SBSQ HOSP IP/OBS SF/LOW 25: CPT

## 2022-07-05 RX ORDER — CLONAZEPAM 1 MG
1 TABLET ORAL THREE TIMES A DAY
Refills: 0 | Status: DISCONTINUED | OUTPATIENT
Start: 2022-07-05 | End: 2022-07-12

## 2022-07-05 RX ADMIN — Medication 1 MILLIGRAM(S): at 16:40

## 2022-07-05 RX ADMIN — Medication 1 SUPPOSITORY(S): at 16:45

## 2022-07-05 RX ADMIN — SENNA PLUS 2 TABLET(S): 8.6 TABLET ORAL at 20:40

## 2022-07-05 RX ADMIN — Medication 50 MILLIGRAM(S): at 09:16

## 2022-07-05 RX ADMIN — Medication 3 MILLIGRAM(S): at 21:03

## 2022-07-05 RX ADMIN — SERTRALINE 200 MILLIGRAM(S): 25 TABLET, FILM COATED ORAL at 09:16

## 2022-07-05 RX ADMIN — RIVAROXABAN 20 MILLIGRAM(S): KIT at 16:40

## 2022-07-05 RX ADMIN — POLYETHYLENE GLYCOL 3350 17 GRAM(S): 17 POWDER, FOR SOLUTION ORAL at 09:18

## 2022-07-05 RX ADMIN — Medication 1 MILLIGRAM(S): at 09:16

## 2022-07-05 RX ADMIN — Medication 1 MILLIGRAM(S): at 20:40

## 2022-07-05 RX ADMIN — PANTOPRAZOLE SODIUM 40 MILLIGRAM(S): 20 TABLET, DELAYED RELEASE ORAL at 06:42

## 2022-07-05 RX ADMIN — CYCLOBENZAPRINE HYDROCHLORIDE 10 MILLIGRAM(S): 10 TABLET, FILM COATED ORAL at 12:25

## 2022-07-05 RX ADMIN — POLYETHYLENE GLYCOL 3350 17 GRAM(S): 17 POWDER, FOR SOLUTION ORAL at 20:40

## 2022-07-05 RX ADMIN — Medication 1 MILLIGRAM(S): at 12:25

## 2022-07-05 RX ADMIN — Medication 1 TABLET(S): at 09:16

## 2022-07-05 NOTE — BH INPATIENT PSYCHIATRY PROGRESS NOTE - NSBHFUPINTERVALHXFT_PSY_A_CORE
Chart reviewed including pertinent labs, imaging, ekg. case discussed with treatment team.  Over this interval: no behavioral issues. adherent to meds. 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 patient continues to inquire about additional clonazepam, reports not finding his current regimen to be helpful. we discuss the high doses of clonzepam he is receiving, and that it would not be recommended to increase dose at this time. patient verbalizes understanding. also counseled on flexeril and sertraline interactions; patient verbalizes understanding. patient currently denies SIIP, reports ongoing anxiety, and amenable to continue hospitalization

## 2022-07-05 NOTE — BH INPATIENT PSYCHIATRY PROGRESS NOTE - PRN MEDS
MEDICATIONS  (PRN):  bisacodyl 5 milliGRAM(s) Oral every 12 hours PRN Constipation  cyclobenzaprine 10 milliGRAM(s) Oral every 12 hours PRN Muscle Spasm  glycerin Suppository - Adult 1 Suppository(s) Rectal once PRN constipation  hydrOXYzine hydrochloride 50 milliGRAM(s) Oral every 6 hours PRN anxiety/agitation  LORazepam     Tablet 1 milliGRAM(s) Oral every 6 hours PRN anxiety/agitation  LORazepam   Injectable 2 milliGRAM(s) IntraMuscular once PRN severe agitation  melatonin. 3 milliGRAM(s) Oral at bedtime PRN Insomnia  traZODone 50 milliGRAM(s) Oral at bedtime PRN insomnia

## 2022-07-05 NOTE — BH INPATIENT PSYCHIATRY PROGRESS NOTE - NSBHCHARTREVIEWVS_PSY_A_CORE FT
Vital Signs Last 24 Hrs  T(C): 37 (07-05-22 @ 08:49), Max: 37 (07-05-22 @ 08:49)  T(F): 98.6 (07-05-22 @ 08:49), Max: 98.6 (07-05-22 @ 08:49)  HR: --  BP: --  BP(mean): --  RR: --  SpO2: --    Orthostatic VS  07-05-22 @ 08:49  Lying BP: --/-- HR: --  Sitting BP: 109/61 HR: 66  Standing BP: --/-- HR: --  Site: --  Mode: --  Orthostatic VS  07-04-22 @ 08:48  Lying BP: --/-- HR: --  Sitting BP: 93/73 HR: 63  Standing BP: --/-- HR: --  Site: --  Mode: --

## 2022-07-05 NOTE — BH INPATIENT PSYCHIATRY PROGRESS NOTE - CURRENT MEDICATION
MEDICATIONS  (STANDING):  clonazePAM  Tablet 1 milliGRAM(s) Oral three times a day  multivitamin/minerals 1 Tablet(s) Oral daily  pantoprazole    Tablet 40 milliGRAM(s) Oral before breakfast  polyethylene glycol 3350 17 Gram(s) Oral two times a day  rivaroxaban 20 milliGRAM(s) Oral with dinner  senna 2 Tablet(s) Oral at bedtime  sertraline 200 milliGRAM(s) Oral daily    MEDICATIONS  (PRN):  bisacodyl 5 milliGRAM(s) Oral every 12 hours PRN Constipation  cyclobenzaprine 10 milliGRAM(s) Oral every 12 hours PRN Muscle Spasm  glycerin Suppository - Adult 1 Suppository(s) Rectal once PRN constipation  hydrOXYzine hydrochloride 50 milliGRAM(s) Oral every 6 hours PRN anxiety/agitation  LORazepam     Tablet 1 milliGRAM(s) Oral every 6 hours PRN anxiety/agitation  LORazepam   Injectable 2 milliGRAM(s) IntraMuscular once PRN severe agitation  melatonin. 3 milliGRAM(s) Oral at bedtime PRN Insomnia  traZODone 50 milliGRAM(s) Oral at bedtime PRN insomnia

## 2022-07-05 NOTE — BH INPATIENT PSYCHIATRY PROGRESS NOTE - NSBHASSESSSUMMFT_PSY_ALL_CORE
51M w/no formal psychiatric history, presenting with anxiety, depression, suicidality, in context of recent severe psychosocial and medical stressors.    Etiology of presentation may be multifactorial, differential includes panic disorder, PTSD, MDD, adjustment, substance induced.    patient with ongoing complaints of worsening anxiety and requesting additional / higher doses of benzodiazepines. patient's anxiety contextually appropriate in setting of recently finding out that he was terminated from his job. no a/e to Sertraline. given symptom response from Sertraline could take additional time, will continue regimen as ordered. Clonazepam at 1mg TID and will hold at that dose without further increases for now. ongoing psychoed provided to patient. patient amenable to ongoing hospitalization

## 2022-07-05 NOTE — BH INPATIENT PSYCHIATRY PROGRESS NOTE - MSE UNSTRUCTURED FT
Appearance: Dressed appropriately.  Good hygiene and grooming.  Behavior: anxious, calm.   Motor: No tremors or other abnormal movements.   Speech: Regular rate.  Mood: anxious  Affect: anxious, constricted but with reactivity   Thought Process: Linear.  Associations: Fair.  Thought Content: Ruminations.  denies SIIP, HIIP  Insight: Limited.  Judgment: Fair on interview.  Attention: Fair.  Language: Fluent.  Gait: Intact.

## 2022-07-06 PROCEDURE — 99231 SBSQ HOSP IP/OBS SF/LOW 25: CPT

## 2022-07-06 RX ORDER — GLYCERIN ADULT
1 SUPPOSITORY, RECTAL RECTAL ONCE
Refills: 0 | Status: COMPLETED | OUTPATIENT
Start: 2022-07-06 | End: 2022-07-06

## 2022-07-06 RX ADMIN — POLYETHYLENE GLYCOL 3350 17 GRAM(S): 17 POWDER, FOR SOLUTION ORAL at 20:37

## 2022-07-06 RX ADMIN — Medication 1 MILLIGRAM(S): at 08:35

## 2022-07-06 RX ADMIN — Medication 1 SUPPOSITORY(S): at 16:52

## 2022-07-06 RX ADMIN — CYCLOBENZAPRINE HYDROCHLORIDE 10 MILLIGRAM(S): 10 TABLET, FILM COATED ORAL at 08:56

## 2022-07-06 RX ADMIN — Medication 3 MILLIGRAM(S): at 21:20

## 2022-07-06 RX ADMIN — Medication 1 MILLIGRAM(S): at 13:19

## 2022-07-06 RX ADMIN — RIVAROXABAN 20 MILLIGRAM(S): KIT at 16:59

## 2022-07-06 RX ADMIN — PANTOPRAZOLE SODIUM 40 MILLIGRAM(S): 20 TABLET, DELAYED RELEASE ORAL at 06:32

## 2022-07-06 RX ADMIN — Medication 1 TABLET(S): at 08:35

## 2022-07-06 RX ADMIN — Medication 1 MILLIGRAM(S): at 20:37

## 2022-07-06 RX ADMIN — SERTRALINE 200 MILLIGRAM(S): 25 TABLET, FILM COATED ORAL at 08:35

## 2022-07-06 RX ADMIN — CYCLOBENZAPRINE HYDROCHLORIDE 10 MILLIGRAM(S): 10 TABLET, FILM COATED ORAL at 21:20

## 2022-07-06 RX ADMIN — Medication 1 MILLIGRAM(S): at 11:45

## 2022-07-06 RX ADMIN — SENNA PLUS 2 TABLET(S): 8.6 TABLET ORAL at 20:37

## 2022-07-06 RX ADMIN — POLYETHYLENE GLYCOL 3350 17 GRAM(S): 17 POWDER, FOR SOLUTION ORAL at 09:33

## 2022-07-06 RX ADMIN — Medication 1 MILLIGRAM(S): at 17:56

## 2022-07-06 NOTE — BH INPATIENT PSYCHIATRY PROGRESS NOTE - CURRENT MEDICATION
MEDICATIONS  (STANDING):  clonazePAM  Tablet 1 milliGRAM(s) Oral three times a day  glycerin Suppository - Adult 1 Suppository(s) Rectal once  multivitamin/minerals 1 Tablet(s) Oral daily  pantoprazole    Tablet 40 milliGRAM(s) Oral before breakfast  polyethylene glycol 3350 17 Gram(s) Oral two times a day  rivaroxaban 20 milliGRAM(s) Oral with dinner  senna 2 Tablet(s) Oral at bedtime  sertraline 200 milliGRAM(s) Oral daily    MEDICATIONS  (PRN):  bisacodyl 5 milliGRAM(s) Oral every 12 hours PRN Constipation  cyclobenzaprine 10 milliGRAM(s) Oral every 12 hours PRN Muscle Spasm  hydrOXYzine hydrochloride 50 milliGRAM(s) Oral every 6 hours PRN anxiety/agitation  LORazepam     Tablet 1 milliGRAM(s) Oral every 6 hours PRN anxiety/agitation  LORazepam   Injectable 2 milliGRAM(s) IntraMuscular once PRN severe agitation  melatonin. 3 milliGRAM(s) Oral at bedtime PRN Insomnia  traZODone 50 milliGRAM(s) Oral at bedtime PRN insomnia

## 2022-07-06 NOTE — BH INPATIENT PSYCHIATRY PROGRESS NOTE - PRN MEDS
MEDICATIONS  (PRN):  bisacodyl 5 milliGRAM(s) Oral every 12 hours PRN Constipation  cyclobenzaprine 10 milliGRAM(s) Oral every 12 hours PRN Muscle Spasm  hydrOXYzine hydrochloride 50 milliGRAM(s) Oral every 6 hours PRN anxiety/agitation  LORazepam     Tablet 1 milliGRAM(s) Oral every 6 hours PRN anxiety/agitation  LORazepam   Injectable 2 milliGRAM(s) IntraMuscular once PRN severe agitation  melatonin. 3 milliGRAM(s) Oral at bedtime PRN Insomnia  traZODone 50 milliGRAM(s) Oral at bedtime PRN insomnia

## 2022-07-06 NOTE — BH INPATIENT PSYCHIATRY PROGRESS NOTE - MSE UNSTRUCTURED FT
Appearance: Dressed appropriately.  Good hygiene and grooming.  Behavior: anxious, calm.   Motor: No tremors or other abnormal movements.   Speech: Regular rate.  Mood: anxious  Affect: anxious, constricted but with reactivity   Thought Process: Linear.  Associations: Fair.  Thought Content: Ruminations.  denies SIIP, HIIP  Insight: Limited.  Judgment: Fair on interview.  Attention: Fair.  Language: Fluent.  Gait: Intact.  Appearance: Appears stated age. Dressed appropriately. Good hygiene and grooming. No physical abnormalities.   Behavior: Maintains eye contact with examiner and is cooperative/engaged with examiner. Easy to establish rapport. Relaxed facial expression.   Motor: No tremors or other abnormal movements. No psychomotor agitation or retardation.   Speech: Clear, appropriate volume, fair quantity, normal rate and flow, normal prosody and tone.   Mood: Stated mood is "feeling alright."   Affect: Euthymic, restricted range, reactive to external factors, gianfranco,   Thought Process: Linear.  Associations: Fair.  Thought Content: Ruminations.  denies SIIP, HIIP  Insight: Limited.  Judgment: Fair on interview.  Attention: Fair.  Language: Fluent.  Gait: Intact.  Appearance: Appears stated age. Dressed appropriately. Good hygiene and grooming. No physical abnormalities.   Behavior: Maintains eye contact with examiner and is cooperative/engaged with examiner. Easy to establish rapport. Relaxed facial expression.   Motor: No tremors or other abnormal movements. No psychomotor agitation or retardation.   Speech: Clear, appropriate volume, fair quantity, normal rate and flow, normal prosody and tone.   Mood: Stated mood is "feeling alright."   Affect: Euthymic, restricted range, reactive to external factors, gianfranco,   Thought Process: Linear.  Associations: Fair.  Thought Content: Ruminations.  denies SIIP, HIIP  Insight: improving   Judgment: fair and improving  impulse control: appropriate   Attention: good  Language: Fluent.  Gait: Intact.

## 2022-07-06 NOTE — BH INPATIENT PSYCHIATRY PROGRESS NOTE - NSBHASSESSSUMMFT_PSY_ALL_CORE
51M w/no formal psychiatric history, presenting with anxiety, depression, suicidality, in context of recent severe psychosocial and medical stressors.    Etiology of presentation may be multifactorial, differential includes panic disorder, PTSD, MDD, adjustment, substance induced.    patient with ongoing complaints of worsening anxiety and requesting additional / higher doses of benzodiazepines. patient's anxiety contextually appropriate in setting of recently finding out that he was terminated from his job. no a/e to Sertraline. given symptom response from Sertraline could take additional time, will continue regimen as ordered. Clonazepam at 1mg TID and will hold at that dose without further increases for now. ongoing psychoed provided to patient. patient amenable to ongoing hospitalization  51M w/no formal psychiatric history, presenting with anxiety, depression, suicidality, in context of recent severe psychosocial and medical stressors.    Etiology of presentation may be multifactorial, differential includes panic disorder, PTSD, MDD, adjustment, substance induced.    patient with ongoing complaints of worsening anxiety and requesting additional / higher doses of benzodiazepines. patient's anxiety contextually appropriate in setting of recently finding out that he was terminated from his job. no a/e to Sertraline. given symptom response from Sertraline could take additional time, will continue regimen as ordered. Clonazepam at 1mg TID and will hold at that dose without further increases for now. ongoing psychoed provided to patient. patient amenable to ongoing hospitalization     Over this interval patients reports more optimistic outlook on futrue and brighter affect, more future oriented outlook, optimized doze of klonipin and sertaline. Social work coordinating. PHP.  51M w/no formal psychiatric history, presenting with anxiety, depression, suicidality, in context of recent severe psychosocial and medical stressors.    Etiology of presentation may be multifactorial, differential includes panic disorder, PTSD, MDD, adjustment, substance induced.    Over this interval, patient with subjective improvements in mood, appears more future oriented, engages meaningfully in aftercare planning. exam also notable for brighter affect with goal directed and linear thought process. patient on optimized dose of sertraline 200mg. Will continue this. Clonazepam at 1mg TID and will work to gradually reduce dose. Patient amenable to [facility] referral and coordinating with s/w regarding this. plan as below     q15min obs  Sertraline 200mg daily  Clonazepam 1mg TID for anxiety  Lorazepam PRN for severe agitation  flexeril PRN for muscle spasms  ongoing psychoed  coordinate with s/w

## 2022-07-06 NOTE — BH INPATIENT PSYCHIATRY PROGRESS NOTE - NSBHCHARTREVIEWVS_PSY_A_CORE FT
Vital Signs Last 24 Hrs  T(C): 36.9 (07-06-22 @ 08:18), Max: 36.9 (07-06-22 @ 08:18)  T(F): 98.5 (07-06-22 @ 08:18), Max: 98.5 (07-06-22 @ 08:18)  HR: 74 (07-06-22 @ 08:18) (74 - 74)  BP: 99/63 (07-06-22 @ 08:18) (99/63 - 99/63)  BP(mean): --  RR: --  SpO2: --    Orthostatic VS  07-05-22 @ 08:49  Lying BP: --/-- HR: --  Sitting BP: 109/61 HR: 66  Standing BP: --/-- HR: --  Site: --  Mode: --

## 2022-07-06 NOTE — BH INPATIENT PSYCHIATRY PROGRESS NOTE - NSBHFUPINTERVALHXFT_PSY_A_CORE
Chart reviewed including pertinent labs, imaging, ekg. Case discussed with treatment team. Over this interval: no behavioral issues. Adherent to medications. Patient reporting ongoing constipation despite order for suppository/enema and other medications. Patient was offered another suppository/enema and he agreed to try again. Patient states he has an over-the-counter herbal remedy he uses at home that usually helps him with constipation and that he plans on using that when he is discharged home from here. Patient states that he spoke with Dr. Frank earlier today about potentially returning home soon and he was in agreement with that recommendation and feels that it's time for him to go home. Patient currently denies SIIP and reports ongoing anxiety.

## 2022-07-07 PROCEDURE — 90832 PSYTX W PT 30 MINUTES: CPT

## 2022-07-07 PROCEDURE — 99231 SBSQ HOSP IP/OBS SF/LOW 25: CPT

## 2022-07-07 RX ADMIN — Medication 1 MILLIGRAM(S): at 08:42

## 2022-07-07 RX ADMIN — CYCLOBENZAPRINE HYDROCHLORIDE 10 MILLIGRAM(S): 10 TABLET, FILM COATED ORAL at 09:56

## 2022-07-07 RX ADMIN — Medication 1 MILLIGRAM(S): at 20:26

## 2022-07-07 RX ADMIN — Medication 3 MILLIGRAM(S): at 22:02

## 2022-07-07 RX ADMIN — Medication 1 MILLIGRAM(S): at 11:05

## 2022-07-07 RX ADMIN — RIVAROXABAN 20 MILLIGRAM(S): KIT at 17:28

## 2022-07-07 RX ADMIN — SERTRALINE 200 MILLIGRAM(S): 25 TABLET, FILM COATED ORAL at 08:42

## 2022-07-07 RX ADMIN — SENNA PLUS 2 TABLET(S): 8.6 TABLET ORAL at 20:27

## 2022-07-07 RX ADMIN — Medication 50 MILLIGRAM(S): at 22:04

## 2022-07-07 RX ADMIN — Medication 1 MILLIGRAM(S): at 12:31

## 2022-07-07 RX ADMIN — PANTOPRAZOLE SODIUM 40 MILLIGRAM(S): 20 TABLET, DELAYED RELEASE ORAL at 07:51

## 2022-07-07 RX ADMIN — POLYETHYLENE GLYCOL 3350 17 GRAM(S): 17 POWDER, FOR SOLUTION ORAL at 08:42

## 2022-07-07 RX ADMIN — Medication 1 TABLET(S): at 08:42

## 2022-07-07 RX ADMIN — POLYETHYLENE GLYCOL 3350 17 GRAM(S): 17 POWDER, FOR SOLUTION ORAL at 20:25

## 2022-07-07 RX ADMIN — Medication 1 MILLIGRAM(S): at 17:53

## 2022-07-07 NOTE — BH INPATIENT PSYCHIATRY PROGRESS NOTE - NSBHFUPINTERVALHXFT_PSY_A_CORE
Chart reviewed including pertinent labs, imaging, ekg. Case discussed with treatment team. Over this interval: no behavioral issues. Adherent to medications. Patient states that he is feeling "good" and that he feels like he "turned a corner" yesterday because he hasn't been thinking as much about the things that make him upset/anxious and because he is "healing". When probed further, he stated that he feels like he's trying to move on and accept things, however, he stated that he is feeling anxious about going home because he will be reminded of the home invader. He is also feeling anxious about being unemployed. He states that he feels betrayed by his employer. He also stated that he was told by the  that they will need to meet about a week from now and he was wondering if he is still here at that time if he will be able to meet with the DA here. Patient states he is not having any side effects or concerns with his medications. He states that he has not been able to sleep at night because of his roommates. He states that his roommate repeatedly would tell their other roommate to stop snoring. However, he states that he tries to take naps during the day. In terms of eating, he states that he hasn't had an appetite and doesn't think that the food is particularly appetizing that they serve here. Patient currently denies SIIP.

## 2022-07-07 NOTE — PHARMACOTHERAPY INTERVENTION NOTE - COMMENTS
Patient with order for hydroxyzine and lorazepam PRN anxiety/agitation. Discussed with provider duplicate indication and that patient not requiring hydroxyzine.     Recommended either clarifying indication or discontinuing hydroxyzine order, as clinically appropriate. Patient with order for hydroxyzine and lorazepam PRN anxiety/agitation. Discussed with provider duplicate indication and that patient not requiring hydroxyzine.     Recommended either clarifying indication or discontinuing hydroxyzine order, as clinically appropriate.    Hydroxyzine order discontinued.

## 2022-07-07 NOTE — BH INPATIENT PSYCHIATRY PROGRESS NOTE - NSBHCHARTREVIEWVS_PSY_A_CORE FT
Vital Signs Last 24 Hrs  T(C): 36.2 (07-07-22 @ 08:46), Max: 36.6 (07-06-22 @ 17:11)  T(F): 97.2 (07-07-22 @ 08:46), Max: 97.9 (07-06-22 @ 17:11)  HR: --  BP: --  BP(mean): --  RR: --  SpO2: --    Orthostatic VS  07-07-22 @ 08:46  Lying BP: --/-- HR: --  Sitting BP: 102/72 HR: 80  Standing BP: --/-- HR: --  Site: --  Mode: --

## 2022-07-07 NOTE — BH INPATIENT PSYCHIATRY PROGRESS NOTE - CURRENT MEDICATION
MEDICATIONS  (STANDING):  clonazePAM  Tablet 1 milliGRAM(s) Oral three times a day  multivitamin/minerals 1 Tablet(s) Oral daily  pantoprazole    Tablet 40 milliGRAM(s) Oral before breakfast  polyethylene glycol 3350 17 Gram(s) Oral two times a day  rivaroxaban 20 milliGRAM(s) Oral with dinner  senna 2 Tablet(s) Oral at bedtime  sertraline 200 milliGRAM(s) Oral daily    MEDICATIONS  (PRN):  bisacodyl 5 milliGRAM(s) Oral every 12 hours PRN Constipation  cyclobenzaprine 10 milliGRAM(s) Oral every 12 hours PRN Muscle Spasm  hydrOXYzine hydrochloride 50 milliGRAM(s) Oral every 6 hours PRN anxiety/agitation  LORazepam     Tablet 1 milliGRAM(s) Oral every 6 hours PRN anxiety/agitation  LORazepam   Injectable 2 milliGRAM(s) IntraMuscular once PRN severe agitation  melatonin. 3 milliGRAM(s) Oral at bedtime PRN Insomnia  traZODone 50 milliGRAM(s) Oral at bedtime PRN insomnia   MEDICATIONS  (STANDING):  clonazePAM  Tablet 1 milliGRAM(s) Oral three times a day  multivitamin/minerals 1 Tablet(s) Oral daily  pantoprazole    Tablet 40 milliGRAM(s) Oral before breakfast  polyethylene glycol 3350 17 Gram(s) Oral two times a day  rivaroxaban 20 milliGRAM(s) Oral with dinner  senna 2 Tablet(s) Oral at bedtime  sertraline 200 milliGRAM(s) Oral daily    MEDICATIONS  (PRN):  bisacodyl 5 milliGRAM(s) Oral every 12 hours PRN Constipation  cyclobenzaprine 10 milliGRAM(s) Oral every 12 hours PRN Muscle Spasm  LORazepam     Tablet 1 milliGRAM(s) Oral every 6 hours PRN anxiety/agitation  LORazepam   Injectable 2 milliGRAM(s) IntraMuscular once PRN severe agitation  melatonin. 3 milliGRAM(s) Oral at bedtime PRN Insomnia  traZODone 50 milliGRAM(s) Oral at bedtime PRN insomnia

## 2022-07-07 NOTE — BH INPATIENT PSYCHIATRY PROGRESS NOTE - PRN MEDS
MEDICATIONS  (PRN):  bisacodyl 5 milliGRAM(s) Oral every 12 hours PRN Constipation  cyclobenzaprine 10 milliGRAM(s) Oral every 12 hours PRN Muscle Spasm  hydrOXYzine hydrochloride 50 milliGRAM(s) Oral every 6 hours PRN anxiety/agitation  LORazepam     Tablet 1 milliGRAM(s) Oral every 6 hours PRN anxiety/agitation  LORazepam   Injectable 2 milliGRAM(s) IntraMuscular once PRN severe agitation  melatonin. 3 milliGRAM(s) Oral at bedtime PRN Insomnia  traZODone 50 milliGRAM(s) Oral at bedtime PRN insomnia   MEDICATIONS  (PRN):  bisacodyl 5 milliGRAM(s) Oral every 12 hours PRN Constipation  cyclobenzaprine 10 milliGRAM(s) Oral every 12 hours PRN Muscle Spasm  LORazepam     Tablet 1 milliGRAM(s) Oral every 6 hours PRN anxiety/agitation  LORazepam   Injectable 2 milliGRAM(s) IntraMuscular once PRN severe agitation  melatonin. 3 milliGRAM(s) Oral at bedtime PRN Insomnia  traZODone 50 milliGRAM(s) Oral at bedtime PRN insomnia

## 2022-07-07 NOTE — BH INPATIENT PSYCHIATRY PROGRESS NOTE - NSBHASSESSSUMMFT_PSY_ALL_CORE
51M w/no formal psychiatric history, presenting with anxiety, depression, suicidality, in context of recent severe psychosocial and medical stressors.    Etiology of presentation may be multifactorial, differential includes panic disorder, PTSD, MDD, adjustment, substance induced.    Over this interval, patient with subjective improvements in mood, appears more future oriented. Exam also notable for brighter affect with goal directed and linear thought process. Patient on optimized dose of sertraline 200mg. Will continue this. Clonazepam at 1mg TID and will work to gradually reduce dose. Patient amenable to PHP referral and coordinating with s/w regarding this. plan as below     q15min obs  Sertraline 200mg daily  Clonazepam 1mg TID for anxiety  Lorazepam PRN for severe agitation  flexeril PRN for muscle spasms  ongoing psychoed  coordinate with s/w

## 2022-07-07 NOTE — BH INPATIENT PSYCHIATRY PROGRESS NOTE - MSE UNSTRUCTURED FT
Appearance: Appears stated age. Dressed appropriately. Good hygiene and grooming. No physical abnormalities.   Behavior: Maintains eye contact with examiner and is cooperative/engaged with examiner. Easy to establish rapport. Relaxed facial expression.   Motor: No tremors or other abnormal movements. No psychomotor agitation or retardation.   Speech: Clear, appropriate volume, fair quantity, normal rate and flow, normal prosody and tone.   Mood: Stated mood is "feeling good."   Affect: Euthymic, restricted range, reactive to external factors, gianfranco.   Thought Process: Linear.  Associations: Fair.  Thought Content: Ruminations. Denies SIIP, HIIP  Insight: improving   Judgment: fair and improving  impulse control: appropriate   Attention: good  Language: Fluent.  Gait: Intact.

## 2022-07-08 PROCEDURE — 99231 SBSQ HOSP IP/OBS SF/LOW 25: CPT

## 2022-07-08 RX ADMIN — POLYETHYLENE GLYCOL 3350 17 GRAM(S): 17 POWDER, FOR SOLUTION ORAL at 08:38

## 2022-07-08 RX ADMIN — Medication 1 MILLIGRAM(S): at 12:33

## 2022-07-08 RX ADMIN — Medication 1 MILLIGRAM(S): at 23:12

## 2022-07-08 RX ADMIN — Medication 1 MILLIGRAM(S): at 16:01

## 2022-07-08 RX ADMIN — SERTRALINE 200 MILLIGRAM(S): 25 TABLET, FILM COATED ORAL at 08:39

## 2022-07-08 RX ADMIN — CYCLOBENZAPRINE HYDROCHLORIDE 10 MILLIGRAM(S): 10 TABLET, FILM COATED ORAL at 08:38

## 2022-07-08 RX ADMIN — SENNA PLUS 2 TABLET(S): 8.6 TABLET ORAL at 21:21

## 2022-07-08 RX ADMIN — Medication 1 MILLIGRAM(S): at 21:21

## 2022-07-08 RX ADMIN — PANTOPRAZOLE SODIUM 40 MILLIGRAM(S): 20 TABLET, DELAYED RELEASE ORAL at 06:32

## 2022-07-08 RX ADMIN — Medication 3 MILLIGRAM(S): at 22:13

## 2022-07-08 RX ADMIN — Medication 1 MILLIGRAM(S): at 08:39

## 2022-07-08 RX ADMIN — Medication 1 TABLET(S): at 08:39

## 2022-07-08 RX ADMIN — RIVAROXABAN 20 MILLIGRAM(S): KIT at 17:26

## 2022-07-08 RX ADMIN — POLYETHYLENE GLYCOL 3350 17 GRAM(S): 17 POWDER, FOR SOLUTION ORAL at 21:21

## 2022-07-08 NOTE — BH TREATMENT PLAN - NSTXCOPEINTERPR_PSY_ALL_CORE
Pt demonstrated notable progress towards established psych rehab goal as pt engaged in session meaningfully. Psychiatric rehabilitation team will continue to engage with pt to provide psychoeducation for symptom management.
Over the next seven days writer will provide support, encouragement, and individual/group sessions to assist the Pt in progression of his psych rehab goal.

## 2022-07-08 NOTE — BH INPATIENT PSYCHIATRY PROGRESS NOTE - NSBHASSESSSUMMFT_PSY_ALL_CORE
51M w/no formal psychiatric history, presenting with anxiety, depression, suicidality, in context of recent severe psychosocial and medical stressors.    Etiology of presentation may be multifactorial, differential includes panic disorder, PTSD, MDD, adjustment, substance induced.    Over this interval, patient with subjective improvements in mood, appears more future oriented. Exam also notable for brighter affect with goal directed and linear thought process mostly, however, at times was circumferential and over-inclusive of extraneous details, but able to be redirected by examiner. Patient on optimized dose of sertraline 200mg. Will continue this. Clonazepam at 1mg TID and will work to gradually reduce dose. Patient amenable to PHP referral and coordinating with s/w regarding this. plan as below     q15min obs  Sertraline 200mg daily  Clonazepam 1mg TID for anxiety  Lorazepam PRN for severe agitation  flexeril PRN for muscle spasms  ongoing psychoed  coordinate with s/w

## 2022-07-08 NOTE — BH TREATMENT PLAN - NSTXPLANTHERAPYSESSIONSFT_PSY_ALL_CORE
07-07-22  Type of therapy: Leisure development  Type of session: Individual  Level of patient participation: Engaged,Participates  Duration of participation: 30 minutes  Therapy conducted by: Psych rehab  Therapy Summary: Writer met with pt to assess progress of psych rehab goal over the past seven days. Pt has been working on identifying and utilizing 2 coping skills to meet his needs. Pt was receptive to have session with the writer, however remained reserved and guarded for the first half of the session. Pt questioned the writer’s role and expressed mistrust. However, pt started to engage more meaningfully as pt shared his struggles of carrying a lot of responsibility from family and work. Writer provided validation and empathic listening. Pt reported that he was under immense stress for the past year. When asked about coping skills, pt reported that he “deflects conflicts” and suppresses feelings. Writer provided psychoeducation on creating a healthy expressive outlet and explored future preventative plans. Pt was receptive and said that he needs to “rest” and restore balance in his life. Pt stated that he wants to spend his time, “golfing, spending time with wife, going to the beach, and having cocktails.” Pt reported fleeting SI, and denied HI, AH, and VH. Pt attends few psych rehab groups in the unit and interacts with select peers. Writer encouraged pt to make his needs known to the staff. Psychiatric rehabilitation team will continue to engage with pt to build therapeutic rapport and provide support throughout her stay.  
  06-30-22  Type of therapy: none  Type of session: Individual  Level of patient participation: Resistance to participation  Duration of participation: Less than 15 minutes  Therapy conducted by: Psych rehab  Therapy Summary: Writer met with Pt to discuss his progress towards psych rehab goal. Pt was not receptive. Upon approach Pt was in his room lying down on his bed and covered with a blanket.  Therefore, progress summary will be written per chart, observation, and collateral from staff. Patient has been compliant with standing medications. No behavioral issue on the unit. Pt maintains good ADLs. Today Pt got the news that he got fired from his job. Per the staff Pt refused to talk with anyone. Over the past week, Pt did not attend any of psych rehab groups despite encouragement. Pt did not make any progress towards his psych rehab goal. Pt continues to express frustrations in getting needs met on unit.  Pt continues to report having anxiety and he continues to feel like he is not ready to face psychosocial stressors yet. Pt denies SI/HI/AH/VH.

## 2022-07-08 NOTE — BH INPATIENT PSYCHIATRY PROGRESS NOTE - NSBHCHARTREVIEWVS_PSY_A_CORE FT
Vital Signs Last 24 Hrs  T(C): 36.2 (07-08-22 @ 08:48), Max: 36.6 (07-07-22 @ 19:12)  T(F): 97.2 (07-08-22 @ 08:48), Max: 97.8 (07-07-22 @ 19:12)  HR: --  BP: --  BP(mean): --  RR: --  SpO2: --    Orthostatic VS  07-08-22 @ 08:48  Lying BP: --/-- HR: --  Sitting BP: 105/72 HR: 72  Standing BP: --/-- HR: --  Site: --  Mode: --  Orthostatic VS  07-07-22 @ 08:46  Lying BP: --/-- HR: --  Sitting BP: 102/72 HR: 80  Standing BP: --/-- HR: --  Site: --  Mode: --

## 2022-07-08 NOTE — BH TREATMENT PLAN - NSTXDCOPLKINTERSW_PSY_ALL_CORE
Writer will educate the patient on the various outpatient options and make necessary referrals.
Writer will educate the patient on the various outpatient options and make necessary referrals.

## 2022-07-08 NOTE — BH TREATMENT PLAN - NSTXPATIENTPARTICIPATE_PSY_ALL_CORE
Patient participated in identification of needs/problems/goals for treatment/Patient participated in defining interventions
Patient participated in defining interventions

## 2022-07-08 NOTE — BH INPATIENT PSYCHIATRY PROGRESS NOTE - CURRENT MEDICATION
MEDICATIONS  (STANDING):  clonazePAM  Tablet 1 milliGRAM(s) Oral three times a day  multivitamin/minerals 1 Tablet(s) Oral daily  pantoprazole    Tablet 40 milliGRAM(s) Oral before breakfast  polyethylene glycol 3350 17 Gram(s) Oral two times a day  rivaroxaban 20 milliGRAM(s) Oral with dinner  senna 2 Tablet(s) Oral at bedtime  sertraline 200 milliGRAM(s) Oral daily    MEDICATIONS  (PRN):  bisacodyl 5 milliGRAM(s) Oral every 12 hours PRN Constipation  cyclobenzaprine 10 milliGRAM(s) Oral every 12 hours PRN Muscle Spasm  LORazepam     Tablet 1 milliGRAM(s) Oral every 6 hours PRN anxiety/agitation  LORazepam   Injectable 2 milliGRAM(s) IntraMuscular once PRN severe agitation  melatonin. 3 milliGRAM(s) Oral at bedtime PRN Insomnia  traZODone 50 milliGRAM(s) Oral at bedtime PRN insomnia

## 2022-07-08 NOTE — BH INPATIENT PSYCHIATRY PROGRESS NOTE - NSBHFUPINTERVALHXFT_PSY_A_CORE
Chart reviewed including pertinent labs, imaging, ekg. Case discussed with treatment team. Over this interval: no behavioral issues. Adherent to medications. No new side effects/concerns about medications. Unable to sleep at night due to one of his roommates constantly telling their third roommate to stop snoring. Low appetite. Patient states that he feels good at the current moment, but this morning was feeling somewhat agitated/annoyed this due to frustrations with the inpatient experiences such as lack of access to a newspaper/news, inability to get coffee/ice water whenever wanted. Denies SIIP, HIIP.

## 2022-07-08 NOTE — BH INPATIENT PSYCHIATRY PROGRESS NOTE - MSE UNSTRUCTURED FT
Appearance: Appears stated age. Dressed appropriately. Good hygiene and grooming. No physical abnormalities.   Behavior: Maintains eye contact with examiner and is cooperative/engaged with examiner. Easy to establish rapport. Relaxed facial expression.   Motor: No tremors or other abnormal movements. No psychomotor agitation or retardation.   Speech: Clear, appropriate volume, fair quantity, normal rate and flow, normal prosody and tone.   Mood: Stated mood is "feeling good."   Affect: Euthymic, restricted range, reactive to external factors, gianfranco.   Thought Process: Mostly linear and goal-directed. At times circumferential/overinclusive.   Associations: Fair.  Thought Content: Ruminations. Denies SIIP, HIIP  Insight: improving   Judgment: fair and improving  impulse control: appropriate   Attention: good  Language: Fluent.  Gait: Intact.

## 2022-07-09 RX ADMIN — RIVAROXABAN 20 MILLIGRAM(S): KIT at 16:09

## 2022-07-09 RX ADMIN — Medication 1 MILLIGRAM(S): at 09:33

## 2022-07-09 RX ADMIN — CYCLOBENZAPRINE HYDROCHLORIDE 10 MILLIGRAM(S): 10 TABLET, FILM COATED ORAL at 09:41

## 2022-07-09 RX ADMIN — Medication 1 TABLET(S): at 09:35

## 2022-07-09 RX ADMIN — Medication 1 MILLIGRAM(S): at 20:02

## 2022-07-09 RX ADMIN — SENNA PLUS 2 TABLET(S): 8.6 TABLET ORAL at 20:02

## 2022-07-09 RX ADMIN — Medication 1 MILLIGRAM(S): at 17:39

## 2022-07-09 RX ADMIN — Medication 5 MILLIGRAM(S): at 09:41

## 2022-07-09 RX ADMIN — Medication 3 MILLIGRAM(S): at 22:03

## 2022-07-09 RX ADMIN — PANTOPRAZOLE SODIUM 40 MILLIGRAM(S): 20 TABLET, DELAYED RELEASE ORAL at 06:45

## 2022-07-09 RX ADMIN — POLYETHYLENE GLYCOL 3350 17 GRAM(S): 17 POWDER, FOR SOLUTION ORAL at 20:02

## 2022-07-09 RX ADMIN — Medication 1 MILLIGRAM(S): at 13:08

## 2022-07-09 RX ADMIN — CYCLOBENZAPRINE HYDROCHLORIDE 10 MILLIGRAM(S): 10 TABLET, FILM COATED ORAL at 22:03

## 2022-07-09 RX ADMIN — POLYETHYLENE GLYCOL 3350 17 GRAM(S): 17 POWDER, FOR SOLUTION ORAL at 09:33

## 2022-07-09 RX ADMIN — SERTRALINE 200 MILLIGRAM(S): 25 TABLET, FILM COATED ORAL at 09:34

## 2022-07-10 RX ADMIN — Medication 1 MILLIGRAM(S): at 12:53

## 2022-07-10 RX ADMIN — Medication 3 MILLIGRAM(S): at 20:27

## 2022-07-10 RX ADMIN — SERTRALINE 200 MILLIGRAM(S): 25 TABLET, FILM COATED ORAL at 08:19

## 2022-07-10 RX ADMIN — Medication 50 MILLIGRAM(S): at 20:27

## 2022-07-10 RX ADMIN — SENNA PLUS 2 TABLET(S): 8.6 TABLET ORAL at 20:28

## 2022-07-10 RX ADMIN — PANTOPRAZOLE SODIUM 40 MILLIGRAM(S): 20 TABLET, DELAYED RELEASE ORAL at 06:31

## 2022-07-10 RX ADMIN — Medication 5 MILLIGRAM(S): at 09:38

## 2022-07-10 RX ADMIN — RIVAROXABAN 20 MILLIGRAM(S): KIT at 17:12

## 2022-07-10 RX ADMIN — POLYETHYLENE GLYCOL 3350 17 GRAM(S): 17 POWDER, FOR SOLUTION ORAL at 08:19

## 2022-07-10 RX ADMIN — Medication 1 TABLET(S): at 08:18

## 2022-07-10 RX ADMIN — Medication 1 MILLIGRAM(S): at 20:28

## 2022-07-10 RX ADMIN — Medication 1 MILLIGRAM(S): at 15:31

## 2022-07-10 RX ADMIN — CYCLOBENZAPRINE HYDROCHLORIDE 10 MILLIGRAM(S): 10 TABLET, FILM COATED ORAL at 20:28

## 2022-07-10 RX ADMIN — Medication 1 MILLIGRAM(S): at 08:18

## 2022-07-10 RX ADMIN — POLYETHYLENE GLYCOL 3350 17 GRAM(S): 17 POWDER, FOR SOLUTION ORAL at 20:22

## 2022-07-11 PROCEDURE — 90832 PSYTX W PT 30 MINUTES: CPT

## 2022-07-11 RX ADMIN — Medication 1 MILLIGRAM(S): at 21:01

## 2022-07-11 RX ADMIN — Medication 3 MILLIGRAM(S): at 21:01

## 2022-07-11 RX ADMIN — Medication 5 MILLIGRAM(S): at 06:30

## 2022-07-11 RX ADMIN — Medication 1 MILLIGRAM(S): at 09:51

## 2022-07-11 RX ADMIN — Medication 1 MILLIGRAM(S): at 12:02

## 2022-07-11 RX ADMIN — POLYETHYLENE GLYCOL 3350 17 GRAM(S): 17 POWDER, FOR SOLUTION ORAL at 21:03

## 2022-07-11 RX ADMIN — CYCLOBENZAPRINE HYDROCHLORIDE 10 MILLIGRAM(S): 10 TABLET, FILM COATED ORAL at 09:54

## 2022-07-11 RX ADMIN — RIVAROXABAN 20 MILLIGRAM(S): KIT at 16:55

## 2022-07-11 RX ADMIN — PANTOPRAZOLE SODIUM 40 MILLIGRAM(S): 20 TABLET, DELAYED RELEASE ORAL at 06:23

## 2022-07-11 RX ADMIN — SERTRALINE 200 MILLIGRAM(S): 25 TABLET, FILM COATED ORAL at 09:52

## 2022-07-11 RX ADMIN — SENNA PLUS 2 TABLET(S): 8.6 TABLET ORAL at 21:01

## 2022-07-11 RX ADMIN — Medication 1 MILLIGRAM(S): at 16:55

## 2022-07-11 RX ADMIN — POLYETHYLENE GLYCOL 3350 17 GRAM(S): 17 POWDER, FOR SOLUTION ORAL at 09:51

## 2022-07-11 RX ADMIN — Medication 1 MILLIGRAM(S): at 06:31

## 2022-07-11 RX ADMIN — Medication 50 MILLIGRAM(S): at 21:01

## 2022-07-11 RX ADMIN — Medication 1 TABLET(S): at 10:04

## 2022-07-11 NOTE — BH PSYCHOLOGY - CLINICIAN PSYCHOTHERAPY NOTE - NSBHPSYCHOLINT_PSY_A_CORE
Cognitive/behavioral therapy/Supported coping skills/Supportive therapy/other...

## 2022-07-11 NOTE — BH PSYCHOLOGY - CLINICIAN PSYCHOTHERAPY NOTE - NSBHPSYCHOLBILLFAM_PSY_A_CORE
52853 - 16 to 37 minutes
[id number] - 53 minutes and above
[id number] - 53 minutes and above
[id number] - 16 to 37 minutes
[id number] - 38 to 52 minutes

## 2022-07-11 NOTE — BH INPATIENT PSYCHIATRY PROGRESS NOTE - NSBHFUPINTERVALHXFT_PSY_A_CORE
Chart reviewed including pertinent labs, imaging, ekg. Case discussed with treatment team. Over this interval: no behavioral issues. Adherent to medications. No new side effects/concerns about medications. Unable to sleep at night due to one of his roommates constantly telling their third roommate to stop snoring. Appetite improved. Patient states that he feels "good" when askd about mood. Denies SIIP, HIIP.  Chart reviewed including pertinent labs, imaging, ekg. Case discussed with treatment team. Over this interval: no behavioral issues. Adherent to medications. No new side effects/concerns about medications. Unable to sleep at night due to one of his roommates constantly telling their third roommate to stop snoring. Appetite improved. Patient states that he feels "good" when asked about mood. Denies SIIP, HIIP.

## 2022-07-11 NOTE — BH PSYCHOLOGY - CLINICIAN PSYCHOTHERAPY NOTE - NSBHPSYCHOLGOALS_PSY_A_CORE
Decrease symptoms/Assessment/Improve level of independent functioning/Improve social/vocational/coping skills

## 2022-07-11 NOTE — BH PSYCHOLOGY - CLINICIAN PSYCHOTHERAPY NOTE - TOKEN PULL-DIAGNOSIS
Primary Diagnosis:  Anxiety disorder [F41.9]        Problem Dx:   Depression, unspecified [F32.A]      

## 2022-07-11 NOTE — BH INPATIENT PSYCHIATRY PROGRESS NOTE - MSE UNSTRUCTURED FT
Appearance: Appears stated age. Dressed appropriately in casual attire, wearing blanket draped around shoulders. Good hygiene and grooming. No physical abnormalities.   Behavior: Maintains fair eye contact with examiner, at times making eye contact with others in room, is cooperative/engaged with examiner. Good relatedness. Easy to establish rapport. Relaxed facial expression.   Motor: No tremors or other abnormal movements. No psychomotor agitation or retardation.   Speech: Clear, appropriate volume, fair quantity, normal rate and flow, normal prosody and tone.   Mood: Stated mood is "feeling good."   Affect: Euthymic, restricted range, reactive to external factors, gianfranco. Appropriate.   Thought Process: Mostly linear and goal-directed.   Associations: Fair.  Thought Content: Denies SIIP, HIIP  Insight: improving   Judgment: fair and improving  impulse control: appropriate   Attention: good  Language: Fluent.  Gait: Intact.  Appearance: Appears stated age. Dressed appropriately in casual attire, wearing blanket draped around shoulders. Good hygiene and grooming. No physical abnormalities.   Behavior: Maintains fair eye contact with examiner, at times making eye contact with others in room, is cooperative/engaged with examiner. Fair relatedness. Relaxed facial expression.   Motor: No tremors or other abnormal movements. No psychomotor agitation or retardation.   Speech: Clear, appropriate volume, fair quantity, normal rate and flow, normal prosody and tone.   Mood: Stated mood is "feeling good."   Affect: Euthymic, restricted range, reactive to external factors, gianfranco. Appropriate.   Thought Process: Mostly linear and goal-directed.   Associations: Fair.  Thought Content: Denies SIIP, HIIP  Insight: improving   Judgment: fair and improving  impulse control: appropriate   Attention: good  Language: Fluent.  Gait: Intact.

## 2022-07-11 NOTE — BH INPATIENT PSYCHIATRY PROGRESS NOTE - PRN MEDS
MEDICATIONS  (PRN):  bisacodyl 5 milliGRAM(s) Oral every 12 hours PRN Constipation  cyclobenzaprine 10 milliGRAM(s) Oral every 12 hours PRN Muscle Spasm  LORazepam     Tablet 1 milliGRAM(s) Oral every 6 hours PRN anxiety/agitation  LORazepam   Injectable 2 milliGRAM(s) IntraMuscular once PRN severe agitation  melatonin. 3 milliGRAM(s) Oral at bedtime PRN Insomnia  traZODone 50 milliGRAM(s) Oral at bedtime PRN insomnia

## 2022-07-11 NOTE — BH PSYCHOLOGY - CLINICIAN PSYCHOTHERAPY NOTE - NSBHPSYCHOLINT_PSY_A_CORE FT
acceptance and commitment therapy
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
## 2022-07-11 NOTE — BH INPATIENT PSYCHIATRY PROGRESS NOTE - NSBHCHARTREVIEWVS_PSY_A_CORE FT
Vital Signs Last 24 Hrs  T(C): 36.3 (07-11-22 @ 07:10), Max: 37.1 (07-10-22 @ 17:26)  T(F): 97.4 (07-11-22 @ 07:10), Max: 98.7 (07-10-22 @ 17:26)  HR: --  BP: --  BP(mean): --  RR: --  SpO2: --    Orthostatic VS  07-11-22 @ 07:10  Lying BP: --/-- HR: --  Sitting BP: 107/68 HR: 65  Standing BP: --/-- HR: --  Site: --  Mode: --  Orthostatic VS  07-10-22 @ 08:19  Lying BP: --/-- HR: --  Sitting BP: 126/79 HR: 60  Standing BP: --/-- HR: --  Site: --  Mode: --   Vital Signs Last 24 Hrs  T(C): 36.9 (07-11-22 @ 16:21), Max: 36.9 (07-11-22 @ 16:21)  T(F): 98.5 (07-11-22 @ 16:21), Max: 98.5 (07-11-22 @ 16:21)  HR: --  BP: --  BP(mean): --  RR: --  SpO2: --    Orthostatic VS  07-11-22 @ 07:10  Lying BP: --/-- HR: --  Sitting BP: 107/68 HR: 65  Standing BP: --/-- HR: --  Site: --  Mode: --  Orthostatic VS  07-10-22 @ 08:19  Lying BP: --/-- HR: --  Sitting BP: 126/79 HR: 60  Standing BP: --/-- HR: --  Site: --  Mode: --

## 2022-07-11 NOTE — BH INPATIENT PSYCHIATRY PROGRESS NOTE - NSBHASSESSSUMMFT_PSY_ALL_CORE
51M w/no formal psychiatric history, presenting with anxiety, depression, suicidality, in context of recent severe psychosocial and medical stressors.    Etiology of presentation may be multifactorial, differential includes panic disorder, PTSD, MDD, adjustment, substance induced.    Over this interval, patient with subjective improvements in mood, appears more future oriented. Exam also notable for brighter affect with goal directed and linear thought process mostly, however, at times was circumferential and over-inclusive of extraneous details, but able to be redirected by examiner. Patient on optimized dose of sertraline 200mg. Will continue this. Clonazepam at 1mg TID and will work to gradually reduce dose. Patient amenable to PHP referral and coordinating with s/w regarding this. plan as below     q15min obs  Sertraline 200mg daily  Clonazepam 1mg TID for anxiety  Lorazepam PRN for severe agitation  flexeril PRN for muscle spasms  ongoing psychoed  coordinate with s/w    51M w/no formal psychiatric history, presenting with anxiety, depression, suicidality, in context of recent severe psychosocial and medical stressors.    Etiology of presentation may be multifactorial, differential includes panic disorder, PTSD, MDD, adjustment, substance induced.    Over this interval, patient with subjective improvements in mood, appears more future oriented. Exam notable for goal directed and linear thought process with fair relatedness. Patient on optimized dose of sertraline 200mg. Will continue this. Clonazepam at 1mg TID and will work to gradually reduce dose. Patient awaiting discharge to adult partial care facility at [facility] as per social work earliest possible Wednesday. plan as below     q15min obs  Sertraline 200mg daily  Clonazepam 1mg TID for anxiety  Lorazepam PRN for severe agitation  flexeril PRN for muscle spasms  ongoing psychoed  coordinate with s/w for discharge

## 2022-07-12 ENCOUNTER — APPOINTMENT (OUTPATIENT)
Dept: CARDIOLOGY | Facility: CLINIC | Age: 52
End: 2022-07-12

## 2022-07-12 RX ORDER — RIVAROXABAN 15 MG-20MG
1 KIT ORAL
Qty: 14 | Refills: 0
Start: 2022-07-12 | End: 2022-07-25

## 2022-07-12 RX ORDER — TADALAFIL 10 MG/1
1 TABLET, FILM COATED ORAL
Qty: 0 | Refills: 0 | DISCHARGE

## 2022-07-12 RX ORDER — MULTIVIT-MIN/FERROUS GLUCONATE 9 MG/15 ML
1 LIQUID (ML) ORAL
Qty: 14 | Refills: 0
Start: 2022-07-12 | End: 2022-07-25

## 2022-07-12 RX ORDER — ROSUVASTATIN CALCIUM 5 MG/1
1 TABLET ORAL
Qty: 0 | Refills: 0 | DISCHARGE

## 2022-07-12 RX ORDER — CLONAZEPAM 1 MG
1 TABLET ORAL
Qty: 42 | Refills: 0
Start: 2022-07-12 | End: 2022-07-25

## 2022-07-12 RX ORDER — RIVAROXABAN 15 MG-20MG
1 KIT ORAL
Qty: 0 | Refills: 0 | DISCHARGE

## 2022-07-12 RX ORDER — LANOLIN ALCOHOL/MO/W.PET/CERES
1 CREAM (GRAM) TOPICAL
Qty: 14 | Refills: 0
Start: 2022-07-12 | End: 2022-07-25

## 2022-07-12 RX ORDER — SERTRALINE 25 MG/1
2 TABLET, FILM COATED ORAL
Qty: 0 | Refills: 0 | DISCHARGE

## 2022-07-12 RX ORDER — CYCLOBENZAPRINE HYDROCHLORIDE 10 MG/1
1 TABLET, FILM COATED ORAL
Qty: 14 | Refills: 0
Start: 2022-07-12 | End: 2022-07-25

## 2022-07-12 RX ORDER — TRAZODONE HCL 50 MG
1 TABLET ORAL
Qty: 14 | Refills: 0
Start: 2022-07-12 | End: 2022-07-25

## 2022-07-12 RX ORDER — SERTRALINE 25 MG/1
2 TABLET, FILM COATED ORAL
Qty: 28 | Refills: 0
Start: 2022-07-12 | End: 2022-07-25

## 2022-07-12 RX ORDER — POLYETHYLENE GLYCOL 3350 17 G/17G
17 POWDER, FOR SOLUTION ORAL
Qty: 28 | Refills: 0
Start: 2022-07-12 | End: 2022-07-25

## 2022-07-12 RX ORDER — SENNA PLUS 8.6 MG/1
2 TABLET ORAL
Qty: 28 | Refills: 0
Start: 2022-07-12 | End: 2022-07-25

## 2022-07-12 RX ORDER — CLOMIPHENE CITRATE 50 MG/1
1 TABLET ORAL
Qty: 0 | Refills: 0 | DISCHARGE

## 2022-07-12 RX ORDER — PANTOPRAZOLE SODIUM 20 MG/1
1 TABLET, DELAYED RELEASE ORAL
Qty: 14 | Refills: 0
Start: 2022-07-12 | End: 2022-07-25

## 2022-07-12 RX ORDER — CHOLECALCIFEROL (VITAMIN D3) 125 MCG
1 CAPSULE ORAL
Qty: 0 | Refills: 0 | DISCHARGE

## 2022-07-12 RX ADMIN — Medication 3 MILLIGRAM(S): at 20:44

## 2022-07-12 RX ADMIN — POLYETHYLENE GLYCOL 3350 17 GRAM(S): 17 POWDER, FOR SOLUTION ORAL at 08:15

## 2022-07-12 RX ADMIN — RIVAROXABAN 20 MILLIGRAM(S): KIT at 16:44

## 2022-07-12 RX ADMIN — Medication 1 TABLET(S): at 08:16

## 2022-07-12 RX ADMIN — POLYETHYLENE GLYCOL 3350 17 GRAM(S): 17 POWDER, FOR SOLUTION ORAL at 20:44

## 2022-07-12 RX ADMIN — Medication 1 MILLIGRAM(S): at 08:15

## 2022-07-12 RX ADMIN — Medication 1 MILLIGRAM(S): at 06:40

## 2022-07-12 RX ADMIN — PANTOPRAZOLE SODIUM 40 MILLIGRAM(S): 20 TABLET, DELAYED RELEASE ORAL at 06:34

## 2022-07-12 RX ADMIN — SENNA PLUS 2 TABLET(S): 8.6 TABLET ORAL at 20:44

## 2022-07-12 RX ADMIN — Medication 1 MILLIGRAM(S): at 20:44

## 2022-07-12 RX ADMIN — SERTRALINE 200 MILLIGRAM(S): 25 TABLET, FILM COATED ORAL at 08:15

## 2022-07-12 RX ADMIN — Medication 1 MILLIGRAM(S): at 12:45

## 2022-07-12 NOTE — BH DISCHARGE NOTE NURSING/SOCIAL WORK/PSYCH REHAB - NSDCVIVACCINE_GEN_ALL_CORE_FT
Tdap; 26-Dec-2021 06:09; Iona Gunn (RN); [employer]; [id number] (Exp. Date: 09-Sep-2023); IntraMuscular; Deltoid Right.; 0.5 milliLiter(s); VIS (VIS Published: 09-May-2013, VIS Presented: 26-Dec-2021);

## 2022-07-12 NOTE — BH INPATIENT PSYCHIATRY PROGRESS NOTE - NSBHCHARTREVIEWVS_PSY_A_CORE FT
Vital Signs Last 24 Hrs  T(C): 36.7 (07-12-22 @ 08:36), Max: 36.9 (07-11-22 @ 16:21)  T(F): 98.1 (07-12-22 @ 08:36), Max: 98.5 (07-11-22 @ 16:21)  HR: --  BP: --  BP(mean): --  RR: --  SpO2: --    Orthostatic VS  07-12-22 @ 08:36  Lying BP: --/-- HR: --  Sitting BP: 105/68 HR: 100  Standing BP: --/-- HR: --  Site: --  Mode: --  Orthostatic VS  07-11-22 @ 07:10  Lying BP: --/-- HR: --  Sitting BP: 107/68 HR: 65  Standing BP: --/-- HR: --  Site: --  Mode: --   Vital Signs Last 24 Hrs  T(C): 37.1 (07-12-22 @ 17:28), Max: 37.1 (07-12-22 @ 17:28)  T(F): 98.7 (07-12-22 @ 17:28), Max: 98.7 (07-12-22 @ 17:28)  HR: --  BP: --  BP(mean): --  RR: --  SpO2: --    Orthostatic VS  07-12-22 @ 08:36  Lying BP: --/-- HR: --  Sitting BP: 105/68 HR: 100  Standing BP: --/-- HR: --  Site: --  Mode: --  Orthostatic VS  07-11-22 @ 07:10  Lying BP: --/-- HR: --  Sitting BP: 107/68 HR: 65  Standing BP: --/-- HR: --  Site: --  Mode: --

## 2022-07-12 NOTE — BH INPATIENT PSYCHIATRY PROGRESS NOTE - NSBHASSESSSUMMFT_PSY_ALL_CORE
51M w/no formal psychiatric history, presenting with anxiety, depression, suicidality, in context of recent severe psychosocial and medical stressors.    Etiology of presentation may be multifactorial, differential includes panic disorder, PTSD, MDD, adjustment, substance induced.    Over this interval, patient with subjective improvements in mood, appears more future oriented. Exam notable for goal directed and linear thought process with fair relatedness. Patient on optimized dose of sertraline 200mg. Will continue this. Clonazepam at 1mg TID and will work to gradually reduce dose. Patient awaiting discharge to adult partial care facility at [facility] as per social work earliest possible Wednesday. plan as below     q15min obs  Sertraline 200mg daily  Clonazepam 1mg TID for anxiety  Lorazepam PRN for severe agitation  flexeril PRN for muscle spasms  ongoing psychoed  coordinate with s/w for discharge   51M w/no formal psychiatric history, presenting with anxiety, depression, suicidality, in context of recent severe psychosocial and medical stressors.    Etiology of presentation may be multifactorial, differential includes panic disorder, PTSD, MDD, adjustment, substance induced.    Over this interval, patient with unchanged overall mood from prior day, but improved from admission, and remains future oriented and amenable to discharge to partial care facility. Patient awaiting discharge to adult partial care facility at [facility] as per social work earliest possible Wednesday. Patient on optimized dose of sertraline 200mg. Will continue this. Clonazepam at 1mg TID and will work to gradually reduce dose. plan as below     q15min obs  Sertraline 200mg daily  Clonazepam 1mg TID for anxiety  Lorazepam PRN for severe agitation  flexeril PRN for muscle spasms  ongoing psychoed  coordinate with s/w for discharge

## 2022-07-12 NOTE — BH DISCHARGE NOTE NURSING/SOCIAL WORK/PSYCH REHAB - NSCDUDCCRISIS_PSY_A_CORE
[facility] Well  1 [phone]) [facility]-WELL ([phone])  Text "WELL" to [phone]  Website: www.[employer]/.[employer] [facility] 1 [phone] 701-XJHP (7005) Website: www.safehorizon.org/.National Suicide Prevention Lifeline 0 (438) 106-2735/.  [employer]  1 (925) LIFENET (081-9404)/.  [facility]  [phone] [place] [address] [phone]   Hours:  Monday through Friday from 9 AM to 3 PM/.  [place]  [employer] - [facility] Crisis Line  [phone], Option 1

## 2022-07-12 NOTE — BH DISCHARGE NOTE NURSING/SOCIAL WORK/PSYCH REHAB - PATIENT PORTAL LINK FT
You can access the FollowMyHealth Patient Portal offered by [facility] by registering at the following website: [email or web address]. By joining [facility]’s FollowMyHealth portal, you will also be able to view your health information using other applications (apps) compatible with our system.

## 2022-07-12 NOTE — BH DISCHARGE NOTE NURSING/SOCIAL WORK/PSYCH REHAB - NSDCCRNAME_GEN_ALL_CORE_FT
A care coordination application was submitted for you on 7/11/2022. Your assigned care coordinator or  should be reaching out to you within the week / once assigned. If you need to contact someone sooner, you may call below number to inquire regarding what agency you have been assigned to and who your assigned worker is. Thank you.  A care coordination application was submitted to [facility] for you on 7/11/2022. Your assigned care coordinator or  should be reaching out to you within the week / once assigned. If you need to contact someone sooner, you may call below number to inquire regarding what agency you have been assigned to and who your assigned worker is. Thank you.  Deven Perry,  from [facility]

## 2022-07-12 NOTE — BH DISCHARGE NOTE NURSING/SOCIAL WORK/PSYCH REHAB - NSDCPRRECOMMEND_PSY_ALL_CORE
Psychiatric rehabilitation staff recommends that patient continue to demonstrate daily medication compliance and utilize identified coping skills to manage symptoms. Patient would benefit from attending [facility] for ongoing medication management, support and psychotherapy.

## 2022-07-12 NOTE — BH INPATIENT PSYCHIATRY PROGRESS NOTE - NSBHFUPINTERVALHXFT_PSY_A_CORE
Chart reviewed including pertinent labs, imaging, ekg. Case discussed with treatment team. Over this interval:.... Chart reviewed including pertinent labs, imaging, ekg. Case discussed with treatment team. Over this interval: no behavioral issues. Patient's stated mood is "exhausted" because of new roommate snoring and disturbing sleep last night. Otherwise, patient has appropriate appetite, no new side effects/concerns about medications, is adherent to medications. Patient reports continued concern about his upcoming vacation because of his inability to drink while on his prescribed medications and expressed desire to either switch medications to be able to partake in alcoholic beverages while on vacation. Stated he feels anxious about being able to return to his prior baseline functioning in terms of his occupation due to his condition. Denies THANIA HI.

## 2022-07-12 NOTE — BH DISCHARGE NOTE NURSING/SOCIAL WORK/PSYCH REHAB - DISCHARGE INSTRUCTIONS AFTERCARE APPOINTMENTS
In order to check the location, date, or time of your aftercare appointment, please refer to your Discharge Instructions Document given to you upon leaving the hospital.  If you have lost the instructions please call [phone]

## 2022-07-12 NOTE — BH DISCHARGE NOTE NURSING/SOCIAL WORK/PSYCH REHAB - NSBHREFERPURPOSE1_PSY_ALL_CORE
Please note, this is an IN-PERSON appt. The regular schedule after this intake appt will be Monday-Friday from 9am-1pm. Thank you./Partial Hospitalization Program (PHP) Please note, this is an IN-PERSON appt. The regular schedule after this intake appt will be Monday-Friday from 9am-1pm. The program states that  have a $25 co-pay per day. Thank you./Partial Hospitalization Program (PHP)

## 2022-07-12 NOTE — BH INPATIENT PSYCHIATRY PROGRESS NOTE - MSE UNSTRUCTURED FT
Appearance: Appears stated age. Dressed appropriately in casual attire, wearing blanket draped around shoulders. Good hygiene and grooming. No physical abnormalities.   Behavior: Maintains fair eye contact with examiner, at times making eye contact with others in room, is cooperative/engaged with examiner. Fair relatedness. Relaxed facial expression.   Motor: No tremors or other abnormal movements. No psychomotor agitation or retardation.   Speech: Clear, appropriate volume, fair quantity, normal rate and flow, normal prosody and tone.   Mood: Stated mood is "feeling good."   Affect: Euthymic, restricted range, reactive to external factors, gianfranco. Appropriate.   Thought Process: Mostly linear and goal-directed.   Associations: Fair.  Thought Content: Denies SIIP, HIIP  Insight: improving   Judgment: fair and improving  impulse control: appropriate   Attention: good  Language: Fluent.  Gait: Intact.  Appearance: Appears stated age. Dressed appropriately in casual attire. Good hygiene and grooming. No physical abnormalities.   Behavior: Maintains fair eye contact with examiner, is cooperative/engaged with examiner. Fair relatedness. Relaxed facial expression.   Motor: No tremors or other abnormal movements. No psychomotor agitation or retardation.   Speech: Clear, appropriate volume, fair quantity, normal rate and flow, normal prosody and tone.   Mood: Stated mood is "exhausted"   Affect: Euthymic, restricted range, reactive to external factors, gianfranco. Appropriate.   Thought Process: Mostly linear and goal-directed.   Associations: Fair.  Thought Content: Denies SIIP, HIIP  Insight: improving   Judgment: fair and improving  impulse control: appropriate   Attention: good  Language: Fluent.  Gait: Intact.

## 2022-07-12 NOTE — BH DISCHARGE NOTE NURSING/SOCIAL WORK/PSYCH REHAB - NSDCPRGOAL_PSY_ALL_CORE
Patient submitted 3DL on 7/12/22 and is scheduled to be discharged today. Patient has demonstrated progress towards psychiatric rehabilitation goals during current hospitalization. Patient has demonstrated daily medication compliance and is tolerating medications well. Patient reports improvement in depression/anxiety compared to admission. Patient was able to identify exercise and meditation as effective coping skills to manage symptoms. Patient reports feeling confident in ability to utilize coping skills post discharge. Patient has attended approximately 13 percent of psychiatric rehabilitation groups during current hospitalization. Patient was verbal and engaged in group discussions and activities. Patient was able to tolerate group structure and did not require redirection. Patient was visible in the milieu. Patient increasingly socialized with select peers appropriately. Patient was able to make needs known to staff. Patient has demonstrated improved insight into the current episode and was able to identify anxiety, racing thoughts, lack of sleep and feeling weak as warning signs that a crisis may be developing. Patient expressed readiness for discharge. Patient was provided with a Press Ganey survey to complete prior to discharge.

## 2022-07-13 VITALS — TEMPERATURE: 97 F

## 2022-07-13 PROCEDURE — 99238 HOSP IP/OBS DSCHRG MGMT 30/<: CPT

## 2022-07-13 RX ORDER — CLONAZEPAM 1 MG
1 TABLET ORAL THREE TIMES A DAY
Refills: 0 | Status: DISCONTINUED | OUTPATIENT
Start: 2022-07-13 | End: 2022-07-13

## 2022-07-13 RX ADMIN — Medication 1 TABLET(S): at 08:35

## 2022-07-13 RX ADMIN — Medication 1 MILLIGRAM(S): at 08:36

## 2022-07-13 RX ADMIN — CYCLOBENZAPRINE HYDROCHLORIDE 10 MILLIGRAM(S): 10 TABLET, FILM COATED ORAL at 06:37

## 2022-07-13 RX ADMIN — POLYETHYLENE GLYCOL 3350 17 GRAM(S): 17 POWDER, FOR SOLUTION ORAL at 09:20

## 2022-07-13 RX ADMIN — Medication 1 MILLIGRAM(S): at 12:02

## 2022-07-13 RX ADMIN — PANTOPRAZOLE SODIUM 40 MILLIGRAM(S): 20 TABLET, DELAYED RELEASE ORAL at 08:39

## 2022-07-13 RX ADMIN — SERTRALINE 200 MILLIGRAM(S): 25 TABLET, FILM COATED ORAL at 08:36

## 2022-07-13 NOTE — BH INPATIENT PSYCHIATRY PROGRESS NOTE - NS ED BHA MED ROS PSYCHIATRIC
See HPI
Sski Pregnancy And Lactation Text: This medication is Pregnancy Category D and isn't considered safe during pregnancy. It is excreted in breast milk.

## 2022-07-13 NOTE — BH INPATIENT PSYCHIATRY PROGRESS NOTE - NSBHATTESTTYPEVISIT_PSY_A_CORE
Attending Only
Attending with Resident/Fellow/Student
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
## 2022-07-13 NOTE — BH INPATIENT PSYCHIATRY PROGRESS NOTE - NSBHFUPINTERVALHXFT_PSY_A_CORE
Chart reviewed including pertinent labs, imaging, ekg. Case discussed with treatment team. Over this interval: no behavioral issues. Patient's stated mood is "tired" due to patient's inability to sleep due to nighttime disturbances/sounds on the unit. Otherwise, patient has appropriate appetite, no new side effects/concerns about medications, is adherent to medications. Patient reports continued concern about his upcoming vacation because of his inability to drink while on his prescribed medications and expressed desire to either switch medications to be able to partake in alcoholic beverages while on vacation. Stated he feels anxious about being able to return to his prior baseline functioning in terms of his occupation due to his condition. Denies SI HI.  Case discussed with treatment team. Over this interval: no behavioral issues. Patient's stated mood is "tired" due to patient's inability to sleep due to nighttime disturbances/sounds on the unit. Otherwise, patient has appropriate appetite, no new side effects/concerns about medications, is adherent to medications. Patient looking forward to upcoming vacation.

## 2022-07-13 NOTE — BH INPATIENT PSYCHIATRY PROGRESS NOTE - NSTXDEPRESGOAL_PSY_ALL_CORE
Attend and participate in at least 2 groups daily despite low mood/energy

## 2022-07-13 NOTE — BH INPATIENT PSYCHIATRY PROGRESS NOTE - NSBHMETABOLIC_PSY_ALL_CORE_FT
BMI: BMI (kg/m2): 27.1 (06-23-22 @ 14:39)  HbA1c:   Glucose: POCT Blood Glucose.: 83 mg/dL (11-17-21 @ 08:23)    BP: 103/71 (06-29-22 @ 08:10) (103/71 - 116/68)  Lipid Panel: Date/Time: 03-24-22 @ 03:51  Cholesterol, Serum: 103  Direct LDL: --  HDL Cholesterol, Serum: 19  Total Cholesterol/HDL Ration Measurement: --  Triglycerides, Serum: 116  
BMI: BMI (kg/m2): 27.1 (06-23-22 @ 14:39)  HbA1c:   Glucose: POCT Blood Glucose.: 83 mg/dL (11-17-21 @ 08:23)    BP: 125/88 (06-23-22 @ 01:49) (125/88 - 141/90)  Lipid Panel: Date/Time: 03-24-22 @ 03:51  Cholesterol, Serum: 103  Direct LDL: --  HDL Cholesterol, Serum: 19  Total Cholesterol/HDL Ration Measurement: --  Triglycerides, Serum: 116  
BMI: BMI (kg/m2): 27.1 (06-23-22 @ 14:39)  HbA1c:   Glucose: POCT Blood Glucose.: 83 mg/dL (11-17-21 @ 08:23)    BP: 99/63 (07-06-22 @ 08:18) (99/63 - 99/63)  Lipid Panel: Date/Time: 03-24-22 @ 03:51  Cholesterol, Serum: 103  Direct LDL: --  HDL Cholesterol, Serum: 19  Total Cholesterol/HDL Ration Measurement: --  Triglycerides, Serum: 116  
BMI: BMI (kg/m2): 27.1 (06-23-22 @ 14:39)  HbA1c:   Glucose: POCT Blood Glucose.: 83 mg/dL (11-17-21 @ 08:23)    BP: --  Lipid Panel: Date/Time: 03-24-22 @ 03:51  Cholesterol, Serum: 103  Direct LDL: --  HDL Cholesterol, Serum: 19  Total Cholesterol/HDL Ration Measurement: --  Triglycerides, Serum: 116  
BMI: BMI (kg/m2): 27.1 (06-23-22 @ 14:39)  HbA1c:   Glucose: POCT Blood Glucose.: 83 mg/dL (11-17-21 @ 08:23)    BP: 116/68 (06-27-22 @ 06:49) (116/68 - 116/68)  Lipid Panel: Date/Time: 03-24-22 @ 03:51  Cholesterol, Serum: 103  Direct LDL: --  HDL Cholesterol, Serum: 19  Total Cholesterol/HDL Ration Measurement: --  Triglycerides, Serum: 116  
BMI: BMI (kg/m2): 27.1 (06-23-22 @ 14:39)  HbA1c:   Glucose: POCT Blood Glucose.: 83 mg/dL (11-17-21 @ 08:23)    BP: --  Lipid Panel: Date/Time: 03-24-22 @ 03:51  Cholesterol, Serum: 103  Direct LDL: --  HDL Cholesterol, Serum: 19  Total Cholesterol/HDL Ration Measurement: --  Triglycerides, Serum: 116  
BMI: BMI (kg/m2): 27.1 (06-23-22 @ 14:39)  HbA1c:   Glucose: POCT Blood Glucose.: 83 mg/dL (11-17-21 @ 08:23)    BP: --  Lipid Panel: Date/Time: 03-24-22 @ 03:51  Cholesterol, Serum: 103  Direct LDL: --  HDL Cholesterol, Serum: 19  Total Cholesterol/HDL Ration Measurement: --  Triglycerides, Serum: 116  
BMI: BMI (kg/m2): 27.1 (06-23-22 @ 14:39)  HbA1c:   Glucose: POCT Blood Glucose.: 83 mg/dL (11-17-21 @ 08:23)    BP: 125/88 (06-23-22 @ 01:49) (125/88 - 141/90)  Lipid Panel: Date/Time: 03-24-22 @ 03:51  Cholesterol, Serum: 103  Direct LDL: --  HDL Cholesterol, Serum: 19  Total Cholesterol/HDL Ration Measurement: --  Triglycerides, Serum: 116  
BMI: BMI (kg/m2): 27.1 (06-23-22 @ 14:39)  HbA1c:   Glucose: POCT Blood Glucose.: 83 mg/dL (11-17-21 @ 08:23)    BP: 103/71 (06-29-22 @ 08:10) (103/71 - 103/71)  Lipid Panel: Date/Time: 03-24-22 @ 03:51  Cholesterol, Serum: 103  Direct LDL: --  HDL Cholesterol, Serum: 19  Total Cholesterol/HDL Ration Measurement: --  Triglycerides, Serum: 116  
BMI: BMI (kg/m2): 27.1 (06-23-22 @ 14:39)  HbA1c:   Glucose: POCT Blood Glucose.: 83 mg/dL (11-17-21 @ 08:23)    BP: 116/68 (06-27-22 @ 06:49) (116/68 - 116/68)  Lipid Panel: Date/Time: 03-24-22 @ 03:51  Cholesterol, Serum: 103  Direct LDL: --  HDL Cholesterol, Serum: 19  Total Cholesterol/HDL Ration Measurement: --  Triglycerides, Serum: 116  
BMI: BMI (kg/m2): 27.1 (06-23-22 @ 14:39)  HbA1c:   Glucose: POCT Blood Glucose.: 83 mg/dL (11-17-21 @ 08:23)    BP: 99/63 (07-06-22 @ 08:18) (99/63 - 99/63)  Lipid Panel: Date/Time: 03-24-22 @ 03:51  Cholesterol, Serum: 103  Direct LDL: --  HDL Cholesterol, Serum: 19  Total Cholesterol/HDL Ration Measurement: --  Triglycerides, Serum: 116  
BMI: BMI (kg/m2): 27.1 (06-23-22 @ 14:39)  HbA1c:   Glucose: POCT Blood Glucose.: 83 mg/dL (11-17-21 @ 08:23)    BP: 99/63 (07-06-22 @ 08:18) (99/63 - 99/63)  Lipid Panel: Date/Time: 03-24-22 @ 03:51  Cholesterol, Serum: 103  Direct LDL: --  HDL Cholesterol, Serum: 19  Total Cholesterol/HDL Ration Measurement: --  Triglycerides, Serum: 116  
BMI: BMI (kg/m2): 27.1 (06-23-22 @ 14:39)  HbA1c:   Glucose: POCT Blood Glucose.: 83 mg/dL (11-17-21 @ 08:23)    BP: 103/71 (06-29-22 @ 08:10) (103/71 - 103/71)  Lipid Panel: Date/Time: 03-24-22 @ 03:51  Cholesterol, Serum: 103  Direct LDL: --  HDL Cholesterol, Serum: 19  Total Cholesterol/HDL Ration Measurement: --  Triglycerides, Serum: 116  
BMI: BMI (kg/m2): 27.1 (06-23-22 @ 14:39)  HbA1c:   Glucose: POCT Blood Glucose.: 83 mg/dL (11-17-21 @ 08:23)    BP: --  Lipid Panel: Date/Time: 03-24-22 @ 03:51  Cholesterol, Serum: 103  Direct LDL: --  HDL Cholesterol, Serum: 19  Total Cholesterol/HDL Ration Measurement: --  Triglycerides, Serum: 116  
BMI: BMI (kg/m2): 27.1 (06-23-22 @ 14:39)  HbA1c:   Glucose: POCT Blood Glucose.: 83 mg/dL (11-17-21 @ 08:23)    BP: --  Lipid Panel: Date/Time: 03-24-22 @ 03:51  Cholesterol, Serum: 103  Direct LDL: --  HDL Cholesterol, Serum: 19  Total Cholesterol/HDL Ration Measurement: --  Triglycerides, Serum: 116

## 2022-07-13 NOTE — BH INPATIENT PSYCHIATRY DISCHARGE NOTE - NSICDXPASTMEDICALHX_GEN_ALL_CORE_FT
PAST MEDICAL HISTORY:  Dyslipidemia     High cholesterol     HTN (hypertension)     Morbid obesity due to excess calories     Pericardial cyst 1993, resolved    Pulmonary embolism      PAST MEDICAL HISTORY:  Dyslipidemia     HTN (hypertension)     Pericardial cyst 1993, resolved    Pulmonary embolism

## 2022-07-13 NOTE — BH INPATIENT PSYCHIATRY PROGRESS NOTE - NSDCCRITERIA_PSY_ALL_CORE
When pt is no longer an acute or imminent risk of harm to self or others, and is able to care for self safely, pt may then be discharged. 

## 2022-07-13 NOTE — BH INPATIENT PSYCHIATRY PROGRESS NOTE - NSBHATTESTSTAFFAMEND_PSY_A_CORE
I have personally seen and examined this patient. I fully participated in the care of this patient. I have made amendments to the documentation where appropriate and otherwise agree with the history, physical exam, and plan as documented by the

## 2022-07-13 NOTE — BH INPATIENT PSYCHIATRY PROGRESS NOTE - NSBHATTESTBILLINGAW_PSY_A_CORE
[id number] Inpatient care - moderate complexity - 25 minutes
[id number]-Fpqngynrtv Inpatient care - low complexity - 15 minutes
[id number]-Wskzaytihe Inpatient care - moderate complexity - 25 minutes
[id number]-Dkbpfiyzxa Inpatient care - low complexity - 15 minutes
[id number]-Gfgmmqgpxc Inpatient care - moderate complexity - 25 minutes
[id number]-Irojmvefib Inpatient care - low complexity - 15 minutes
[id number]-Azfvixqnmy Inpatient care - low complexity - 15 minutes
03622-Qmdsknrscs Inpatient care - low complexity - 15 minutes
[id number]-Ckgfwdhqdp Inpatient care - low complexity - 15 minutes
48035-Mfmqflobpo Inpatient care - low complexity - 15 minutes
[id number]-Gnofmywffu Inpatient care - low complexity - 15 minutes
72095-Oqzpotkpgr Inpatient care - low complexity - 15 minutes
11084-Jzhbyljhqk Inpatient care - low complexity - 15 minutes
[id number]-Akdtfesqtn Inpatient care - low complexity - 15 minutes
[id number]-Kkbpnffnen Inpatient care - low complexity - 15 minutes

## 2022-07-13 NOTE — BH INPATIENT PSYCHIATRY DISCHARGE NOTE - NSDCCPCAREPLAN_GEN_ALL_CORE_FT
PRINCIPAL DISCHARGE DIAGNOSIS  Diagnosis: Anxiety disorder  Assessment and Plan of Treatment: Medication management and psychotherapy      SECONDARY DISCHARGE DIAGNOSES  Diagnosis: Depression, unspecified  Assessment and Plan of Treatment: Medication management and psychotherapy

## 2022-07-13 NOTE — BH INPATIENT PSYCHIATRY PROGRESS NOTE - PRN MEDS
MEDICATIONS  (PRN):  bisacodyl 5 milliGRAM(s) Oral every 12 hours PRN Constipation  cyclobenzaprine 10 milliGRAM(s) Oral every 12 hours PRN Muscle Spasm  melatonin. 3 milliGRAM(s) Oral at bedtime PRN Insomnia  traZODone 50 milliGRAM(s) Oral at bedtime PRN insomnia
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
## 2022-07-13 NOTE — BH INPATIENT PSYCHIATRY PROGRESS NOTE - NSICDXBHPRIMARYDX_PSY_ALL_CORE
Anxiety disorder   F41.9  

## 2022-07-13 NOTE — BH INPATIENT PSYCHIATRY PROGRESS NOTE - NSBHCHARTREVIEWVS_PSY_A_CORE FT
Vital Signs Last 24 Hrs  T(C): 36.1 (07-13-22 @ 08:45), Max: 37.1 (07-12-22 @ 17:28)  T(F): 97 (07-13-22 @ 08:45), Max: 98.7 (07-12-22 @ 17:28)  HR: --  BP: --  BP(mean): --  RR: --  SpO2: --    Orthostatic VS  07-13-22 @ 08:45  Lying BP: --/-- HR: --  Sitting BP: 106/62 HR: 66  Standing BP: --/-- HR: --  Site: --  Mode: --  Orthostatic VS  07-12-22 @ 08:36  Lying BP: --/-- HR: --  Sitting BP: 105/68 HR: 100  Standing BP: --/-- HR: --  Site: --  Mode: --

## 2022-07-13 NOTE — BH INPATIENT PSYCHIATRY PROGRESS NOTE - NSTXPROBDCOPLK_PSY_ALL_CORE
DISCHARGE ISSUE - LACK OF APPROPRIATE OUTPATIENT SERVICES

## 2022-07-13 NOTE — BH INPATIENT PSYCHIATRY PROGRESS NOTE - NSBHFUPINTERVALCCFT_PSY_A_CORE
follow up mood
Seen for follow up of anxiety/panic
follow up mood
follow up anxiety 
follow up mood

## 2022-07-13 NOTE — BH INPATIENT PSYCHIATRY PROGRESS NOTE - NSTXCOPEDATETRGT_PSY_ALL_CORE
07-Jul-2022
14-Jul-2022
14-Jul-2022
30-Jun-2022
14-Jul-2022
30-Jun-2022
07-Jul-2022
07-Jul-2022
30-Jun-2022
30-Jun-2022
07-Jul-2022
14-Jul-2022

## 2022-07-13 NOTE — BH INPATIENT PSYCHIATRY PROGRESS NOTE - MSE UNSTRUCTURED FT
Appearance: Appears stated age. Dressed appropriately in casual attire. Good hygiene and grooming. No physical abnormalities.   Behavior: Maintains fair eye contact with examiner, is cooperative/engaged with examiner. Fair relatedness. Relaxed facial expression.   Motor: No tremors or other abnormal movements. No psychomotor agitation or retardation.   Speech: Clear, appropriate volume, fair quantity, normal rate and flow, normal prosody and tone.   Mood: Stated mood is "exhausted"   Affect: Euthymic, restricted range, reactive to external factors, gianfranco. Appropriate.   Thought Process: Mostly linear and goal-directed.   Associations: Fair.  Thought Content: Denies SIIP, HIIP  Insight: improving   Judgment: fair and improving  impulse control: appropriate   Attention: good  Language: Fluent.  Gait: Intact.  Appearance: Appears stated age. Dressed appropriately in casual attire. Good hygiene and grooming. No physical abnormalities.   Behavior: Maintains fair eye contact with interviewer and team, is cooperative/engaged with examiner. Fair relatedness. Relaxed facial expression.   Motor: No tremors or other abnormal movements. No psychomotor agitation or retardation.   Speech: Clear, appropriate volume, fair quantity, normal rate and flow, normal prosody and tone.   Mood: Stated mood is "tired"   Affect: Euthymic, restricted range, reactive to external factors, gianfranco. Appropriate.   Thought Process: Mostly linear and goal-directed.   Associations: Fair.  Thought Content: Denies SIIP, HIIP  Insight: improving   Judgment: fair and improving  impulse control: appropriate   Attention: good  Language: Fluent.  Gait: Intact.

## 2022-07-13 NOTE — PHARMACOTHERAPY INTERVENTION NOTE - COMMENTS
Discharge Education Note    Provided medication education upon discharge. Patient to be discharged on the following medications:     - Sertraline 100 mG tablet, 2 tablets PO daily  - Clonazepam 1 mG tablet, 1 tablet PO TID  - Rivaroxaban 20 mG tablet, 1 tablet PO daily with dinner    Discussed indication, dosing, and potential side effects.     Discussed with patient sedative nature of benzodiazepines. Also explained to patient the risk for dizziness and unsteady gait. Advised patient to reach out to outpatient psychiatrist if any of the above side effects occur. Also discussed with patient the addictive nature of this class of medications and how tolerance can form. Advised patient not to consume alcohol while taking clonazepam.    Educated patient on safe and effective use of anticoagulants, including dose and appropriate administration time, indication, adherence, side effects, staying safe in the home (i.e., reducing risk for falls or injury), and when to contact doctor/go to ED.    Patient provided the opportunity to ask questions. Patient expressed understanding to the above instruction. Patient provided supply of the above medications upon discharge.    Thank you for the opportunity to participate in the care of this patient.     Leigha Cox, AlyssaD, BCPP

## 2022-07-13 NOTE — BH INPATIENT PSYCHIATRY PROGRESS NOTE - NSTXDEPRESDATEEST_PSY_ALL_CORE
23-Jun-2022

## 2022-07-13 NOTE — BH INPATIENT PSYCHIATRY PROGRESS NOTE - NSTXDEPRESDATETRGT_PSY_ALL_CORE
07-Jul-2022
30-Jun-2022
07-Jul-2022
30-Jun-2022
30-Jun-2022
07-Jul-2022
07-Jul-2022
30-Jun-2022
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
## 2022-07-13 NOTE — BH INPATIENT PSYCHIATRY PROGRESS NOTE - NSICDXBHSECONDARYDX_PSY_ALL_CORE
Depression, unspecified   F32.A  

## 2022-07-13 NOTE — BH INPATIENT PSYCHIATRY PROGRESS NOTE - NSBHASSESSSUMMFT_PSY_ALL_CORE
51M w/no formal psychiatric history, presenting with anxiety, depression, suicidality, in context of recent severe psychosocial and medical stressors.    Etiology of presentation may be multifactorial, differential includes panic disorder, PTSD, MDD, adjustment, substance induced.    Over this interval, patient with unchanged overall mood from prior day, but improved from admission, and remains future oriented and looking forward to discharge to partial care facility. Patient awaiting discharge to adult partial care facility at [facility] today. Patient on optimized dose of sertraline 200mg. Will continue this. Clonazepam at 1mg TID and will work to gradually reduce dose. plan as below     q15min obs  Sertraline 200mg daily  Clonazepam 1mg TID for anxiety  Lorazepam PRN for severe agitation  flexeril PRN for muscle spasms  ongoing psychoed  coordinate with s/w for discharge   51M w/no formal psychiatric history, presenting with anxiety, depression, suicidality, in context of recent severe psychosocial and medical stressors.    Etiology of presentation may be multifactorial, differential includes panic disorder, PTSD, MDD, adjustment, substance induced.    Over this interval, patient remains future oriented and looking forward to discharge to [facility] care facility. Patient on optimized dose of sertraline 200mg. Will continue this. Clonazepam at 1mg TID and will work to gradually reduce dose. plan as below     q15min obs  Sertraline 200mg daily  Clonazepam 1mg TID for anxiety  Lorazepam PRN for severe agitation  flexeril PRN for muscle spasms  ongoing psychoed  coordinate with s/w for discharge

## 2022-07-13 NOTE — BH INPATIENT PSYCHIATRY PROGRESS NOTE - NSTXDCOPLKDATETRGT_PSY_ALL_CORE
07-Jul-2022
14-Jul-2022
30-Jun-2022
14-Jul-2022
07-Jul-2022
07-Jul-2022
30-Jun-2022
14-Jul-2022
14-Jul-2022
30-Jun-2022
07-Jul-2022
30-Jun-2022
30-Jun-2022

## 2022-07-13 NOTE — BH INPATIENT PSYCHIATRY PROGRESS NOTE - NSTXDCOPLKGOAL_PSY_ALL_CORE
Will agree to consider an appropriate level of outpatient care

## 2022-07-13 NOTE — BH INPATIENT PSYCHIATRY DISCHARGE NOTE - NSDCMRMEDTOKEN_GEN_ALL_CORE_FT
clonazePAM 1 mg oral tablet: 1 tab(s) orally 3 times a day MDD:3 mg/day  cyclobenzaprine 10 mg oral tablet: 1 tab(s) orally once a day, As Needed -Muscle Spasm   melatonin 3 mg oral tablet: 1 tab(s) orally once a day (at bedtime), As needed, Insomnia  Multiple Vitamins with Minerals oral tablet: 1 tab(s) orally once a day  pantoprazole 40 mg oral delayed release tablet: 1 tab(s) orally once a day (before a meal)  polyethylene glycol 3350 oral powder for reconstitution: 17 gram(s) orally 2 times a day  rivaroxaban 20 mg oral tablet: 1 tab(s) orally once a day (before a meal)  senna leaf extract oral tablet: 2 tab(s) orally once a day (at bedtime)  sertraline 100 mg oral tablet: 2 tab(s) orally once a day  traZODone 50 mg oral tablet: 1 tab(s) orally once a day (at bedtime), As needed, insomnia

## 2022-07-13 NOTE — BH INPATIENT PSYCHIATRY PROGRESS NOTE - NSTXDCOPLKDATEEST_PSY_ALL_CORE
23-Jun-2022
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
## 2022-07-13 NOTE — BH INPATIENT PSYCHIATRY DISCHARGE NOTE - NSBHMETABOLIC_PSY_ALL_CORE_FT
BMI: BMI (kg/m2): 27.1 (06-23-22 @ 14:39)  HbA1c:   Glucose: POCT Blood Glucose.: 83 mg/dL (11-17-21 @ 08:23)    BP: --  Lipid Panel: Date/Time: 03-24-22 @ 03:51  Cholesterol, Serum: 103  Direct LDL: --  HDL Cholesterol, Serum: 19  Total Cholesterol/HDL Ration Measurement: --  Triglycerides, Serum: 116

## 2022-07-13 NOTE — BH INPATIENT PSYCHIATRY PROGRESS NOTE - CURRENT MEDICATION
MEDICATIONS  (STANDING):  clonazePAM  Tablet 1 milliGRAM(s) Oral three times a day  multivitamin/minerals 1 Tablet(s) Oral daily  pantoprazole    Tablet 40 milliGRAM(s) Oral before breakfast  polyethylene glycol 3350 17 Gram(s) Oral two times a day  rivaroxaban 20 milliGRAM(s) Oral with dinner  senna 2 Tablet(s) Oral at bedtime  sertraline 200 milliGRAM(s) Oral daily    MEDICATIONS  (PRN):  bisacodyl 5 milliGRAM(s) Oral every 12 hours PRN Constipation  cyclobenzaprine 10 milliGRAM(s) Oral every 12 hours PRN Muscle Spasm  melatonin. 3 milliGRAM(s) Oral at bedtime PRN Insomnia  traZODone 50 milliGRAM(s) Oral at bedtime PRN insomnia

## 2022-07-26 ENCOUNTER — TRANSCRIPTION ENCOUNTER (OUTPATIENT)
Age: 52
End: 2022-07-26

## 2022-08-05 NOTE — BH INPATIENT PSYCHIATRY PROGRESS NOTE - CURRENT MEDICATION
MEDICATIONS  (STANDING):  clonazePAM  Tablet 1 milliGRAM(s) Oral three times a day  multivitamin/minerals 1 Tablet(s) Oral daily  pantoprazole    Tablet 40 milliGRAM(s) Oral before breakfast  rivaroxaban 20 milliGRAM(s) Oral with dinner  sertraline 200 milliGRAM(s) Oral daily    MEDICATIONS  (PRN):  bisacodyl 5 milliGRAM(s) Oral every 12 hours PRN Constipation  cyclobenzaprine 10 milliGRAM(s) Oral every 12 hours PRN Muscle Spasm  hydrOXYzine hydrochloride 50 milliGRAM(s) Oral every 6 hours PRN anxiety/agitation  LORazepam     Tablet 1 milliGRAM(s) Oral every 6 hours PRN anxiety/agitation  LORazepam   Injectable 2 milliGRAM(s) IntraMuscular once PRN severe agitation  melatonin. 3 milliGRAM(s) Oral at bedtime PRN Insomnia  polyethylene glycol 3350 17 Gram(s) Oral daily PRN constipation  senna 2 Tablet(s) Oral at bedtime PRN constipation  traZODone 50 milliGRAM(s) Oral at bedtime PRN insomnia   Closure 2 Information: This tab is for additional flaps and grafts, including complex repair and grafts and complex repair and flaps. You can also specify a different location for the additional defect, if the location is the same you do not need to select a new one. We will insert the automated text for the repair you select below just as we do for solitary flaps and grafts. Please note that at this time if you select a location with a different insurance zone you will need to override the ICD10 and CPT if appropriate.

## 2022-08-14 ENCOUNTER — TRANSCRIPTION ENCOUNTER (OUTPATIENT)
Age: 52
End: 2022-08-14

## 2022-08-15 ENCOUNTER — OUTPATIENT (OUTPATIENT)
Dept: OUTPATIENT SERVICES | Facility: HOSPITAL | Age: 52
LOS: 1 days | End: 2022-08-15
Payer: COMMERCIAL

## 2022-08-15 ENCOUNTER — RESULT REVIEW (OUTPATIENT)
Age: 52
End: 2022-08-15

## 2022-08-15 DIAGNOSIS — R19.4 CHANGE IN BOWEL HABIT: ICD-10-CM

## 2022-08-15 DIAGNOSIS — Z90.3 ACQUIRED ABSENCE OF STOMACH [PART OF]: Chronic | ICD-10-CM

## 2022-08-15 DIAGNOSIS — Z98.890 OTHER SPECIFIED POSTPROCEDURAL STATES: Chronic | ICD-10-CM

## 2022-08-15 DIAGNOSIS — R10.13 EPIGASTRIC PAIN: ICD-10-CM

## 2022-08-15 DIAGNOSIS — Z98.89 OTHER SPECIFIED POSTPROCEDURAL STATES: Chronic | ICD-10-CM

## 2022-08-15 DIAGNOSIS — Z98.52 VASECTOMY STATUS: Chronic | ICD-10-CM

## 2022-08-15 PROCEDURE — 88312 SPECIAL STAINS GROUP 1: CPT

## 2022-08-15 PROCEDURE — 88305 TISSUE EXAM BY PATHOLOGIST: CPT

## 2022-08-15 PROCEDURE — 88313 SPECIAL STAINS GROUP 2: CPT | Mod: 26

## 2022-08-15 PROCEDURE — 43239 EGD BIOPSY SINGLE/MULTIPLE: CPT

## 2022-08-15 PROCEDURE — 88313 SPECIAL STAINS GROUP 2: CPT

## 2022-08-15 PROCEDURE — 45380 COLONOSCOPY AND BIOPSY: CPT

## 2022-08-15 PROCEDURE — 88305 TISSUE EXAM BY PATHOLOGIST: CPT | Mod: 26

## 2022-08-15 PROCEDURE — 88312 SPECIAL STAINS GROUP 1: CPT | Mod: 26

## 2022-08-16 LAB — SURGICAL PATHOLOGY STUDY: SIGNIFICANT CHANGE UP

## 2022-08-25 NOTE — H&P PST ADULT - POSTERIOR CERVICAL R
Therapy Activity Session  Performed by Rehab Aide staff     TEP: AcuteTherapy Extention Program, activity plan was established by a licensed therapist and performed under the guidance of a licensed therapist.      Pt seen on 12t nursing unit                                                                                         PT Frequency Frequency Comments: MTF 8/24 TEP MWThF (PT to reevaluate on 8/30)                                                                                  OT Frequency Frequency Comments: MWThF 8/24, TEP daily OT to re-assess 8/31    SLP Frequency      Availability:  Patient available and per RN report, able to particiate.     Tolerance/Participation  Tolerated well, no shortness of breath or signs of discomfort., Participated and followed direction well during session.    SESSION    Activities/Exercises/Mobility completed this session:  Physical Therapy Exercises    TEP Follow Up Needed: Yes  Therapy Extender Program Discipline: OT        PT Frequency: TEP MWThF, PT MTF and to reevaluate on 8/30, page Thelma MONTERROSO [phone] with questions    PT Task 1: supine heel slides or seated hip flexion bilat  PT Reps for Task 1: 10  PT Sets for Task 1: 1  PT Resistance for Task 1: AAROM  PT Task 1 Completion: Completed (08/25/22 1101)    PT Task 2: supine quad sets or seated knee extension bilat  PT Reps for Task 2: 10  PT Sets for Task 2: 1  PT Resistance for Task 2: AROM/AAROM  PT Task 2 Completion: Completed (08/25/22 1101)    PT Task 3: ankle pumps bilat  PT Reps for Task 3: 10  PT Sets for Task 3: 1  PT Resistance for Task 3: AROM  PT Task 3 Completion: Completed (08/25/22 1101)       PT Task 4: hip abduction bilat  PT Reps for Task 4: 10  PT Resistance for Task 4: AAROM  PT Task 4 Completion: Completed (08/25/22 1101)    Occupational Therapy Exercises    TEP Follow Up Needed: Yes  Therapy Extender Program Discipline: OT        OT Frequency: TEP daily, OT to reassess by 8/31    OT Task 1:  Supine BUE Shoulder flexion/extension  OT Reps for Task 1: 10  OT Sets for Task 1: 1  OT Resistance for Task 1: AROM/AAROM  OT Task 1 Completion: Completed (08/25/22 1101)    OT Task 2: Supine BUE elbow flexion/extension  OT Reps for Task 2: 10  OT Sets for Task 2: 1  OT Resistance for Task 2: AROM/AAROM  OT Task 2 Completion: Completed (08/25/22 1101)    OT Task 3: Supine hand open/close  OT Reps for Task 3: 10  OT Sets for Task 3: 1  OT Resistance for Task 3: AROM/ AAROM  OT Task 3 Completion: Completed       Speech Therapy Exercises    TEP Follow Up Needed: Yes                                                                        normal

## 2022-11-08 ENCOUNTER — APPOINTMENT (OUTPATIENT)
Dept: CARDIOLOGY | Facility: CLINIC | Age: 52
End: 2022-11-08

## 2022-11-08 DIAGNOSIS — I26.99 OTHER PULMONARY EMBOLISM W/OUT ACUTE COR PULMONALE: ICD-10-CM

## 2022-11-08 PROCEDURE — 99214 OFFICE O/P EST MOD 30 MIN: CPT

## 2022-11-08 RX ORDER — RIVAROXABAN 10 MG/1
10 TABLET, FILM COATED ORAL
Qty: 10 | Refills: 0 | Status: ACTIVE | COMMUNITY
Start: 2022-01-12 | End: 1900-01-01

## 2022-11-10 PROBLEM — I26.99 PULMONARY EMBOLUS: Status: ACTIVE | Noted: 2021-11-29
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
## 2022-11-10 NOTE — ASSESSMENT
[FreeTextEntry1] : 1. Bilateral PE without right heart strain\par --HD stable, on RA with SpO2 high 90s\par --Negative cardiac biomarkers\par --TTE with grossly normal LV systolic function.\par --PESI risk index 53 points, Class I, Very Low Risk: 0-1.6% 30-day mortality in\par this group.\par 2. R peroneal and soleal vein DVT\par --No evidence of phlegmasia on exam, report of numbness over right inner thigh,\par intact motion\par 3. Post-surgical state (gastric sleeve surgery 5 days prior to presentation),\par immobile, morbidly obese, male sex\par \par Plan:\par 1  He has completed over 6 months of full dose a/c for a provoked PE./DVT.  Dimer negative, FVL and PTGM neg\par 2.  Will stop xarleto (discussed with patient < 1% chance of recurrence)\par 3.  Start aspirin 81mg daily - this can reduce the recurrence rate by 20%\par 4.  Repeat venous duplex for surveillance in 1 month\par 5.  Rx given to patient for xarelto 10mg 1 hour prior to flight or long drives\par 6.  He knows red-flags of recurrent DVT/PE\par 7.  Call to see me prior to any major surgery for DVT ppx\par 8.  Return in 1 months.

## 2022-12-01 ENCOUNTER — APPOINTMENT (OUTPATIENT)
Dept: ULTRASOUND IMAGING | Facility: CLINIC | Age: 52
End: 2022-12-01

## 2022-12-01 PROCEDURE — 93970 EXTREMITY STUDY: CPT

## 2022-12-02 NOTE — DIETITIAN INITIAL EVALUATION ADULT. - PROBLEM/PLAN-1
[address]  PHYSICAL THERAPY PLAN OF CARE   [address], [address] [facility]         Ph: [phone]  Fax: [phone]    [] Certification  [] Recertification [x]  Plan of Care  [] Progress Note [] Discharge      Referring Provider: Fani Reyna MD     From:  Rochetoni Cuellar, PT, DPT  Patient: Chan Rivas (44 y.o. female) : 1973 Date: 2022  Medical Diagnosis: Strain of unspecified muscle, fascia and tendon at shoulder and upper arm level, left arm, initial encounter [D23.101K]  Strain of muscle, fascia and tendon at neck level, initial encounter [S16. 1XXA]       Treatment Diagnosis: L shoulder and neck pain,decreased L shoulder AROM, decreased cervical AROM, decreased thoracic rotation AROM, decreased L UE strength, decreased posture, decreased activity tolerance, and signifciant soft tissue reestrictions throughout cervical and shoulder complex    Plan of Care/Certification Expiration Date: 22   Progress Report Period from:  2022  to 2022    Visits to Date: 1 No Show: 0 Cancelled Appts: 0    OBJECTIVE:   Short Term Goals - Time Frame for Short Term Goals: 2-3 weeks    Goals Current/Discharge status  Status   Short Term Goal 1: The pt will demonstrate improved postural awareness requiring <25% VC's with exercises   poor New   Short Term Goal 2: The pt will have decreased L UE pain </= 3/10 at worst in order to increase ease with ADL's  Pain Screening  Patient Currently in Pain: Yes  Pain Assessment: 0-10  Pain Level: 0  Best Pain Level: 0  Worst Pain Level: 10  Pain Type: Chronic pain  Pain Location: Shoulder  Pain Orientation: Left  Pain Descriptors: Sharp, Stabbing  Pain Frequency: Intermittent  Pain Onset: Sudden  Functional Pain Assessment: Prevents or interferes with all active and some passive activities  Aggravating factors:  Movement, Reaching, Lifting, Carrying  Pain Management/Relieving Factors: Rest, Medications   New     Long Term Goals - Time Frame for Long Term Goals : 4-6 weeks  Goals Current/ Discharge status Status   Long Term Goal 1: The pt will be indep/compliant with HEP in order to self-manage symptoms upon D/C  ongoing New   Long Term Goal 2: The pt will have an increase in UEFI score >/=70/80 and decrease in NDI score to 0/50 points in order to increase functional activity tolerance Exam: UEFI 59/ 80, NDI 9/50   New   Long Term Goal 3: The pt will demonstrate improved L UE strength >/=4+/5 in order to lift/carry up to 50# for work related duties at [address] [place] [address] Rd Trapezius: 4/5  L UE Strength Comment: shoulder 2-/5, elbow flex 4/5 ext 4-/5 New   Long Term Goal 4: The pt will demo improved L shoulder and cervical AROM WNL's and B thoracic rotation >/=75% in order to increase ease with UE ADL's   AROM LUE (degrees)  L Shoulder Flexion (0-180): 70 with pain  L Shoulder Extension (0-45): 20 with pain  L Shoulder ABduction (0-180): 62 with pain  L Shoulder Int Rotation  (0-70): Greater trochanter with pain, unable to reach midline  L Shoulder Ext Rotation  (0-90): T1     AROM Cervical Spine   Cervical spine general AROM: flex 65, ext 30, SB L 25 with pain R 35, Rot L 40 with pain R 73      AROM Thoracic Spine   Thoracic spine general AROM: B rotation 50% New     Body Structures, Functions, Activity Limitations Requiring Skilled Therapeutic Intervention: Decreased posture, Increased pain, Decreased ADL status, Decreased ROM, Decreased strength, Decreased tolerance to work activity  Assessment: Pt presents with onset of L shoulder and neck pain beginning after a work related injury where she slipped off a stool reaching into a machine and landed on her L UE 10/19/22. She currently presents with decreased L shoulder AROM, decreased cervical AROM, decreased thoracic rotation AROM, decreased L UE strength, decreased posture, decreased activity tolerance, and signifciant soft tissue reestrictions throughout cervical and shoulder complex.  These impairments currently limit her fucntional abilities to reach, lift, carry, perform ADL's, household duties, work duties, push, pull, or sleep without limitations at PLOF. Therapy Prognosis: Good    PT Education: Goals;PT Role;Plan of Care;Evaluative findings; Insurance;Home Exercise Program    PLAN: [x] Evaluate and Treat  Frequency/Duration:  Plan Frequency: 2  Plan weeks: 4-6  Current Treatment Recommendations: Strengthening, ROM, Neuromuscular re-education, Manual, Pain management, Return to work related activity, Home exercise program, Patient/Caregiver education & training, Modalities, Positioning, Dry needling  Modalities: Heat/Cold, Mechanical Traction, Ultrasound, E-stim - unattended, E-stim - manual                    Patient Status:[x] Continue/ Initiate plan of Care     [] Discharge PT. Recommend pt continue with HEP. [] Additional visits requested, Please re-certify for additional visits:     [] Hold        Signature: Electronically signed by Nedra Will PT on 12/2/22 at 4:01 PM EST    If you have any questions or concerns, please don't hesitate to call. Thank you for your referral.    I have reviewed this plan of care and certify a need for medically necessary rehabilitation services.     Physician Signature:__________________________________________________________  Date:  Please sign and return DISPLAY PLAN FREE TEXT

## 2022-12-09 ENCOUNTER — APPOINTMENT (OUTPATIENT)
Dept: SURGERY | Facility: CLINIC | Age: 52
End: 2022-12-09

## 2022-12-12 ENCOUNTER — APPOINTMENT (OUTPATIENT)
Dept: SURGERY | Facility: CLINIC | Age: 52
End: 2022-12-12

## 2022-12-19 ENCOUNTER — APPOINTMENT (OUTPATIENT)
Dept: SURGERY | Facility: CLINIC | Age: 52
End: 2022-12-19

## 2022-12-19 VITALS
TEMPERATURE: 96.7 F | RESPIRATION RATE: 18 BRPM | SYSTOLIC BLOOD PRESSURE: 121 MMHG | BODY MASS INDEX: 26.42 KG/M2 | HEART RATE: 61 BPM | OXYGEN SATURATION: 99 % | DIASTOLIC BLOOD PRESSURE: 73 MMHG | HEIGHT: 67 IN | WEIGHT: 168.31 LBS

## 2022-12-19 DIAGNOSIS — E44.1 MILD PROTEIN-CALORIE MALNUTRITION: ICD-10-CM

## 2022-12-19 PROCEDURE — 99213 OFFICE O/P EST LOW 20 MIN: CPT

## 2022-12-19 NOTE — ASSESSMENT
[FreeTextEntry1] : 52-year-old male status post sleeve gastrectomy 11/17/2021 with excellent weight loss.\par Excess skin causing intertrigo, pain and discomfort.

## 2022-12-19 NOTE — PLAN
[FreeTextEntry1] : [] nutritional labs ordered\par [] cont bariatric diet and consult with nutritionist as needed\par [] goal 30 min cardiovascular exercise daily, with some weight resistance training as tolerated\par [] continue multivitamins\par [] Continue psychiatric follow-up\par [] Referred to plastic surgery\par [] f/u 1 year
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
## 2022-12-19 NOTE — PHYSICAL EXAM
[Normal] : affect appropriate [de-identified] : Normal respirations [de-identified] : Soft, nondistended, minimally tender to deep palpation in epigastrium without guarding or rebound. [de-identified] : Excess skin at mammary folds, lower abdomen, lower back, and buttocks with intertrigo

## 2023-02-14 NOTE — PROGRESS NOTE ADULT - SUBJECTIVE AND OBJECTIVE BOX
Green Team Surgery Daily Progress Note    Subjective:   Patient seen at bedside this AM. Reports feeling better, with some slight dizziness. Denies chest pain, SOB, lightheadedness. Tolerating diet without N/V.     24h Events:   - Overnight, no acute events    Objective:  Vital Signs  T(C): 36.7 (11-23 @ 00:38), Max: 37.4 (11-22 @ 22:30)  HR: 76 (11-23 @ 02:00) (74 - 83)  BP: 124/72 (11-23 @ 02:00) (124/72 - 144/83)  RR: 23 (11-23 @ 02:00) (17 - 23)  SpO2: 92% (11-23 @ 02:00) (92% - 98%)  11-22-21 @ 07:01  -  11-23-21 @ 03:05  --------------------------------------------------------  IN:  Total IN: 0 mL    OUT:    Voided (mL): 525 mL  Total OUT: 525 mL    Total NET: -525 mL          Physical Exam:  GEN: resting in bed comfortably in NAD  RESP: no increased WOB  ABD: distended, nontender to palpation, incisions c/d/i  EXTR: warm, well-perfused without gross deformities; spontaneous movement in b/l U/L extrem  NEURO: AAOx4    Labs:                        14.1   8.93  )-----------( 211      ( 23 Nov 2021 01:12 )             41.9   11-23    137  |  102  |  8   ----------------------------<  90  3.7   |  19<L>  |  0.84    Ca    9.0      23 Nov 2021 01:12  Phos  2.5     11-23  Mg     1.9     11-23    TPro  7.1  /  Alb  3.8  /  TBili  0.5  /  DBili  x   /  AST  17  /  ALT  26  /  AlkPhos  85  11-22    CAPILLARY BLOOD GLUCOSE          Medications:   MEDICATIONS  (STANDING):  atorvastatin 40 milliGRAM(s) Oral at bedtime  chlorhexidine 2% Cloths 1 Application(s) Topical <User Schedule>  heparin  Infusion 2100 Unit(s)/Hr (21 mL/Hr) IV Continuous <Continuous>  nystatin Powder 1 Application(s) Topical two times a day  pantoprazole  Injectable 40 milliGRAM(s) IV Push every 24 hours    MEDICATIONS  (PRN):  acetaminophen   IVPB .. 1000 milliGRAM(s) IV Intermittent every 6 hours PRN Mild Pain (1 - 3)  hydrocortisone 1% Cream 1 Application(s) Topical daily PRN Rash and/or Itching  hyoscyamine SL 0.125 milliGRAM(s) SubLingual every 6 hours PRN nausea  oxyCODONE    Solution 5 milliGRAM(s) Oral every 6 hours PRN Severe Pain (7 - 10)  oxyCODONE    Solution 2.5 milliGRAM(s) Oral every 6 hours PRN Moderate Pain (4 - 6)      Imaging:       Home

## 2023-04-07 NOTE — ED BEHAVIORAL HEALTH ASSESSMENT NOTE - NS ED BHA PLAN HANDOFF TO INPATIENT PROVIDER YESNO
Pt due for Prolia 5/12/23    Last Dexa -2.5  DEXA Bone Density Axial (05/04/2022 09:14)    Last CMP- Calcium 9.3  Comprehensive Metabolic Panel (11/11/2022 10:01)    Last office Visit   Office Visit with Rosalinda Irizarry MD (10/25/2022)   Yes

## 2023-05-01 NOTE — CONSULT NOTE ADULT - SUBJECTIVE AND OBJECTIVE BOX
Patient is a 51y old  Male who presents with a chief complaint of weakness fatigue diarrhea abdominal pain (21 Mar 2022 14:35)    BRIEF HOSPITAL COURSE:   HPI:  50 y/o M with hx of HTN, HLD, PE, DVT on xarelto, gastric sleeve in 2021 by Dr. Rodriguez at [facility] presents with c/o vomiting, diarrhea, fever, chills, body aches and generalized weakness. Pt states that he was on a business trip in [place] last week, started having vomiting and non-bloody watery diarrhea on 3/16, returned to [place] on 3/19 and started also having tactile fever, chills, generalized weakness and body aches. States that he has no vomiting for 2 days but has worsening lethargy. Pt also admits to upper abdominal discomfort. Denies CP, cough, runny nose, sore throat, known sick contacts. Pt is vaccinated x 3 for covid. Had neg rapid covid test at home yesterday.    In the ED:  Vitals 113/79 BP, HR 80, T 97.5F, 18 RR, 97% SpO2  Pertinent labs include Lipase 555, serum pro-BNP 3413, troponin 131.4. UA wnl.   CXR shows clear lungs on official read.  CT abd/pelvis with IV contrast shows No CT evidence of bowel obstruction or colitis. Mild intrahepatic biliary duct dilatation. MRCP may be obtained for further assessment.  s/p 2L NS bolus, 1000mg tylenol IV, 20mg famotidine IV, 4mg zofran IV, 4mg morphine IV x 2 doses (21 Mar 2022 16:05)    Events last 24 hours:   - /67  - Patient w/abdominal symptoms, epigastric pain, N/V/D, weakness, fatigue  - Clinically okay, perfusing well  - POCUS reveals IVC 2.10, VTI 13.5, persevered systolic function, RV<LV, A-line predominance b/l     Review of Systems:  Negative besides mentioned in HPI    PAST MEDICAL & SURGICAL HISTORY:  Dyslipidemia  Pericardial cyst  , resolved  High cholesterol  HTN (hypertension)  Morbid obesity due to excess calories  Pulmonary embolism  Status post hip surgery  R hip, repair of labrum,   H/O hernia repair  2010 x2  H/O vasectomy    S/P colonoscopy    H/O gastric sleeve    Medications:  sodium chloride 0.9% Bolus 1000 milliLiter(s) IV Bolus once    ICU Vital Signs Last 24 Hrs  T(C): 36.4 (21 Mar 2022 11:00), Max: 36.4 (21 Mar 2022 11:00)  T(F): 97.5 (21 Mar 2022 11:00), Max: 97.5 (21 Mar 2022 11:00)  HR: 64 (21 Mar 2022 14:20) (50 - 80)  BP: 92/56 (21 Mar 2022 14:20) (50/35 - 113/79)  RR: 18 (21 Mar 2022 11:30) (18 - 18)  SpO2: 100% (21 Mar 2022 11:30) (97% - 100%)    I&O's Detail    LABS:                        15.6   5.44  )-----------( 194      ( 21 Mar 2022 11:42 )             44.4     03-    138  |  106  |  11  ----------------------------<  113<H>  3.7   |  25  |  0.83    Ca    9.0      21 Mar 2022 11:42  Mg     2.3     -    TPro  6.9  /  Alb  3.1<L>  /  TBili  0.8  /  DBili  x   /  AST  26  /  ALT  38  /  AlkPhos  69  03-    PT/INR - ( 21 Mar 2022 11:42 )   PT: 19.8 sec;   INR: 1.68 ratio    PTT - ( 21 Mar 2022 11:42 )  PTT:32.2 sec    Urinalysis Basic - ( 21 Mar 2022 15:48 )  Color: Yellow / Appearance: Clear / S.015 / pH: x  Gluc: x / Ketone: Moderate  / Bili: Small / Urobili: 4   Blood: x / Protein: 30 mg/dL / Nitrite: Negative   Leuk Esterase: Trace / RBC: 0-2 /HPF / WBC 3-5   Sq Epi: x / Non Sq Epi: Occasional / Bacteria: Occasional    CULTURES:  Rapid RVP Result: NotDetec ( @ 12:18)    Physical Examination:  General: No acute distress.    HEENT: Pupils equal, reactive to light.  Symmetric.  PULM: Clear to auscultation bilaterally, no significant sputum production  NECK: Supple, no lymphadenopathy, trachea midline  CVS: Regular rate and rhythm, no murmurs, rubs, or gallops  ABD: Soft, nondistended, Tender to paplpation, normoactive bowel sounds, no masses  EXT: No edema, nontender  SKIN: Warm and well perfused, no rashes noted.  NEURO: Alert, oriented, interactive, nonfocal  POCUS: IVC 2.10, VTI 13.5, persevered systolic function, RV<LV, A-line predominance b/l   RADIOLOGY:   < from: CT Abdomen and Pelvis w/ IV Cont (22 @ 13:50) >    ACC: [id number] EXAM:  CT ABDOMEN AND PELVIS IC                          PROCEDURE DATE:  2022      INTERPRETATION:  CLINICAL INFORMATION: Abdominal pain and vomiting and   diarrhea    COMPARISON: 2021    CONTRAST/COMPLICATIONS:  IV Contrast: Omnipaque 350  90 cc administered   10 cc discarded  Oral Contrast: NONE  Complications: None reported at time of study completion    PROCEDURE:  CT of the Abdomen and Pelvis was performed.  Sagittal and coronal reformats were performed.    FINDINGS:  LOWER CHEST: Lower lobe atelectasis.    LIVER: Multiple subcentimeter hepatic hypodensities, too small to further   characterize.  BILE DUCTS: Mild intrahepatic dilatation.  GALLBLADDER: Within normal limits.  SPLEEN: Within normal limits.  PANCREAS: Within normal limits.  ADRENALS: Within normal limits.  KIDNEYS/URETERS: Within normal limits.    BLADDER: Within normal limits.  REPRODUCTIVE ORGANS: Prostate within normal limits.    BOWEL: Sleeve gastrectomy. No bowel obstruction. Appendix is within   normal limits.  PERITONEUM: No ascites.  VESSELS: Atherosclerotic changes.  RETROPERITONEUM/LYMPH NODES: Several prominent retroperitoneal lymph   nodes.  ABDOMINAL WALL: Right inguinal hernia repair.  BONES: Degenerative changes.    IMPRESSION:  No CT evidence of bowel obstruction or colitis.  Mild intrahepatic biliary duct dilatation. MRCP may be obtained for   further assessment.      --- End of Report ---    BREE WEISS MD; Attending Radiologist  This document has been electronically signed. Mar 21 2022  2:07PM    < end of copied text >     Cephalexin Pregnancy And Lactation Text: This medication is Pregnancy Category B and considered safe during pregnancy.  It is also excreted in breast milk but can be used safely for shorter doses.

## 2023-05-09 ENCOUNTER — APPOINTMENT (OUTPATIENT)
Dept: CARDIOLOGY | Facility: CLINIC | Age: 53
End: 2023-05-09
Payer: COMMERCIAL

## 2023-05-09 DIAGNOSIS — I82.409 ACUTE EMBOLISM AND THROMBOSIS OF UNSPECIFIED DEEP VEINS OF UNSPECIFIED LOWER EXTREMITY: ICD-10-CM

## 2023-05-09 PROCEDURE — 99214 OFFICE O/P EST MOD 30 MIN: CPT

## 2023-05-21 NOTE — ASSESSMENT
[FreeTextEntry1] : 1. Bilateral PE without right heart strain\par --HD stable, on RA with SpO2 high 90s\par --Negative cardiac biomarkers\par --TTE with grossly normal LV systolic function.\par --PESI risk index 53 points, Class I, Very Low Risk: 0-1.6% 30-day mortality in\par this group.\par 2. R peroneal and soleal vein DVT\par --No evidence of phlegmasia on exam, report of numbness over right inner thigh,\par intact motion\par 3. Post-surgical state (gastric sleeve surgery 5 days prior to presentation),\par immobile, morbidly obese, male sex\par \par Plan:\par  \par 1  Continue aspirin 81mg daily - this can reduce the recurrence rate by 20%\par 2  xarelto 10mg 1 hour prior to flight or long drives\par 3  He knows red-flags of recurrent DVT/PE\par 4.   Call to see me prior to any major surgery for DVT ppx\par 5.  Return in 1 year
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
## 2023-10-27 ENCOUNTER — APPOINTMENT (OUTPATIENT)
Dept: ORTHOPEDIC SURGERY | Facility: CLINIC | Age: 53
End: 2023-10-27
Payer: COMMERCIAL

## 2023-10-27 VITALS — HEIGHT: 67 IN | BODY MASS INDEX: 25.58 KG/M2 | WEIGHT: 163 LBS

## 2023-10-27 PROCEDURE — 99244 OFF/OP CNSLTJ NEW/EST MOD 40: CPT

## 2023-10-27 PROCEDURE — 73010 X-RAY EXAM OF SHOULDER BLADE: CPT | Mod: RT

## 2023-10-27 PROCEDURE — 73030 X-RAY EXAM OF SHOULDER: CPT | Mod: RT

## 2023-10-27 RX ORDER — METHYLPREDNISOLONE 4 MG/1
4 TABLET ORAL
Qty: 1 | Refills: 0 | Status: ACTIVE | COMMUNITY
Start: 2023-10-27 | End: 1900-01-01

## 2023-11-01 ENCOUNTER — RESULT REVIEW (OUTPATIENT)
Age: 53
End: 2023-11-01

## 2023-11-01 ENCOUNTER — APPOINTMENT (OUTPATIENT)
Dept: MRI IMAGING | Facility: CLINIC | Age: 53
End: 2023-11-01
Payer: COMMERCIAL

## 2023-11-01 PROCEDURE — 73221 MRI JOINT UPR EXTREM W/O DYE: CPT | Mod: RT

## 2023-11-01 NOTE — ED PROVIDER NOTE - QRS
Patient is asking for refills, previous encounter is unclear if refills were sent, LOV 7/11/23 with Nuha. NOV n/a   84

## 2023-11-03 ENCOUNTER — APPOINTMENT (OUTPATIENT)
Dept: ORTHOPEDIC SURGERY | Facility: CLINIC | Age: 53
End: 2023-11-03

## 2023-11-06 ENCOUNTER — APPOINTMENT (OUTPATIENT)
Dept: ORTHOPEDIC SURGERY | Facility: CLINIC | Age: 53
End: 2023-11-06
Payer: COMMERCIAL

## 2023-11-06 VITALS — HEIGHT: 67 IN | BODY MASS INDEX: 25.58 KG/M2 | WEIGHT: 163 LBS

## 2023-11-06 PROCEDURE — ZZZZZ: CPT

## 2023-11-08 ENCOUNTER — APPOINTMENT (OUTPATIENT)
Dept: MRI IMAGING | Facility: CLINIC | Age: 53
End: 2023-11-08
Payer: COMMERCIAL

## 2023-11-08 PROCEDURE — 73221 MRI JOINT UPR EXTREM W/O DYE: CPT | Mod: LT

## 2023-11-13 ENCOUNTER — APPOINTMENT (OUTPATIENT)
Dept: ORTHOPEDIC SURGERY | Facility: CLINIC | Age: 53
End: 2023-11-13
Payer: COMMERCIAL

## 2023-11-13 VITALS — HEIGHT: 67 IN | BODY MASS INDEX: 25.58 KG/M2 | WEIGHT: 163 LBS

## 2023-11-13 DIAGNOSIS — M25.511 PAIN IN RIGHT SHOULDER: ICD-10-CM

## 2023-11-13 DIAGNOSIS — M25.512 PAIN IN LEFT SHOULDER: ICD-10-CM

## 2023-11-13 PROCEDURE — 20611 DRAIN/INJ JOINT/BURSA W/US: CPT | Mod: 50

## 2023-11-13 PROCEDURE — 73030 X-RAY EXAM OF SHOULDER: CPT | Mod: LT

## 2023-11-13 PROCEDURE — 99214 OFFICE O/P EST MOD 30 MIN: CPT | Mod: 25

## 2023-11-13 PROCEDURE — 73010 X-RAY EXAM OF SHOULDER BLADE: CPT | Mod: LT

## 2023-11-20 ENCOUNTER — APPOINTMENT (OUTPATIENT)
Dept: ORTHOPEDIC SURGERY | Facility: CLINIC | Age: 53
End: 2023-11-20
Payer: COMMERCIAL

## 2023-11-20 VITALS — WEIGHT: 163 LBS | HEIGHT: 67 IN | BODY MASS INDEX: 25.58 KG/M2

## 2023-11-20 PROCEDURE — ZZZZZ: CPT

## 2023-11-21 NOTE — DISCHARGE NOTE PROVIDER - COLLABORATE WITH
Take the medications as prescribed. Return for any worsening or new symptoms. Follow up with Primary Care Provider in the next 2-3 days.    
Resident

## 2023-12-11 ENCOUNTER — APPOINTMENT (OUTPATIENT)
Dept: SURGERY | Facility: CLINIC | Age: 53
End: 2023-12-11
Payer: COMMERCIAL

## 2023-12-15 ENCOUNTER — APPOINTMENT (OUTPATIENT)
Dept: SURGERY | Facility: CLINIC | Age: 53
End: 2023-12-15
Payer: COMMERCIAL

## 2023-12-15 VITALS — BODY MASS INDEX: 25.84 KG/M2 | WEIGHT: 165 LBS

## 2023-12-15 DIAGNOSIS — E66.01 MORBID (SEVERE) OBESITY DUE TO EXCESS CALORIES: ICD-10-CM

## 2023-12-15 PROCEDURE — 99213 OFFICE O/P EST LOW 20 MIN: CPT | Mod: 95

## 2023-12-15 NOTE — PLAN
[FreeTextEntry1] : [] nutritional labs ordered [] cont bariatric diet and consult with nutritionist as needed [] goal 30 min cardiovascular exercise daily, with some weight resistance training as tolerated [] continue multivitamins [] f/u 1 year

## 2023-12-15 NOTE — PHYSICAL EXAM
[Normal] : affect appropriate [de-identified] : Normal respirations [de-identified] : Excess skin at mammary folds, lower abdomen, lower back, and buttocks with intertrigo

## 2023-12-15 NOTE — ASSESSMENT
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

## 2023-12-15 NOTE — HISTORY OF PRESENT ILLNESS
[de-identified] : LUIS is a 53 year old male here for 2 year postoperative visit s/p laparoscopic sleeve gastrectomy on 11/17/21 Has been maintaining his weight well, at 165-170 lbs. Feels adequate restriction.  "Couldn't be happier." Feels very satisfied with his results. Reports no abdominal pain, reflux, or dysphagia.

## 2023-12-18 ENCOUNTER — APPOINTMENT (OUTPATIENT)
Dept: ORTHOPEDIC SURGERY | Facility: CLINIC | Age: 53
End: 2023-12-18
Payer: COMMERCIAL

## 2023-12-18 VITALS — BODY MASS INDEX: 25.9 KG/M2 | HEIGHT: 67 IN | WEIGHT: 165 LBS

## 2023-12-18 DIAGNOSIS — S43.003A UNSPECIFIED SUBLUXATION OF UNSPECIFIED SHOULDER JOINT, INITIAL ENCOUNTER: ICD-10-CM

## 2023-12-18 DIAGNOSIS — M75.41 IMPINGEMENT SYNDROME OF RIGHT SHOULDER: ICD-10-CM

## 2023-12-18 DIAGNOSIS — S46.812D STRAIN OF OTHER MUSCLES, FASCIA AND TENDONS AT SHOULDER AND UPPER ARM LEVEL, LEFT ARM, SUBSEQUENT ENCOUNTER: ICD-10-CM

## 2023-12-18 DIAGNOSIS — M75.42 IMPINGEMENT SYNDROME OF LEFT SHOULDER: ICD-10-CM

## 2023-12-18 PROCEDURE — ZZZZZ: CPT

## 2023-12-18 NOTE — ASSESSMENT
[FreeTextEntry1] : We discussed his course.  He feels much better after injections. If symptoms recur he will call for follow up.  Questions answered.   Patient seen by Ralph Wing M.D. Entered by Isela Flanagan acting as scribe.

## 2023-12-18 NOTE — DATA REVIEWED
[FreeTextEntry1] : RIGHT SHOULDER MRI ([facility]) 11/1/23: There are AC cysts and spurs. There are no major cuff tears. The muscle is good. the biceps seems ok. 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   LEFT SHOULDER MRI ([facility] [place]) 11/8/23: There are no major posterosuperior cuff tears. The muscle is good. The biceps may be subluxed into a partial subscapularis tear. 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 There are AC changes.

## 2023-12-18 NOTE — REASON FOR VISIT
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  He saw Dr. Wing for this previously.  He most recent visit was 2012.  Surgery was outlined for impingement with an os and AC joint arthritis.  He never proceeded.  He has no new injury.  Reaching and lifting are affected.  He has tried medications without relief.  Sleep can be affected.  An MRI in 2012 showed no significant cuff tear and slight AC fluid. The right MRI was done, and the R AC injection Nov 2023 has helped substantially.  The left MRI was done.  The L AC injection on 11/10 helped ~65%.  The L bicipital injection 11/20/23 helped substantially. He feels about 80% better.

## 2023-12-18 NOTE — HISTORY OF PRESENT ILLNESS
Received 3 page fax for Letter of Medical Necessity for Dr Beckham to complete. Form in the in-basket at 's desk.   [de-identified] : Here for left shoulder. Injection at last visit gave very good relief.  [FreeTextEntry1] : L shoulder [de-identified] : injection, PT

## 2023-12-18 NOTE — PHYSICAL EXAM
[Right] : right shoulder [Standing] : standing [Moderate] : moderate [Left] : left shoulder [Sitting] : sitting [5 ___] : forward flexion 5[unfilled]/5 [5___] : external rotation 5[unfilled]/5 [] : no sensory deficits [TWNoteComboBox4] : passive forward flexion 150 degrees [TWNoteComboBox6] : internal rotation L1 [de-identified] : external rotation 60 degrees

## 2024-04-16 NOTE — ED ADULT NURSE NOTE - NURSING NEURO LEVEL OF CONSCIOUSNESS
Called patient's son and he wanted to know if urine culture had resulted and is any other treatment needed  
Patients son is on the phone and I transferred her to Varun  
Patients son would like a return call in regards to Urine and labs from a week or more ago.   Please call   Thank you,  Aida  
Urine Culture results faxed to [facility] # [phone]  
Venessa from Hosp[facility] called requesting the results for the Urine culture. Informed them of instructions from Dr Bain regardign results and medication changes.  
lethargic

## 2024-06-03 ENCOUNTER — APPOINTMENT (OUTPATIENT)
Dept: ORTHOPEDIC SURGERY | Facility: CLINIC | Age: 54
End: 2024-06-03

## 2024-06-20 NOTE — PATIENT PROFILE ADULT - HARM RISK FACTORS
Clinic Care Coordination Contact    Situation: Patient chart reviewed by care coordinator.    Background: Patient is enrolled in Care Coordination and followed by RN CC.     Assessment: Task initiated to send patient updated Care Plan every 90 days or upon change in condition.     Plan/Recommendations: RN AMY sent patient updated Care Plan via her TutorialTab account.     Cynthia Snell RN, BSN, CPHN, [facility]  [facility]  Phone: [phone]  Email: [email or web address]     no yes

## 2024-09-12 NOTE — ED PROVIDER NOTE - ENMT, MLM
INFLAMMATION/PAIN Airway patent, Nasal mucosa clear. Mouth with normal mucosa. Throat has no vesicles, no oropharyngeal exudates and uvula is midline.

## 2024-10-16 NOTE — PATIENT PROFILE ADULT - LIVING ENVIRONMENT
Phoned patient to f/u on his Mounjaro start. States he actually just took the first dose this past Friday 10/11.    Reviewed will deny this refill request for now as he just started. Asked him to f/u with our office around 10/25 and let us know how he is tolerating, any side effects, and low sugars? Based on that will refill- possibly next dose up.     He verbalized understanding and states will do.    Refill denied for now.    Closing this encounter.      no

## 2024-12-16 ENCOUNTER — APPOINTMENT (OUTPATIENT)
Dept: SURGERY | Facility: CLINIC | Age: 54
End: 2024-12-16
Payer: COMMERCIAL

## 2024-12-16 VITALS — WEIGHT: 190 LBS | BODY MASS INDEX: 29.76 KG/M2

## 2024-12-16 DIAGNOSIS — R63.5 ABNORMAL WEIGHT GAIN: ICD-10-CM

## 2024-12-16 DIAGNOSIS — Z98.84 BARIATRIC SURGERY STATUS: ICD-10-CM

## 2024-12-16 PROCEDURE — 99213 OFFICE O/P EST LOW 20 MIN: CPT

## 2025-03-31 NOTE — ED PROVIDER NOTE - NSTIMEPROVIDERCAREINITIATE_GEN_ER
21-Mar-2022 11:13 Body Location Override (Optional - Billing Will Still Be Based On Selected Body Map Location If Applicable): left parietal scalp Detail Level: Simple Depth Of Biopsy: dermis Was A Bandage Applied: Yes Size Of Lesion In Cm: 0 Anticipated Plan (Based On Presumed Biopsy Results): M Biopsy Type: H and E Biopsy Method: Personna blade Anesthesia Type: 1% lidocaine with 1:100,000 epinephrine and a 1:10 solution of 8.4% sodium bicarbonate Anesthesia Volume In Cc: 0.3 Hemostasis: Drysol Wound Care: Petrolatum Dressing: bandage Destruction After The Procedure: No Type Of Destruction Used: Curettage Curettage Text: The wound bed was treated with curettage after the biopsy was performed. Cryotherapy Text: The wound bed was treated with cryotherapy after the biopsy was performed. Electrodesiccation Text: The wound bed was treated with electrodesiccation after the biopsy was performed. Electrodesiccation And Curettage Text: The wound bed was treated with electrodesiccation and curettage after the biopsy was performed. Silver Nitrate Text: The wound bed was treated with silver nitrate after the biopsy was performed. Lab: [id number] Lab Facility: [id number] Medical Necessity Information: It is in your best interest to select a reason for this procedure from the list below. All of these items fulfill various CMS LCD requirements except the new and changing color options. Consent: Written consent was obtained and risks were reviewed including but not limited to scarring, infection, bleeding, scabbing, incomplete removal, nerve damage and allergy to anesthesia. Post-Care Instructions: I reviewed with the patient in detail post-care instructions. Patient is to keep the biopsy site clean and apply vaseline twice daily until healed. Notification Instructions: Patient will be notified of biopsy results. However, patient instructed to call the office if not contacted within 2 weeks. Billing Type: Third-Party Bill Information: Selecting Yes will display possible errors in your note based on the variables you have selected. This validation is only offered as a suggestion for you. PLEASE NOTE THAT THE VALIDATION TEXT WILL BE REMOVED WHEN YOU FINALIZE YOUR NOTE. IF YOU WANT TO FAX A PRELIMINARY NOTE YOU WILL NEED TO TOGGLE THIS TO 'NO' IF YOU DO NOT WANT IT IN YOUR FAXED NOTE. Body Location Override (Optional - Billing Will Still Be Based On Selected Body Map Location If Applicable): rebiopsy right temple Path Notes (To The Dermatopathologist): Repeat biopsy. Accession #: [id number] Body Location Override (Optional - Billing Will Still Be Based On Selected Body Map Location If Applicable): left nasal tip

## 2025-05-08 NOTE — BH TREATMENT PLAN - NSTXCOPEDATETRGT_PSY_ALL_CORE
Known MIRELLA.  Monitor oxygen saturations at night.  May need home oxygen on discharge  Consider follow-up as outpatient with sleep medicine.  
14-Jul-2022
07-Jul-2022

## (undated) DEVICE — FORCEP RADIAL JAW 4 W NDL 2.4MM 2.8MM 240CM ORANGE DISP

## (undated) DEVICE — DRSG CURITY GAUZE SPONGE 4 X 4" 12-PLY

## (undated) DEVICE — FORCEP RADIAL JAW 4 JUMBO 2.8MM 3.2MM 240CM ORANGE DISP

## (undated) DEVICE — FORMALIN CUPS 10% BUFFERED

## (undated) DEVICE — TUBING SUCTION CONN 6FT STERILE

## (undated) DEVICE — TRAP SPECIMEN SPUTUM 40CC

## (undated) DEVICE — NDL INJ SCLERO INTERJECT 23G

## (undated) DEVICE — RADIAL JAW 4 LG CAPACITY WITH NDL

## (undated) DEVICE — SUCTION YANKAUER TAPERED BULBOUS NO VENT

## (undated) DEVICE — CANISTER SUCTION 1200CC 10/SL

## (undated) DEVICE — VALVE BIOPSY

## (undated) DEVICE — MASK OXYGEN PANORAMIC

## (undated) DEVICE — SOL IRR NS 0.9% 250ML

## (undated) DEVICE — POLY TRAP ETRAP

## (undated) DEVICE — BRUSH COLONOSCOPY CYTOLOGY

## (undated) DEVICE — TUBING IV SET SECONDARY 34"

## (undated) DEVICE — CLAMP BX HOT RAD JAW 3

## (undated) DEVICE — SYR IV POSIFLUSH NS 3ML 30/TY

## (undated) DEVICE — BITE BLOCK MAXI RUBBER STAMP

## (undated) DEVICE — SET IV PUMP BLOOD 1VALVE 180FILTER NON-DEHP

## (undated) DEVICE — MARKER ENDO SPOT EX

## (undated) DEVICE — ENDOCUFF VISION SZ 3 SM PRPL

## (undated) DEVICE — TRAP QUICK CATCH  SINGL CHAMBER

## (undated) DEVICE — SYR LUER LOK 30CC

## (undated) DEVICE — SYR ALLIANCE II INFLATION 60ML

## (undated) DEVICE — CATH IV SAFE BC 20G X 1.16" (PINK)

## (undated) DEVICE — SENSOR O2 FINGER XL ADULT 24/BX 6BX/CA

## (undated) DEVICE — RETRIEVER ROTH NET PLATINUM-UNIVERSAL

## (undated) DEVICE — ELCTR GROUNDING PAD ADULT COVIDIEN

## (undated) DEVICE — CATH ELCTR GLIDE PRB 7FR

## (undated) DEVICE — SUT HEWSON RETRIEVER

## (undated) DEVICE — Device

## (undated) DEVICE — CATH ELECHMSTAT  INJ 7FR 210CM

## (undated) DEVICE — FORCEP RADIAL JAW 4 W NDL 2.2MM 2.8MM 160CM YELLOW DISP

## (undated) DEVICE — SOL IRR POUR H2O 500ML

## (undated) DEVICE — TUBING IV SET GRAVITY 3Y 100" MACRO

## (undated) DEVICE — TUBING IV SET SECOND 34" W/O LOK-BLUNT

## (undated) DEVICE — TUBING ENDO EXT OLYMPUS 160 24HR USE

## (undated) DEVICE — TUBING CANNULA SALTER LABS NASAL ADULT 7FT

## (undated) DEVICE — ENDOCUFF VISION SZ 2 LG GRN

## (undated) DEVICE — BRUSH CYTO ENDO

## (undated) DEVICE — SYR LUER SLIP TIP 50CC

## (undated) DEVICE — CATH IV SAFE BC 22G X 1" (BLUE)

## (undated) DEVICE — STERIS DEFENDO 3-PIECE KIT (AIR/WATER, SUCTION & BIOPSY VALVES)

## (undated) DEVICE — PACK IV START WITH CHG

## (undated) DEVICE — TUBE O2 SUPL CRUSH RESIS CONN SOUTHSIDE ONLY

## (undated) DEVICE — SYR LUER SLIP TIP 30CC

## (undated) DEVICE — SNARE LRG

## (undated) DEVICE — TUBE RECTAL 24FR

## (undated) DEVICE — MASK O2 NON REBREATH 3IN1 ADULT

## (undated) DEVICE — SNARE POLYP SENS 27MM 240CM